# Patient Record
Sex: FEMALE | Race: WHITE | Employment: OTHER | URBAN - METROPOLITAN AREA
[De-identification: names, ages, dates, MRNs, and addresses within clinical notes are randomized per-mention and may not be internally consistent; named-entity substitution may affect disease eponyms.]

---

## 2017-02-27 DIAGNOSIS — F99 INSOMNIA DUE TO OTHER MENTAL DISORDER: ICD-10-CM

## 2017-02-27 DIAGNOSIS — F51.05 INSOMNIA DUE TO OTHER MENTAL DISORDER: ICD-10-CM

## 2017-02-28 NOTE — TELEPHONE ENCOUNTER
Trazodone 50 mg       Last Written Prescription Date: 8/9/16  Last Fill Quantity: 90; # refills: 1  Last Office Visit with [facility], [facility] or [facility] prescribing provider:  11/22/16        Last PHQ-9 score on record=   PHQ-9 SCORE 11/22/2016   Total Score -   Total Score 2       Lab Results   Component Value Date    AST 28 08/04/2015     Lab Results   Component Value Date    ALT 52 05/31/2016

## 2017-03-01 RX ORDER — TRAZODONE HYDROCHLORIDE 50 MG/1
TABLET, FILM COATED ORAL
Qty: 90 TABLET | Refills: 2 | Status: SHIPPED | OUTPATIENT
Start: 2017-03-01 | End: 2019-07-02

## 2017-03-01 NOTE — TELEPHONE ENCOUNTER
Insomnia and Depression     No labs needed  PHQ9 2  Prescription approved per [facility] Refill Protocol.  Blanka Rosales RN- Triage [employer]

## 2017-04-16 DIAGNOSIS — F32.5 MAJOR DEPRESSION IN COMPLETE REMISSION (H): ICD-10-CM

## 2017-04-17 NOTE — TELEPHONE ENCOUNTER
Venlafaxine 150 mg    Last Written Prescription Date: 11/1/16  Last Fill Quantity: 90, # refills: 1  Last Office Visit with [facility], [facility] or [facility] prescribing provider: 11/22/16        BP Readings from Last 3 Encounters:   11/22/16 124/78   06/14/16 136/70   05/31/16 120/70     Pulse: (for Fetzima)  Creatinine   Date Value Ref Range Status   08/04/2015 0.90 0.52 - 1.04 mg/dL Final   ]    Last PHQ-9 score on record=   PHQ-9 SCORE 11/22/2016   Total Score -   Total Score 2

## 2017-04-18 RX ORDER — VENLAFAXINE HYDROCHLORIDE 150 MG/1
CAPSULE, EXTENDED RELEASE ORAL
Qty: 90 CAPSULE | Refills: 1 | Status: SHIPPED | OUTPATIENT
Start: 2017-04-18 | End: 2017-10-22

## 2017-06-01 ENCOUNTER — OFFICE VISIT (OUTPATIENT)
Dept: FAMILY MEDICINE | Facility: CLINIC | Age: 67
End: 2017-06-01
Payer: COMMERCIAL

## 2017-06-01 VITALS
HEART RATE: 88 BPM | RESPIRATION RATE: 14 BRPM | OXYGEN SATURATION: 97 % | HEIGHT: 64 IN | TEMPERATURE: 98 F | DIASTOLIC BLOOD PRESSURE: 70 MMHG | WEIGHT: 147 LBS | BODY MASS INDEX: 25.1 KG/M2 | SYSTOLIC BLOOD PRESSURE: 110 MMHG

## 2017-06-01 DIAGNOSIS — Z23 NEED FOR DTAP VACCINE: ICD-10-CM

## 2017-06-01 DIAGNOSIS — Z23 NEED FOR PNEUMOCOCCAL VACCINE: ICD-10-CM

## 2017-06-01 DIAGNOSIS — R73.01 IMPAIRED FASTING GLUCOSE: ICD-10-CM

## 2017-06-01 DIAGNOSIS — F41.1 GENERALIZED ANXIETY DISORDER: ICD-10-CM

## 2017-06-01 DIAGNOSIS — E78.2 MIXED HYPERLIPIDEMIA: ICD-10-CM

## 2017-06-01 DIAGNOSIS — F32.5 MAJOR DEPRESSION IN COMPLETE REMISSION (H): ICD-10-CM

## 2017-06-01 DIAGNOSIS — K62.5 RECTAL BLEEDING: Primary | ICD-10-CM

## 2017-06-01 DIAGNOSIS — N18.2 CKD (CHRONIC KIDNEY DISEASE) STAGE 2, GFR 60-89 ML/MIN: ICD-10-CM

## 2017-06-01 DIAGNOSIS — E66.3 OVERWEIGHT (BMI 25.0-29.9): ICD-10-CM

## 2017-06-01 LAB
ALT SERPL W P-5'-P-CCNC: 27 U/L (ref 0–50)
ANION GAP SERPL CALCULATED.3IONS-SCNC: 9 MMOL/L (ref 3–14)
BUN SERPL-MCNC: 10 MG/DL (ref 7–30)
CALCIUM SERPL-MCNC: 8.7 MG/DL (ref 8.5–10.1)
CHLORIDE SERPL-SCNC: 102 MMOL/L (ref 94–109)
CHOLEST SERPL-MCNC: 187 MG/DL
CO2 SERPL-SCNC: 28 MMOL/L (ref 20–32)
CREAT SERPL-MCNC: 0.74 MG/DL (ref 0.52–1.04)
CREAT UR-MCNC: 32 MG/DL
GFR SERPL CREATININE-BSD FRML MDRD: 78 ML/MIN/1.7M2
GLUCOSE SERPL-MCNC: 93 MG/DL (ref 70–99)
HDLC SERPL-MCNC: 60 MG/DL
LDLC SERPL CALC-MCNC: 92 MG/DL
MICROALBUMIN UR-MCNC: <5 MG/L
MICROALBUMIN/CREAT UR: NORMAL MG/G CR (ref 0–25)
NONHDLC SERPL-MCNC: 127 MG/DL
POTASSIUM SERPL-SCNC: 3.6 MMOL/L (ref 3.4–5.3)
SODIUM SERPL-SCNC: 139 MMOL/L (ref 133–144)
TRIGL SERPL-MCNC: 177 MG/DL

## 2017-06-01 PROCEDURE — 82043 UR ALBUMIN QUANTITATIVE: CPT | Performed by: FAMILY MEDICINE

## 2017-06-01 PROCEDURE — 80048 BASIC METABOLIC PNL TOTAL CA: CPT | Performed by: FAMILY MEDICINE

## 2017-06-01 PROCEDURE — 84460 ALANINE AMINO (ALT) (SGPT): CPT | Performed by: FAMILY MEDICINE

## 2017-06-01 PROCEDURE — 90732 PPSV23 VACC 2 YRS+ SUBQ/IM: CPT | Performed by: FAMILY MEDICINE

## 2017-06-01 PROCEDURE — 99214 OFFICE O/P EST MOD 30 MIN: CPT | Mod: 25 | Performed by: FAMILY MEDICINE

## 2017-06-01 PROCEDURE — 90715 TDAP VACCINE 7 YRS/> IM: CPT | Mod: GY | Performed by: FAMILY MEDICINE

## 2017-06-01 PROCEDURE — 90471 IMMUNIZATION ADMIN: CPT | Mod: 59 | Performed by: FAMILY MEDICINE

## 2017-06-01 PROCEDURE — 36415 COLL VENOUS BLD VENIPUNCTURE: CPT | Performed by: FAMILY MEDICINE

## 2017-06-01 PROCEDURE — 80061 LIPID PANEL: CPT | Performed by: FAMILY MEDICINE

## 2017-06-01 PROCEDURE — G0009 ADMIN PNEUMOCOCCAL VACCINE: HCPCS | Performed by: FAMILY MEDICINE

## 2017-06-01 ASSESSMENT — ANXIETY QUESTIONNAIRES
2. NOT BEING ABLE TO STOP OR CONTROL WORRYING: NOT AT ALL
3. WORRYING TOO MUCH ABOUT DIFFERENT THINGS: NOT AT ALL
GAD7 TOTAL SCORE: 0
5. BEING SO RESTLESS THAT IT IS HARD TO SIT STILL: NOT AT ALL
6. BECOMING EASILY ANNOYED OR IRRITABLE: NOT AT ALL
1. FEELING NERVOUS, ANXIOUS, OR ON EDGE: NOT AT ALL
7. FEELING AFRAID AS IF SOMETHING AWFUL MIGHT HAPPEN: NOT AT ALL
IF YOU CHECKED OFF ANY PROBLEMS ON THIS QUESTIONNAIRE, HOW DIFFICULT HAVE THESE PROBLEMS MADE IT FOR YOU TO DO YOUR WORK, TAKE CARE OF THINGS AT HOME, OR GET ALONG WITH OTHER PEOPLE: NOT DIFFICULT AT ALL

## 2017-06-01 ASSESSMENT — PATIENT HEALTH QUESTIONNAIRE - PHQ9: 5. POOR APPETITE OR OVEREATING: NOT AT ALL

## 2017-06-01 NOTE — PATIENT INSTRUCTIONS
1.  Weight Loss Tips  1. Do not eat after 6 hrs before your expected bedtime  2. Have your heaviest meal for breakfast, a slightly lighter meal at lunch and a snack 6 hrs before bed  3. No sugar/calorie drinks except milk ie no fruit juice, pop, alcohol.  4. Drink milk 30min before meals to decrease your hunger. Also it is excellent as part of your last meal of the day snack  5. Drink lots of water  6. Increase fiber in diet: all bran cereal, salads, popcorn etc  7. Have only one small serving of fruit a day about 1/2 cup (as this is high in sugar)  8. EXERCISE is the bottom line. Without it, you will gain weight even on a low calorie diet. Best if done 2-3X a day as can    Being overweight contributes to high blood pressure and high cholesterol, both of which cause heart attacks, strokes and kidney failure, prediabetes and diabetes, arthritis, and liver disease     3. . Schedule your mammo at [facility]  At [address] at [phone]

## 2017-06-01 NOTE — MR AVS SNAPSHOT
After Visit Summary   6/1/2017    Emma Rudolph    MRN: [id number]           Patient Information     Date Of Birth          1950        Visit Information        Provider Department      6/1/2017 1:00 PM Angie Camacho MD [facility]        Today's Diagnoses     Impaired fasting glucose    -  1    Major depression in complete remission (H) on meds since 6-12-13        Overweight (BMI 25.0-29.9)        Generalized anxiety disorder        Mixed hyperlipidemia        Rectal bleeding          Care Instructions    1.  Weight Loss Tips  1. Do not eat after 6 hrs before your expected bedtime  2. Have your heaviest meal for breakfast, a slightly lighter meal at lunch and a snack 6 hrs before bed  3. No sugar/calorie drinks except milk ie no fruit juice, pop, alcohol.  4. Drink milk 30min before meals to decrease your hunger. Also it is excellent as part of your last meal of the day snack  5. Drink lots of water  6. Increase fiber in diet: all bran cereal, salads, popcorn etc  7. Have only one small serving of fruit a day about 1/2 cup (as this is high in sugar)  8. EXERCISE is the bottom line. Without it, you will gain weight even on a low calorie diet. Best if done 2-3X a day as can    Being overweight contributes to high blood pressure and high cholesterol, both of which cause heart attacks, strokes and kidney failure, prediabetes and diabetes, arthritis, and liver disease               Follow-ups after your visit        Additional Services     GASTROENTEROLOGY ADULT REF PROCEDURE ONLY       Last Lab Result: Creatinine (mg/dL)       Date                     Value                 08/04/2015               0.90             ----------  Body mass index is 25.23 kg/(m^2).     Needed:  No  Language:  English    Patient will be contacted to schedule procedure.     Please be aware that coverage of these services is subject to the terms and limitations  of your health insurance plan.  Call member services at your health plan with any benefit or coverage questions.  Any procedures must be performed at a [facility] facility OR coordinated by your clinic's referral office.    Please bring the following with you to your appointment:    (1) Any X-Rays, CTs or MRIs which have been performed.  Contact the facility where they were done to arrange for  prior to your scheduled appointment.    (2) List of current medications   (3) This referral request   (4) Any documents/labs given to you for this referral                  Who to contact     If you have questions or need follow up information about today's clinic visit or your schedule please contact [facility] directly at [phone].  Normal or non-critical lab and imaging results will be communicated to you by MyChart, letter or phone within 4 business days after the clinic has received the results. If you do not hear from us within 7 days, please contact the clinic through [employer] or phone. If you have a critical or abnormal lab result, we will notify you by phone as soon as possible.  Submit refill requests through [employer] or call your pharmacy and they will forward the refill request to us. Please allow 3 business days for your refill to be completed.          Additional Information About Your Visit        [employer] Information     [employer] gives you secure access to your electronic health record. If you see a primary care provider, you can also send messages to your care team and make appointments. If you have questions, please call your primary care clinic.  If you do not have a primary care provider, please call [phone] and they will assist you.        Care EveryWhere ID     This is your Care EveryWhere ID. This could be used by other organizations to access your [facility] medical records  [id number]        Your Vitals Were     Pulse Temperature Respirations Height Last Period  "Pulse Oximetry    88 98  F (36.7  C) (Tympanic) 14 5' 4\" (1.626 m) (LMP Unknown) 97%    Breastfeeding? BMI (Body Mass Index)                No 25.23 kg/m2           Blood Pressure from Last 3 Encounters:   06/01/17 110/70   11/22/16 124/78   06/14/16 136/70    Weight from Last 3 Encounters:   06/01/17 147 lb (66.7 kg)   11/22/16 146 lb 8 oz (66.5 kg)   06/14/16 151 lb (68.5 kg)              We Performed the Following     DEPRESSION ACTION PLAN (DAP)     GASTROENTEROLOGY ADULT REF PROCEDURE ONLY        Primary Care Provider Office Phone # Fax #    Angelina Puckett PA-C [phone]       [facility] [address]  [facility] [address]        Thank you!     Thank you for choosing [facility]  for your care. Our goal is always to provide you with excellent care. Hearing back from our patients is one way we can continue to improve our services. Please take a few minutes to complete the written survey that you may receive in the mail after your visit with us. Thank you!             Your Updated Medication List - Protect others around you: Learn how to safely use, store and throw away your medicines at www.disposemymeds.org.          This list is accurate as of: 6/1/17  1:30 PM.  Always use your most recent med list.                   Brand Name Dispense Instructions for use    ASPIRIN PO      Take 81 mg by mouth daily       MULTIVITAMIN PO      Take by mouth daily       simvastatin 20 MG tablet    ZOCOR    90 tablet    TAKE 1 TABLET AT BEDTIME       traZODone 50 MG tablet    DESYREL    90 tablet    TAKE 1 TABLET NIGHTLY AS NEEDED FOR SLEEP       venlafaxine 150 MG 24 hr capsule    EFFEXOR-XR    90 capsule    TAKE 1 CAPSULE DAILY       vitamin D 1000 UNITS capsule      Take 1 capsule by mouth daily.         "

## 2017-06-01 NOTE — NURSING NOTE
"Chief Complaint   Patient presents with     Rectal Problem     /70  Pulse 88  Temp 98  F (36.7  C) (Tympanic)  Resp 14  Ht 5' 4\" (1.626 m)  Wt 147 lb (66.7 kg)  LMP  (LMP Unknown)  SpO2 97%  Breastfeeding? No  BMI 25.23 kg/m2 Estimated body mass index is 25.23 kg/(m^2) as calculated from the following:    Height as of this encounter: 5' 4\" (1.626 m).    Weight as of this encounter: 147 lb (66.7 kg).  BP completed using cuff size: regular   Kelli Napier CMA    Health Maintenance Due   Topic Date Due     MICROALBUMIN Q1 YEAR  11/13/1951     DEXA SCAN SCREENING (SYSTEM ASSIGNED)  11/13/2015     MILENA QUESTIONNAIRE 6 MONTHS  05/22/2017     PHQ-9 Q6 MONTHS  05/22/2017     FALL RISK ASSESSMENT  06/14/2017     Health Maintenance reviewed at today's visit patient asked to schedule/complete:   Depression:  Patient agrees to schedule  Immunizations:  Patient agrees to schedule    "

## 2017-06-01 NOTE — LETTER
My Depression Action Plan  Name: Emma Rudolph   Date of Birth 1950  Date: 6/1/2017    My doctor: Angelina Puckett   My clinic: [address] [address]  [phone]          GREEN    ZONE   Good Control    What it looks like:     Things are going generally well. You have normal up s and down s. You may even feel depressed from time to time, but bad moods usually last less than a day.   What you need to do:  1. Continue to care for yourself (see self care plan)  2. Check your depression survival kit and update it as needed  3. Follow your physician s recommendations including any medication.  4. Do not stop taking medication unless you consult with your physician first.           YELLOW         ZONE Getting Worse    What it looks like:     Depression is starting to interfere with your life.     It may be hard to get out of bed; you may be starting to isolate yourself from others.    Symptoms of depression are starting to last most all day and this has happened for several days.     You may have suicidal thoughts but they are not constant.   What you need to do:     1. Call your care team, your response to treatment will improve if you keep your care team informed of your progress. Yellow periods are signs an adjustment may need to be made.     2. Continue your self-care, even if you have to fake it!    3. Talk to someone in your support network    4. Open up your depression survival kit           RED    ZONE Medical Alert - Get Help    What it looks like:     Depression is seriously interfering with your life.     You may experience these or other symptoms: You can t get out of bed most days, can t work or engage in other necessary activities, you have trouble taking care of basic hygiene, or basic responsibilities, thoughts of suicide or death that will not go away, self-injurious behavior.      What you need to do:  1. Call your care team and request a same-day appointment. If they are not available (weekends or after hours) call your local crisis line, emergency room or 911.      Electronically signed by: Angie Camacho, June 1, 2017    Depression Self Care Plan / Survival Kit    Self-Care for Depression  Here s the deal. Your body and mind are really not as separate as most people think.  What you do and think affects how you feel and how you feel influences what you do and think. This means if you do things that people who feel good do, it will help you feel better.  Sometimes this is all it takes.  There is also a place for medication and therapy depending on how severe your depression is, so be sure to consult with your medical provider and/ or Behavioral Health Consultant if your symptoms are worsening or not improving.     In order to better manage my stress, I will:    Exercise  Get some form of exercise, every day. This will help reduce pain and release endorphins, the  feel good  chemicals in your brain. This is almost as good as taking antidepressants!  This is not the same as joining a gym and then never going! (they count on that by the way ) It can be as simple as just going for a walk or doing some gardening, anything that will get you moving.      Hygiene   Maintain good hygiene (Get out of bed in the morning, Make your bed, Brush your teeth, Take a shower, and Get dressed like you were going to work, even if you are unemployed).  If your clothes don't fit try to get ones that do.    Diet  I will strive to eat foods that are good for me, drink plenty of water, and avoid excessive sugar, caffeine, alcohol, and other mood-altering substances.  Some foods that are helpful in depression are: complex carbohydrates, B vitamins, flaxseed, fish or fish oil, fresh fruits and vegetables.    Psychotherapy  I agree to participate in Individual Therapy (if recommended).    Medication  If prescribed  medications, I agree to take them.  Missing doses can result in serious side effects.  I understand that drinking alcohol, or other illicit drug use, may cause potential side effects.  I will not stop my medication abruptly without first discussing it with my provider.    Staying Connected With Others  I will stay in touch with my friends, family members, and my primary care provider/team.    Use your imagination  Be creative.  We all have a creative side; it doesn t matter if it s oil painting, sand castles, or mud pies! This will also kick up the endorphins.    Witness Beauty  (AKA stop and smell the roses) Take a look outside, even in mid-winter. Notice colors, textures. Watch the squirrels and birds.     Service to others  Be of service to others.  There is always someone else in need.  By helping others we can  get out of ourselves  and remember the really important things.  This also provides opportunities for practicing all the other parts of the program.    Humor  Laugh and be silly!  Adjust your TV habits for less news and crime-drama and more comedy.    Control your stress  Try breathing deep, massage therapy, biofeedback, and meditation. Find time to relax each day.     My support system    Clinic Contact:  Phone number:    Contact 1:  Phone number:    Contact 2:  Phone number:    [employer]/:  Phone number:    Therapist:  Phone number:    [facility] crisis center:    Phone number:    Other community support:  Phone number:

## 2017-06-01 NOTE — LETTER
[facility]  [address]  [facility] [id number] [phone]                                                                                                           Emma Rudolph  [address]  [facility] [address]    June 2, 2017      Dear Emma,    The results of your recent tests were reviewed and are enclosed.     All of your lab results are normal, except as noted below.     The following are explanations of some of our lab tests     THIS DOES NOT MEAN THAT YOU HAD ALL OF THESE DONE     Please continue on the same medications unless a change is noted above     These are some general explanations for tests:     Hgb is the blood iron level   WBC means White Blood Cells   Platelets are small blood cells that help with forming the blood clots along with other blood factors.   Electrolytes are Sodium, Potassium, Calcium, Magnesium, Phosphorus.   Liver tests are: AST, ALT, Bilirubin, Alkaline Phosphatase.###your ALT is just slightly high  And has been this high before , 3 yrs ago   Kidney tests are Creatinine, GFR.   HDL Cholesterol - is the good cholesterol and it is good to have it high.   LDL cholesterol is the bad cholesterol and it is good to have it low.   It is recommended to have LDL less than 130 for people with hypertension and to have it less than 100 for people with heart disease, diabetes and chronic kidney disease.   Triglycerides are another type of lipid and can also cause heart disease so should be kept low. ###very slightly high -not significant   Thyroid tests are TSH, T4, T3   Glucose is sugar###just a little high as it has been in the past   This is a sign of prediabetes . The only way known to prevent diabetes or keep it from getting worse is exercise, 20-40 minutes 3 times a day around the time of meals as your insulin is wearing out  You need to get rid of the sugar using your muscles     A1c is a test that gives  us an idea about how well was controlled the diabetes for the last 3 months.   PSA stands for Prostate Specific Antigen and it can be elevated with prostate cancer or prostate inflammation.              Results for orders placed or performed in visit on 06/01/17   ALT   Result Value Ref Range    ALT 27 0 - 50 U/L   Lipid panel reflex to direct LDL   Result Value Ref Range    Cholesterol 187 <200 mg/dL    Triglycerides 177 (H) <150 mg/dL    HDL Cholesterol 60 >49 mg/dL    LDL Cholesterol Calculated 92 <100 mg/dL    Non HDL Cholesterol 127 <130 mg/dL   Albumin Random Urine Quantitative   Result Value Ref Range    Creatinine Urine 32 mg/dL    Albumin Urine mg/L <5 mg/L    Albumin Urine mg/g Cr Unable to calculate due to low value 0 - 25 mg/g Cr   Basic metabolic panel   Result Value Ref Range    Sodium 139 133 - 144 mmol/L    Potassium 3.6 3.4 - 5.3 mmol/L    Chloride 102 94 - 109 mmol/L    Carbon Dioxide 28 20 - 32 mmol/L    Anion Gap 9 3 - 14 mmol/L    Glucose 93 70 - 99 mg/dL    Urea Nitrogen 10 7 - 30 mg/dL    Creatinine 0.74 0.52 - 1.04 mg/dL    GFR Estimate 78 >60 mL/min/1.7m2    GFR Estimate If Black >90   GFR Calc   >60 mL/min/1.7m2    Calcium 8.7 8.5 - 10.1 mg/dL       Thank you for choosing [facility].  We appreciate the opportunity to serve you and look forward to supporting your healthcare needs in the future.    If you have any questions or concerns, please call me or my staff at [phone].      Sincerely,    Angie Camacho MD

## 2017-06-01 NOTE — NURSING NOTE
Screening Questionnaire for Adult Immunization    Are you sick today?   No   Do you have allergies to medications, food, a vaccine component or latex?   No   Have you ever had a serious reaction after receiving a vaccination?   No   Do you have a long-term health problem with heart disease, lung disease, asthma, kidney disease, metabolic disease (e.g. diabetes), anemia, or other blood disorder?   No   Do you have cancer, leukemia, HIV/AIDS, or any other immune system problem?   No   In the past 3 months, have you taken medications that affect  your immune system, such as prednisone, other steroids, or anticancer drugs; drugs for the treatment of rheumatoid arthritis, Crohn s disease, or psoriasis; or have you had radiation treatments?   No   Have you had a seizure, or a brain or other nervous system problem?   No   During the past year, have you received a transfusion of blood or blood     products, or been given immune (gamma) globulin or antiviral drug?   No   For women: Are you pregnant or is there a chance you could become        pregnant during the next month?   No   Have you received any vaccinations in the past 4 weeks?   No     Immunization questionnaire answers were all negative.      MNVFC doesn't apply on this patient    Per orders of Dr. Camacho, injection of Tdap and Pneumococcal 23 given by Kelli Napier. Patient instructed to remain in clinic for 20 minutes afterwards, and to report any adverse reaction to me immediately.       Screening performed by Kelli Napier on 6/1/2017 at 2:01 PM.

## 2017-06-01 NOTE — PROGRESS NOTES
SUBJECTIVE:                                                    Emma Rudolph is a 66 year old female who presents to clinic today for the following health issues:    Hemorrhoids      Duration: x 1.5 day    Description:   Pain: no   Itching: no     Accompanying signs and symptoms:   Blood in stool: 4 x  Over 2 d and BRB and went to bottom of toilet    Changes in stool pattern: no wnl BM q d     History (similar episodes/previous evaluation): None    Precipitating or alleviating factors: None    Therapies tried and outcome: none    Colonoscopy in 4-15 --> rept in 10 yrs      Glucose Intolerance   Follow-up      Patient is checking blood sugars: not at all    Diabetic concerns: None     Symptoms of hypoglycemia (low blood sugar): none     Paresthesias (numbness or burning in feet) or sores: No     Date of last diabetic eye exam: 2016     MIxed Hyperlipidemia Follow-Up      Rate your low fat/cholesterol diet?: not monitoring fat    Taking statin?  Yes, no muscle aches from 20mgm simvastatin    Other lipid medications/supplements?:  None    fam hx CAD     Had a hi ALT in the past r/o from statin      OVERWEIGHT    -steady wt for yrs   -comorbid hi LDL , Glucose Intolerance    CKD Stage 3    -no use of NSAIDS  -comorbid hi LDL     Depression and Anxiety Follow-Up    Status since last visit: No change-in remission on meds     Other associated symptoms:None    Complicating factors:     Significant life event: No     Current substance abuse: None    No insomnia off trazodone for 3 mo     PHQ-9 SCORE 5/31/2016 6/14/2016 11/22/2016   Total Score - - -   Total Score 4 0 2     MILENA-7 SCORE 5/31/2016 6/14/2016 11/22/2016   Total Score 0 0 3        PHQ-9  English =0     PHQ-9   Any Language     GAD7=0       Problem list and histories reviewed & adjusted, as indicated.  Additional history: as documented    Labs reviewed in EPIC    Reviewed and updated as needed this visit by clinical staff  Tobacco  Allergies  Meds  Med  "Hx  Surg Hx  Fam Hx  Soc Hx      Reviewed and updated as needed this visit by Provider         ROS:  C: NEGATIVE for fever, chills, change in weight  I: NEGATIVE for worrisome rashes, moles or lesions  E: NEGATIVE for vision changes or irritation  E/M: NEGATIVE for ear, mouth and throat problems  R: NEGATIVE for significant cough or SOB  B: NEGATIVE for masses, tenderness or discharge  CV: NEGATIVE for chest pain, palpitations or peripheral edema  GI: POSITIVE for  and hematochezia  : NEGATIVE for frequency, dysuria, or hematuria  M: NEGATIVE for significant arthralgias or myalgia  N: NEGATIVE for weakness, dizziness or paresthesias  E: NEGATIVE for temperature intolerance, skin/hair changes  H: NEGATIVE for bleeding problems  P: NEGATIVE for changes in mood or affect    OBJECTIVE:                                                    /70  Pulse 88  Temp 98  F (36.7  C) (Tympanic)  Resp 14  Ht 5' 4\" (1.626 m)  Wt 147 lb (66.7 kg)  LMP  (LMP Unknown)  SpO2 97%  Breastfeeding? No  BMI 25.23 kg/m2  Body mass index is 25.23 kg/(m^2).  GENERAL: healthy, alert and no distress  EYES: Eyes grossly normal to inspection, PERRL and conjunctivae and sclerae normal  RESP: lungs clear to auscultation - no rales, rhonchi or wheezes  CV: regular rate and rhythm, normal S1 S2, no S3 or S4, no murmur, click or rub, no peripheral edema and peripheral pulses strong  ABDOMEN: soft, nontender, without hepatosplenomegaly or masses, soft, nontender and bowel sounds normal  MS: no gross musculoskeletal defects noted, no edema  SKIN: no suspicious lesions or rashes  NEURO: Normal strength and tone, mentation intact and speech normal  PSYCH: mentation appears normal, affect normal/bright    Diagnostic Test Results:  Results for orders placed or performed in visit on 06/01/17   ALT   Result Value Ref Range    ALT 27 0 - 50 U/L   Lipid panel reflex to direct LDL   Result Value Ref Range    Cholesterol 187 <200 mg/dL    " Triglycerides 177 (H) <150 mg/dL    HDL Cholesterol 60 >49 mg/dL    LDL Cholesterol Calculated 92 <100 mg/dL    Non HDL Cholesterol 127 <130 mg/dL   Albumin Random Urine Quantitative   Result Value Ref Range    Creatinine Urine 32 mg/dL    Albumin Urine mg/L <5 mg/L    Albumin Urine mg/g Cr Unable to calculate due to low value 0 - 25 mg/g Cr   Basic metabolic panel   Result Value Ref Range    Sodium 139 133 - 144 mmol/L    Potassium 3.6 3.4 - 5.3 mmol/L    Chloride 102 94 - 109 mmol/L    Carbon Dioxide 28 20 - 32 mmol/L    Anion Gap 9 3 - 14 mmol/L    Glucose 93 70 - 99 mg/dL    Urea Nitrogen 10 7 - 30 mg/dL    Creatinine 0.74 0.52 - 1.04 mg/dL    GFR Estimate 78 >60 mL/min/1.7m2    GFR Estimate If Black >90   GFR Calc   >60 mL/min/1.7m2    Calcium 8.7 8.5 - 10.1 mg/dL        ASSESSMENT/PLAN:                                                              ICD-10-CM    1. Rectal bleeding K62.5 GASTROENTEROLOGY ADULT REF PROCEDURE ONLY   2. Major depression in complete remission (H) on meds since 6-12-13 F32.5    3. Generalized anxiety disorder F41.1    4. Impaired fasting glucose R73.01    5. Overweight (BMI 25.0-29.9) E66.3 ALT   6. Mixed hyperlipidemia E78.2 ALT     Lipid panel reflex to direct LDL   7. Need for pneumococcal vaccine Z23 PNEUMOVAX - MEDICARE     Pneumococcal vaccine 23 valent PPSV23  (Pneumovax) [06667]   8. Need for DTaP vaccine Z23 TDAP VACCINE (ADACEL)     ADMIN PNEUMOCOCCAL VACCINE (For MEDICARE Pt. ONLY) []   9. CKD (chronic kidney disease) stage 2, GFR 60-89 ml/min N18.2 Albumin Random Urine Quantitative     Basic metabolic panel       Patient Instructions   1.  Weight Loss Tips  1. Do not eat after 6 hrs before your expected bedtime  2. Have your heaviest meal for breakfast, a slightly lighter meal at lunch and a snack 6 hrs before bed  3. No sugar/calorie drinks except milk ie no fruit juice, pop, alcohol.  4. Drink milk 30min before meals to decrease your hunger. Also it  is excellent as part of your last meal of the day snack  5. Drink lots of water  6. Increase fiber in diet: all bran cereal, salads, popcorn etc  7. Have only one small serving of fruit a day about 1/2 cup (as this is high in sugar)  8. EXERCISE is the bottom line. Without it, you will gain weight even on a low calorie diet. Best if done 2-3X a day as can    Being overweight contributes to high blood pressure and high cholesterol, both of which cause heart attacks, strokes and kidney failure, prediabetes and diabetes, arthritis, and liver disease     3. . Schedule your mammo at [facility]  At [address] at [phone]          Angie Camacho MD  [facility]    Weight management plan: Discussed healthy diet and exercise guidelines and patient will follow up in 6 months in clinic to re-evaluate.      Angie Camacho MD

## 2017-06-02 ASSESSMENT — PATIENT HEALTH QUESTIONNAIRE - PHQ9: SUM OF ALL RESPONSES TO PHQ QUESTIONS 1-9: 0

## 2017-06-02 ASSESSMENT — ANXIETY QUESTIONNAIRES: GAD7 TOTAL SCORE: 0

## 2017-06-02 NOTE — PROGRESS NOTES
Please see attached lab results  All of your lab results are normal, except as noted below.    The following are explanations of some of our lab tests    THIS DOES NOT MEAN THAT YOU HAD ALL OF THESE DONE    Please continue on the same medications unless a change is noted above    These are some general explanations for tests:    Hgb is the blood iron level  WBC means White Blood Cells  Platelets are small blood cells that help with forming the blood clots along with other blood factors.  Electrolytes are Sodium, Potassium, Calcium, Magnesium, Phosphorus.  Liver tests are: AST, ALT, Bilirubin, Alkaline Phosphatase.###your ALT is just slightly high  And has been this high before , 3 yrs ago   Kidney tests are Creatinine, GFR.  HDL Cholesterol - is the good cholesterol and it is good to have it high.  LDL cholesterol is the bad cholesterol and it is good to have it low.  It is recommended to have LDL less than 130 for people with hypertension and to have it less than 100 for people with heart disease, diabetes and chronic kidney disease.  Triglycerides are another type of lipid and can also cause heart disease so should be kept low. ###very slightly high -not significant   Thyroid tests are TSH, T4, T3  Glucose is sugar###just a little high as it has been in the past   This is a sign of prediabetes . The only way known to prevent diabetes or keep it from getting worse is exercise, 20-40 minutes 3 times a day around the time of meals as your insulin is wearing out  You need to get rid of the sugar using your muscles     A1c is a test that gives us an idea about how well was controlled the diabetes for the last 3 months.   PSA stands for Prostate Specific Antigen and it can be elevated with prostate cancer or prostate inflammation.

## 2017-08-04 ENCOUNTER — HOSPITAL ENCOUNTER (OUTPATIENT)
Dept: BONE DENSITY | Facility: CLINIC | Age: 67
End: 2017-08-04
Attending: FAMILY MEDICINE
Payer: COMMERCIAL

## 2017-08-04 ENCOUNTER — HOSPITAL ENCOUNTER (OUTPATIENT)
Dept: MAMMOGRAPHY | Facility: CLINIC | Age: 67
Discharge: HOME | End: 2017-08-04
Attending: FAMILY MEDICINE | Admitting: FAMILY MEDICINE
Payer: COMMERCIAL

## 2017-08-04 DIAGNOSIS — Z12.31 ENCOUNTER FOR SCREENING MAMMOGRAM FOR HIGH-RISK PATIENT: ICD-10-CM

## 2017-08-04 PROCEDURE — 77080 DXA BONE DENSITY AXIAL: CPT

## 2017-08-04 PROCEDURE — G0202 SCR MAMMO BI INCL CAD: HCPCS

## 2017-10-11 ENCOUNTER — OFFICE VISIT (OUTPATIENT)
Dept: FAMILY MEDICINE | Facility: CLINIC | Age: 67
End: 2017-10-11
Payer: COMMERCIAL

## 2017-10-11 VITALS
OXYGEN SATURATION: 97 % | TEMPERATURE: 97.3 F | RESPIRATION RATE: 20 BRPM | DIASTOLIC BLOOD PRESSURE: 80 MMHG | HEART RATE: 98 BPM | BODY MASS INDEX: 26.29 KG/M2 | HEIGHT: 64 IN | SYSTOLIC BLOOD PRESSURE: 130 MMHG | WEIGHT: 154 LBS

## 2017-10-11 DIAGNOSIS — M67.431 GANGLION CYST OF WRIST, RIGHT: Primary | ICD-10-CM

## 2017-10-11 PROCEDURE — 99213 OFFICE O/P EST LOW 20 MIN: CPT | Performed by: INTERNAL MEDICINE

## 2017-10-11 NOTE — PATIENT INSTRUCTIONS
Ganglion Cyst    A ganglion cyst usually is a painless bump on the wrist or finger joint. It connects to the joint capsule and grows like a balloon on a stalk. It is filled with joint fluid. The cause of a ganglion cyst is not known.   If the cyst puts pressure on a nearby nerve it may cause pain. Otherwise, cysts are painless and harmless. Most tend to disappear over time without treatment. Do not try to drain or break the cyst at home. This can cause harm and does not cure the problem.  If you are having pain from the cyst, a temporary wrist splint may be helpful to limit wrist motion. If this does not help, the fluid can be removed from the cyst. This should shrink the size of the cyst. If this doesn t give relief, the ganglion can be removed by surgery.  Home care    If you are having wrist pain, use a wrist splint for 1-2 weeks at a time. You can buy one at many drug stores without a prescription.    You may use over-the-counter pain medicine to control pain, unless another medicine was prescribed. If you have chronic liver or kidney disease or ever had a stomach ulcer or GI bleeding, talk with your healthcare provider before using these medicines.      If a needle was used to drain the cyst fluid or inject medicine into it, keep the site clean and covered with a bandage for the first 24 hours. If a pressure dressing was applied, leave it in place for the time advised.  Follow-up care  Follow up with your healthcare provider, or as advised by our staff. Make an appointment for a repeat exam if pain continues for more than 2 weeks in a wrist splint.  When to seek medical advice  Call your healthcare provider right away if any of these occur:    Increasing pain in the wrist    Redness over the cyst    Fluid draining from the cyst    Numbness or tingling in the hand or arm  Date Last Reviewed: 11/20/2015 2000-2017 [employer]. [address] [place] [address], [place] [address]. All rights reserved. This  information is not intended as a substitute for professional medical care. Always follow your healthcare professional's instructions.

## 2017-10-11 NOTE — PROGRESS NOTES
SUBJECTIVE:   Emma Rudolph is a 66 year old female who presents to clinic today for the following health issues:    Musculoskeletal problem/pain      Duration: >2 weeks    Description  Location: Right Wrist    Intensity:  moderate    Accompanying signs and symptoms: Noticed two nodules.    History  Previous similar problem: no   Previous evaluation:  none    Precipitating or alleviating factors:  Trauma or overuse: no   Aggravating factors include: none    Therapies tried and outcome: nothing        PMH:   Patient Active Problem List   Diagnosis     Generalized anxiety disorder     Mixed hyperlipidemia     CKD (chronic kidney disease) stage 2, GFR 60-89 ml/min     Overweight (BMI 25.0-29.9)     ACP (advance care planning)     Major depression in complete remission (H) on meds since 6-12-13     Insomnia due to other mental disorder since 33y/o when became depressed      Elevated ALT measurement     Osteoarthritis of multiple joints, unspecified osteoarthritis type of #2&3 fingers bilat R>L     Family history of ischemic heart disease     Memory loss     Primary insomnia     Impaired fasting glucose     Past Surgical History:   Procedure Laterality Date     COLONOSCOPY       EYE SURGERY       H ARGON LASER FOR RETINAL TEAR Left 3/2014     HEAD & NECK SURGERY      wisdom teeth     LAPAROSCOPIC SINGLE SITE SALPINGO-OOPHORECTOMY Bilateral 4/15/2015    Procedure: LAPAROSCOPIC SINGLE SITE SALPINGO-OOPHORECTOMY;  Surgeon: Leonora Zavaleta MD;  Location:  [facility]     wisdom teeth removal  age 19       Social History   Substance Use Topics     Smoking status: Never Smoker     Smokeless tobacco: Never Used     Alcohol use 0.0 oz/week     0 Standard drinks or equivalent per week      Comment: socially     Family History   Problem Relation Age of Onset     Breast Cancer Mother 80     Neurologic Disorder Father      Parkinson's     Coronary Artery Disease Father 44     MI     Lipids Brother      Lipids Brother      Family  "History Negative Brother      Lipids Sister      Family History Negative Sister      Family History Negative Sister      Family History Negative Sister      CEREBROVASCULAR DISEASE Maternal Grandmother 86     CANCER Maternal Grandfather 69     leukemia     CANCER Paternal Grandfather 69     stomach     DIABETES No family hx of      Cancer - colorectal No family hx of      Hypertension No family hx of      Hyperlipidemia No family hx of      Colon Cancer No family hx of      Prostate Cancer No family hx of      Other Cancer No family hx of      Depression No family hx of      Anxiety Disorder No family hx of      MENTAL ILLNESS No family hx of      Substance Abuse No family hx of      Anesthesia Reaction No family hx of      Asthma No family hx of      OSTEOPOROSIS No family hx of      Genetic Disorder No family hx of      Thyroid Disease No family hx of      Obesity No family hx of      Unknown/Adopted No family hx of          Current Outpatient Prescriptions   Medication Sig Dispense Refill     venlafaxine (EFFEXOR-XR) 150 MG 24 hr capsule TAKE 1 CAPSULE DAILY 90 capsule 1     traZODone (DESYREL) 50 MG tablet TAKE 1 TABLET NIGHTLY AS NEEDED FOR SLEEP 90 tablet 2     simvastatin (ZOCOR) 20 MG tablet TAKE 1 TABLET AT BEDTIME 90 tablet 2     ASPIRIN PO Take 81 mg by mouth daily       Multiple Vitamins-Minerals (MULTIVITAMIN OR) Take by mouth daily       Cholecalciferol (VITAMIN D) 1000 UNITS capsule Take 1 capsule by mouth daily.       Allergies   Allergen Reactions     Penicillins Swelling and Rash     Amoxicillin     Sulfa Drugs Hives     Trazodone Fatigue     Really tired         ROS:  C: NEGATIVE for fever, chills, change in weight    OBJECTIVE:                                                    /80  Pulse 98  Temp 97.3  F (36.3  C) (Tympanic)  Resp 20  Ht 5' 4\" (1.626 m)  Wt 154 lb (69.9 kg)  LMP  (LMP Unknown)  SpO2 97%  Breastfeeding? No  BMI 26.43 kg/m2  Body mass index is 26.43 kg/(m^2).  " Apical pulse 90 and regular  GENERAL: healthy, alert and no distress  MS: RUE exam shows two small nodules in Right wrist.  One appears to be a ganglion the other is bone hard    Diagnostic Test Results:  none      ASSESSMENT/PLAN:                                                    1. Ganglion cyst of wrist, right  And bone hard nodule Right wrist.    - ORTHO  REFERRAL        Will call or return to clinic if worsening or symptoms not improving as discussed.  See Patient Instructions      Ayo Latham MD  [facility]

## 2017-10-11 NOTE — NURSING NOTE
"Chief Complaint   Patient presents with     Musculoskeletal Problem     /80  Pulse 98  Temp 97.3  F (36.3  C) (Tympanic)  Resp 20  Ht 5' 4\" (1.626 m)  Wt 154 lb (69.9 kg)  LMP  (LMP Unknown)  SpO2 97%  Breastfeeding? No  BMI 26.43 kg/m2 Estimated body mass index is 26.43 kg/(m^2) as calculated from the following:    Height as of this encounter: 5' 4\" (1.626 m).    Weight as of this encounter: 154 lb (69.9 kg).  BP completed using cuff size: regular   Kelli Napier CMA    Health Maintenance Due   Topic Date Due     FALL RISK ASSESSMENT  06/14/2017     INFLUENZA VACCINE (SYSTEM ASSIGNED)  09/01/2017     Health Maintenance reviewed at today's visit patient asked to schedule/complete:   Immunizations:  Patient agrees to schedule    "

## 2017-10-11 NOTE — MR AVS SNAPSHOT
After Visit Summary   10/11/2017    Emma Rudolph    MRN: [id number]           Patient Information     Date Of Birth          1950        Visit Information        Provider Department      10/11/2017 11:30 AM Ayo Latham MD [facility]        Today's Diagnoses     Ganglion cyst of wrist, right    -  1      Care Instructions      Ganglion Cyst    A ganglion cyst usually is a painless bump on the wrist or finger joint. It connects to the joint capsule and grows like a balloon on a stalk. It is filled with joint fluid. The cause of a ganglion cyst is not known.   If the cyst puts pressure on a nearby nerve it may cause pain. Otherwise, cysts are painless and harmless. Most tend to disappear over time without treatment. Do not try to drain or break the cyst at home. This can cause harm and does not cure the problem.  If you are having pain from the cyst, a temporary wrist splint may be helpful to limit wrist motion. If this does not help, the fluid can be removed from the cyst. This should shrink the size of the cyst. If this doesn t give relief, the ganglion can be removed by surgery.  Home care    If you are having wrist pain, use a wrist splint for 1-2 weeks at a time. You can buy one at many drug stores without a prescription.    You may use over-the-counter pain medicine to control pain, unless another medicine was prescribed. If you have chronic liver or kidney disease or ever had a stomach ulcer or GI bleeding, talk with your healthcare provider before using these medicines.      If a needle was used to drain the cyst fluid or inject medicine into it, keep the site clean and covered with a bandage for the first 24 hours. If a pressure dressing was applied, leave it in place for the time advised.  Follow-up care  Follow up with your healthcare provider, or as advised by our staff. Make an appointment for a repeat exam if pain continues for more than 2  weeks in a wrist splint.  When to seek medical advice  Call your healthcare provider right away if any of these occur:    Increasing pain in the wrist    Redness over the cyst    Fluid draining from the cyst    Numbness or tingling in the hand or arm  Date Last Reviewed: 11/20/2015 2000-2017 [employer]. [address] [place] [address]. All rights reserved. This information is not intended as a substitute for professional medical care. Always follow your healthcare professional's instructions.                Follow-ups after your visit        Additional Services     ORTHO  REFERRAL       [facility] is referring you to the Orthopedic  Services at [facility].       The  Representative will assist you in the coordination of your Orthopedic and Musculoskeletal Care as prescribed by your physician.    The  Representative will call you within 1 business day to help schedule your appointment, or you may contact the  Representative at:    All areas ~ [phone]     Type of Referral : Ganglion       Timeframe requested: Within 2 weeks    Coverage of these services is subject to the terms and limitations of your health insurance plan.  Please call member services at your health plan with any benefit or coverage questions.      If X-rays, CT or MRI's have been performed, please contact the facility where they were done to arrange for , prior to your scheduled appointment.  Please bring this referral request to your appointment and present it to your specialist.                  Who to contact     If you have questions or need follow up information about today's clinic visit or your schedule please contact [facility] directly at [phone].  Normal or non-critical lab and imaging results will be communicated to you by MyChart, letter or phone within 4 business days after the  "clinic has received the results. If you do not hear from us within 7 days, please contact the clinic through [facility] or phone. If you have a critical or abnormal lab result, we will notify you by phone as soon as possible.  Submit refill requests through [facility] or call your pharmacy and they will forward the refill request to us. Please allow 3 business days for your refill to be completed.          Additional Information About Your Visit        [employer] Information     [facility] gives you secure access to your electronic health record. If you see a primary care provider, you can also send messages to your care team and make appointments. If you have questions, please call your primary care clinic.  If you do not have a primary care provider, please call [phone] and they will assist you.        Care EveryWhere ID     This is your Care EveryWhere ID. This could be used by other organizations to access your [place] medical records  [id number]        Your Vitals Were     Pulse Temperature Respirations Height Last Period Pulse Oximetry    98 97.3  F (36.3  C) (Tympanic) 20 5' 4\" (1.626 m) (LMP Unknown) 97%    Breastfeeding? BMI (Body Mass Index)                No 26.43 kg/m2           Blood Pressure from Last 3 Encounters:   10/11/17 130/80   06/01/17 110/70   11/22/16 124/78    Weight from Last 3 Encounters:   10/11/17 154 lb (69.9 kg)   06/01/17 147 lb (66.7 kg)   11/22/16 146 lb 8 oz (66.5 kg)              We Performed the Following     ORTHO  REFERRAL        Primary Care Provider Office Phone # Fax #    Angelina Puckett PA-C [phone]       [address]  [facility] [address]        Equal Access to Services     [facility] HUMA : Hadii zuleika hagen Soyahaira, waaxda luqadaha, qaybta kaalmada adeegyada, luciano jennings. So [facility] [phone].    ATENCIÓN: Si habla español, tiene a wiggins disposición servicios gratuitos de asistencia lingüística. " Estella rios [phone].    We comply with applicable federal civil rights laws and [place] laws. We do not discriminate on the basis of race, color, national origin, age, disability, sex, sexual orientation, or gender identity.            Thank you!     Thank you for choosing [facility]  for your care. Our goal is always to provide you with excellent care. Hearing back from our patients is one way we can continue to improve our services. Please take a few minutes to complete the written survey that you may receive in the mail after your visit with us. Thank you!             Your Updated Medication List - Protect others around you: Learn how to safely use, store and throw away your medicines at www.disposemymeds.org.          This list is accurate as of: 10/11/17 11:53 AM.  Always use your most recent med list.                   Brand Name Dispense Instructions for use Diagnosis    ASPIRIN PO      Take 81 mg by mouth daily        MULTIVITAMIN PO      Take by mouth daily        simvastatin 20 MG tablet    ZOCOR    90 tablet    TAKE 1 TABLET AT BEDTIME    Mixed hyperlipidemia       traZODone 50 MG tablet    DESYREL    90 tablet    TAKE 1 TABLET NIGHTLY AS NEEDED FOR SLEEP    Insomnia due to other mental disorder       venlafaxine 150 MG 24 hr capsule    EFFEXOR-XR    90 capsule    TAKE 1 CAPSULE DAILY    Major depression in complete remission (H)       vitamin D 1000 UNITS capsule      Take 1 capsule by mouth daily.

## 2017-10-16 ENCOUNTER — OFFICE VISIT (OUTPATIENT)
Dept: ORTHOPEDICS | Facility: CLINIC | Age: 67
End: 2017-10-16
Payer: COMMERCIAL

## 2017-10-16 VITALS
BODY MASS INDEX: 26.29 KG/M2 | WEIGHT: 154 LBS | HEIGHT: 64 IN | SYSTOLIC BLOOD PRESSURE: 138 MMHG | DIASTOLIC BLOOD PRESSURE: 84 MMHG

## 2017-10-16 DIAGNOSIS — M67.439 PALMAR WRIST GANGLION: Primary | ICD-10-CM

## 2017-10-16 DIAGNOSIS — M65.4 DE QUERVAIN'S DISEASE (RADIAL STYLOID TENOSYNOVITIS): ICD-10-CM

## 2017-10-16 PROCEDURE — 99203 OFFICE O/P NEW LOW 30 MIN: CPT | Performed by: ORTHOPAEDIC SURGERY

## 2017-10-16 NOTE — PROGRESS NOTES
HISTORY OF PRESENT ILLNESS:    Emma Rudolph is a 66 year old female who is seen in consultation at the request of Dr. Latham for right wrist, cyst    Present symptoms: Pt states cyst has been present for about 10 days,  Pt states cyst does cause some discomfort, pain will go up the arm with increased use.  Pt describes pain as dull.  Cyst is located on the volar aspect of the wrist.  Pt states she also has a bone spur Right at the radial styloid.  She denies any numbness or tingling sensation otherwise. No catching or locking of the finger movements.  She is right-hand dominant.  Treatments tried to this point: heat, wrist brace (neoprene)   Orthopedic PMH: no ortho surgeries    Past Medical History:   Diagnosis Date     Generalized anxiety disorder      Hyperlipidemia LDL goal <130      Mild major depression (H)      Vitamin D deficiency disease        Past Surgical History:   Procedure Laterality Date     COLONOSCOPY       EYE SURGERY       H ARGON LASER FOR RETINAL TEAR Left 3/2014     HEAD & NECK SURGERY      wisdom teeth     LAPAROSCOPIC SINGLE SITE SALPINGO-OOPHORECTOMY Bilateral 4/15/2015    Procedure: LAPAROSCOPIC SINGLE SITE SALPINGO-OOPHORECTOMY;  Surgeon: Leonora Zavaleta MD;  [facility]     wisdom teeth removal  age 19       Family History   Problem Relation Age of Onset     Breast Cancer Mother 80     Neurologic Disorder Father      Parkinson's     Coronary Artery Disease Father 44     MI     Lipids Brother      Lipids Brother      Family History Negative Brother      Lipids Sister      Family History Negative Sister      Family History Negative Sister      Family History Negative Sister      CEREBROVASCULAR DISEASE Maternal Grandmother 86     CANCER Maternal Grandfather 69     leukemia     CANCER Paternal Grandfather 69     stomach     DIABETES No family hx of      Cancer - colorectal No family hx of      Hypertension No family hx of      Hyperlipidemia No family hx of      Colon Cancer No  family hx of      Prostate Cancer No family hx of      Other Cancer No family hx of      Depression No family hx of      Anxiety Disorder No family hx of      MENTAL ILLNESS No family hx of      Substance Abuse No family hx of      Anesthesia Reaction No family hx of      Asthma No family hx of      OSTEOPOROSIS No family hx of      Genetic Disorder No family hx of      Thyroid Disease No family hx of      Obesity No family hx of      Unknown/Adopted No family hx of        Social History     Social History     Marital status: Single     Spouse name: N/A     Number of children: N/A     Years of education: N/A     Occupational History      Retired     [employer]     Social History Main Topics     Smoking status: Never Smoker     Smokeless tobacco: Never Used     Alcohol use 0.0 oz/week     0 Standard drinks or equivalent per week      Comment: socially     Drug use: No     Sexual activity: No     Other Topics Concern     Parent/Sibling W/ Cabg, Mi Or Angioplasty Before 65f 55m? Yes     Seat Belt Yes     Social History Narrative       Current Outpatient Prescriptions   Medication Sig Dispense Refill     venlafaxine (EFFEXOR-XR) 150 MG 24 hr capsule TAKE 1 CAPSULE DAILY 90 capsule 1     traZODone (DESYREL) 50 MG tablet TAKE 1 TABLET NIGHTLY AS NEEDED FOR SLEEP 90 tablet 2     simvastatin (ZOCOR) 20 MG tablet TAKE 1 TABLET AT BEDTIME 90 tablet 2     ASPIRIN PO Take 81 mg by mouth daily       Multiple Vitamins-Minerals (MULTIVITAMIN OR) Take by mouth daily       Cholecalciferol (VITAMIN D) 1000 UNITS capsule Take 1 capsule by mouth daily.         Allergies   Allergen Reactions     Penicillins Swelling and Rash     Amoxicillin     Sulfa Drugs Hives     Trazodone Fatigue     Really tired       REVIEW OF SYSTEMS:  CONSTITUTIONAL:  NEGATIVE for fever, chills, change in weight  INTEGUMENTARY/SKIN:  NEGATIVE for worrisome rashes, moles or lesions  EYES:  NEGATIVE for vision changes or irritation  ENT/MOUTH:  NEGATIVE  "for ear, mouth and throat problems  RESP:  NEGATIVE for significant cough or SOB  BREAST:  NEGATIVE for masses, tenderness or discharge  CV:  NEGATIVE for chest pain, palpitations or peripheral edema  GI:  NEGATIVE for nausea, abdominal pain, heartburn, or change in bowel habits  :  Negative   MUSCULOSKELETAL:  See HPI above  NEURO:  NEGATIVE for weakness, dizziness or paresthesias  ENDOCRINE:  NEGATIVE for temperature intolerance, skin/hair changes  HEME/ALLERGY/IMMUNE:  NEGATIVE for bleeding problems  PSYCHIATRIC:  NEGATIVE for changes in mood or affect      PHYSICAL EXAM:  /84 (BP Location: Right arm, Patient Position: Sitting, Cuff Size: Adult Regular)  Ht 5' 4\" (1.626 m)  Wt 154 lb (69.9 kg)  LMP  (LMP Unknown)  BMI 26.43 kg/m2  Body mass index is 26.43 kg/(m^2).   GENERAL APPEARANCE: healthy, alert and no distress   HEENT: No apparent thyroid megaly. Clear sclera with normal ocular movement  RESPIRATORY: No labored breathing  SKIN: no suspicious lesions or rashes  NEURO: Normal strength and tone, mentation intact and speech normal  VASCULAR: Good pulses, and capillary refill   LYMPH: no lymphadenopathy   PSYCH:  mentation appears normal and affect normal/bright    MUSCULOSKELETAL:  Normal gait  No deformities in the upper extremities  Presence of very firm mass of small size on the radial styloid noted  It is tender to touch  The size of the mass is probably 4 mm in diameter  Some tenderness along the abductor tendon to the thumb, right  Another softer cystic mass on the volar radial wrist  It is also tender  Range of motion is somewhat uncomfortable  Finger movement is full  No focal atrophy of the thenar eminences  Normal  strength     ASSESSMENT:  Volar wrist ganglion cyst  Cyst versus  Bone spur at the first dorsal compartment of the wrist  Minimal de Quervain's, right    PLAN:  The nature of the problems for both de Quervain's (And associated cyst) along with volar wrist ganglion cyst was " thoroughly explained.  Options of observation,aspiration versus surgical intervention were discussed.  Details about the surgery  Were explained including possible recurrent formation of the cyst  With discussion about choices for anesthesia, most likely, local MAC would be the best option for this procedure.  If the problem persists to her dissatisfaction, surgical intervention of volar wrist ganglion cyst and the first dorsal compartment can be considered Under local MAC. She'll contact our surgery scheduler if she decides on surgery.  Otherwise, follow-up as needed.    Imaging Interpretation:   None taken today    Pete Barrett MD  Department of Orthopedic Surgery        Disclaimer: This note consists of symbols derived from keyboarding, dictation and/or voice recognition software. As a result, there may be errors in the script that have gone undetected. Please consider this when interpreting information found in this chart.

## 2017-10-16 NOTE — PATIENT INSTRUCTIONS
Ganglion Cyst: Hand    A ganglion cyst is a firm, fluid-filled lump that can suddenly appear on the front or back of the wrist or at the base of a finger. These cysts grow from normal tissue in the wrist and fingers, and range in size from a pea to a peach pit. Although ganglion cysts are common, they don t spread, and they don t become cancerous. They can occur after an injury, but many times it isn t known why they grow. Ganglion cysts can change in size, and may go away on their own.  Symptoms  A ganglion cyst is sometimes painful, especially when it first occurs. Constantly using your hand or wrist can make the cyst enlarge and hurt more. Some hand and wrist movements, such as grasping things, may also be difficult.  How a ganglion cyst develops  Your wrist and hand are made up of many small bones that meet at joints. Tendons attach muscles to the bones at the joints. The tendons allow the joints to bend and straighten. Both tendons and joints are lined with tissue called synovium. This tissue makes a thick fluid that keeps the joints and tendons moving easily. Sometimes the tissue balloons out from the joint or tendons and forms a cyst. As the cyst fills with fluid and grows, it appears as a lump you can feel.  Where ganglion cysts occur  A ganglion cyst can occur anywhere on the hand near a joint. Cysts most commonly appear on the back or palm side of the wrist, or on the palm at the base of a finger. Your doctor can usually diagnose a cyst by examining the lump. He or she may draw off a little fluid or order an X-ray to rule out other problems.  Treating a ganglion cyst  Your healthcare provider may just watch your ganglion cyst. Many shrink and become painless without treatment. Some disappear altogether. If the cyst is unsightly or painful, or makes it hard for you to use your hand, your healthcare provider can treat it or, if needed, remove it surgically.  Nonsurgical treatment  To shrink the cyst, your  provider may remove (aspirate) the fluid with a needle. If the cyst hurts, your provider may also give you an injection of an anti-inflammatory, such as cortisone, to relieve the irritation. Your hand may then be wrapped to help keep the cyst from recurring.  Surgery  If the cyst reappears after treatment, your healthcare provider may remove it surgically. A section of the tissue that lines the joint or tendon is removed along with the cyst. This helps prevent another cyst from forming, although recurrence of the cyst is still possible after surgery. Usually, only your hand or arm is numbed, and you can go home a few hours after surgery. Your hand may be in a splint for several days.  Date Last Reviewed: 9/10/2015    7096-1526 [employer]. [address] [place] [address], [place] [address]. All rights reserved. This information is not intended as a substitute for professional medical care. Always follow your healthcare professional's instructions.

## 2017-10-16 NOTE — LETTER
10/16/2017         RE: Emma Rudolph  [address]  [facility] [id number]        Dear Colleague,    Thank you for referring your patient, Emma Rudolph, to the [facility]. Please see a copy of my visit note below.    HISTORY OF PRESENT ILLNESS:    Emma Rudolph is a 66 year old female who is seen in consultation at the request of Dr. Latham for right wrist, cyst    Present symptoms: Pt states cyst has been present for about 10 days,  Pt states cyst does cause some discomfort, pain will go up the arm with increased use.  Pt describes pain as dull.  Cyst is located on the volar aspect of the wrist.  Pt states she also has a bone spur Right at the radial styloid.  She denies any numbness or tingling sensation otherwise. No catching or locking of the finger movements.  She is right-hand dominant.  Treatments tried to this point: heat, wrist brace (neoprene)   Orthopedic PMH: no ortho surgeries    Past Medical History:   Diagnosis Date     Generalized anxiety disorder      Hyperlipidemia LDL goal <130      Mild major depression (H)      Vitamin D deficiency disease        Past Surgical History:   Procedure Laterality Date     COLONOSCOPY       EYE SURGERY       H ARGON LASER FOR RETINAL TEAR Left 3/2014     HEAD & NECK SURGERY      wisdom teeth     LAPAROSCOPIC SINGLE SITE SALPINGO-OOPHORECTOMY Bilateral 4/15/2015    Procedure: LAPAROSCOPIC SINGLE SITE SALPINGO-OOPHORECTOMY;  Surgeon: Leonora Zavaleta MD;  [facility]  [facility]     wisdom teeth removal  age 19       Family History   Problem Relation Age of Onset     Breast Cancer Mother 80     Neurologic Disorder Father      Parkinson's     Coronary Artery Disease Father 44     MI     Lipids Brother      Lipids Brother      Family History Negative Brother      Lipids Sister      Family History Negative Sister      Family History Negative Sister      Family History Negative Sister      CEREBROVASCULAR DISEASE Maternal Grandmother  86     CANCER Maternal Grandfather 69     leukemia     CANCER Paternal Grandfather 69     stomach     DIABETES No family hx of      Cancer - colorectal No family hx of      Hypertension No family hx of      Hyperlipidemia No family hx of      Colon Cancer No family hx of      Prostate Cancer No family hx of      Other Cancer No family hx of      Depression No family hx of      Anxiety Disorder No family hx of      MENTAL ILLNESS No family hx of      Substance Abuse No family hx of      Anesthesia Reaction No family hx of      Asthma No family hx of      OSTEOPOROSIS No family hx of      Genetic Disorder No family hx of      Thyroid Disease No family hx of      Obesity No family hx of      Unknown/Adopted No family hx of        Social History     Social History     Marital status: Single     Spouse name: N/A     Number of children: N/A     Years of education: N/A     Occupational History      Retired     [employer]     Social History Main Topics     Smoking status: Never Smoker     Smokeless tobacco: Never Used     Alcohol use 0.0 oz/week     0 Standard drinks or equivalent per week      Comment: socially     Drug use: No     Sexual activity: No     Other Topics Concern     Parent/Sibling W/ Cabg, Mi Or Angioplasty Before 65f 55m? Yes     Seat Belt Yes     Social History Narrative       Current Outpatient Prescriptions   Medication Sig Dispense Refill     venlafaxine (EFFEXOR-XR) 150 MG 24 hr capsule TAKE 1 CAPSULE DAILY 90 capsule 1     traZODone (DESYREL) 50 MG tablet TAKE 1 TABLET NIGHTLY AS NEEDED FOR SLEEP 90 tablet 2     simvastatin (ZOCOR) 20 MG tablet TAKE 1 TABLET AT BEDTIME 90 tablet 2     ASPIRIN PO Take 81 mg by mouth daily       Multiple Vitamins-Minerals (MULTIVITAMIN OR) Take by mouth daily       Cholecalciferol (VITAMIN D) 1000 UNITS capsule Take 1 capsule by mouth daily.         Allergies   Allergen Reactions     Penicillins Swelling and Rash     Amoxicillin     Sulfa Drugs Hives      "Trazodone Fatigue     Really tired       REVIEW OF SYSTEMS:  CONSTITUTIONAL:  NEGATIVE for fever, chills, change in weight  INTEGUMENTARY/SKIN:  NEGATIVE for worrisome rashes, moles or lesions  EYES:  NEGATIVE for vision changes or irritation  ENT/MOUTH:  NEGATIVE for ear, mouth and throat problems  RESP:  NEGATIVE for significant cough or SOB  BREAST:  NEGATIVE for masses, tenderness or discharge  CV:  NEGATIVE for chest pain, palpitations or peripheral edema  GI:  NEGATIVE for nausea, abdominal pain, heartburn, or change in bowel habits  :  Negative   MUSCULOSKELETAL:  See HPI above  NEURO:  NEGATIVE for weakness, dizziness or paresthesias  ENDOCRINE:  NEGATIVE for temperature intolerance, skin/hair changes  HEME/ALLERGY/IMMUNE:  NEGATIVE for bleeding problems  PSYCHIATRIC:  NEGATIVE for changes in mood or affect      PHYSICAL EXAM:  /84 (BP Location: Right arm, Patient Position: Sitting, Cuff Size: Adult Regular)  Ht 5' 4\" (1.626 m)  Wt 154 lb (69.9 kg)  LMP  (LMP Unknown)  BMI 26.43 kg/m2  Body mass index is 26.43 kg/(m^2).   GENERAL APPEARANCE: healthy, alert and no distress   HEENT: No apparent thyroid megaly. Clear sclera with normal ocular movement  RESPIRATORY: No labored breathing  SKIN: no suspicious lesions or rashes  NEURO: Normal strength and tone, mentation intact and speech normal  VASCULAR: Good pulses, and capillary refill   LYMPH: no lymphadenopathy   PSYCH:  mentation appears normal and affect normal/bright    MUSCULOSKELETAL:  Normal gait  No deformities in the upper extremities  Presence of very firm mass of small size on the radial styloid noted  It is tender to touch  The size of the mass is probably 4 mm in diameter  Some tenderness along the abductor tendon to the thumb, right  Another softer cystic mass on the volar radial wrist  It is also tender  Range of motion is somewhat uncomfortable  Finger movement is full  No focal atrophy of the thenar eminences  Normal  " strength     ASSESSMENT:  Volar wrist ganglion cyst  Cyst versus  Bone spur at the first dorsal compartment of the wrist  Minimal de Quervain's, right    PLAN:  The nature of the problems for both de Quervain's (And associated cyst) along with volar wrist ganglion cyst was thoroughly explained.  Options of observation,aspiration versus surgical intervention were discussed.  Details about the surgery  Were explained including possible recurrent formation of the cyst  With discussion about choices for anesthesia, most likely, local MAC would be the best option for this procedure.  If the problem persists to her dissatisfaction, surgical intervention of volar wrist ganglion cyst and the first dorsal compartment can be considered Under local MAC. She'll contact our surgery scheduler if she decides on surgery.  Otherwise, follow-up as needed.    Imaging Interpretation:   None taken today    Pete Barrett MD  Department of Orthopedic Surgery        Disclaimer: This note consists of symbols derived from keyboarding, dictation and/or voice recognition software. As a result, there may be errors in the script that have gone undetected. Please consider this when interpreting information found in this chart.      Again, thank you for allowing me to participate in the care of your patient.        Sincerely,        Pete Barrett MD

## 2017-10-16 NOTE — MR AVS SNAPSHOT
After Visit Summary   10/16/2017    Emma Rudolph    MRN: [id number]           Patient Information     Date Of Birth          1950        Visit Information        Provider Department      10/16/2017 1:00 PM Pete Barrett MD [facility] ORTHOPEDIC SURGERY        Care Instructions      Ganglion Cyst: Hand    A ganglion cyst is a firm, fluid-filled lump that can suddenly appear on the front or back of the wrist or at the base of a finger. These cysts grow from normal tissue in the wrist and fingers, and range in size from a pea to a peach pit. Although ganglion cysts are common, they don t spread, and they don t become cancerous. They can occur after an injury, but many times it isn t known why they grow. Ganglion cysts can change in size, and may go away on their own.  Symptoms  A ganglion cyst is sometimes painful, especially when it first occurs. Constantly using your hand or wrist can make the cyst enlarge and hurt more. Some hand and wrist movements, such as grasping things, may also be difficult.  How a ganglion cyst develops  Your wrist and hand are made up of many small bones that meet at joints. Tendons attach muscles to the bones at the joints. The tendons allow the joints to bend and straighten. Both tendons and joints are lined with tissue called synovium. This tissue makes a thick fluid that keeps the joints and tendons moving easily. Sometimes the tissue balloons out from the joint or tendons and forms a cyst. As the cyst fills with fluid and grows, it appears as a lump you can feel.  Where ganglion cysts occur  A ganglion cyst can occur anywhere on the hand near a joint. Cysts most commonly appear on the back or palm side of the wrist, or on the palm at the base of a finger. Your doctor can usually diagnose a cyst by examining the lump. He or she may draw off a little fluid or order an X-ray to rule out other problems.  Treating a ganglion cyst  Your healthcare provider  may just watch your ganglion cyst. Many shrink and become painless without treatment. Some disappear altogether. If the cyst is unsightly or painful, or makes it hard for you to use your hand, your healthcare provider can treat it or, if needed, remove it surgically.  Nonsurgical treatment  To shrink the cyst, your provider may remove (aspirate) the fluid with a needle. If the cyst hurts, your provider may also give you an injection of an anti-inflammatory, such as cortisone, to relieve the irritation. Your hand may then be wrapped to help keep the cyst from recurring.  Surgery  If the cyst reappears after treatment, your healthcare provider may remove it surgically. A section of the tissue that lines the joint or tendon is removed along with the cyst. This helps prevent another cyst from forming, although recurrence of the cyst is still possible after surgery. Usually, only your hand or arm is numbed, and you can go home a few hours after surgery. Your hand may be in a splint for several days.  Date Last Reviewed: 9/10/2015    9637-4433 [employer]. [address] [place] [address]. All rights reserved. This information is not intended as a substitute for professional medical care. Always follow your healthcare professional's instructions.                Follow-ups after your visit        Who to contact     If you have questions or need follow up information about today's clinic visit or your schedule please contact [facility] ORTHOPEDIC SURGERY directly at [phone].  Normal or non-critical lab and imaging results will be communicated to you by MyChart, letter or phone within 4 business days after the clinic has received the results. If you do not hear from us within 7 days, please contact the clinic through [employer] or phone. If you have a critical or abnormal lab result, we will notify you by phone as soon as possible.  Submit refill requests through [employer] or call your pharmacy  "and they will forward the refill request to us. Please allow 3 business days for your refill to be completed.          Additional Information About Your Visit        [employer] Information     [employer] gives you secure access to your electronic health record. If you see a primary care provider, you can also send messages to your care team and make appointments. If you have questions, please call your primary care clinic.  If you do not have a primary care provider, please call [phone] and they will assist you.        Care EveryWhere ID     This is your Care EveryWhere ID. This could be used by other organizations to access your [place] medical records  [id number]        Your Vitals Were     Height Last Period BMI (Body Mass Index)             5' 4\" (1.626 m) (LMP Unknown) 26.43 kg/m2          Blood Pressure from Last 3 Encounters:   10/16/17 138/84   10/11/17 130/80   06/01/17 110/70    Weight from Last 3 Encounters:   10/16/17 154 lb (69.9 kg)   10/11/17 154 lb (69.9 kg)   06/01/17 147 lb (66.7 kg)              Today, you had the following     No orders found for display       Primary Care Provider Office Phone # Fax #    Angelina Puckett PA-C [phone] [address] [facility] [address]  [facility] [address]        Equal Access to Services     VALDEMAR FINN : Hadii aad ku hadasho Soomaali, waaxda luqadaha, qaybta kaalmada adeegyada, luciano celis . So [facility] [phone].    ATENCIÓN: Si habla español, tiene a wiggins disposición servicios gratuitos de asistencia lingüística. Estella al [phone].    We comply with applicable federal civil rights laws and [place] laws. We do not discriminate on the basis of race, color, national origin, age, disability, sex, sexual orientation, or gender identity.            Thank you!     Thank you for choosing [facility] ORTHOPEDIC SURGERY  for your care. Our goal is always to provide you with excellent care. Hearing back " from our patients is one way we can continue to improve our services. Please take a few minutes to complete the written survey that you may receive in the mail after your visit with us. Thank you!             Your Updated Medication List - Protect others around you: Learn how to safely use, store and throw away your medicines at www.disposemymeds.org.          This list is accurate as of: 10/16/17  1:13 PM.  Always use your most recent med list.                   Brand Name Dispense Instructions for use Diagnosis    ASPIRIN PO      Take 81 mg by mouth daily        MULTIVITAMIN PO      Take by mouth daily        simvastatin 20 MG tablet    ZOCOR    90 tablet    TAKE 1 TABLET AT BEDTIME    Mixed hyperlipidemia       traZODone 50 MG tablet    DESYREL    90 tablet    TAKE 1 TABLET NIGHTLY AS NEEDED FOR SLEEP    Insomnia due to other mental disorder       venlafaxine 150 MG 24 hr capsule    EFFEXOR-XR    90 capsule    TAKE 1 CAPSULE DAILY    Major depression in complete remission (H)       vitamin D 1000 UNITS capsule      Take 1 capsule by mouth daily.

## 2017-10-16 NOTE — NURSING NOTE
"Chief Complaint   Patient presents with     Cyst     Right wrist, cyst       Initial /84 (BP Location: Right arm, Patient Position: Sitting, Cuff Size: Adult Regular)  Ht 5' 4\" (1.626 m)  Wt 154 lb (69.9 kg)  LMP  (LMP Unknown)  BMI 26.43 kg/m2 Estimated body mass index is 26.43 kg/(m^2) as calculated from the following:    Height as of this encounter: 5' 4\" (1.626 m).    Weight as of this encounter: 154 lb (69.9 kg).  Medication Reconciliation: complete    "

## 2017-10-22 DIAGNOSIS — F32.5 MAJOR DEPRESSION IN COMPLETE REMISSION (H): ICD-10-CM

## 2017-10-22 DIAGNOSIS — E78.2 MIXED HYPERLIPIDEMIA: ICD-10-CM

## 2017-10-24 RX ORDER — SIMVASTATIN 20 MG
TABLET ORAL
Qty: 90 TABLET | Refills: 2 | Status: SHIPPED | OUTPATIENT
Start: 2017-10-24 | End: 2018-06-26

## 2017-10-24 RX ORDER — VENLAFAXINE HYDROCHLORIDE 150 MG/1
CAPSULE, EXTENDED RELEASE ORAL
Qty: 90 CAPSULE | Refills: 0 | Status: SHIPPED | OUTPATIENT
Start: 2017-10-24 | End: 2018-01-07

## 2018-01-07 DIAGNOSIS — F32.5 MAJOR DEPRESSION IN COMPLETE REMISSION (H): ICD-10-CM

## 2018-01-09 RX ORDER — VENLAFAXINE HYDROCHLORIDE 150 MG/1
CAPSULE, EXTENDED RELEASE ORAL
Qty: 90 CAPSULE | Refills: 0 | Status: SHIPPED | OUTPATIENT
Start: 2018-01-09 | End: 2018-04-15

## 2018-04-15 DIAGNOSIS — F32.5 MAJOR DEPRESSION IN COMPLETE REMISSION (H): ICD-10-CM

## 2018-04-17 RX ORDER — VENLAFAXINE HYDROCHLORIDE 150 MG/1
CAPSULE, EXTENDED RELEASE ORAL
Qty: 30 CAPSULE | Refills: 0 | Status: SHIPPED | OUTPATIENT
Start: 2018-04-17 | End: 2018-06-03

## 2018-06-03 ENCOUNTER — TELEPHONE (OUTPATIENT)
Dept: FAMILY MEDICINE | Facility: CLINIC | Age: 68
End: 2018-06-03

## 2018-06-03 DIAGNOSIS — F32.5 MAJOR DEPRESSION IN COMPLETE REMISSION (H): ICD-10-CM

## 2018-06-04 NOTE — TELEPHONE ENCOUNTER
"Requested Prescriptions   Pending Prescriptions Disp Refills     venlafaxine (EFFEXOR-XR) 150 MG 24 hr capsule [Pharmacy Med Name: VENLAFAXINE HCL ER CAPS 150MG]  Last Written Prescription Date:  4/17/18  Last Fill Quantity: 30,  # refills: 0   Last office visit: 10/11/2017 with prescribing provider:  Noa   Future Office Visit:     30 capsule 0     Sig: TAKE 1 CAPSULE DAILY (ONE TIME REFILL ONLY, NEED TO BE SEEN BECAUSE DUE 6 MONTH OFFICE VISIT AND PHQ-9 ASSESSMENT)    Serotonin-Norepinephrine Reuptake Inhibitors  Failed    6/3/2018  5:24 PM       Failed - PHQ-9 score of less than 5 in past 6 months    Please review last PHQ-9 score.          Failed - Normal serum creatinine on file in past 12 months    Recent Labs   Lab Test  06/01/17   1344   CR  0.74            Failed - Recent (6 mo) or future (30 days) visit within the authorizing provider's specialty    Patient had office visit in the last 6 months or has a visit in the next 30 days with authorizing provider or within the authorizing provider's specialty.  See \"Patient Info\" tab in inbasket, or \"Choose Columns\" in Meds & Orders section of the refill encounter.           Passed - Blood pressure under 140/90 in past 12 months    BP Readings from Last 3 Encounters:   10/16/17 138/84   10/11/17 130/80   06/01/17 110/70                Passed - Patient is age 18 or older       Passed - No active pregnancy on record       Passed - No positive pregnancy test in past 12 months          "

## 2018-06-05 RX ORDER — VENLAFAXINE HYDROCHLORIDE 150 MG/1
CAPSULE, EXTENDED RELEASE ORAL
Qty: 30 CAPSULE | Refills: 3 | Status: SHIPPED | OUTPATIENT
Start: 2018-06-05 | End: 2018-06-06

## 2018-06-05 NOTE — TELEPHONE ENCOUNTER
Patient has to reschedule her appointment as provider out of office.PHQ9 completed.  Prescription approved per [facility] Refill Protocol.

## 2018-06-06 RX ORDER — VENLAFAXINE HYDROCHLORIDE 150 MG/1
CAPSULE, EXTENDED RELEASE ORAL
Qty: 90 CAPSULE | Refills: 0 | Status: SHIPPED | OUTPATIENT
Start: 2018-06-06 | End: 2018-06-07

## 2018-06-06 ASSESSMENT — PATIENT HEALTH QUESTIONNAIRE - PHQ9: SUM OF ALL RESPONSES TO PHQ QUESTIONS 1-9: 3

## 2018-06-06 NOTE — TELEPHONE ENCOUNTER
Pt reports she shows Rx for effexor was not approved. Informed pt rx was approved yesterday. This was resent today for 90 day supply since she uses mail order.

## 2018-06-07 RX ORDER — VENLAFAXINE HYDROCHLORIDE 150 MG/1
CAPSULE, EXTENDED RELEASE ORAL
Qty: 7 CAPSULE | Refills: 0 | Status: SHIPPED | OUTPATIENT
Start: 2018-06-07 | End: 2018-06-26

## 2018-06-07 NOTE — TELEPHONE ENCOUNTER
venlafaxine (EFFEXOR-XR) 150 MG 24 hr capsule 90 capsule 0 6/6/2018        Sig: Take 1 capsule daily      Class: E-Prescribe      Order: [id number]      E-Prescribing Status: Receipt confirmed by pharmacy (6/6/2018 11:48 AM CDT)      Patient is calling back and requesting a short term supply to [facility] on [address].

## 2018-06-26 ENCOUNTER — OFFICE VISIT (OUTPATIENT)
Dept: FAMILY MEDICINE | Facility: CLINIC | Age: 68
End: 2018-06-26
Payer: COMMERCIAL

## 2018-06-26 VITALS
SYSTOLIC BLOOD PRESSURE: 130 MMHG | HEIGHT: 64 IN | TEMPERATURE: 98.2 F | HEART RATE: 101 BPM | WEIGHT: 157 LBS | OXYGEN SATURATION: 98 % | DIASTOLIC BLOOD PRESSURE: 82 MMHG | BODY MASS INDEX: 26.8 KG/M2 | RESPIRATION RATE: 14 BRPM

## 2018-06-26 DIAGNOSIS — R74.01 ELEVATED ALT MEASUREMENT: ICD-10-CM

## 2018-06-26 DIAGNOSIS — R41.3 MEMORY LOSS: Primary | ICD-10-CM

## 2018-06-26 DIAGNOSIS — R73.01 IMPAIRED FASTING GLUCOSE: ICD-10-CM

## 2018-06-26 DIAGNOSIS — Z82.49 FAMILY HISTORY OF ISCHEMIC HEART DISEASE: ICD-10-CM

## 2018-06-26 DIAGNOSIS — N18.2 CKD (CHRONIC KIDNEY DISEASE) STAGE 2, GFR 60-89 ML/MIN: ICD-10-CM

## 2018-06-26 DIAGNOSIS — E66.3 OVERWEIGHT (BMI 25.0-29.9): ICD-10-CM

## 2018-06-26 DIAGNOSIS — E78.2 MIXED HYPERLIPIDEMIA: ICD-10-CM

## 2018-06-26 DIAGNOSIS — Z12.11 SPECIAL SCREENING FOR MALIGNANT NEOPLASMS, COLON: ICD-10-CM

## 2018-06-26 DIAGNOSIS — F51.01 PRIMARY INSOMNIA: ICD-10-CM

## 2018-06-26 DIAGNOSIS — F32.5 MAJOR DEPRESSION IN COMPLETE REMISSION (H): ICD-10-CM

## 2018-06-26 DIAGNOSIS — F41.1 GENERALIZED ANXIETY DISORDER: ICD-10-CM

## 2018-06-26 PROCEDURE — 99214 OFFICE O/P EST MOD 30 MIN: CPT | Performed by: FAMILY MEDICINE

## 2018-06-26 PROCEDURE — 82043 UR ALBUMIN QUANTITATIVE: CPT | Performed by: FAMILY MEDICINE

## 2018-06-26 PROCEDURE — 80048 BASIC METABOLIC PNL TOTAL CA: CPT | Performed by: FAMILY MEDICINE

## 2018-06-26 PROCEDURE — 36415 COLL VENOUS BLD VENIPUNCTURE: CPT | Performed by: FAMILY MEDICINE

## 2018-06-26 PROCEDURE — 80061 LIPID PANEL: CPT | Performed by: FAMILY MEDICINE

## 2018-06-26 PROCEDURE — 84460 ALANINE AMINO (ALT) (SGPT): CPT | Performed by: FAMILY MEDICINE

## 2018-06-26 RX ORDER — SIMVASTATIN 20 MG
20 TABLET ORAL AT BEDTIME
Qty: 90 TABLET | Refills: 1 | Status: SHIPPED | OUTPATIENT
Start: 2018-06-26 | End: 2018-10-30

## 2018-06-26 RX ORDER — VENLAFAXINE HYDROCHLORIDE 150 MG/1
CAPSULE, EXTENDED RELEASE ORAL
Qty: 90 CAPSULE | Refills: 1 | Status: SHIPPED | OUTPATIENT
Start: 2018-06-26 | End: 2018-08-03

## 2018-06-26 ASSESSMENT — PATIENT HEALTH QUESTIONNAIRE - PHQ9: 5. POOR APPETITE OR OVEREATING: NOT AT ALL

## 2018-06-26 ASSESSMENT — ANXIETY QUESTIONNAIRES
7. FEELING AFRAID AS IF SOMETHING AWFUL MIGHT HAPPEN: NOT AT ALL
5. BEING SO RESTLESS THAT IT IS HARD TO SIT STILL: NOT AT ALL
2. NOT BEING ABLE TO STOP OR CONTROL WORRYING: NOT AT ALL
IF YOU CHECKED OFF ANY PROBLEMS ON THIS QUESTIONNAIRE, HOW DIFFICULT HAVE THESE PROBLEMS MADE IT FOR YOU TO DO YOUR WORK, TAKE CARE OF THINGS AT HOME, OR GET ALONG WITH OTHER PEOPLE: NOT DIFFICULT AT ALL
GAD7 TOTAL SCORE: 1
6. BECOMING EASILY ANNOYED OR IRRITABLE: SEVERAL DAYS
1. FEELING NERVOUS, ANXIOUS, OR ON EDGE: NOT AT ALL
3. WORRYING TOO MUCH ABOUT DIFFERENT THINGS: NOT AT ALL

## 2018-06-26 NOTE — LETTER
My Depression Action Plan  Name: Emma Rudolph   Date of Birth 1950  Date: 6/26/2018    My doctor: Angelina Puckett   My clinic: [address] [address]  [phone]          GREEN    ZONE   Good Control    What it looks like:     Things are going generally well. You have normal up s and down s. You may even feel depressed from time to time, but bad moods usually last less than a day.   What you need to do:  1. Continue to care for yourself (see self care plan)  2. Check your depression survival kit and update it as needed  3. Follow your physician s recommendations including any medication.  4. Do not stop taking medication unless you consult with your physician first.           YELLOW         ZONE Getting Worse    What it looks like:     Depression is starting to interfere with your life.     It may be hard to get out of bed; you may be starting to isolate yourself from others.    Symptoms of depression are starting to last most all day and this has happened for several days.     You may have suicidal thoughts but they are not constant.   What you need to do:     1. Call your care team, your response to treatment will improve if you keep your care team informed of your progress. Yellow periods are signs an adjustment may need to be made.     2. Continue your self-care, even if you have to fake it!    3. Talk to someone in your support network    4. Open up your depression survival kit           RED    ZONE Medical Alert - Get Help    What it looks like:     Depression is seriously interfering with your life.     You may experience these or other symptoms: You can t get out of bed most days, can t work or engage in other necessary activities, you have trouble taking care of basic hygiene, or basic responsibilities, thoughts of suicide or death that will not go away, self-injurious behavior.      What you need to do:  1. Call your care team and request a same-day appointment. If they are not available (weekends or after hours) call your local crisis line, emergency room or 911.            Depression Self Care Plan / Survival Kit    Self-Care for Depression  Here s the deal. Your body and mind are really not as separate as most people think.  What you do and think affects how you feel and how you feel influences what you do and think. This means if you do things that people who feel good do, it will help you feel better.  Sometimes this is all it takes.  There is also a place for medication and therapy depending on how severe your depression is, so be sure to consult with your medical provider and/ or Behavioral Health Consultant if your symptoms are worsening or not improving.     In order to better manage my stress, I will:    Exercise  Get some form of exercise, every day. This will help reduce pain and release endorphins, the  feel good  chemicals in your brain. This is almost as good as taking antidepressants!  This is not the same as joining a gym and then never going! (they count on that by the way ) It can be as simple as just going for a walk or doing some gardening, anything that will get you moving.      Hygiene   Maintain good hygiene (Get out of bed in the morning, Make your bed, Brush your teeth, Take a shower, and Get dressed like you were going to work, even if you are unemployed).  If your clothes don't fit try to get ones that do.    Diet  I will strive to eat foods that are good for me, drink plenty of water, and avoid excessive sugar, caffeine, alcohol, and other mood-altering substances.  Some foods that are helpful in depression are: complex carbohydrates, B vitamins, flaxseed, fish or fish oil, fresh fruits and vegetables.    Psychotherapy  I agree to participate in Individual Therapy (if recommended).    Medication  If prescribed medications, I agree to take them.  Missing doses can  result in serious side effects.  I understand that drinking alcohol, or other illicit drug use, may cause potential side effects.  I will not stop my medication abruptly without first discussing it with my provider.    Staying Connected With Others  I will stay in touch with my friends, family members, and my primary care provider/team.    Use your imagination  Be creative.  We all have a creative side; it doesn t matter if it s oil painting, sand castles, or mud pies! This will also kick up the endorphins.    Witness Beauty  (AKA stop and smell the roses) Take a look outside, even in mid-winter. Notice colors, textures. Watch the squirrels and birds.     Service to others  Be of service to others.  There is always someone else in need.  By helping others we can  get out of ourselves  and remember the really important things.  This also provides opportunities for practicing all the other parts of the program.    Humor  Laugh and be silly!  Adjust your TV habits for less news and crime-drama and more comedy.    Control your stress  Try breathing deep, massage therapy, biofeedback, and meditation. Find time to relax each day.     My support system    Clinic Contact:  Phone number:    Contact 1:  Phone number:    Contact 2:  Phone number:    Nonden[employer]ational/:  Phone number:    Therapist:  Phone number:    Local crisis center:    Phone number:    Other community support:  Phone number:

## 2018-06-26 NOTE — NURSING NOTE
"Chief Complaint   Patient presents with     Recheck Medication     /82  Pulse 101  Temp 98.2  F (36.8  C) (Tympanic)  Resp 14  Ht 5' 4\" (1.626 m)  Wt 157 lb (71.2 kg)  LMP  (LMP Unknown)  SpO2 98%  Breastfeeding? No  BMI 26.95 kg/m2 Estimated body mass index is 26.95 kg/(m^2) as calculated from the following:    Height as of this encounter: 5' 4\" (1.626 m).    Weight as of this encounter: 157 lb (71.2 kg).  BP completed using cuff size: regular   Kelli Napier CMA    Health Maintenance Due   Topic Date Due     FALL RISK ASSESSMENT  06/14/2017     BMP Q1 YR  06/01/2018     LIPID MONITORING Q1 YEAR  06/01/2018     MICROALBUMIN Q1 YEAR  06/01/2018     DEPRESSION ACTION PLAN Q1 YR  06/01/2018     PAP Q3 YR  08/04/2018     Health Maintenance reviewed at today's visit patient asked to schedule/complete:   Cervical Cancer:  Patient agrees to schedule  Depression:  Patient agrees to schedule    "

## 2018-06-26 NOTE — PROGRESS NOTES
SUBJECTIVE:   Emma Rudolph is a 67 year old female who presents to clinic today for the following health issues:      MEMORY LOSS    - NOTED ONLY  By pt   -cannot remember even names of friends --they have not said anything   -lives with and cares for 91y/o mom   -worsening over 2 yrs   -neg fam hx deementia     Glucose Intolerance Follow-up      Patient is checking blood sugars: not at all    Diabetic concerns: None     Symptoms of hypoglycemia (low blood sugar): none     Paresthesias (numbness or burning in feet) or sores: No     Date of last diabetic eye exam: 2016     MIxed Hyperlipidemia Follow-Up      Rate your low fat/cholesterol diet?: not monitoring fat    Taking statin?  Yes, no muscle aches from 20mgm simvastatin    Other lipid medications/supplements?:  None    fam hx CAD     Had a hi ALT in the past r/o from statin      OVERWEIGHT      -steady wt for yrs     -comorbid hi LDL , Glucose Intolerance    CKD Stage 3      -no use of NSAIDS    -comorbid hi LDL     Depression and Anxiety Follow-Up      Status since last visit: No change-in remission on meds     Other associated symptoms:None    Complicating factors: still some insomnia     Significant life event: No     Current substance abuse: None    No insomnia off trazodone for 3 mo     PHQ-9 SCORE 5/31/2016 6/14/2016 11/22/2016   Total Score - - -   Total Score 4 0 2     MILENA-7 SCORE 5/31/2016 6/14/2016 11/22/2016   Total Score 0 0 3        PHQ-9  English =0     PHQ-9   Any Language     GAD7=0         Problem list and histories reviewed & adjusted, as indicated.  Additional history: as documented    Labs reviewed in EPIC    Reviewed and updated as needed this visit by clinical staff  Tobacco  Allergies  Meds  Problems  Med Hx  Surg Hx  Fam Hx  Soc Hx        Reviewed and updated as needed this visit by Provider  Allergies  Meds  Problems         ROS:  CONSTITUTIONAL: NEGATIVE for fever, chills, change in weight  INTEGUMENTARY/SKIN:  "NEGATIVE for worrisome rashes, moles or lesions  EYES: NEGATIVE for vision changes or irritation  ENT/MOUTH: NEGATIVE for ear, mouth and throat problems  RESP: NEGATIVE for significant cough or SOB  BREAST: NEGATIVE for masses, tenderness or discharge  CV: NEGATIVE for chest pain, palpitations or peripheral edema  GI: NEGATIVE for nausea, abdominal pain, heartburn, or change in bowel habits  : NEGATIVE for frequency, dysuria, or hematuria  MUSCULOSKELETAL: NEGATIVE for significant arthralgias or myalgia  NEURO: NEGATIVE for weakness, dizziness or paresthesias  ENDOCRINE: NEGATIVE for temperature intolerance, skin/hair changes  HEME: NEGATIVE for bleeding problems  PSYCHIATRIC: NEGATIVE for changes in mood or affect POS  Memory loss     OBJECTIVE:     /82  Pulse 101  Temp 98.2  F (36.8  C) (Tympanic)  Resp 14  Ht 5' 4\" (1.626 m)  Wt 157 lb (71.2 kg)  LMP  (LMP Unknown)  SpO2 98%  Breastfeeding? No  BMI 26.95 kg/m2  Body mass index is 26.95 kg/(m^2).  GENERAL: healthy, alert, no distress, over weight and elderly  EYES: Eyes grossly normal to inspection, PERRL and conjunctivae and sclerae normal  RESP: lungs clear to auscultation - no rales, rhonchi or wheezes  CV: regular rate and rhythm, normal S1 S2, no S3 or S4, no murmur, click or rub, no peripheral edema and peripheral pulses strong  MS: no gross musculoskeletal defects noted, no edema  SKIN: no suspicious lesions or rashes  NEURO: Normal strength and tone, mentation intact and speech normal  PSYCH: mentation appears normal, affect normal/bright and appearance well groomed    Diagnostic Test Results:  none     ASSESSMENT/PLAN:               ICD-10-CM    1. Memory loss R41.3 NEUROLOGY ADULT REFERRAL   2. Impaired fasting glucose R73.01 Basic metabolic panel     Albumin Random Urine Quantitative with Creat Ratio   3. Elevated ALT measurement R74.0    4. 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 Generalized anxiety disorder F41.1    6. Mixed hyperlipidemia E78.2 Lipid panel reflex to direct LDL Fasting     ALT     simvastatin (ZOCOR) 20 MG tablet   7. Family history of ischemic heart disease Z82.49 Lipid panel reflex to direct LDL Fasting   8. CKD (chronic kidney disease) stage 2, GFR 60-89 ml/min N18.2    9. Primary insomnia F51.01    10. Overweight (BMI 25.0-29.9) E66.3 ALT   11. Special screening for malignant neoplasms, colon Z12.11 Fecal colorectal cancer screen FIT       Patient Instructions   1. Shingrex is a 2 shot series that prevents shingles 97% of the time, as opposed to the old shingles shot that only prevented it at 40-50%  It costs less for medicare at a pharmacy  You should get it starting at 50 yrs old     2.  Weight Loss Tips  1. Do not eat after 6 hrs before your expected bedtime  2. Have your heaviest meal for breakfast, a slightly lighter meal at lunch and a snack 6 hrs before bed  3. No sugar/calorie drinks except milk ie no fruit juice, pop, alcohol.  4. Drink milk 30min before meals to decrease your hunger. Also it is excellent as part of your last meal of the day snack  5. Drink lots of water  6. Increase fiber in diet: all bran cereal, salads, popcorn etc  7. Have only one small serving of fruit a day about 1/2 cup (as this is high in sugar)  8. EXERCISE is the bottom line. Without it, you will gain weight even on a low calorie diet. Best if done 2-3X a day as can    Being overweight contributes to high blood pressure and high cholesterol, both of which cause heart attacks, strokes and kidney failure, prediabetes and diabetes, arthritis, and liver disease               Angie Camacho MD  [facility]      Weight management plan: Discussed healthy diet and exercise guidelines and patient will follow up in 6 months in clinic to re-evaluate.    Left\\\Angie Camacho MD

## 2018-06-26 NOTE — MR AVS SNAPSHOT
After Visit Summary   6/26/2018    Emma Rudolph    MRN: [id number]           Patient Information     Date Of Birth          1950        Visit Information        Provider Department      6/26/2018 11:00 AM Angie Camacho MD [facility]        Today's Diagnoses     Memory loss    -  1    Impaired fasting glucose        Elevated ALT measurement        Mixed hyperlipidemia        Family history of ischemic heart disease        Major depression in complete remission (H) on meds since 6-12-13        Generalized anxiety disorder        Primary insomnia        Overweight (BMI 25.0-29.9)          Care Instructions    1. Shingrex is a 2 shot series that prevents shingles 97% of the time, as opposed to the old shingles shot that only prevented it at 40-50%  It costs less for medicare at a pharmacy  You should get it starting at 50 yrs old     2.  Weight Loss Tips  1. Do not eat after 6 hrs before your expected bedtime  2. Have your heaviest meal for breakfast, a slightly lighter meal at lunch and a snack 6 hrs before bed  3. No sugar/calorie drinks except milk ie no fruit juice, pop, alcohol.  4. Drink milk 30min before meals to decrease your hunger. Also it is excellent as part of your last meal of the day snack  5. Drink lots of water  6. Increase fiber in diet: all bran cereal, salads, popcorn etc  7. Have only one small serving of fruit a day about 1/2 cup (as this is high in sugar)  8. EXERCISE is the bottom line. Without it, you will gain weight even on a low calorie diet. Best if done 2-3X a day as can    Being overweight contributes to high blood pressure and high cholesterol, both of which cause heart attacks, strokes and kidney failure, prediabetes and diabetes, arthritis, and liver disease                   Follow-ups after your visit        Additional Services     NEUROLOGY ADULT REFERRAL       Your provider has referred you for the following:    Consult at [facility]: [facility] Liana Chacon [phone]   [email or web address]    Please be aware that coverage of these services is subject to the terms and limitations of your health insurance plan.  Call member services at your health plan with any benefit or coverage questions.      Please bring the following with you to your appointment:    (1) Any X-Rays, CTs or MRIs which have been performed.  Contact the facility where they were done to arrange for  prior to your scheduled appointment.    (2) List of current medications  (3) This referral request   (4) Any documents/labs given to you for this referral                  Follow-up notes from your care team     Return in about 6 months (around 12/26/2018) for Physical Exam.      Who to contact     If you have questions or need follow up information about today's clinic visit or your schedule please contact [facility] directly at [phone].  Normal or non-critical lab and imaging results will be communicated to you by Let's Talkhart, letter or phone within 4 business days after the clinic has received the results. If you do not hear from us within 7 days, please contact the clinic through Let's Talkhart or phone. If you have a critical or abnormal lab result, we will notify you by phone as soon as possible.  Submit refill requests through [employer] or call your pharmacy and they will forward the refill request to us. Please allow 3 business days for your refill to be completed.          Additional Information About Your Visit        Let's TalkharTitansan Information     [employer] gives you secure access to your electronic health record. If you see a primary care provider, you can also send messages to your care team and make appointments. If you have questions, please call your primary care clinic.  If you do not have a primary care provider, please call [phone] and they will assist you.        Care  "EveryWhere ID     This is your Care EveryWhere ID. This could be used by other organizations to access your [facility] medical records  [id number]        Your Vitals Were     Pulse Temperature Respirations Height Last Period Pulse Oximetry    101 98.2  F (36.8  C) (Tympanic) 14 5' 4\" (1.626 m) (LMP Unknown) 98%    Breastfeeding? BMI (Body Mass Index)                No 26.95 kg/m2           Blood Pressure from Last 3 Encounters:   06/26/18 130/82   10/16/17 138/84   10/11/17 130/80    Weight from Last 3 Encounters:   06/26/18 157 lb (71.2 kg)   10/16/17 154 lb (69.9 kg)   10/11/17 154 lb (69.9 kg)              We Performed the Following     Albumin Random Urine Quantitative with Creat Ratio     ALT     Basic metabolic panel     DEPRESSION ACTION PLAN (DAP)     Lipid panel reflex to direct LDL Fasting     NEUROLOGY ADULT REFERRAL     PAF COMPLETED          Today's Medication Changes          These changes are accurate as of 6/26/18 12:11 PM.  If you have any questions, ask your nurse or doctor.               These medicines have changed or have updated prescriptions.        Dose/Directions    simvastatin 20 MG tablet   Commonly known as:  ZOCOR   This may have changed:  See the new instructions.   Used for:  Mixed hyperlipidemia   Changed by:  Angie Camacho MD        Dose:  20 mg   Take 1 tablet (20 mg) by mouth At Bedtime   Quantity:  90 tablet   Refills:  1            Where to get your medicines      These medications were sent to [facility] - [address] [address]     Phone:  [phone]     simvastatin 20 MG tablet    venlafaxine 150 MG 24 hr capsule                Primary Care Provider Office Phone # Fax #    Angelina Puckett PA-C [phone]       [address] [address]        Equal Access to Services     VALDEMAR FINN AH: vira Lopez, " jalil jonwallace janettein hayaan adeeg kharash la'aan ah. So [facility] [place] [facility] [phone].    ATENCIÓN: Si ajit anna, tiene a wiggins disposición servicios gratuitos de asistencia lingüística. Estella al [phone].    We comply with applicable federal civil rights laws and [place] laws. We do not discriminate on the basis of race, color, national origin, age, disability, sex, sexual orientation, or gender identity.            Thank you!     Thank you for choosing [facility]  for your care. Our goal is always to provide you with excellent care. Hearing back from our patients is one way we can continue to improve our services. Please take a few minutes to complete the written survey that you may receive in the mail after your visit with us. Thank you!             Your Updated Medication List - Protect others around you: Learn how to safely use, store and throw away your medicines at www.disposemymeds.org.          This list is accurate as of 6/26/18 12:11 PM.  Always use your most recent med list.                   Brand Name Dispense Instructions for use Diagnosis    ASPIRIN PO      Take 81 mg by mouth daily        MULTIVITAMIN PO      Take by mouth daily        simvastatin 20 MG tablet    ZOCOR    90 tablet    Take 1 tablet (20 mg) by mouth At Bedtime    Mixed hyperlipidemia       traZODone 50 MG tablet    DESYREL    90 tablet    TAKE 1 TABLET NIGHTLY AS NEEDED FOR SLEEP    Insomnia due to other mental disorder       venlafaxine 150 MG 24 hr capsule    EFFEXOR-XR    90 capsule    Take 1 capsule daily    Major depression in complete remission (H)       vitamin D 1000 units capsule      Take 1 capsule by mouth daily.

## 2018-06-26 NOTE — PATIENT INSTRUCTIONS
1. Shingrex is a 2 shot series that prevents shingles 97% of the time, as opposed to the old shingles shot that only prevented it at 40-50%  It costs less for medicare at a pharmacy  You should get it starting at 50 yrs old     2.  Weight Loss Tips  1. Do not eat after 6 hrs before your expected bedtime  2. Have your heaviest meal for breakfast, a slightly lighter meal at lunch and a snack 6 hrs before bed  3. No sugar/calorie drinks except milk ie no fruit juice, pop, alcohol.  4. Drink milk 30min before meals to decrease your hunger. Also it is excellent as part of your last meal of the day snack  5. Drink lots of water  6. Increase fiber in diet: all bran cereal, salads, popcorn etc  7. Have only one small serving of fruit a day about 1/2 cup (as this is high in sugar)  8. EXERCISE is the bottom line. Without it, you will gain weight even on a low calorie diet. Best if done 2-3X a day as can    Being overweight contributes to high blood pressure and high cholesterol, both of which cause heart attacks, strokes and kidney failure, prediabetes and diabetes, arthritis, and liver disease

## 2018-06-26 NOTE — LETTER
June 29, 2018      Emma Rudolph  [address] [place] [id number]        Dear ,    We are writing to inform you of your test results.    All of your lab results are normal, except as noted below.     The following are explanations of some of our lab tests     THIS DOES NOT MEAN THAT YOU HAD ALL OF THESE DONE     Please continue on the same medications unless a change is noted above     These are some general explanations for tests:     Hgb is the blood iron level   WBC means White Blood Cells   Platelets are small blood cells that help with forming the blood clots along with other blood factors.   Electrolytes are Sodium, Potassium, Calcium, Magnesium, Phosphorus.   Liver tests are: AST, ALT, Bilirubin, Alkaline Phosphatase.   Kidney tests are Creatinine, GFR.   HDL Cholesterol - is the good cholesterol and it is good to have it high.   LDL cholesterol is the bad cholesterol and it is good to have it low.###essentially normal at 104  ##You should be on atorvastatin?   It is recommended to have LDL less than 130 for people with hypertension and to have it less than 100 for people with heart disease, diabetes and chronic kidney disease.   Triglycerides are another type of lipid and can also cause heart disease so should be kept low. ###much higher than in the past when they were only alittle higher than normal. I am sure it is recent diet, so will await the next labs in 6 mo ####   Thyroid tests are TSH, T4, T3   Glucose is sugar   A1c is a test that gives us an idea about how well was controlled the diabetes for the last 3 months.   PSA stands for Prostate Specific Antigen and it can be elevated with prostate cancer or prostate inflammation.     Resulted Orders   Basic metabolic panel   Result Value Ref Range    Sodium 140 133 - 144 mmol/L    Potassium 3.9 3.4 - 5.3 mmol/L    Chloride 105 94 - 109 mmol/L    Carbon Dioxide 27 20 - 32 mmol/L    Anion Gap 8 3 - 14 mmol/L    Glucose 95 70 - 99  mg/dL      Comment:      Fasting specimen    Urea Nitrogen 12 7 - 30 mg/dL    Creatinine 0.71 0.52 - 1.04 mg/dL    GFR Estimate 82 >60 mL/min/1.7m2      Comment:      Non  GFR Calc    GFR Estimate If Black >90 >60 mL/min/1.7m2      Comment:       GFR Calc    Calcium 9.7 8.5 - 10.1 mg/dL   Lipid panel reflex to direct LDL Fasting   Result Value Ref Range    Cholesterol 207 (H) <200 mg/dL      Comment:      Desirable:       <200 mg/dl    Triglycerides 236 (H) <150 mg/dL      Comment:      Borderline high:  150-199 mg/dl  High:             200-499 mg/dl  Very high:       >499 mg/dl  Fasting specimen      HDL Cholesterol 56 >49 mg/dL    LDL Cholesterol Calculated 104 (H) <100 mg/dL      Comment:      Above desirable:  100-129 mg/dl  Borderline High:  130-159 mg/dL  High:             160-189 mg/dL  Very high:       >189 mg/dl      Non HDL Cholesterol 151 (H) <130 mg/dL      Comment:      Above Desirable:  130-159 mg/dl  Borderline high:  160-189 mg/dl  High:             190-219 mg/dl  Very high:       >219 mg/dl     ALT   Result Value Ref Range    ALT 35 0 - 50 U/L   Albumin Random Urine Quantitative with Creat Ratio   Result Value Ref Range    Creatinine Urine 4 mg/dL    Albumin Urine mg/L 6 mg/L    Albumin Urine mg/g Cr 148.96 (H) 0 - 25 mg/g Cr       If you have any questions or concerns, please call the clinic at the number listed above.       Sincerely,        Angie Camacho MD

## 2018-06-27 DIAGNOSIS — Z12.11 SPECIAL SCREENING FOR MALIGNANT NEOPLASMS, COLON: ICD-10-CM

## 2018-06-27 LAB
ALT SERPL W P-5'-P-CCNC: 35 U/L (ref 0–50)
ANION GAP SERPL CALCULATED.3IONS-SCNC: 8 MMOL/L (ref 3–14)
BUN SERPL-MCNC: 12 MG/DL (ref 7–30)
CALCIUM SERPL-MCNC: 9.7 MG/DL (ref 8.5–10.1)
CHLORIDE SERPL-SCNC: 105 MMOL/L (ref 94–109)
CHOLEST SERPL-MCNC: 207 MG/DL
CO2 SERPL-SCNC: 27 MMOL/L (ref 20–32)
CREAT SERPL-MCNC: 0.71 MG/DL (ref 0.52–1.04)
CREAT UR-MCNC: 4 MG/DL
GFR SERPL CREATININE-BSD FRML MDRD: 82 ML/MIN/1.7M2
GLUCOSE SERPL-MCNC: 95 MG/DL (ref 70–99)
HDLC SERPL-MCNC: 56 MG/DL
LDLC SERPL CALC-MCNC: 104 MG/DL
MICROALBUMIN UR-MCNC: 6 MG/L
MICROALBUMIN/CREAT UR: 148.96 MG/G CR (ref 0–25)
NONHDLC SERPL-MCNC: 151 MG/DL
POTASSIUM SERPL-SCNC: 3.9 MMOL/L (ref 3.4–5.3)
SODIUM SERPL-SCNC: 140 MMOL/L (ref 133–144)
TRIGL SERPL-MCNC: 236 MG/DL

## 2018-06-27 PROCEDURE — 82274 ASSAY TEST FOR BLOOD FECAL: CPT | Performed by: FAMILY MEDICINE

## 2018-06-27 ASSESSMENT — PATIENT HEALTH QUESTIONNAIRE - PHQ9: SUM OF ALL RESPONSES TO PHQ QUESTIONS 1-9: 1

## 2018-06-27 ASSESSMENT — ANXIETY QUESTIONNAIRES: GAD7 TOTAL SCORE: 1

## 2018-06-28 ENCOUNTER — TELEPHONE (OUTPATIENT)
Dept: FAMILY MEDICINE | Facility: CLINIC | Age: 68
End: 2018-06-28

## 2018-06-28 DIAGNOSIS — E78.2 MIXED HYPERLIPIDEMIA: Primary | ICD-10-CM

## 2018-06-28 RX ORDER — ATORVASTATIN CALCIUM 20 MG/1
20 TABLET, FILM COATED ORAL DAILY
Qty: 30 TABLET | Refills: 1 | Status: SHIPPED | OUTPATIENT
Start: 2018-06-28 | End: 2018-08-03

## 2018-06-28 NOTE — TELEPHONE ENCOUNTER
Reason for Call:  Other call back    Detailed comments: Patient would like to change her cholesterol medication, states Simvastatin causes leg cramping at night. She would like a call back to discuss.     Phone Number Patient can be reached at: Home number on file [phone] (home)    Best Time: any    Can we leave a detailed message on this number? YES    Call taken on 6/28/2018 at 2:11 PM by Jany Lovtet

## 2018-06-29 NOTE — PROGRESS NOTES
Please see attached lab results  All of your lab results are normal, except as noted below.    The following are explanations of some of our lab tests    THIS DOES NOT MEAN THAT YOU HAD ALL OF THESE DONE    Please continue on the same medications unless a change is noted above    These are some general explanations for tests:    Hgb is the blood iron level  WBC means White Blood Cells  Platelets are small blood cells that help with forming the blood clots along with other blood factors.  Electrolytes are Sodium, Potassium, Calcium, Magnesium, Phosphorus.  Liver tests are: AST, ALT, Bilirubin, Alkaline Phosphatase.  Kidney tests are Creatinine, GFR.  HDL Cholesterol - is the good cholesterol and it is good to have it high.  LDL cholesterol is the bad cholesterol and it is good to have it low.###essentially normal at 104  ##You should be on atorvastatin?  It is recommended to have LDL less than 130 for people with hypertension and to have it less than 100 for people with heart disease, diabetes and chronic kidney disease.  Triglycerides are another type of lipid and can also cause heart disease so should be kept low. ###much higher than in the past when they were only alittle higher than normal. I am sure it is recent diet, so will await the next labs in 6 mo ####  Thyroid tests are TSH, T4, T3  Glucose is sugar  A1c is a test that gives us an idea about how well was controlled the diabetes for the last 3 months.   PSA stands for Prostate Specific Antigen and it can be elevated with prostate cancer or prostate inflammation.

## 2018-07-01 LAB — HEMOCCULT STL QL IA: NEGATIVE

## 2018-08-03 ENCOUNTER — MYC REFILL (OUTPATIENT)
Dept: FAMILY MEDICINE | Facility: CLINIC | Age: 68
End: 2018-08-03

## 2018-08-03 DIAGNOSIS — E78.2 MIXED HYPERLIPIDEMIA: ICD-10-CM

## 2018-08-03 RX ORDER — ATORVASTATIN CALCIUM 20 MG/1
20 TABLET, FILM COATED ORAL DAILY
Qty: 30 TABLET | Refills: 5 | Status: SHIPPED | OUTPATIENT
Start: 2018-08-03 | End: 2018-10-08

## 2018-08-03 RX ORDER — ATORVASTATIN CALCIUM 20 MG/1
20 TABLET, FILM COATED ORAL DAILY
Qty: 30 TABLET | Refills: 1 | OUTPATIENT
Start: 2018-08-03

## 2018-08-03 NOTE — TELEPHONE ENCOUNTER
Message from Tdt:  Original authorizing provider: TAI Barker would like a refill of the following medications:  atorvastatin (LIPITOR) 20 MG tablet [Angelina Puckett PA-C]    Preferred pharmacy: [facility] - [address]    Comment:

## 2018-08-03 NOTE — TELEPHONE ENCOUNTER
Message from Tdt:  Original authorizing provider: TAI Barker would like a refill of the following medications:  atorvastatin (LIPITOR) 20 MG tablet [Angelina Puckett PA-C]    Preferred pharmacy: [facility] - [address]    Comment:

## 2018-08-03 NOTE — TELEPHONE ENCOUNTER
After Visit Summary   8/31/2017    Gadiel James    MRN: [id number]           Patient Information     Date Of Birth          2003        Visit Information        Provider Department      8/31/2017 7:30 PM Nick Rolon MD [facility]        Today's Diagnoses     Dysuria    -  1       Follow-ups after your visit        Your next 10 appointments already scheduled     Feb 19, 2018  1:00 PM CST   Return Visit with La Batista   [facility] ([facility])    [address] [place] [address] [address] [place] [address]   [phone]            Feb 27, 2018  9:30 AM CST   Return Visit with Edmundo Woods,     [facility] ([facility])    [address] [place] [address]   [phone]              Who to contact     If you have questions or need follow up information about today's clinic visit or your schedule please contact [facility] directly at [phone].  Normal or non-critical lab and imaging results will be communicated to you by V[employer] Viohart, letter or phone within 4 business days after the clinic has received the results. If you do not hear from us within 7 days, please contact the clinic through [employer] or phone. If you have a critical or abnormal lab result, we will notify you by phone as soon as possible.  Submit refill requests through [employer] or call your pharmacy and they will forward the refill request to us. Please allow 3 business days for your refill to be completed.          Additional Information About Your Visit        Vital Viohart Information     [employer] lets you send messages to your doctor, view your test results, renew your prescriptions, schedule appointments and more. To sign up, go to [email or web address], contact your [facility] clinic or call [phone] during business hours.            Care EveryWhere ID     This is your Care EveryWhere ID. This  Duplicate request.    Leah Esposito RN  Flex Workforce Triage     "could be used by other organizations to access your [facility] medical records  Opted out of Care Everywhere exchange        Your Vitals Were     Pulse Temperature Height Pulse Oximetry BMI (Body Mass Index)       98 98.5  F (36.9  C) (Oral) 5' 8\" (1.727 m) 98% 34.55 kg/m2        Blood Pressure from Last 3 Encounters:   08/31/17 125/70   07/24/17 113/67   03/10/17 126/81    Weight from Last 3 Encounters:   08/31/17 227 lb 3.2 oz (103.1 kg) (>99 %)*   07/24/17 218 lb (98.9 kg) (>99 %)*   03/10/17 183 lb 3.2 oz (83.1 kg) (99 %)*     * Growth percentiles are based on [facility] 2-20 Years data.              We Performed the Following     UA reflex to Microscopic and Culture        Primary Care Provider Office Phone # Fax #    Rajesh Brower [phone]       [address] [facility] [address]  [facility] [address]        Equal Access to Services     [facility]: Hadii tish salazar hadasho Soomaali, waaxda luqadaha, qaybta kaalmada adeegyajermaine, toro rice . So [facility] [phone].    ATENCIÓN: Si habla español, tiene a fong disposición servicios gratuitos de asistencia lingüística. Altoname al [phone].    We comply with applicable federal civil rights laws and [place] laws. We do not discriminate on the basis of race, color, national origin, age, disability sex, sexual orientation or gender identity.            Thank you!     Thank you for choosing [facility]  for your care. Our goal is always to provide you with excellent care. Hearing back from our patients is one way we can continue to improve our services. Please take a few minutes to complete the written survey that you may receive in the mail after your visit with us. Thank you!             Your Updated Medication List - Protect others around you: Learn how to safely use, store and throw away your medicines at www.disposemymeds.org.          This list is accurate as of: 8/31/17 11:59 PM.  Always use your most recent " med list.                   Brand Name Dispense Instructions for use Diagnosis    ABILIFY 15 MG tablet   Generic drug:  ARIPiprazole      Take 7.5 mg by mouth daily        CHILDRENS MULTI VITAMINS/IRON PO           FLUoxetine 20 MG capsule    PROzac    30 capsule    Take 1 capsule (20 mg) by mouth daily    Mood disorder (H)       guanFACINE 2 MG Tb24 24 hr tablet    INTUNIV    30 tablet    Take 1 tablet (2 mg) by mouth At Bedtime    Attention deficit hyperactivity disorder (ADHD), combined type       lisdexamfetamine 40 MG capsule    VYVANSE    30 capsule    Take 1 capsule (40 mg) by mouth every morning    Attention deficit hyperactivity disorder (ADHD), combined type       order for DME     1 Device    Equipment being ordered: short CAM boot    Rowe's disease of left foot       study - metFORMIN  MG 24 hr tablet    ids 4862     Take 500 mg by mouth 2 times daily (with meals) Prescribed by Dr. Miller

## 2018-08-03 NOTE — TELEPHONE ENCOUNTER
"atorvastatin (LIPITOR) 20 MG tablet  Last Written Prescription Date:  06/28/18  Last Fill Quantity: 30,  # refills: 1   Last office visit: 6/26/2018 with prescribing provider:  06/26/18   Future Office Visit:    Requested Prescriptions   Pending Prescriptions Disp Refills     atorvastatin (LIPITOR) 20 MG tablet 30 tablet 1     Sig: Take 1 tablet (20 mg) by mouth daily    Statins Protocol Passed    8/3/2018  2:25 PM       Passed - LDL on file in past 12 months    Recent Labs   Lab Test  06/26/18   1219   LDL  104*            Passed - No abnormal creatine kinase in past 12 months    No lab results found.            Passed - Recent (12 mo) or future (30 days) visit within the authorizing provider's specialty    Patient had office visit in the last 12 months or has a visit in the next 30 days with authorizing provider or within the authorizing provider's specialty.  See \"Patient Info\" tab in inbasket, or \"Choose Columns\" in Meds & Orders section of the refill encounter.           Passed - Patient is age 18 or older       Passed - No active pregnancy on record       Passed - No positive pregnancy test in past 12 months          "

## 2018-08-03 NOTE — TELEPHONE ENCOUNTER
"Requested Prescriptions   Pending Prescriptions Disp Refills     atorvastatin (LIPITOR) 20 MG tablet 30 tablet 1    Last Written Prescription Date:  6/28/2018  Last Fill Quantity: 30,  # refills: 1   Last office visit: 6/26/2018 with prescribing provider:  Italo   Future Office Visit:     Sig: Take 1 tablet (20 mg) by mouth daily    Statins Protocol Passed    8/3/2018  2:25 PM       Passed - LDL on file in past 12 months    Recent Labs   Lab Test  06/26/18   1219   LDL  104*            Passed - No abnormal creatine kinase in past 12 months    No lab results found.            Passed - Recent (12 mo) or future (30 days) visit within the authorizing provider's specialty    Patient had office visit in the last 12 months or has a visit in the next 30 days with authorizing provider or within the authorizing provider's specialty.  See \"Patient Info\" tab in inbasket, or \"Choose Columns\" in Meds & Orders section of the refill encounter.           Passed - Patient is age 18 or older       Passed - No active pregnancy on record       Passed - No positive pregnancy test in past 12 months        Filled per [facility] protocol.     Leah Esposito RN  Flex Workforce Triage    "

## 2018-09-11 ENCOUNTER — HOSPITAL ENCOUNTER (OUTPATIENT)
Dept: MAMMOGRAPHY | Facility: CLINIC | Age: 68
Discharge: HOME | End: 2018-09-11
Attending: FAMILY MEDICINE | Admitting: FAMILY MEDICINE
Payer: COMMERCIAL

## 2018-09-11 DIAGNOSIS — Z12.31 VISIT FOR SCREENING MAMMOGRAM: ICD-10-CM

## 2018-09-11 PROCEDURE — 77067 SCR MAMMO BI INCL CAD: CPT

## 2018-10-07 ENCOUNTER — HEALTH MAINTENANCE LETTER (OUTPATIENT)
Age: 68
End: 2018-10-07

## 2018-10-08 DIAGNOSIS — E78.2 MIXED HYPERLIPIDEMIA: ICD-10-CM

## 2018-10-08 NOTE — TELEPHONE ENCOUNTER
Reason for Call:  Medication or medication refill:    Do you use a [facility]?  Name of the pharmacy and phone number for the current request:     [facility] - [place], [place] - [address] [place] [address]      Name of the medication requested: Lipitor    Other request: Patient would like a 90 day supply instead of 30 day    Can we leave a detailed message on this number? YES    Phone number patient can be reached at: Home number on file [phone] (home)    Best Time: anytime    Call taken on 10/8/2018 at 4:40 PM by Shelby Driver

## 2018-10-10 RX ORDER — ATORVASTATIN CALCIUM 20 MG/1
20 TABLET, FILM COATED ORAL DAILY
Qty: 90 TABLET | Refills: 2 | Status: SHIPPED | OUTPATIENT
Start: 2018-10-10 | End: 2019-07-23

## 2018-10-10 NOTE — TELEPHONE ENCOUNTER
"Requested Prescriptions   Pending Prescriptions Disp Refills     atorvastatin (LIPITOR) 20 MG tablet 30 tablet 5     Sig: Take 1 tablet (20 mg) by mouth daily    Statins Protocol Passed    10/8/2018  5:07 PM       Passed - LDL on file in past 12 months    Recent Labs   Lab Test  06/26/18   1219   LDL  104*            Passed - No abnormal creatine kinase in past 12 months    No lab results found.            Passed - Recent (12 mo) or future (30 days) visit within the authorizing provider's specialty    Patient had office visit in the last 12 months or has a visit in the next 30 days with authorizing provider or within the authorizing provider's specialty.  See \"Patient Info\" tab in inbasket, or \"Choose Columns\" in Meds & Orders section of the refill encounter.           Passed - Patient is age 18 or older       Passed - No active pregnancy on record       Passed - No positive pregnancy test in past 12 months        Prescription approved per [facility] Refill Protocol.    "

## 2018-10-30 ENCOUNTER — OFFICE VISIT (OUTPATIENT)
Dept: FAMILY MEDICINE | Facility: CLINIC | Age: 68
End: 2018-10-30
Payer: COMMERCIAL

## 2018-10-30 VITALS
WEIGHT: 160 LBS | TEMPERATURE: 98.9 F | DIASTOLIC BLOOD PRESSURE: 78 MMHG | SYSTOLIC BLOOD PRESSURE: 120 MMHG | RESPIRATION RATE: 14 BRPM | BODY MASS INDEX: 27.46 KG/M2 | HEART RATE: 86 BPM

## 2018-10-30 DIAGNOSIS — M65.4 DE QUERVAIN'S DISEASE (TENOSYNOVITIS): ICD-10-CM

## 2018-10-30 DIAGNOSIS — R07.0 THROAT PAIN: Primary | ICD-10-CM

## 2018-10-30 DIAGNOSIS — R09.82 PND (POST-NASAL DRIP): ICD-10-CM

## 2018-10-30 PROCEDURE — 99214 OFFICE O/P EST MOD 30 MIN: CPT | Performed by: PHYSICIAN ASSISTANT

## 2018-10-30 RX ORDER — FLUTICASONE PROPIONATE 50 MCG
1-2 SPRAY, SUSPENSION (ML) NASAL DAILY
Qty: 1 BOTTLE | Refills: 3 | Status: SHIPPED | OUTPATIENT
Start: 2018-10-30 | End: 2020-03-19

## 2018-10-30 NOTE — MR AVS SNAPSHOT
After Visit Summary   10/30/2018    Emma Rudolph    MRN: [id number]           Patient Information     Date Of Birth          1950        Visit Information        Provider Department      10/30/2018 1:30 PM Angelina Puckett PA-C [facility]        Today's Diagnoses     Throat pain    -  1    PND (post-nasal drip)        De Quervain's disease (tenosynovitis)           Follow-ups after your visit        Additional Services     [facility], AND CHIR[facility]RACTIC REFERRAL       Physical Therapy, Hand Therapy and Chiropractic Care are available through:  *[facility]  *Hand Therapy (Occupational Therapy or Physical Therapy)  *[facility]    Call one number to schedule at any of the above locations: [phone].    Physical therapy, Hand therapy and/or Chiropractic care has been recommended by your physician as an excellent treatment option to reduce pain and help people return to normal activities, including sports.  Therapy and/or chiropractic care services are a great complement or alternative to expensive and invasive surgery, injections, or long-term use of prescription medications. The primary goal is to identify the underlying problem and provide you the tools to manage your condition on your own.     Please be aware that coverage of these services is subject to the terms and limitations of your health insurance plan.  Call member services at your health plan with any benefit or coverage questions.      Please bring the following to your appointment:  *Your personal calendar for scheduling future appointments  *Comfortable clothing                  Follow-up notes from your care team     Return in about 2 weeks (around 11/13/2018) for phone call to clinic.      Future tests that were ordered for you today     Open Future Orders        Priority Expected Expires Ordered    [facility], AND CH[facility]RACT[facility]  REFERRAL Routine  10/30/2019 10/30/2018            Who to contact     If you have questions or need follow up information about today's clinic visit or your schedule please contact [facility] directly at [phone].  Normal or non-critical lab and imaging results will be communicated to you by [employer], letter or phone within 4 business days after the clinic has received the results. If you do not hear from us within 7 days, please contact the clinic through [employer] or phone. If you have a critical or abnormal lab result, we will notify you by phone as soon as possible.  Submit refill requests through [employer] or call your pharmacy and they will forward the refill request to us. Please allow 3 business days for your refill to be completed.          Additional Information About Your Visit        [employer] Information     [employer] gives you secure access to your electronic health record. If you see a primary care provider, you can also send messages to your care team and make appointments. If you have questions, please call your primary care clinic.  If you do not have a primary care provider, please call [phone] and they will assist you.        Care EveryWhere ID     This is your Care EveryWhere ID. This could be used by other organizations to access your [facility] medical records  [id number]        Your Vitals Were     Pulse Temperature Respirations Last Period BMI (Body Mass Index)       86 98.9  F (37.2  C) (Tympanic) 14 (LMP Unknown) 27.46 kg/m2        Blood Pressure from Last 3 Encounters:   10/30/18 120/78   06/26/18 130/82   10/16/17 138/84    Weight from Last 3 Encounters:   10/30/18 160 lb (72.6 kg)   06/26/18 157 lb (71.2 kg)   10/16/17 154 lb (69.9 kg)                 Today's Medication Changes          These changes are accurate as of 10/30/18  1:59 PM.  If you have any questions, ask your nurse or doctor.               Start taking these medicines.        Dose/Directions     fluticasone 50 MCG/ACT spray   Commonly known as:  FLONASE   Used for:  Throat pain, PND (post-nasal drip)   Started by:  Angelina Puckett PA-C        Dose:  1-2 spray   Spray 1-2 sprays into both nostrils daily   Quantity:  1 Bottle   Refills:  3         Stop taking these medicines if you haven't already. Please contact your care team if you have questions.     simvastatin 20 MG tablet   Commonly known as:  ZOCOR   Stopped by:  Angelina Puckett PA-C                Where to get your medicines      These medications were sent to [facility] [address] [facility] [address] AT [address]  [address], [facility] [address]     Phone:  [phone]     fluticasone 50 MCG/ACT spray                Primary Care Provider Office Phone # Fax #    Angelina Puckett PA-C [phone] [address]  [facility] [address]        Equal Access to Services     [facility] AH: Hadii aad ku hadasho Soomaali, waaxda luqadaha, qaybta kaalmada adeegyada, waxay janettein hayaan adeseth jennings. So [facility] [phone].    ATENCIÓN: Si habla español, tiene a wiggins disposición servicios gratuitos de asistencia lingüística. [facility] [phone].    We comply with applicable federal civil rights laws and [place] laws. We do not discriminate on the basis of race, color, national origin, age, disability, sex, sexual orientation, or gender identity.            Thank you!     Thank you for choosing [facility]  for your care. Our goal is always to provide you with excellent care. Hearing back from our patients is one way we can continue to improve our services. Please take a few minutes to complete the written survey that you may receive in the mail after your visit with us. Thank you!             Your Updated Medication List - Protect others around you: Learn how to safely use, store and throw away your medicines  at www.disposemymeds.org.          This list is accurate as of 10/30/18  1:59 PM.  Always use your most recent med list.                   Brand Name Dispense Instructions for use Diagnosis    ASPIRIN PO      Take 81 mg by mouth daily        atorvastatin 20 MG tablet    LIPITOR    90 tablet    Take 1 tablet (20 mg) by mouth daily    Mixed hyperlipidemia       fluticasone 50 MCG/ACT spray    FLONASE    1 Bottle    Spray 1-2 sprays into both nostrils daily    Throat pain, PND (post-nasal drip)       MULTIVITAMIN PO      Take by mouth daily        traZODone 50 MG tablet    DESYREL    90 tablet    TAKE 1 TABLET NIGHTLY AS NEEDED FOR SLEEP    Insomnia due to other mental disorder       venlafaxine 150 MG 24 hr capsule    EFFEXOR-XR    90 capsule    Take 1 capsule daily    Major depression in complete remission (H)       vitamin D 1000 units capsule      Take 1 capsule by mouth daily.

## 2018-10-30 NOTE — NURSING NOTE
Health Maintenance Due   Topic Date Due     FALL RISK ASSESSMENT  06/14/2017     PAP Q3 YR  08/04/2018     INFLUENZA VACCINE (1) 09/01/2018     Health Maintenance reviewed at today's visit patient asked to schedule/complete:   Cervical Cancer:  Patient agrees to schedule

## 2018-10-30 NOTE — PROGRESS NOTES
SUBJECTIVE:   Emma Rudolph is a 67 year old female who presents to clinic today for the following health issues:    Throat Problem      Duration: since May     Description (location/character/radiation): scratchy throat, deeper voice occationally    Intensity:  mild    Accompanying signs and symptoms: throat always feels irritated     History (similar episodes/previous evaluation): None    Precipitating or alleviating factors: None    Therapies tried and outcome: throat lozenges, lots of water     Reviewed and updated as needed this visit by clinical staff  Tobacco  Allergies  Meds  Problems  Med Hx  Surg Hx  Fam Hx  Soc Hx        Reviewed and updated as needed this visit by Provider  Tobacco  Allergies  Meds  Problems  Med Hx  Surg Hx  Fam Hx  Soc Hx        Additional complaints: Right hand pain    HPI additional notes: Pat presents today with   Chief Complaint   Patient presents with     Throat Problem   Has scratchy sensation in throat all the time.  Fluids help.  No fevers, No pain when swallowing but has irritation sensation.  Thought it was maybe spring allergies but then never went away.  No history of heartburn.  Nothing seems to make it worse.  No runny nose or other URI symptoms.  Voice feels more scratchy and sounds different, no trouble projecting her voice.         ROS:  C: Negative for fever, chills, recent change in weight  Skin: Negative for worrisome rashes or lesions  ENT/MOUTH:POSITIVE for sore throat and hoarseness.  Negative for congestion, runny nose, pain with swallowing and swollen glands.  Resp: Negative for significant cough or SOB  CV: Negative for chest pain or peripheral edema  GI: Negative for nausea, abdominal pain, heartburn, or change in bowel habits  MS: Positive for right hand pain  P: Negative for changes in mood or affect  ROS all other systems negative.    Chart Review:  History   Smoking Status     Never Smoker   Smokeless Tobacco     Never Used        Recent Labs   Lab Test  06/26/18   1219  06/01/17   1344  05/31/16   0838  08/04/15   0934   06/12/13   0933   LDL  104*  92  95  87   < >  84   HDL  56  60  65  61   < >  55   TRIG  236*  177*  149  151*   < >  173*   ALT  35  27  52*  47   < >  58*   CR  0.71  0.74   --   0.90   < >   --    GFRESTIMATED  82  78   --   63   < >   --    GFRESTBLACK  >90  >90  African American GFR Calc     --   76   < >   --    POTASSIUM  3.9  3.6   --    --    --    --    TSH   --    --    --   1.07   --   1.16    < > = values in this interval not displayed.      BP Readings from Last 3 Encounters:   10/30/18 120/78   06/26/18 130/82   10/16/17 138/84    Wt Readings from Last 3 Encounters:   10/30/18 160 lb (72.6 kg)   06/26/18 157 lb (71.2 kg)   10/16/17 154 lb (69.9 kg)          Patient Active Problem List   Diagnosis     Generalized anxiety disorder     Mixed hyperlipidemia     CKD (chronic kidney disease) stage 2, GFR 60-89 ml/min     ACP (advance care planning)     Major depression in complete remission (H) on meds since 6-12-13     Osteoarthritis of multiple joints, unspecified osteoarthritis type of #2&3 fingers bilat R>L     Family history of ischemic heart disease     Memory loss     Primary insomnia     Throat pain     Past Surgical History:   Procedure Laterality Date     COLONOSCOPY       EYE SURGERY       H ARGON LASER FOR RETINAL TEAR Left 3/2014     HEAD & NECK SURGERY      wisdom teeth     LAPAROSCOPIC SINGLE SITE SALPINGO-OOPHORECTOMY Bilateral 4/15/2015    Procedure: LAPAROSCOPIC SINGLE SITE SALPINGO-OOPHORECTOMY;  Surgeon: Leonora Zavaleta MD;  Location: [facility]     wisdom teeth removal  age 19     Problem list, Medication list, Allergies, Medical/Social/Surg hx reviewed in [facility], updated as appropriate.   OBJECTIVE:                                                    /78 (BP Location: Right arm, Cuff Size: Adult Regular)  Pulse 86  Temp 98.9  F (37.2  C) (Tympanic)  Resp 14  Wt 160 lb (72.6 kg)  LMP   (LMP Unknown)  BMI 27.46 kg/m2  Body mass index is 27.46 kg/(m^2).  GENERAL: healthy, alert, in no acute distress  EYES: Grossly normal to inspection, EOMI, PERRL  HENT: Ear canals normal; TMs pearly gray without effusion. Nasal mucosa moist without edema or discharge. Oral mucous membranes moist, no lesions or ulcerations. Pharynx mildly erythematous.  Uvula midline.  Scant clear postnasal drainage. Sinuses non-tender to palpation.  NECK: Non-tender, no adenopathy.  RESP: lungs clear to auscultation - no rales, no rhonchi, no wheezes  CV: regular rate and rhythm, normal S1 S2. No peripheral edema.  ABDOMEN: soft, nontender, no hepatosplenomegaly or masses. Normal bowel sounds in all four quadrants.  SKIN: no suspicious lesions, no rashes  PSYCH: Alert and oriented times 3;  Able to articulate logical thoughts. Affect is normal.    Diagnostic test results: none      ASSESSMENT/PLAN:                                                          ICD-10-CM    1. Throat pain R07.0 fluticasone (FLONASE) 50 MCG/ACT spray   2. PND (post-nasal drip) R09.82 fluticasone (FLONASE) 50 MCG/ACT spray   3. De Quervain's disease (tenosynovitis) M65.4 [facility], HAND, AND CHIROPRACTIC REFERRAL     Discussed with chronicity of throat pain, cause is likely due to post-nasal drainage or reflux.  Pt does have post-nasal drainage on exam and her symptoms did start in the spring so I think this is the more likely cause.  Will trial flonase for two weeks.  If not improving, pt will send MyC message and will trial course of omeprazole.  Discussed further step may then be visit to ENT or GI for laryngoscopy or endoscopy to check for inflammation.    Will refer pt to [facility] for PT on her right hand.  She has already seen orthopedics last year who were not very helpful and she does not want a steroid injection at this time.      Please see patient instructions for treatment details.    Return in about 2 weeks (around 11/13/2018) for phone call to  clinic.    Angelina Puckett PA-C  [facility]

## 2018-11-12 ENCOUNTER — THERAPY VISIT (OUTPATIENT)
Dept: OCCUPATIONAL THERAPY | Facility: CLINIC | Age: 68
End: 2018-11-12
Attending: PHYSICIAN ASSISTANT
Payer: COMMERCIAL

## 2018-11-12 DIAGNOSIS — M65.4 DE QUERVAIN'S DISEASE (TENOSYNOVITIS): ICD-10-CM

## 2018-11-12 PROCEDURE — G8984 CARRY CURRENT STATUS: HCPCS | Mod: GO | Performed by: OCCUPATIONAL THERAPIST

## 2018-11-12 PROCEDURE — 97110 THERAPEUTIC EXERCISES: CPT | Mod: GO | Performed by: OCCUPATIONAL THERAPIST

## 2018-11-12 PROCEDURE — 97760 ORTHOTIC MGMT&TRAING 1ST ENC: CPT | Mod: GO | Performed by: OCCUPATIONAL THERAPIST

## 2018-11-12 PROCEDURE — 97165 OT EVAL LOW COMPLEX 30 MIN: CPT | Mod: GO | Performed by: OCCUPATIONAL THERAPIST

## 2018-11-12 PROCEDURE — G8985 CARRY GOAL STATUS: HCPCS | Mod: GO | Performed by: OCCUPATIONAL THERAPIST

## 2018-11-12 PROCEDURE — 97140 MANUAL THERAPY 1/> REGIONS: CPT | Mod: GO | Performed by: OCCUPATIONAL THERAPIST

## 2018-11-12 NOTE — PROGRESS NOTES
Hand Therapy Initial Evaluation    Current Date:  11/12/2018    Diagnosis:  Right Dequervains  DOI:  October 2017     Subjective:  Emma Rudolph is a 67 year old right hand dominant female.    Patient reports symptoms of pain of the right wrist which occurred due to overuse/computer. Since onset symptoms are Unchanged  Special tests:  none.  Previous treatment: thumb spica from MD.    General health as reported by patient is excellent.  Pertinent medical history includes:Menopausal  Medical allergies:sulfer.  Surgical history: none.  Medication history: Anti-depressants, cholesteral.    Occupational Profile Information:  Current occupation is retired  Job Tasks: Computer Work  Prior functional level:  no limitations  Barriers include:none  Mobility: No difficulty  Transportation: drives  Leisure activities/hobbies: computer  Other: computer    Upper Extremity Functional Index Score:  SCORE:   Column Totals: /80: 78   (A lower score indicates greater disability.)    Objective:  Pain Level Report  VAS(0-10) 11/12/18   At Rest: 0   With Use: 1     Report of Pain:  Location:  wrist  Pain Quality:  Sharp and twinge and ache  Frequency: intermittent    Pain is worst:  daytime  Exacerbated by:  computer  Relieved by:  cold and orthosis  Progression:  unchanged  ROM:  Pain Report:  - none    + mild    ++ moderate    +++ severe    11/12/2018    Thumb AROM  PROM Left Right    MP 0/65 0+/65    IP /75 0/75    Rabd 65 55++    Pabd 60 50+++   Wrist Ext 75 75    Flex 75 75    RD 15 15    UD 40 35+    Sup 85 85    Pro 85 85     Pain level report on scale of 0-10/10  Resisted Testing 11/12/2018   APL +   EPB +   RD min   UD -   Wrist Ext -   Wrist Flex +     Strength:   (Measured in pounds)  Pain Report:  - none    + mild    ++ moderate    +++ severe     11/12/18   Trials Left Right   1  2  3     Average: 40# 45#-     Lat Pinch  11/12/18   Trials Left Right   1  2  3     Average: 16# 14#-     3 Pt Pinch  11/12/18   Trials  Left Right   1  2  3     Average: 14# 14#+     Pain Report:  - none    + mild    ++ moderate    +++ severe   Tenderness 11/12/2018   Radial Styloid +   SBRN -     Special Tests 11/12/2018   Finkelsteins R:++  L:   Radial Nerve Tinel's -     Assessment:  Patient presents with symptoms consistent with diagnosis of DeQuervains tenosynovitis, with conservative intervention.   Patient's limitations or Problem List includes:  Pain, Decreased ROM/motion, Weakness, Decreased  and pinch strength and tightness in musculature of the right wrist which interferes with the patient's ability to perform Work Tasks and Household Chores as compared to previous level of function.    Rehab Potential:  Excellent - Return to full activity, no limitations    Patient will benefit from skilled Occupational Therapy to increase wrist and thumb ROM, flexibility,  strength and pinch strength and decrease pain to return to previous activity level and resume normal daily tasks and to reach their rehab potential.    Barriers to Learning:  No barrier    Communication Issues:  Patient appears to be able to clearly communicate and understand verbal and written communication and follow directions correctly.    Chart Review: Detailed history review with patient    Identified Performance Deficits: home establishment and management and work    Assessment of Occupational Performance:  1-3 Performance Deficits    Clinical Decision Making (Complexity): Low complexity    Treatment Explanation:  The following has been discussed with the patient:    RX ordered/plan of care  Anticipated outcomes  Possible risks and side effects    Treatment Plan:    Frequency:  1 X week, once daily  Duration:  for 4 weeks tapering to 2 X a month over 1 month    Modalities:    US   Therapeutic Exercise:   AROM of wrist and thumb  Manual Techniques:   Joint mobs  Friction massage over 1st dorsal compartment  MFR  Neuromuscular Reeducation   Nerve Gliding and  Kinesiotaping  Orthotic Fabrication:  Forearm based thumb spica orthosis  Activity:   Avoid activities that exacerbate pain in the wrist or thumb.    Avoid  with twist, wide grasp.    Discharge Plan:    Achieve all LTG.  Independent in home treatment program.  Reach maximal therapeutic benefit.    Home Exercise Program:  FABTH HS and day during repetitive tasks and heavy activities  FM to 1st dorsal compartment and radial styloid  Massage extensors    Next Visit:  US  MFR  FM  Nerve gliding  ROM thumb

## 2018-11-12 NOTE — LETTER
[facility]  [address] [address]  [phone]    2018    Re: Emma Rudolph   :   1950  MRN:  [id number]   REFERRING PHYSICIAN:   Angelina Wall    [facility]    Date of Initial Evaluation:  2018  Visits:  Rxs Used: 1  Reason for Referral:  De Quervain's disease (tenosynovitis)    EVALUATION SUMMARY    Hand Therapy Initial Evaluation    Current Date:  2018  Diagnosis:  Right Dequervains  DOI:  2017     Subjective:  Emma Rudolph is a 67 year old right hand dominant female.  Patient reports symptoms of pain of the right wrist which occurred due to overuse/computer. Since onset symptoms are Unchanged  Special tests:  none.  Previous treatment: thumb spica from MD.    General health as reported by patient is excellent.  Pertinent medical history includes:Menopausal  Medical allergies:sulfer.  Surgical history: none.  Medication history: Anti-depressants, cholesteral.  Occupational Profile Information:  Current occupation is retired  Job Tasks: Computer Work  Prior functional level:  no limitations  Barriers include:none  Mobility: No difficulty  Transportation: drives  Leisure activities/hobbies: computer  Other: computer    Upper Extremity Functional Index Score:  SCORE:   Column Totals: /80: 78   (A lower score indicates greater disability.)    Objective:  Pain Level Report  VAS(0-10) 18   At Rest: 0   With Use: 1   Re: Emma Rudolph   :   1950    Report of Pain:  Location:  wrist  Pain Quality:  Sharp and twinge and ache  Frequency: intermittent    Pain is worst:  daytime  Exacerbated by:  computer  Relieved by:  cold and orthosis  Progression:  unchanged  ROM:  Pain Report:  - none    + mild    ++ moderate    +++ severe    2018    Thumb AROM  PROM Left Right    MP 0/65 0+/65    IP /75 0/75    Rabd 65 55++    Pabd 60 50+++   Wrist Ext 75 75    Flex 75 75    RD 15 15    UD 40  35+    Sup 85 85    Pro 85 85     Pain level report on scale of 0-10/10  Resisted Testing 11/12/2018   APL +   EPB +   RD min   UD -   Wrist Ext -   Wrist Flex +     Strength:   (Measured in pounds)  Pain Report:  - none    + mild    ++ moderate    +++ severe     11/12/18   Trials Left Right   1  2  3     Average: 40# 45#-     Lat Pinch  11/12/18   Trials Left Right   1  2  3     Average: 16# 14#-     3 Pt Pinch  11/12/18   Trials Left Right   1  2  3     Average: 14# 14#+     Pain Report:  - none    + mild    ++ moderate    +++ severe   Tenderness 11/12/2018   Radial Styloid +   SBRN -     Special Tests 11/12/2018   Finkelsteins R:++  L:   Radial Nerve Tinel's -     Assessment:  Patient presents with symptoms consistent with diagnosis of DeQuervains tenosynovitis, with conservative intervention.   Patient's limitations or Problem List includes:  Pain, Decreased ROM/motion, Weakness, Decreased  and pinch strength and tightness in musculature of the right wrist which interferes with the patient's ability to perform Work Tasks and Household Chores as compared to previous level of function.    Rehab Potential:  Excellent - Return to full activity, no limitations    Patient will benefit from skilled Occupational Therapy to increase wrist and thumb ROM, flexibility,  strength and pinch strength and decrease pain to return to previous activity level and resume normal daily tasks and to reach their rehab potential.    Barriers to Learning:  No barrier  Communication Issues:  Patient appears to be able to clearly communicate and understand verbal and written communication and follow directions correctly.    Chart Review: Detailed history review with patient    Identified Performance Deficits: home establishment and management and work    Assessment of Occupational Performance:  1-3 Performance Deficits    Clinical Decision Making (Complexity): Low complexity    Treatment Explanation:  The following has been  discussed with the patient:    RX ordered/plan of care  Anticipated outcomes  Possible risks and side effects    Treatment Plan:    Frequency:  1 X week, once daily  Duration:  for 4 weeks tapering to 2 X a month over 1 month    Modalities:    US   Therapeutic Exercise:   AROM of wrist and thumb  Manual Techniques:   Joint mobs  Friction massage over 1st dorsal compartment  MFR  Neuromuscular Reeducation   Nerve Gliding and Kinesiotaping  Orthotic Fabrication:  Forearm based thumb spica orthosis  Activity:   Avoid activities that exacerbate pain in the wrist or thumb.    Avoid  with twist, wide grasp.    Discharge Plan:    Achieve all LTG.  Independent in home treatment program.  Reach maximal therapeutic benefit.    Home Exercise Program:  FABTH HS and day during repetitive tasks and heavy activities  FM to 1st dorsal compartment and radial styloid  Massage extensors    Next Visit:  US  MFR  FM  Nerve gliding  ROM thumb      Thank you for your referral.    INQUIRIES  Therapist: LILIANA Iraheta/WILLIAMS, ZULEIMAT  [facility]  [address] [place] [address]  Phone: [phone]  Fax: [phone]

## 2018-11-23 ENCOUNTER — THERAPY VISIT (OUTPATIENT)
Dept: OCCUPATIONAL THERAPY | Facility: CLINIC | Age: 68
End: 2018-11-23
Payer: COMMERCIAL

## 2018-11-23 DIAGNOSIS — M65.4 RADIAL STYLOID TENOSYNOVITIS: Primary | ICD-10-CM

## 2018-11-23 PROCEDURE — 97035 APP MDLTY 1+ULTRASOUND EA 15: CPT | Mod: GO | Performed by: OCCUPATIONAL THERAPIST

## 2018-11-23 PROCEDURE — 97110 THERAPEUTIC EXERCISES: CPT | Mod: GO | Performed by: OCCUPATIONAL THERAPIST

## 2018-11-23 PROCEDURE — 97140 MANUAL THERAPY 1/> REGIONS: CPT | Mod: GO | Performed by: OCCUPATIONAL THERAPIST

## 2018-11-23 NOTE — PROGRESS NOTES
SOAP Note Objective Information for 11/23/2018:    Pain Level Report  VAS(0-10) 11/12/18 11/23/18   At Rest: 0 0   With Use: 1 0-1     Report of Pain:  Location:  wrist  Pain Quality:  tight  Frequency: intermittent    Pain is worst:  daytime  Exacerbated by:  computer  Relieved by:  cold and orthosis  Progression:  improved  Home Exercise Program:  FABTH HS and day during repetitive tasks and heavy activities  FM to 1st dorsal compartment and radial styloid  Massage extensors  Gentle AROM of wrist - ext/flex and UD/RD  Gentle AROM/tendon gliding of thumb - flex/ext and PABD    Next Visit:  US  ROM thumb  MFR  FM  Nerve gliding - passive and active RN glides    Please refer to the daily flowsheet for treatment today, total treatment time and time spent performing 1:1 timed codes.

## 2018-11-30 ENCOUNTER — THERAPY VISIT (OUTPATIENT)
Dept: OCCUPATIONAL THERAPY | Facility: CLINIC | Age: 68
End: 2018-11-30
Payer: COMMERCIAL

## 2018-11-30 DIAGNOSIS — M65.4 RADIAL STYLOID TENOSYNOVITIS: ICD-10-CM

## 2018-11-30 PROCEDURE — 97112 NEUROMUSCULAR REEDUCATION: CPT | Mod: GO | Performed by: OCCUPATIONAL THERAPIST

## 2018-11-30 PROCEDURE — 97140 MANUAL THERAPY 1/> REGIONS: CPT | Mod: GO | Performed by: OCCUPATIONAL THERAPIST

## 2018-11-30 PROCEDURE — 97110 THERAPEUTIC EXERCISES: CPT | Mod: GO | Performed by: OCCUPATIONAL THERAPIST

## 2018-11-30 NOTE — PROGRESS NOTES
SOAP Note Objective Information for 11/30/2018:    Pain Level Report  VAS(0-10) 11/12/18 11/23/18 11/30/18   At Rest: 0 0 0   With Use: 1 0-1 0-1     Report of Pain:  Location:  wrist  Pain Quality:  Tight, twinge  Frequency: intermittent    Pain is worst:  daytime  Exacerbated by:  computer  Relieved by:  cold and orthosis  Progression:  Improved   Home Exercise Program:  FABTH HS and day during repetitive tasks and heavy activities  FM to 1st dorsal compartment and radial styloid  Massage extensors  Gentle AROM of wrist - ext/flex and UD/RD  Gentle AROM/tendon gliding of thumb - flex/ext and PABD  Proximal RN glides     Next Visit:  US  ROM thumb  MFR  FM  Nerve gliding - passive and active RN glides  Add DeQuervain's stretch    Please refer to the daily flowsheet for treatment today, total treatment time and time spent performing 1:1 timed codes.

## 2018-12-05 ENCOUNTER — THERAPY VISIT (OUTPATIENT)
Dept: OCCUPATIONAL THERAPY | Facility: CLINIC | Age: 68
End: 2018-12-05
Payer: COMMERCIAL

## 2018-12-05 DIAGNOSIS — M65.4 RADIAL STYLOID TENOSYNOVITIS: ICD-10-CM

## 2018-12-05 PROCEDURE — 97140 MANUAL THERAPY 1/> REGIONS: CPT | Mod: GO | Performed by: OCCUPATIONAL THERAPIST

## 2018-12-05 PROCEDURE — 97110 THERAPEUTIC EXERCISES: CPT | Mod: GO | Performed by: OCCUPATIONAL THERAPIST

## 2018-12-05 NOTE — PROGRESS NOTES
SOAP Note Objective Information for 12/5/2018    Pain Level Report  VAS(0-10) 11/12/18 11/23/18 11/30/18 12/5/2018   At Rest: 0 0 0 0   With Use: 1 0-1 0-1 1     Report of Pain:  Location:  wrist  Pain Quality:  Tight, twinge  Frequency: intermittent    Pain is worst:  daytime  Exacerbated by:  computer  Relieved by:  cold and orthosis  Progression:  Improved   ROM:  Pain Report:  - none    + mild    ++ moderate    +++ severe    11/12/2018    Thumb AROM  PROM Left Right    MP 0/65 0+/65    IP /75 0/75    Rabd 65 55++    Pabd 60 50+++   Wrist Ext 75 75    Flex 75 75    RD 15 15    UD 40 35+    Sup 85 85    Pro 85 85     Pain level report on scale of 0-10/10  Resisted Testing 11/12/2018   APL +   EPB +   RD min   UD -   Wrist Ext -   Wrist Flex +     Strength:   (Measured in pounds)  Pain Report:  - none    + mild    ++ moderate    +++ severe     11/12/18   Trials Left Right   1  2  3     Average: 40# 45#-     Lat Pinch  11/12/18   Trials Left Right   1  2  3     Average: 16# 14#-     3 Pt Pinch  11/12/18   Trials Left Right   1  2  3     Average: 14# 14#+     Pain Report:  - none    + mild    ++ moderate    +++ severe   Tenderness 11/12/2018 12/5/2018   Radial Styloid + min   SBRN -      Special Tests 11/12/2018 12/5/18   Finkelsteins R:++  L: R: min  L:   Radial Nerve Tinel's -      Home Exercise Program:  FAB HS and day during repetitive tasks and heavy activities  FM to 1st dorsal compartment and radial styloid  Massage extensors  Gentle AROM of wrist - ext/flex and UD/RD  Gentle AROM/tendon gliding of thumb - flex/ext and PABD  Proximal RN glides     Next Visit:  US  ROM thumb  MFR  FM  Nerve gliding - passive and active RN glides  Add DeQuervain's stretch    Please refer to the daily flowsheet for treatment today, total treatment time and time spent performing 1:1 timed codes.

## 2018-12-05 NOTE — MR AVS SNAPSHOT
After Visit Summary   12/5/2018    Emma Rudolph    MRN: [id number]           Patient Information     Date Of Birth          1950        Visit Information        Provider Department      12/5/2018 11:00 AM Leah Schroeder [facility]        Today's Diagnoses     Radial styloid tenosynovitis           Follow-ups after your visit        Your next 10 appointments already scheduled     Dec 14, 2018 10:30 AM CST   [facility] with Taylor A Carolina, OT   [facility] ([facility])    [address] [address]   [phone]            Dec 21, 2018 10:30 AM CST   [facility] Hand with Taylor A Orlando, OT   [facility] ([facility])    [address] [address]   [phone]            Dec 28, 2018 10:30 AM CST   [facility] Hand with Taylor A Orlando, OT   [facility] ([facility])    [address] [address]   [phone]            Jan 04, 2019 10:30 AM CST   [facility] Hand with Taylor A Orlando, OT   [facility] ([facility])    [address] [address]   [phone]              Who to contact     If you have questions or need follow up information about today's clinic visit or your schedule please contact [facility] directly at [phone].  Normal or non-critical lab and imaging results will be communicated to you by [employer], letter or phone within 4 business days after the clinic has received the results. If you do not hear from us within 7 days, please contact the clinic through [employer] or phone. If you have a critical or abnormal lab result, we will notify you by phone as soon as possible.  Submit refill requests through [employer] or call your pharmacy and they will forward the refill request to us. Please allow 3 business days for your refill to be completed.          Additional Information About Your Visit        [facility]  Information     [employer] gives you secure access to your electronic health record. If you see a primary care provider, you can also send messages to your care team and make appointments. If you have questions, please call your primary care clinic.  If you do not have a primary care provider, please call [phone] and they will assist you.        Care EveryWhere ID     This is your Care EveryWhere ID. This could be used by other organizations to access your [facility] medical records  [id number]        Your Vitals Were     Last Period                   (LMP Unknown)            Blood Pressure from Last 3 Encounters:   10/30/18 120/78   06/26/18 130/82   10/16/17 138/84    Weight from Last 3 Encounters:   10/30/18 72.6 kg (160 lb)   06/26/18 71.2 kg (157 lb)   10/16/17 69.9 kg (154 lb)              We Performed the Following     MANUAL THER TECH,1+REGIONS,EA 15 MIN     THERAPEUTIC EXERCISES        Primary Care Provider Office Phone # Fax #    Angelina Puckett PA-C [phone]       [address] [facility] [address]  [facility] [address]        Equal Access to Services     [facility] : Hadii aad ku hadasho Soomaali, waaxda luqadaha, qaybta kaalmada adeegyada, waxay janettein hayetiennen tiki celis . So [facility] [phone].    ATENCIÓN: Si habla español, tiene a wiggins disposición servicios gratuitos de asistencia lingüística. [facility] [phone].    We comply with applicable federal civil rights laws and [place] laws. We do not discriminate on the basis of race, color, national origin, age, disability, sex, sexual orientation, or gender identity.            Thank you!     Thank you for choosing [facility]  for your care. Our goal is always to provide you with excellent care. Hearing back from our patients is one way we can continue to improve our services. Please take a few minutes to complete the written survey that you may receive in the mail after your visit with us. Thank you!              Your Updated Medication List - Protect others around you: Learn how to safely use, store and throw away your medicines at www.disposemymeds.org.          This list is accurate as of 12/5/18 11:52 AM.  Always use your most recent med list.                   Brand Name Dispense Instructions for use Diagnosis    ASPIRIN PO      Take 81 mg by mouth daily        atorvastatin 20 MG tablet    LIPITOR    90 tablet    Take 1 tablet (20 mg) by mouth daily    Mixed hyperlipidemia       fluticasone 50 MCG/ACT nasal spray    FLONASE    1 Bottle    Spray 1-2 sprays into both nostrils daily    Throat pain, PND (post-nasal drip)       MULTIVITAMIN PO      Take by mouth daily        traZODone 50 MG tablet    DESYREL    90 tablet    TAKE 1 TABLET NIGHTLY AS NEEDED FOR SLEEP    Insomnia due to other mental disorder       venlafaxine 150 MG 24 hr capsule    EFFEXOR-XR    90 capsule    Take 1 capsule daily    Major depression in complete remission (H)       vitamin D 1000 units capsule      Take 1 capsule by mouth daily.

## 2018-12-14 ENCOUNTER — THERAPY VISIT (OUTPATIENT)
Dept: OCCUPATIONAL THERAPY | Facility: CLINIC | Age: 68
End: 2018-12-14
Payer: COMMERCIAL

## 2018-12-14 DIAGNOSIS — M65.4 RADIAL STYLOID TENOSYNOVITIS: ICD-10-CM

## 2018-12-14 PROCEDURE — 97018 PARAFFIN BATH THERAPY: CPT | Mod: GO | Performed by: OCCUPATIONAL THERAPIST

## 2018-12-14 PROCEDURE — G8984 CARRY CURRENT STATUS: HCPCS | Mod: GO | Performed by: OCCUPATIONAL THERAPIST

## 2018-12-14 PROCEDURE — 97110 THERAPEUTIC EXERCISES: CPT | Mod: GO | Performed by: OCCUPATIONAL THERAPIST

## 2018-12-14 PROCEDURE — 97140 MANUAL THERAPY 1/> REGIONS: CPT | Mod: GO | Performed by: OCCUPATIONAL THERAPIST

## 2018-12-14 PROCEDURE — G8985 CARRY GOAL STATUS: HCPCS | Mod: GO | Performed by: OCCUPATIONAL THERAPIST

## 2018-12-14 NOTE — PROGRESS NOTES
Hand Therapy Progress Note  Reporting Period:  11/12/2018 through 12/14/2018    Diagnosis:  Right Dequervains  DOI:  October 2017     Initial History:  Patient reports symptoms of pain of the right wrist which occurred due to overuse/computer. Since onset symptoms are Unchanged  Special tests:  none.  Previous treatment: thumb spica from MD.    General health as reported by patient is excellent.  Pertinent medical history includes:Menopausal  Medical allergies:sulfer.  Surgical history: none.  Medication history: Anti-depressants, cholesteral.    Occupational Profile Information:  Current occupation is retired  Job Tasks: Computer Work  Prior functional level:  no limitations  Barriers include:none  Mobility: No difficulty  Transportation: drives  Leisure activities/hobbies: computer  Other: computer    S:  Subjective changes as noted by patient:  Things are feeling really good! Its localized here (1st dorsal compartment)  Functional changes noted by patient: Improvement in Self Care Tasks (dressing, eating, bathing, hygiene/toileting)  Response to previous treatment: good  Patient has noted adverse reaction to: None      Pain Level Report  VAS(0-10) 11/12/18 11/23/18 11/30/18 12/5/18 12/14/18   At Rest: 0 0 0 0 0   With Use: 1 0-1 0-1 1 0-1     Report of Pain:  Location:  Wrist - radial  Pain Quality:  Tight, twinge   Frequency: intermittent    Pain is worst:  daytime  Exacerbated by:  Computer, thumb extension  Relieved by:  cold and orthosis  Progression:  Resolving    ROM:  Pain Report:  - none    + mild    ++ moderate    +++ severe    11/12/2018 12/14/18   Thumb AROM  PROM Left Right Right    MP 0/65 0+/65 0/55    IP /75 0/75 0/80    Rabd 65 55++ 60+    Pabd 60 50+++ 60+   Wrist Ext 75 75 75    Flex 75 75 70    RD 15 15 20+    UD 40 35+ 35    Sup 85 85 wnl    Pro 85 85 wnl     Pain level report on scale of 0-10/10  Resisted Testing 11/12/2018 12/14   APL + +   EPB + +   RD min NT   UD - NT   Wrist Ext - NT    Wrist Flex + NT     Strength:   (Measured in pounds)  Pain Report:  - none    + mild    ++ moderate    +++ severe     11/12/18 12/14/18   Trials Left Right Right   1  2  3      Average: 40# 45#- 40-     Lat Pinch  11/12/18 12/14/18   Trials Left Right Right   1  2  3      Average: 16# 14#- 16-     3 Pt Pinch  11/12/18 12/14/18   Trials Left Right Right   1  2  3      Average: 14# 14#+ 14-     Pain Report:  - none    + mild    ++ moderate    +++ severe   Tenderness 11/12/18 12/14/18   Radial Styloid + +   SBRN - -     Special Tests 11/12/18 12/14/18   Finkelsteins R:++  L: R: stretch   Radial Nerve Tinel's - -     Assessment:  Response to therapy has been improvement to:  ROM of thumb:  Radial Abduction and Palmar Abduction  Strength:  Pinch strength  Pain:  frequency is less, intensity of pain is decreased, duration of pain is decreased and less tender over affected area  Overall Assessment:  Patient would benefit from continued therapy to achieve rehab potential  STG/LTG:  See goal sheet for details and updates of remaining functional limitations.     Treatment Plan:    I have re-evaluated this patient and find that the nature, scope, duration and intensity of the therapy is appropriate for the medical condition of the patient.  Frequency:  1 X week, once daily  Duration:  for 4 additional weeks     Home Exercise Program:  FABTH HS and day during repetitive tasks and heavy activities  FM to 1st dorsal compartment and radial styloid  Massage extensors  Gentle AROM of wrist - ext/flex and UD/RD  Gentle AROM/tendon gliding of thumb - flex/ext and PABD  Proximal RN glides - discontinue due to pain in upper arm  DeQuervain's Stretch   Heat (epsom salt soaks vs paraffin) to fingers  Tendon gliding/AROM of fingers    Next Visit:  US  ROM thumb  MFR  FM  Nerve gliding - passive and active RN glides  See if patient purchased paraffin/tried heat to fingers    Please refer to the daily flowsheet for treatment today,  total treatment time and time spent performing 1:1 timed codes.

## 2018-12-21 ENCOUNTER — THERAPY VISIT (OUTPATIENT)
Dept: OCCUPATIONAL THERAPY | Facility: CLINIC | Age: 68
End: 2018-12-21
Payer: COMMERCIAL

## 2018-12-21 DIAGNOSIS — M65.4 RADIAL STYLOID TENOSYNOVITIS: ICD-10-CM

## 2018-12-21 PROCEDURE — 97110 THERAPEUTIC EXERCISES: CPT | Mod: GO | Performed by: OCCUPATIONAL THERAPIST

## 2018-12-21 PROCEDURE — 97140 MANUAL THERAPY 1/> REGIONS: CPT | Mod: GO | Performed by: OCCUPATIONAL THERAPIST

## 2018-12-21 PROCEDURE — 97035 APP MDLTY 1+ULTRASOUND EA 15: CPT | Mod: GO | Performed by: OCCUPATIONAL THERAPIST

## 2018-12-28 ENCOUNTER — THERAPY VISIT (OUTPATIENT)
Dept: OCCUPATIONAL THERAPY | Facility: CLINIC | Age: 68
End: 2018-12-28
Payer: COMMERCIAL

## 2018-12-28 DIAGNOSIS — M65.4 RADIAL STYLOID TENOSYNOVITIS: ICD-10-CM

## 2018-12-28 PROCEDURE — 97140 MANUAL THERAPY 1/> REGIONS: CPT | Mod: GO | Performed by: OCCUPATIONAL THERAPIST

## 2018-12-28 PROCEDURE — 97110 THERAPEUTIC EXERCISES: CPT | Mod: GO | Performed by: OCCUPATIONAL THERAPIST

## 2019-01-11 ENCOUNTER — THERAPY VISIT (OUTPATIENT)
Dept: OCCUPATIONAL THERAPY | Facility: CLINIC | Age: 69
End: 2019-01-11
Payer: COMMERCIAL

## 2019-01-11 DIAGNOSIS — M65.4 RADIAL STYLOID TENOSYNOVITIS: ICD-10-CM

## 2019-01-11 PROCEDURE — 97140 MANUAL THERAPY 1/> REGIONS: CPT | Mod: GO | Performed by: OCCUPATIONAL THERAPIST

## 2019-01-11 PROCEDURE — G8984 CARRY CURRENT STATUS: HCPCS | Mod: GO | Performed by: OCCUPATIONAL THERAPIST

## 2019-01-11 PROCEDURE — 97110 THERAPEUTIC EXERCISES: CPT | Mod: GO | Performed by: OCCUPATIONAL THERAPIST

## 2019-01-11 PROCEDURE — G8985 CARRY GOAL STATUS: HCPCS | Mod: GO | Performed by: OCCUPATIONAL THERAPIST

## 2019-01-11 NOTE — PROGRESS NOTES
Hand Therapy Progress Note  Reporting Period:  12/14/2018 through 1/11/2019    Diagnosis:  Right Dequervains  DOI:  October 2017     Initial History:  Patient reports symptoms of pain of the right wrist which occurred due to overuse/computer. Since onset symptoms are Unchanged  Special tests:  none.  Previous treatment: thumb spica from MD.    General health as reported by patient is excellent.  Pertinent medical history includes:Menopausal  Medical allergies:sulfer.  Surgical history: none.  Medication history: Anti-depressants, cholesteral.    Occupational Profile Information:  Current occupation is retired  Job Tasks: Computer Work  Prior functional level:  no limitations  Barriers include:none  Mobility: No difficulty  Transportation: drives  Leisure activities/hobbies: computer  Other: computer    S:  Subjective changes as noted by patient:  It had been feeling good but I drove over without my splint on and its a little sore.  Functional changes noted by patient: Improvement in Self Care Tasks (dressing, eating, bathing, hygiene/toileting), Recreational Activities and Household Chores  Response to previous treatment: good  Patient has noted adverse reaction to: None    Pain Level Report  VAS(0-10) 11/12/18 11/23/18 11/30/18 12/5/18 12/14/18 1/11/19   At Rest: 0 0 0 0 0 0   With Use: 1 0-1 0-1 1 0-1 0-3     Report of Pain:  Location:  Wrist - radial  Pain Quality:  Tight, twinge   Frequency: intermittent    Pain is worst:  daytime  Exacerbated by:  Computer, thumb extension  Relieved by:  cold and orthosis  Progression:  Resolving - more localized    ROM:  Pain Report:  - none    + mild    ++ moderate    +++ severe    11/12/2018 12/14/18 1/11/19   Thumb AROM  PROM Left Right Right Right    MP 0/65 0+/65 0/55 0/55    IP /75 0/75 0/80 0/70    Rabd 65 55++ 60+ 65+    Pabd 60 50+++ 60+ 60   Wrist Ext 75 75 75 75+    Flex 75 75 70 70+    RD 15 15 20+ 15    UD 40 35+ 35 35+    Sup 85 85 wnl wnl    Pro 85 85 wnl wnl      Pain level report on scale of 0-10/10  Resisted Testing 11/12/2018 12/14 1/11   APL + + +   EPB + + +   RD min NT NT   UD - NT NT   Wrist Ext - NT NT   Wrist Flex + NT NT     Strength:   (Measured in pounds)  Pain Report:  - none    + mild    ++ moderate    +++ severe     11/12/18 12/14/18 1/11/19   Trials Left Right Right Right   1  2  3       Average: 40# 45#- 40- 40-     Lat Pinch  11/12/18 12/14/18 1/11/19   Trials Left Right Right Right   1  2  3       Average: 16# 14#- 16- 15-     3 Pt Pinch  11/12/18 12/14/18 1/11/19   Trials Left Right Right Right   1  2  3       Average: 14# 14#+ 14- 14-     Pain Report:  - none    + mild    ++ moderate    +++ severe   Tenderness 11/12/18 12/14/18 1/11/19   Radial Styloid + + +   SBRN - - -     Special Tests 11/12/18 12/14/18 1/11/19   Finkelsteins R:++  L: R: stretch R: +   Radial Nerve Tinel's - - -     Assessment:  Response to therapy has been improvement to:  Pain:  frequency is less and less tender over affected area  Overall Assessment:  Patient is progressing well and is ready to decrease frequency of treatment in the clinic.  STG/LTG:  See goal sheet for details and updates of remaining functional limitations.     Treatment Plan:    I have re-evaluated this patient and find that the nature, scope, duration and intensity of the therapy is appropriate for the medical condition of the patient.  Frequency:  1 X every other week, once daily  Duration:  for 6 additional weeks     Home Exercise Program:  FABTH HS and day during repetitive tasks and heavy activities  FM to 1st dorsal compartment and radial styloid  Massage extensors  Gentle AROM of wrist - ext/flex and UD/RD  Gentle AROM/tendon gliding of thumb - flex/ext and PABD  Proximal RN glides - discontinue due to pain in upper arm  DeQuervain's Stretch   Heat (epsom salt soaks vs paraffin) to fingers  Tendon gliding/AROM of fingers    Next Visit:  US  ROM thumb  MFR  FM  Nerve gliding - passive and active RN  glides    Please refer to the daily flowsheet for treatment today, total treatment time and time spent performing 1:1 timed codes.

## 2019-01-25 ENCOUNTER — THERAPY VISIT (OUTPATIENT)
Dept: OCCUPATIONAL THERAPY | Facility: CLINIC | Age: 69
End: 2019-01-25
Payer: COMMERCIAL

## 2019-01-25 DIAGNOSIS — M65.4 RADIAL STYLOID TENOSYNOVITIS: ICD-10-CM

## 2019-01-25 PROCEDURE — 97140 MANUAL THERAPY 1/> REGIONS: CPT | Mod: GO | Performed by: OCCUPATIONAL THERAPIST

## 2019-01-25 PROCEDURE — 97110 THERAPEUTIC EXERCISES: CPT | Mod: GO | Performed by: OCCUPATIONAL THERAPIST

## 2019-01-25 PROCEDURE — 97035 APP MDLTY 1+ULTRASOUND EA 15: CPT | Mod: GO | Performed by: OCCUPATIONAL THERAPIST

## 2019-01-30 ENCOUNTER — TRANSFERRED RECORDS (OUTPATIENT)
Dept: HEALTH INFORMATION MANAGEMENT | Facility: CLINIC | Age: 69
End: 2019-01-30

## 2019-02-04 ENCOUNTER — TRANSFERRED RECORDS (OUTPATIENT)
Dept: HEALTH INFORMATION MANAGEMENT | Facility: CLINIC | Age: 69
End: 2019-02-04

## 2019-02-06 NOTE — PROGRESS NOTES
Hand Therapy Progress Note  Reporting Period: 1/11/2019 through 2/8/2019    Diagnosis:  Right Dequervains  DOI:  October 2017     Initial History:  Patient reports symptoms of pain of the right wrist which occurred due to overuse/computer. Since onset symptoms are Unchanged  Special tests:  none.  Previous treatment: thumb spica from MD.    General health as reported by patient is excellent.  Pertinent medical history includes:Menopausal  Medical allergies:sulfer.  Surgical history: none.  Medication history: Anti-depressants, cholesteral.    Occupational Profile Information:  Current occupation is retired  Job Tasks: Computer Work  Prior functional level:  no limitations  Barriers include:none  Mobility: No difficulty  Transportation: drives  Leisure activities/hobbies: computer  Other: computer    Upper Extremity Functional Index Score:  SCORE:   Column Totals: /80: 72   (A lower score indicates greater disability.)    Subjective:  Subjective changes as noted by patient: Overall its feeling good. Pain is really localized here (radial styloid)   Functional changes noted by patient: Improvement in Self Care Tasks (dressing, eating, bathing, hygiene/toileting) and Household Chores  Response to previous treatment: good  Patient has noted adverse reaction to: None    Pain Level Report  VAS(0-10) 11/12/18 11/23/18 11/30/18 12/5/18 12/14/18 1/11/19 2/8/19   At Rest: 0 0 0 0 0 0 0-1   With Use: 1 0-1 0-1 1 0-1 0-3 0-2     Report of Pain:  Location:  Wrist - radial  Pain Quality:  sharp   Frequency: intermittent    Pain is worst:  daytime  Exacerbated by:  Computer, thumb extension  Relieved by:  cold and orthosis  Progression:  Resolving - more localized    ROM:  Pain Report:  - none    + mild    ++ moderate    +++ severe    11/12/2018 12/14/18 1/11/19 2/8/19   Thumb AROM  PROM Left Right Right Right Right    MP 0/65 0+/65 0/55 0/55 0/60    IP /75 0/75 0/80 0/70 0/70    Rabd 65 55++ 60+ 65+ 65    Pabd 60 50+++ 60+ 60 60    Wrist Ext 75 75 75 75+ 75    Flex 75 75 70 70+ 70    RD 15 15 20+ 15 20-    UD 40 35+ 35 35+ 35+    Sup 85 85 wnl wnl wnl    Pro 85 85 wnl wnl wnl     Pain level report on scale of 0-10/10  Resisted Testing 11/12/2018 12/14 1/11 2/8   APL + + + -   EPB + + + +   RD min NT NT NT   UD - NT NT NT   Wrist Ext - NT NT NT   Wrist Flex + NT NT NT     Strength:   (Measured in pounds)  Pain Report:  - none    + mild    ++ moderate    +++ severe     11/12/18 12/14/18 1/11/19 2/8/19   Trials Left Right Right Right Right   1  2  3        Average: 40# 45#- 40- 40- 48-     Lat Pinch  11/12/18 12/14/18 1/11/19 2/8/19   Trials Left Right Right Right Right   1  2  3        Average: 16# 14#- 16- 15- 15-     3 Pt Pinch  11/12/18 12/14/18 1/11/19 2/8/19   Trials Left Right Right Right Right   1  2  3        Average: 14# 14#+ 14- 14- 13-     Pain Report:  - none    + mild    ++ moderate    +++ severe   Tenderness 11/12/18 12/14/18 1/11/19 2/8/19   Radial Styloid + + + +   SBRN - - - -     Special Tests 11/12/18 12/14/18 1/11/19 2/8/19   Finkelsteins R:++  L: R: stretch R: + R: +   Radial Nerve Tinel's - - - -     Assessment:  Response to therapy has been improvement to:  ROM of Wrist:  Ulnar Deviation  Thumb:  Flex  Strength:    Pain:  frequency is less, intensity of pain is decreased, duration of pain is decreased and less tender over affected area  Overall Assessment:  Patient is progressing well and is ready to decrease frequency of treatment in the clinic.  STG/LTG:  See goal sheet for details and updates of remaining functional limitations.     Treatment Plan:    I have re-evaluated this patient and find that the nature, scope, duration and intensity of the therapy is appropriate for the medical condition of the patient.   Patient will continue on independent home exercise program, if symptoms are worsening patient is encouraged to schedule further therapy visits. Will keep chart open 1 month. If patient does not return  to therapy, this will serve as discharge note.  Frequency:  1 X every other week, once daily  Duration:  for 4 additional weeks     Home Exercise Program:  FABTH HS and day during repetitive tasks and heavy activities  FM to 1st dorsal compartment and radial styloid  Massage extensors  Gentle AROM of wrist - ext/flex and UD/RD  Gentle AROM/tendon gliding of thumb - flex/ext and PABD  Proximal RN glides - discontinue due to pain in upper arm  DeQuervain's Stretch   Heat (epsom salt soaks vs paraffin) to fingers  Tendon gliding/AROM of fingers    Next Visit:  US  ROM thumb  MFR  FM  Nerve gliding - passive and active RN glides    Please refer to the daily flowsheet for treatment today, total treatment time and time spent performing 1:1 timed codes.

## 2019-02-08 ENCOUNTER — THERAPY VISIT (OUTPATIENT)
Dept: OCCUPATIONAL THERAPY | Facility: CLINIC | Age: 69
End: 2019-02-08
Payer: COMMERCIAL

## 2019-02-08 DIAGNOSIS — M65.4 RADIAL STYLOID TENOSYNOVITIS: ICD-10-CM

## 2019-02-08 PROCEDURE — 97110 THERAPEUTIC EXERCISES: CPT | Mod: GO | Performed by: OCCUPATIONAL THERAPIST

## 2019-02-08 PROCEDURE — G8985 CARRY GOAL STATUS: HCPCS | Mod: GO | Performed by: OCCUPATIONAL THERAPIST

## 2019-02-08 PROCEDURE — G8984 CARRY CURRENT STATUS: HCPCS | Mod: GO | Performed by: OCCUPATIONAL THERAPIST

## 2019-02-08 PROCEDURE — 97140 MANUAL THERAPY 1/> REGIONS: CPT | Mod: GO | Performed by: OCCUPATIONAL THERAPIST

## 2019-03-22 DIAGNOSIS — F32.5 MAJOR DEPRESSION IN COMPLETE REMISSION (H): ICD-10-CM

## 2019-03-22 RX ORDER — VENLAFAXINE HYDROCHLORIDE 150 MG/1
CAPSULE, EXTENDED RELEASE ORAL
Qty: 90 CAPSULE | Refills: 0 | Status: SHIPPED | OUTPATIENT
Start: 2019-03-22 | End: 2019-07-02

## 2019-03-22 ASSESSMENT — PATIENT HEALTH QUESTIONNAIRE - PHQ9: SUM OF ALL RESPONSES TO PHQ QUESTIONS 1-9: 2

## 2019-03-22 NOTE — TELEPHONE ENCOUNTER
PHQ-9 SCORE 6/1/2017 6/5/2018 6/26/2018   PHQ-9 Total Score - - -   PHQ-9 Total Score 0 3 1     Last OV: 10/30/2018.     Prescription approved per [facility] Protocol for 6 months of refills since last appointment, which was 10/2018. Patient is aware she is due to meet with provider within the next month.      Called patient and updated her PHQ-9.  PHQ-9 score:    PHQ-9 SCORE 3/22/2019   PHQ-9 Total Score -   PHQ-9 Total Score 2

## 2019-03-22 NOTE — TELEPHONE ENCOUNTER
"Requested Prescriptions   Pending Prescriptions Disp Refills     venlafaxine (EFFEXOR-XR) 150 MG 24 hr capsule [Pharmacy Med Name: VENLAFAXINE HCL ER CAPS 150MG]  Last Written Prescription Date:  8/6/2018  Last Fill Quantity: 90 capsule,  # refills: 1   Last office visit: 10/30/2018 with prescribing provider:  Italo Becker Office Visit:     90 capsule 1     Sig: TAKE 1 CAPSULE DAILY    Serotonin-Norepinephrine Reuptake Inhibitors  Failed - 3/22/2019  9:31 AM       Failed - PHQ-9 score of less than 5 in past 6 months    Please review last PHQ-9 score.   PHQ-9 SCORE 6/1/2017 6/5/2018 6/26/2018   PHQ-9 Total Score - - -   PHQ-9 Total Score 0 3 1     MILENA-7 SCORE 11/22/2016 6/1/2017 6/26/2018   Total Score 3 0 1            Passed - Blood pressure under 140/90 in past 12 months    BP Readings from Last 3 Encounters:   10/30/18 120/78   06/26/18 130/82   10/16/17 138/84                Passed - Medication is active on med list       Passed - Patient is age 18 or older       Passed - No active pregnancy on record       Passed - Normal serum creatinine on file in past 12 months    Recent Labs   Lab Test 06/26/18  1219   CR 0.71            Passed - No positive pregnancy test in past 12 months       Passed - Recent (6 mo) or future (30 days) visit within the authorizing provider's specialty    Patient had office visit in the last 6 months or has a visit in the next 30 days with authorizing provider or within the authorizing provider's specialty.  See \"Patient Info\" tab in inbasket, or \"Choose Columns\" in Meds & Orders section of the refill encounter.               "

## 2019-05-03 NOTE — TELEPHONE ENCOUNTER
"Requested Prescriptions   Pending Prescriptions Disp Refills     venlafaxine (EFFEXOR-XR) 150 MG 24 hr capsule [Pharmacy Med Name: VENLAFAXINE HCL ER CAPS 150MG]  Last Written Prescription Date:  1/9/18  Last Fill Quantity: 90,  # refills: 0   Last office visit: 10/11/2017 with prescribing provider:  Noa   Future Office Visit:     90 capsule 0     Sig: TAKE 1 CAPSULE DAILY    Serotonin-Norepinephrine Reuptake Inhibitors  Failed    4/15/2018  1:13 PM       Failed - PHQ-9 score of less than 5 in past 6 months    Please review last PHQ-9 score.          Failed - Recent (6 mo) or future (30 days) visit within the authorizing provider's specialty    Patient had office visit in the last 6 months or has a visit in the next 30 days with authorizing provider or within the authorizing provider's specialty.  See \"Patient Info\" tab in inbasket, or \"Choose Columns\" in Meds & Orders section of the refill encounter.           Passed - Blood pressure under 140/90 in past 12 months    BP Readings from Last 3 Encounters:   10/16/17 138/84   10/11/17 130/80   06/01/17 110/70                Passed - Patient is age 18 or older       Passed - No active pregnancy on record       Passed - Normal serum creatinine on file in past 12 months    Recent Labs   Lab Test  06/01/17   1344   CR  0.74            Passed - No positive pregnancy test in past 12 months          "
Medication is being filled for 1 time refill only due to:  Patient needs to be seen because due 6 month office visit and PHQ-9 assessment.      PHQ-9 SCORE 6/14/2016 11/22/2016 6/1/2017   Total Score - - -   Total Score 0 2 0     
additional location...

## 2019-06-12 PROBLEM — M65.4 RADIAL STYLOID TENOSYNOVITIS: Status: RESOLVED | Noted: 2018-11-23 | Resolved: 2019-06-12

## 2019-06-20 ENCOUNTER — TRANSFERRED RECORDS (OUTPATIENT)
Dept: HEALTH INFORMATION MANAGEMENT | Facility: CLINIC | Age: 69
End: 2019-06-20

## 2019-06-28 ENCOUNTER — TRANSFERRED RECORDS (OUTPATIENT)
Dept: HEALTH INFORMATION MANAGEMENT | Facility: CLINIC | Age: 69
End: 2019-06-28

## 2019-07-02 ENCOUNTER — OFFICE VISIT (OUTPATIENT)
Dept: FAMILY MEDICINE | Facility: CLINIC | Age: 69
End: 2019-07-02
Payer: COMMERCIAL

## 2019-07-02 VITALS
HEART RATE: 90 BPM | RESPIRATION RATE: 14 BRPM | HEIGHT: 64 IN | SYSTOLIC BLOOD PRESSURE: 124 MMHG | DIASTOLIC BLOOD PRESSURE: 76 MMHG | BODY MASS INDEX: 27.14 KG/M2 | WEIGHT: 159 LBS | TEMPERATURE: 97.7 F

## 2019-07-02 DIAGNOSIS — F32.5 MAJOR DEPRESSION IN COMPLETE REMISSION (H): ICD-10-CM

## 2019-07-02 DIAGNOSIS — R41.3 MEMORY LOSS: ICD-10-CM

## 2019-07-02 DIAGNOSIS — Z00.00 ENCOUNTER FOR MEDICARE ANNUAL WELLNESS EXAM: Primary | ICD-10-CM

## 2019-07-02 DIAGNOSIS — Z12.31 ENCOUNTER FOR SCREENING MAMMOGRAM FOR BREAST CANCER: ICD-10-CM

## 2019-07-02 DIAGNOSIS — R73.01 ELEVATED FASTING GLUCOSE: ICD-10-CM

## 2019-07-02 LAB
ALBUMIN SERPL-MCNC: 4 G/DL (ref 3.4–5)
ALP SERPL-CCNC: 117 U/L (ref 40–150)
ALT SERPL W P-5'-P-CCNC: 59 U/L (ref 0–50)
ANION GAP SERPL CALCULATED.3IONS-SCNC: 8 MMOL/L (ref 3–14)
AST SERPL W P-5'-P-CCNC: 37 U/L (ref 0–45)
BILIRUB SERPL-MCNC: 1.4 MG/DL (ref 0.2–1.3)
BUN SERPL-MCNC: 11 MG/DL (ref 7–30)
CALCIUM SERPL-MCNC: 9.4 MG/DL (ref 8.5–10.1)
CHLORIDE SERPL-SCNC: 109 MMOL/L (ref 94–109)
CHOLEST SERPL-MCNC: 170 MG/DL
CO2 SERPL-SCNC: 24 MMOL/L (ref 20–32)
CREAT SERPL-MCNC: 0.77 MG/DL (ref 0.52–1.04)
CREAT UR-MCNC: 219 MG/DL
GFR SERPL CREATININE-BSD FRML MDRD: 78 ML/MIN/{1.73_M2}
GLUCOSE SERPL-MCNC: 145 MG/DL (ref 70–99)
HDLC SERPL-MCNC: 58 MG/DL
LDLC SERPL CALC-MCNC: 84 MG/DL
MICROALBUMIN UR-MCNC: 19 MG/L
MICROALBUMIN/CREAT UR: 8.77 MG/G CR (ref 0–25)
NONHDLC SERPL-MCNC: 112 MG/DL
POTASSIUM SERPL-SCNC: 4 MMOL/L (ref 3.4–5.3)
PROT SERPL-MCNC: 8.2 G/DL (ref 6.8–8.8)
SODIUM SERPL-SCNC: 141 MMOL/L (ref 133–144)
TRIGL SERPL-MCNC: 142 MG/DL

## 2019-07-02 PROCEDURE — 80053 COMPREHEN METABOLIC PANEL: CPT | Performed by: PHYSICIAN ASSISTANT

## 2019-07-02 PROCEDURE — 36415 COLL VENOUS BLD VENIPUNCTURE: CPT | Performed by: PHYSICIAN ASSISTANT

## 2019-07-02 PROCEDURE — 99214 OFFICE O/P EST MOD 30 MIN: CPT | Mod: 25 | Performed by: PHYSICIAN ASSISTANT

## 2019-07-02 PROCEDURE — 80061 LIPID PANEL: CPT | Performed by: PHYSICIAN ASSISTANT

## 2019-07-02 PROCEDURE — G0439 PPPS, SUBSEQ VISIT: HCPCS | Performed by: PHYSICIAN ASSISTANT

## 2019-07-02 PROCEDURE — 82043 UR ALBUMIN QUANTITATIVE: CPT | Performed by: PHYSICIAN ASSISTANT

## 2019-07-02 RX ORDER — VENLAFAXINE HYDROCHLORIDE 75 MG/1
225 CAPSULE, EXTENDED RELEASE ORAL DAILY
Qty: 180 CAPSULE | Refills: 0 | Status: SHIPPED | OUTPATIENT
Start: 2019-07-02 | End: 2019-08-02

## 2019-07-02 ASSESSMENT — ANXIETY QUESTIONNAIRES
GAD7 TOTAL SCORE: 8
GAD7 TOTAL SCORE: 5
1. FEELING NERVOUS, ANXIOUS, OR ON EDGE: NEARLY EVERY DAY
2. NOT BEING ABLE TO STOP OR CONTROL WORRYING: NOT AT ALL
7. FEELING AFRAID AS IF SOMETHING AWFUL MIGHT HAPPEN: NOT AT ALL
6. BECOMING EASILY ANNOYED OR IRRITABLE: NOT AT ALL
5. BEING SO RESTLESS THAT IT IS HARD TO SIT STILL: MORE THAN HALF THE DAYS
4. TROUBLE RELAXING: MORE THAN HALF THE DAYS
6. BECOMING EASILY ANNOYED OR IRRITABLE: NOT AT ALL
GAD7 TOTAL SCORE: 5
7. FEELING AFRAID AS IF SOMETHING AWFUL MIGHT HAPPEN: NOT AT ALL
7. FEELING AFRAID AS IF SOMETHING AWFUL MIGHT HAPPEN: NOT AT ALL
1. FEELING NERVOUS, ANXIOUS, OR ON EDGE: NEARLY EVERY DAY
5. BEING SO RESTLESS THAT IT IS HARD TO SIT STILL: NOT AT ALL
3. WORRYING TOO MUCH ABOUT DIFFERENT THINGS: NOT AT ALL
3. WORRYING TOO MUCH ABOUT DIFFERENT THINGS: NOT AT ALL
2. NOT BEING ABLE TO STOP OR CONTROL WORRYING: NOT AT ALL
GAD7 TOTAL SCORE: 5

## 2019-07-02 ASSESSMENT — ENCOUNTER SYMPTOMS
CARDIOVASCULAR NEGATIVE: 1
RESPIRATORY NEGATIVE: 1
GASTROINTESTINAL NEGATIVE: 1
EYES NEGATIVE: 1
CONSTITUTIONAL NEGATIVE: 1
MUSCULOSKELETAL NEGATIVE: 1
NEUROLOGICAL NEGATIVE: 1

## 2019-07-02 ASSESSMENT — PATIENT HEALTH QUESTIONNAIRE - PHQ9
10. IF YOU CHECKED OFF ANY PROBLEMS, HOW DIFFICULT HAVE THESE PROBLEMS MADE IT FOR YOU TO DO YOUR WORK, TAKE CARE OF THINGS AT HOME, OR GET ALONG WITH OTHER PEOPLE: NOT DIFFICULT AT ALL
5. POOR APPETITE OR OVEREATING: NEARLY EVERY DAY
SUM OF ALL RESPONSES TO PHQ QUESTIONS 1-9: 4
SUM OF ALL RESPONSES TO PHQ QUESTIONS 1-9: 4

## 2019-07-02 ASSESSMENT — ACTIVITIES OF DAILY LIVING (ADL): CURRENT_FUNCTION: NO ASSISTANCE NEEDED

## 2019-07-02 ASSESSMENT — MIFFLIN-ST. JEOR: SCORE: 1236.22

## 2019-07-02 NOTE — PATIENT INSTRUCTIONS
1. We are changing the dose of your antidepressant.    Get rid of the old tablets.    Start with the new tablets - you will take 3 at a time, once a day.   TAKE the venlafaxine at night - lets see if this helps with sleep.     2. SCHEDULE THE MAMMO FOR AFTER September 12 2019  You are due for a mammogram, which is a study to screen for breast cancer.  [facility]  [address]  [address]  [place] [address]  Appointments: [phone] or [phone]  Hours: M-F 7:00AM-5:00PM

## 2019-07-02 NOTE — PROGRESS NOTES
"SUBJECTIVE:   Emma Rudolph is a 68 year old female who presents for Preventive Visit.    Are you in the first 12 months of your Medicare coverage?  No    Healthy Habits:     In general, how would you rate your overall health?  Good    Frequency of exercise:  4-5 days/week    Duration of exercise:  15-30 minutes    Do you usually eat at least 4 servings of fruit and vegetables a day, include whole grains    & fiber and avoid regularly eating high fat or \"junk\" foods?  No    Taking medications regularly:  Yes    Medication side effects:  None    Ability to successfully perform activities of daily living:  No assistance needed    Home Safety:  No safety concerns identified    Hearing Impairment:  No hearing concerns    In the past 6 months, have you been bothered by leaking of urine?  No    In general, how would you rate your overall mental or emotional health?  Good      PHQ-2 Total Score: 0    Additional concerns today:  Yes  Pt brought in a list yesterday - concerns from Dr. Nancy Chery (memory care) to be addressed today.    Do you feel safe in your environment? Yes    Do you have a Health Care Directive? Yes: Patient states has Advance Directive and will bring in a copy to clinic.      Fall risk  Fallen 2 or more times in the past year?: No  Any fall with injury in the past year?: No  click delete button to remove this line now  Cognitive Screening   1) Repeat 3 items (Leader, Season, Table)    2) Clock draw: NORMAL  3) 3 item recall: Recalls 3 objects  Results: 3 items recalled: COGNITIVE IMPAIRMENT LESS LIKELY    Mini-CogTM Copyright S Owen. Licensed by the author for use in [facility]; reprinted with permission (geoff@.[facility]). All rights reserved.      Do you have sleep apnea, excessive snoring or daytime drowsiness?: yes daytime drowsiness    Sleep Apnea Screening Questions:    S - Do you snore? Yes  T - Daytime sleepiness? Yes  O - Has you partner observed you stop breathing during " sleep? No  P - High blood pressure? No  B - BMI >35? No  A - Age >50? Yes  N - Neck size >17 in men? No  G - Male gender? No    Reviewed and updated as needed this visit by clinical staff  Tobacco  Allergies  Meds         Reviewed and updated as needed this visit by Provider        Social History     Tobacco Use     Smoking status: Never Smoker     Smokeless tobacco: Never Used   Substance Use Topics     Alcohol use: Yes     Alcohol/week: 0.0 oz     Comment: socially     If you drink alcohol do you typically have >3 drinks per day or >7 drinks per week? No    Alcohol Use 7/2/2019   Prescreen: >3 drinks/day or >7 drinks/week? No   Prescreen: >3 drinks/day or >7 drinks/week? -   No flowsheet data found.      Current providers sharing in care for this patient include:   Patient Care Team:  Angelina Puckett PA-C as PCP - General (Physician Assistant)  Angelina Puckett PA-C as Assigned PCP    The following health maintenance items are reviewed in Epic and correct as of today:  Health Maintenance   Topic Date Due     ZOSTER IMMUNIZATION (1 of 2) 11/13/2000     MEDICARE ANNUAL WELLNESS VISIT  05/31/2017     BMP  06/26/2019     LIPID  06/26/2019     MICROALBUMIN  06/26/2019     INFLUENZA VACCINE (1) 09/01/2019     FALL RISK ASSESSMENT  10/30/2019     MAMMO SCREENING  09/11/2020     ADVANCE CARE PLANNING  05/31/2021     COLONOSCOPY  04/08/2025     DTAP/TDAP/TD IMMUNIZATION (3 - Td) 06/01/2027     DEXA  Completed     HEPATITIS C SCREENING  Completed     PNEUMOCOCCAL IMMUNIZATION 65+ LOW/MEDIUM RISK  Completed     IPV IMMUNIZATION  Aged Out     MENINGITIS IMMUNIZATION  Aged Out     BP Readings from Last 3 Encounters:   07/02/19 124/76   10/30/18 120/78   06/26/18 130/82    Wt Readings from Last 3 Encounters:   07/02/19 72.1 kg (159 lb)   10/30/18 72.6 kg (160 lb)   06/26/18 71.2 kg (157 lb)                  Patient Active Problem List   Diagnosis     Generalized anxiety disorder     Mixed  hyperlipidemia     CKD (chronic kidney disease) stage 2, GFR 60-89 ml/min     ACP (advance care planning)     Major depression in complete remission (H) on meds since 6-12-13     Osteoarthritis of multiple joints, unspecified osteoarthritis type of #2&3 fingers bilat R>L     Family history of ischemic heart disease     Memory loss     Primary insomnia     Throat pain     Past Surgical History:   Procedure Laterality Date     COLONOSCOPY       EYE SURGERY       H ARGON LASER FOR RETINAL TEAR Left 3/2014     HEAD & NECK SURGERY      wisdom teeth     LAPAROSCOPIC SINGLE SITE SALPINGO-OOPHORECTOMY Bilateral 4/15/2015    Procedure: LAPAROSCOPIC SINGLE SITE SALPINGO-OOPHORECTOMY;  Surgeon: Leonora Zavaleta MD;  Location: [facility]     wisdom teeth removal  age 19       Social History     Tobacco Use     Smoking status: Never Smoker     Smokeless tobacco: Never Used   Substance Use Topics     Alcohol use: Yes     Alcohol/week: 0.0 oz     Comment: socially     Family History   Problem Relation Age of Onset     Breast Cancer Mother 80     Neurologic Disorder Father         Parkinson's     Coronary Artery Disease Father 44        MI     Lipids Brother      Lipids Brother      Family History Negative Brother      Lipids Sister      Family History Negative Sister      Family History Negative Sister      Family History Negative Sister      Cerebrovascular Disease Maternal Grandmother 86     Cancer Maternal Grandfather 69        leukemia     Cancer Paternal Grandfather 69        stomach     Diabetes No family hx of      Cancer - colorectal No family hx of      Hypertension No family hx of      Hyperlipidemia No family hx of      Colon Cancer No family hx of      Prostate Cancer No family hx of      Other Cancer No family hx of      Depression No family hx of      Anxiety Disorder No family hx of      Mental Illness No family hx of      Substance Abuse No family hx of      Anesthesia Reaction No family hx of      Asthma No family hx of   "    Osteoporosis No family hx of      Genetic Disorder No family hx of      Thyroid Disease No family hx of      Obesity No family hx of      Unknown/Adopted No family hx of          Current Outpatient Medications   Medication Sig Dispense Refill     ASPIRIN PO Take 81 mg by mouth daily       atorvastatin (LIPITOR) 20 MG tablet Take 1 tablet (20 mg) by mouth daily 90 tablet 2     Cholecalciferol (VITAMIN D) 1000 UNITS capsule Take 1 capsule by mouth daily.       fluticasone (FLONASE) 50 MCG/ACT spray Spray 1-2 sprays into both nostrils daily 1 Bottle 3     Multiple Vitamins-Minerals (MULTIVITAMIN OR) Take by mouth daily       venlafaxine (EFFEXOR-XR) 75 MG 24 hr capsule Take 3 capsules (225 mg) by mouth daily 180 capsule 0     Allergies   Allergen Reactions     Penicillins Swelling, Rash and Hives     Amoxicillin     Sulfa Drugs Hives     Trazodone Fatigue     Really tired     Mammogram Screening: Mammogram Screening: Patient over age 50, mutual decision to screen reflected in health maintenance.    Review of Systems   Constitutional: Negative.    HENT: Negative.    Eyes: Negative.    Respiratory: Negative.    Cardiovascular: Negative.    Gastrointestinal: Negative.    Genitourinary: Negative.    Musculoskeletal: Negative.    Skin: Negative.    Neurological: Negative.    Psychiatric/Behavioral:        As in HPI         OBJECTIVE:   /76 (Cuff Size: Adult Regular)   Pulse 90   Temp 97.7  F (36.5  C) (Tympanic)   Resp 14   Ht 1.626 m (5' 4\")   Wt 72.1 kg (159 lb)   LMP  (LMP Unknown)   BMI 27.29 kg/m   Estimated body mass index is 27.29 kg/m  as calculated from the following:    Height as of this encounter: 1.626 m (5' 4\").    Weight as of this encounter: 72.1 kg (159 lb).  Physical Exam   Constitutional: She is oriented to person, place, and time. She appears well-developed and well-nourished. No distress.   HENT:   Right Ear: Tympanic membrane and external ear normal.   Left Ear: Tympanic membrane and " external ear normal.   Nose: Nose normal.   Mouth/Throat: Oropharynx is clear and moist. No oropharyngeal exudate.   Eyes: Pupils are equal, round, and reactive to light. Conjunctivae are normal. Right eye exhibits no discharge. Left eye exhibits no discharge.   Neck: Neck supple. No tracheal deviation present. No thyromegaly present.   Cardiovascular: Normal rate, regular rhythm, S1 normal, S2 normal, normal heart sounds and normal pulses. Exam reveals no S3, no S4 and no friction rub.   No murmur heard.  Pulmonary/Chest: Effort normal and breath sounds normal. No respiratory distress. She has no wheezes. She has no rales. Right breast exhibits no mass, no nipple discharge and no tenderness. Left breast exhibits no mass, no nipple discharge and no tenderness.   Abdominal: Soft. She exhibits no mass. There is no hepatosplenomegaly. There is no tenderness.   Musculoskeletal: Normal range of motion. She exhibits no edema.   Lymphadenopathy:     She has no cervical adenopathy.   Neurological: She is alert and oriented to person, place, and time. She has normal strength and normal reflexes. She exhibits normal muscle tone.   Skin: Skin is warm and dry. No rash noted.   Psychiatric: She has a normal mood and affect. Judgment and thought content normal. Cognition and memory are normal.         Diagnostic Test Results:  No results found for this or any previous visit (from the past 24 hour(s)).    ASSESSMENT / PLAN:       ICD-10-CM    1. Encounter for Medicare annual wellness exam Z00.00 Lipid panel reflex to direct LDL Fasting     Comprehensive metabolic panel (BMP + Alb, Alk Phos, ALT, AST, Total. Bili, TP)     Albumin Random Urine Quantitative with Creat Ratio   2. Encounter for screening mammogram for breast cancer Z12.31 *MA Screening Digital Bilateral   3. Memory loss R41.3    4. Major depression in complete remission (H) on meds since 6-12-13 F32.5 venlafaxine (EFFEXOR-XR) 75 MG 24 hr capsule      For memory loss -  "we reviewed the recommendations from Dr. Chery.   Celi feels this has improved some, and today she does not have any apparent memory concerns.   She has GPS on her phone, and we discussed sharing \"find my iphone\" with her sister  She also has increased exercise and has the MIND diet information    She asked to change the antidepressant due to recently increased anxiety.   We will instead increase the dose of venlafaxine and have her take it at night (hopeful this will help with her insomnia as well).    If this is not helping, we will consider a switch to a new antidepressant.   If the insomnia persists - consider a sleep study    End of Life Planning:  Patient currently has an advanced directive: Yes.  Practitioner is supportive of decision.    COUNSELING:  Reviewed preventive health counseling, as reflected in patient instructions  Special attention given to:       Regular exercise       Healthy diet/nutrition       Fall risk prevention       Immunizations - discussed Shingrix, which she should get from her pharmacy (Medicare)       Estimated body mass index is 27.29 kg/m  as calculated from the following:    Height as of this encounter: 1.626 m (5' 4\").    Weight as of this encounter: 72.1 kg (159 lb).         reports that she has never smoked. She has never used smokeless tobacco.      Appropriate preventive services were discussed with this patient, including applicable screening as appropriate for cardiovascular disease, diabetes, osteopenia/osteoporosis, and glaucoma.  As appropriate for age/gender, discussed screening for colorectal cancer, prostate cancer, breast cancer, and cervical cancer. Checklist reviewing preventive services available has been given to the patient.    Reviewed patients plan of care and provided an AVS. The Intermediate Care Plan ( asthma action plan, low back pain action plan, and migraine action plan) for Emma meets the Care Plan requirement. This Care Plan has been " established and reviewed with the Patient.    Counseling Resources:  ATP IV Guidelines  Pooled Cohorts Equation Calculator  Breast Cancer Risk Calculator  FRAX Risk Assessment  ICSI Preventive Guidelines  Dietary Guidelines for Americans, 2010  USDA's MyPlate  ASA Prophylaxis  Lung CA Screening    Pati Sky PA-C  [facility]        Identified Health Risks:  Answers for HPI/ROS submitted by the patient on 7/2/2019   Annual Exam:  If you checked off any problems, how difficult have these problems made it for you to do your work, take care of things at home, or get along with other people?: Not difficult at all  PHQ9 TOTAL SCORE: 4  MILENA 7 TOTAL SCORE: 5

## 2019-07-05 NOTE — RESULT ENCOUNTER NOTE
"Pat    Your lab tests are complete and I have reviewed the results.     - your glucose (blood sugar) was elevated.  We should check another fasting glucose (do not eat for 8 hours prior to the test) and a hemoglobin A1C (test for diabetes).  Please call [phone] to schedule a \"Lab Only\" visit next week and come in fasting.     - Your other lab results look great; everything else is normal.    If you have any questions or concerns, please feel free to call or send a [employer] message.    Sincerely,  Chalino Sky PA-C  "

## 2019-07-23 DIAGNOSIS — E78.2 MIXED HYPERLIPIDEMIA: ICD-10-CM

## 2019-07-23 RX ORDER — ATORVASTATIN CALCIUM 20 MG/1
TABLET, FILM COATED ORAL
Qty: 90 TABLET | Refills: 2 | Status: SHIPPED | OUTPATIENT
Start: 2019-07-23 | End: 2020-05-19

## 2019-07-23 NOTE — TELEPHONE ENCOUNTER
"Requested Prescriptions   Pending Prescriptions Disp Refills     atorvastatin (LIPITOR) 20 MG tablet [Pharmacy Med Name: ATORVASTATIN TABS 20MG]  Last Written Prescription Date:  10/10/2018  Last Fill Quantity: 90 tablet,  # refills: 2   Last office visit: 7/2/2019 with prescribing provider:  Asya   Future Office Visit:   Next 5 appointments (look out 90 days)    Aug 02, 2019  9:50 AM CDT  SHORT with Pati Sky PA-C  [facility] [address] [place] [id number]  [phone]          90 tablet 2     Sig: TAKE 1 TABLET DAILY       Statins Protocol Passed - 7/23/2019 11:40 AM        Passed - LDL on file in past 12 months     Recent Labs   Lab Test 07/02/19  0824   LDL 84             Passed - No abnormal creatine kinase in past 12 months     No lab results found.             Passed - Recent (12 mo) or future (30 days) visit within the authorizing provider's specialty     Patient had office visit in the last 12 months or has a visit in the next 30 days with authorizing provider or within the authorizing provider's specialty.  See \"Patient Info\" tab in inbasket, or \"Choose Columns\" in Meds & Orders section of the refill encounter.              Passed - Medication is active on med list        Passed - Patient is age 18 or older        Passed - No active pregnancy on record        Passed - No positive pregnancy test in past 12 months           "

## 2019-08-02 ENCOUNTER — OFFICE VISIT (OUTPATIENT)
Dept: FAMILY MEDICINE | Facility: CLINIC | Age: 69
End: 2019-08-02
Payer: COMMERCIAL

## 2019-08-02 VITALS
HEART RATE: 88 BPM | TEMPERATURE: 98 F | WEIGHT: 160 LBS | BODY MASS INDEX: 27.46 KG/M2 | SYSTOLIC BLOOD PRESSURE: 126 MMHG | DIASTOLIC BLOOD PRESSURE: 84 MMHG | RESPIRATION RATE: 14 BRPM

## 2019-08-02 DIAGNOSIS — F32.5 MAJOR DEPRESSION IN COMPLETE REMISSION (H): Primary | ICD-10-CM

## 2019-08-02 DIAGNOSIS — R73.01 ELEVATED FASTING GLUCOSE: ICD-10-CM

## 2019-08-02 DIAGNOSIS — E66.3 OVERWEIGHT (BMI 25.0-29.9): ICD-10-CM

## 2019-08-02 LAB
GLUCOSE SERPL-MCNC: 98 MG/DL (ref 70–99)
HBA1C MFR BLD: 5.6 % (ref 0–5.6)

## 2019-08-02 PROCEDURE — 83036 HEMOGLOBIN GLYCOSYLATED A1C: CPT | Performed by: PHYSICIAN ASSISTANT

## 2019-08-02 PROCEDURE — 99214 OFFICE O/P EST MOD 30 MIN: CPT | Performed by: PHYSICIAN ASSISTANT

## 2019-08-02 PROCEDURE — 36415 COLL VENOUS BLD VENIPUNCTURE: CPT | Performed by: PHYSICIAN ASSISTANT

## 2019-08-02 PROCEDURE — 82947 ASSAY GLUCOSE BLOOD QUANT: CPT | Performed by: PHYSICIAN ASSISTANT

## 2019-08-02 RX ORDER — VENLAFAXINE HYDROCHLORIDE 75 MG/1
150 CAPSULE, EXTENDED RELEASE ORAL DAILY
Qty: 180 CAPSULE | Refills: 0 | Status: SHIPPED | OUTPATIENT
Start: 2019-08-02 | End: 2019-12-14

## 2019-08-02 ASSESSMENT — ENCOUNTER SYMPTOMS
CONSTITUTIONAL NEGATIVE: 1
RESPIRATORY NEGATIVE: 1
EYES NEGATIVE: 1
MUSCULOSKELETAL NEGATIVE: 1
CARDIOVASCULAR NEGATIVE: 1
GASTROINTESTINAL NEGATIVE: 1
NEUROLOGICAL NEGATIVE: 1

## 2019-08-02 ASSESSMENT — ANXIETY QUESTIONNAIRES
IF YOU CHECKED OFF ANY PROBLEMS ON THIS QUESTIONNAIRE, HOW DIFFICULT HAVE THESE PROBLEMS MADE IT FOR YOU TO DO YOUR WORK, TAKE CARE OF THINGS AT HOME, OR GET ALONG WITH OTHER PEOPLE: SOMEWHAT DIFFICULT
6. BECOMING EASILY ANNOYED OR IRRITABLE: NOT AT ALL
2. NOT BEING ABLE TO STOP OR CONTROL WORRYING: NOT AT ALL
1. FEELING NERVOUS, ANXIOUS, OR ON EDGE: SEVERAL DAYS
GAD7 TOTAL SCORE: 1
5. BEING SO RESTLESS THAT IT IS HARD TO SIT STILL: NOT AT ALL
3. WORRYING TOO MUCH ABOUT DIFFERENT THINGS: NOT AT ALL
7. FEELING AFRAID AS IF SOMETHING AWFUL MIGHT HAPPEN: NOT AT ALL

## 2019-08-02 ASSESSMENT — PATIENT HEALTH QUESTIONNAIRE - PHQ9: 5. POOR APPETITE OR OVEREATING: NOT AT ALL

## 2019-08-02 NOTE — PROGRESS NOTES
Subjective     Emma Rudolph is a 68 year old female who presents to clinic today for the following health issues:    HPI   Anxiety Follow-Up    How are you doing with your anxiety since your last visit? Improved     Are you having other symptoms that might be associated with anxiety? No    Have you had a significant life event? No     Are you feeling depressed? No    Do you have any concerns with your use of alcohol or other drugs? No    Social History     Tobacco Use     Smoking status: Never Smoker     Smokeless tobacco: Never Used   Substance Use Topics     Alcohol use: Yes     Alcohol/week: 0.0 oz     Comment: socially     Drug use: No     MILENA-7 SCORE 6/26/2018 7/2/2019 7/2/2019   Total Score - - 5 (mild anxiety)   Total Score 1 5 8     PHQ 6/26/2018 3/22/2019 7/2/2019   PHQ-9 Total Score 1 2 4   Q9: Thoughts of better off dead/self-harm past 2 weeks Not at all Not at all Not at all     No flowsheet data found.  No flowsheet data found.      Amount of exercise or physical activity: 5 times a week- 30-60 minutes    Problems taking medications regularly: taking 2 capsules instead of 3    Medication side effects: fatigue and tiredness    Diet: regular (no restrictions)    Insomnia is MUCH better when taking the medication at night  She is now taking 150 mg venlafaxine at night  Her anxiety is well controlled        Patient Active Problem List   Diagnosis     Generalized anxiety disorder     Mixed hyperlipidemia     CKD (chronic kidney disease) stage 2, GFR 60-89 ml/min     ACP (advance care planning)     Major depression in complete remission (H) on meds since 6-12-13     Osteoarthritis of multiple joints, unspecified osteoarthritis type of #2&3 fingers bilat R>L     Family history of ischemic heart disease     Memory loss     Primary insomnia     Throat pain     Past Surgical History:   Procedure Laterality Date     COLONOSCOPY       EYE SURGERY       H ARGON LASER FOR RETINAL TEAR Left 3/2014     HEAD &  NECK SURGERY      wisdom teeth     LAPAROSCOPIC SINGLE SITE SALPINGO-OOPHORECTOMY Bilateral 4/15/2015    Procedure: LAPAROSCOPIC SINGLE SITE SALPINGO-OOPHORECTOMY;  Surgeon: Leonora Zavaleta MD;  [facility]     wisdom teeth removal  age 19       Social History     Tobacco Use     Smoking status: Never Smoker     Smokeless tobacco: Never Used   Substance Use Topics     Alcohol use: Yes     Alcohol/week: 0.0 oz     Comment: socially     Family History   Problem Relation Age of Onset     Breast Cancer Mother 80     Neurologic Disorder Father         Parkinson's     Coronary Artery Disease Father 44        MI     Lipids Brother      Lipids Brother      Family History Negative Brother      Lipids Sister      Family History Negative Sister      Family History Negative Sister      Family History Negative Sister      Cerebrovascular Disease Maternal Grandmother 86     Cancer Maternal Grandfather 69        leukemia     Cancer Paternal Grandfather 69        stomach     Diabetes No family hx of      Cancer - colorectal No family hx of      Hypertension No family hx of      Hyperlipidemia No family hx of      Colon Cancer No family hx of      Prostate Cancer No family hx of      Other Cancer No family hx of      Depression No family hx of      Anxiety Disorder No family hx of      Mental Illness No family hx of      Substance Abuse No family hx of      Anesthesia Reaction No family hx of      Asthma No family hx of      Osteoporosis No family hx of      Genetic Disorder No family hx of      Thyroid Disease No family hx of      Obesity No family hx of      Unknown/Adopted No family hx of          Current Outpatient Medications   Medication Sig Dispense Refill     ASPIRIN PO Take 81 mg by mouth daily       atorvastatin (LIPITOR) 20 MG tablet TAKE 1 TABLET DAILY 90 tablet 2     Cholecalciferol (VITAMIN D) 1000 UNITS capsule Take 1 capsule by mouth daily.       fluticasone (FLONASE) 50 MCG/ACT spray Spray 1-2 sprays into both  nostrils daily 1 Bottle 3     Multiple Vitamins-Minerals (MULTIVITAMIN OR) Take by mouth daily       venlafaxine (EFFEXOR-XR) 75 MG 24 hr capsule Take 2 capsules (150 mg) by mouth daily At night 180 capsule 0     Allergies   Allergen Reactions     Penicillins Swelling, Rash and Hives     Amoxicillin     Sulfa Drugs Hives     Trazodone Fatigue     Really tired       Reviewed and updated as needed this visit by Provider       Review of Systems   Constitutional: Negative.    HENT: Negative.    Eyes: Negative.    Respiratory: Negative.    Cardiovascular: Negative.    Gastrointestinal: Negative.    Genitourinary: Negative.    Musculoskeletal: Negative.    Skin: Negative.    Neurological: Negative.    Psychiatric/Behavioral:        As in HPI         Objective    /84 (Cuff Size: Adult Regular)   Pulse 88   Temp 98  F (36.7  C) (Tympanic)   Resp 14   Wt 72.6 kg (160 lb)   LMP  (LMP Unknown)   BMI 27.46 kg/m    Physical Exam   Constitutional: She is oriented to person, place, and time. She appears well-developed and well-nourished. No distress.   HENT:   Head: Normocephalic.   Right Ear: External ear normal.   Left Ear: External ear normal.   Nose: Nose normal.   Eyes: Conjunctivae and EOM are normal.   Neck: Normal range of motion.   Pulmonary/Chest: Effort normal.   Neurological: She is alert and oriented to person, place, and time.   Skin: She is not diaphoretic.   Psychiatric: She has a normal mood and affect. Judgment normal.       Diagnostic Test Results:  No results found for this or any previous visit (from the past 24 hour(s)).        Assessment & Plan   Problem List Items Addressed This Visit        Behavioral    Major depression in complete remission (H) on meds since 6-12-13 - Primary    Relevant Medications    venlafaxine (EFFEXOR-XR) 75 MG 24 hr capsule      Other Visit Diagnoses     Elevated fasting glucose        Overweight (BMI 25.0-29.9)             - Will also check A1C and fasting glucose  "today   - Ok to take  mg venlafaxine at night as needed.  If 75 mg is enough for her anxiety, then only take 75 daily.  Updated Rx sent to the pharmacy.     BMI:   Estimated body mass index is 27.46 kg/m  as calculated from the following:    Height as of 7/2/19: 1.626 m (5' 4\").    Weight as of this encounter: 72.6 kg (160 lb).   Weight management plan: Discussed healthy diet and exercise guidelines        There are no Patient Instructions on file for this visit.    Return in about 1 year (around 8/2/2020) for Preventive Care Visit.    Pati Sky PA-C  [facility]    "

## 2019-08-03 ASSESSMENT — ANXIETY QUESTIONNAIRES: GAD7 TOTAL SCORE: 1

## 2019-08-06 NOTE — RESULT ENCOUNTER NOTE
Pat    Your lab tests are complete and I have reviewed the results.     - Your lab results look great; everything is normal.  - No evidence of diabetes    If you have any questions or concerns, please feel free to call or send a [employer] message.    Sincerely,  Chalino Sky PA-C

## 2019-09-18 ENCOUNTER — HOSPITAL ENCOUNTER (OUTPATIENT)
Dept: MAMMOGRAPHY | Facility: CLINIC | Age: 69
Discharge: HOME | End: 2019-09-18
Attending: PHYSICIAN ASSISTANT | Admitting: PHYSICIAN ASSISTANT
Payer: COMMERCIAL

## 2019-09-18 DIAGNOSIS — Z12.31 ENCOUNTER FOR SCREENING MAMMOGRAM FOR BREAST CANCER: ICD-10-CM

## 2019-09-18 PROCEDURE — 77067 SCR MAMMO BI INCL CAD: CPT

## 2019-09-20 ENCOUNTER — OFFICE VISIT (OUTPATIENT)
Dept: FAMILY MEDICINE | Facility: CLINIC | Age: 69
End: 2019-09-20
Payer: COMMERCIAL

## 2019-09-20 VITALS
OXYGEN SATURATION: 97 % | RESPIRATION RATE: 14 BRPM | SYSTOLIC BLOOD PRESSURE: 132 MMHG | WEIGHT: 160 LBS | DIASTOLIC BLOOD PRESSURE: 74 MMHG | TEMPERATURE: 97.5 F | BODY MASS INDEX: 27.31 KG/M2 | HEART RATE: 90 BPM | HEIGHT: 64 IN

## 2019-09-20 DIAGNOSIS — Z01.818 PREOP GENERAL PHYSICAL EXAM: Primary | ICD-10-CM

## 2019-09-20 DIAGNOSIS — H25.9 SENILE CATARACT, UNSPECIFIED AGE-RELATED CATARACT TYPE, UNSPECIFIED LATERALITY: ICD-10-CM

## 2019-09-20 PROCEDURE — 99214 OFFICE O/P EST MOD 30 MIN: CPT | Performed by: PHYSICIAN ASSISTANT

## 2019-09-20 ASSESSMENT — MIFFLIN-ST. JEOR: SCORE: 1240.76

## 2019-09-20 NOTE — PROGRESS NOTES
[facility]  [address]  [facility] [address]  [phone]  Dept: [phone]    PRE-OP EVALUATION:  Today's date: 2019    Emma Rudolph (: 1950) presents for pre-operative evaluation assessment as requested by Dr. Parrish lujan.  She requires evaluation and anesthesia risk assessment prior to undergoing surgery/procedure for treatment of cataract .    Fax number for surgical facility: [phone]  Primary Physician: Angelina Puckett  Type of Anesthesia Anticipated: Local with MAC    Patient has a Health Care Directive or Living Will:  NO    Preop Questions 2019   Who is doing your surgery? [facility]   What are you having done? cataract surgery   Date of Surgery/Procedure: 2019   Facility or Hospital where procedure/surgery will be performed: [facility]   1.  Do you have a history of Heart attack, stroke, stent, coronary bypass surgery, or other heart surgery? No   2.  Do you ever have any pain or discomfort in your chest? No   3.  Do you have a history of  Heart Failure? No   4.   Are you troubled by shortness of breath when:  walking on a level surface, or up a slight hill, or at night? No   5.  Do you currently have a cold, bronchitis or other respiratory infection? No   6.  Do you have a cough, shortness of breath, or wheezing? No   7.  Do you sometimes get pains in the calves of your legs when you walk? No   8. Do you or anyone in your family have previous history of blood clots? No   9.  Do you or does anyone in your family have a serious bleeding problem such as prolonged bleeding following surgeries or cuts? No   10. Have you ever had problems with anemia or been told to take iron pills? No   11. Have you had any abnormal blood loss such as black, tarry or bloody stools, or abnormal vaginal bleeding? No   12. Have you ever had a blood transfusion? No   13. Have you  or any of your relatives ever had problems with anesthesia? No   14. Do you have sleep apnea, excessive snoring or daytime drowsiness? No   15. Do you have any prosthetic heart valves? No   16. Do you have prosthetic joints? No   17. Is there any chance that you may be pregnant? No       HPI:     HPI related to upcoming procedure: Progressively worsening vision in both eyes.        See problem list for active medical problems.  Problems all longstanding and stable, except as noted/documented.  See ROS for pertinent symptoms related to these conditions.    MEDICAL HISTORY:     Patient Active Problem List   Diagnosis     Generalized anxiety disorder     Mixed hyperlipidemia     CKD (chronic kidney disease) stage 2, GFR 60-89 ml/min     ACP (advance care planning)     Major depression in complete remission (H) on meds since 6-12-13     Osteoarthritis of multiple joints, unspecified osteoarthritis type of #2&3 fingers bilat R>L     Family history of ischemic heart disease     Memory loss     Primary insomnia       Past Medical History:   Diagnosis Date     Generalized anxiety disorder      Hyperlipidemia LDL goal <130      Mild major depression (H)      Vitamin D deficiency disease      Past Surgical History:   Procedure Laterality Date     COLONOSCOPY       EYE SURGERY       H ARGON LASER FOR RETINAL TEAR Left 3/2014     HEAD & NECK SURGERY      wisdom teeth     LAPAROSCOPIC SINGLE SITE SALPINGO-OOPHORECTOMY Bilateral 4/15/2015    Procedure: LAPAROSCOPIC SINGLE SITE SALPINGO-OOPHORECTOMY;  Surgeon: Leonora Zavaleta MD;  Location: [facility]     wisdom teeth removal  age 19     Current Outpatient Medications   Medication Sig Dispense Refill     ASPIRIN PO Take 81 mg by mouth daily       atorvastatin (LIPITOR) 20 MG tablet TAKE 1 TABLET DAILY 90 tablet 2     Cholecalciferol (VITAMIN D) 1000 UNITS capsule Take 1 capsule by mouth daily.       fluticasone (FLONASE) 50 MCG/ACT spray Spray 1-2 sprays into both nostrils daily 1  "Bottle 3     Multiple Vitamins-Minerals (MULTIVITAMIN OR) Take by mouth daily       venlafaxine (EFFEXOR-XR) 75 MG 24 hr capsule Take 2 capsules (150 mg) by mouth daily At night 180 capsule 0     OTC products: None, except as noted above    Allergies   Allergen Reactions     Penicillins Swelling, Rash and Hives     Amoxicillin     Sulfa Drugs Hives     Trazodone Fatigue     Really tired      Latex Allergy: NO    Social History     Tobacco Use     Smoking status: Never Smoker     Smokeless tobacco: Never Used   Substance Use Topics     Alcohol use: Yes     Alcohol/week: 0.0 oz     Comment: socially     History   Drug Use No       REVIEW OF SYSTEMS:   CONSTITUTIONAL: NEGATIVE for fever, chills, change in weight  INTEGUMENTARY/SKIN: NEGATIVE for worrisome rashes, moles or lesions  EYES: NEGATIVE for vision changes or irritation  ENT/MOUTH: NEGATIVE for ear, mouth and throat problems  RESP: NEGATIVE for significant cough or SOB  BREAST: NEGATIVE for masses, tenderness or discharge  CV: NEGATIVE for chest pain, palpitations or peripheral edema  GI: NEGATIVE for nausea, abdominal pain, heartburn, or change in bowel habits  : NEGATIVE for frequency, dysuria, or hematuria  MUSCULOSKELETAL: NEGATIVE for significant arthralgias or myalgia  NEURO: NEGATIVE for weakness, dizziness or paresthesias  ENDOCRINE: NEGATIVE for temperature intolerance, skin/hair changes  HEME: NEGATIVE for bleeding problems  PSYCHIATRIC: NEGATIVE for changes in mood or affect    EXAM:   /74   Pulse 90   Temp 97.5  F (36.4  C) (Tympanic)   Resp 14   Ht 1.626 m (5' 4\")   Wt 72.6 kg (160 lb)   LMP  (LMP Unknown)   SpO2 97%   BMI 27.46 kg/m      GENERAL APPEARANCE: healthy, alert and no distress     EYES: EOMI, PERRL     HENT: ear canals and TM's normal and nose and mouth without ulcers or lesions     NECK: no adenopathy, no asymmetry, masses, or scars and thyroid normal to palpation     RESP: lungs clear to auscultation - no rales, " rhonchi or wheezes     CV: regular rates and rhythm, normal S1 S2, no S3 or S4 and no murmur, click or rub     ABDOMEN:  soft, nontender, no HSM or masses and bowel sounds normal     MS: extremities normal- no gross deformities noted, no evidence of inflammation in joints, FROM in all extremities.     SKIN: no suspicious lesions or rashes     NEURO: Normal strength and tone, sensory exam grossly normal, mentation intact and speech normal     PSYCH: mentation appears normal. and affect normal/bright     LYMPHATICS: No cervical adenopathy    DIAGNOSTICS:   No labs or EKG required for low risk surgery (cataract, skin procedure, breast biopsy, etc)    Recent Labs   Lab Test 08/02/19  1027 07/02/19  0824 06/26/18  1219  04/15/15  0814 06/11/12  1502   HGB  --   --   --   --  14.0 14.6   NA  --  141 140   < >  --   --    POTASSIUM  --  4.0 3.9   < >  --   --    CR  --  0.77 0.71   < >  --   --    A1C 5.6  --   --   --   --   --     < > = values in this interval not displayed.        IMPRESSION:   Reason for surgery/procedure: Bilateral cataracts  Diagnosis/reason for consult: Evaluation and anesthesia risk assessment prior to      The proposed surgical procedure is considered LOW risk.    REVISED CARDIAC RISK INDEX  The patient has the following serious cardiovascular risks for perioperative complications such as (MI, PE, VFib and 3  AV Block):  No serious cardiac risks  INTERPRETATION: 0 risks: Class I (very low risk - 0.4% complication rate)    The patient has the following additional risks for perioperative complications:  No identified additional risks      ICD-10-CM    1. Preop general physical exam Z01.818    2. Senile cataract, unspecified age-related cataract type, unspecified laterality H25.9        RECOMMENDATIONS:         --Patient is to take all scheduled medications on the day of surgery EXCEPT.    APPROVAL GIVEN to proceed with proposed procedure, without further diagnostic evaluation       Signed  Electronically by: Angelina Puckett PA-C    Copy of this evaluation report is provided to requesting physician.    [facility] Preop Guidelines    Revised Cardiac Risk Index

## 2019-12-14 DIAGNOSIS — F32.5 MAJOR DEPRESSION IN COMPLETE REMISSION (H): ICD-10-CM

## 2019-12-14 NOTE — TELEPHONE ENCOUNTER
"Requested Prescriptions   Pending Prescriptions Disp Refills     venlafaxine (EFFEXOR-XR) 75 MG 24 hr capsule    Last Written Prescription Date:  08/02/2019  Last Fill Quantity: 180 capsule,  # refills: 0   Last office visit: 9/20/2019 with prescribing provider:  Italo   Future Office Visit:     180 capsule 0     Sig: Take 2 capsules (150 mg) by mouth daily At night       Serotonin-Norepinephrine Reuptake Inhibitors  Passed - 12/14/2019  9:29 AM        Passed - Blood pressure under 140/90 in past 12 months     BP Readings from Last 3 Encounters:   09/20/19 132/74   08/02/19 126/84   07/02/19 124/76                 Passed - PHQ-9 score of less than 5 in past 6 months     Please review last PHQ-9 score.    PHQ-9 SCORE 6/26/2018 3/22/2019 7/2/2019   PHQ-9 Total Score - - -   PHQ-9 Total Score MyChart - - 4 (Minimal depression)   PHQ-9 Total Score 1 2 4               Passed - Medication is active on med list        Passed - Patient is age 18 or older        Passed - No active pregnancy on record        Passed - Normal serum creatinine on file in past 12 months     Recent Labs   Lab Test 07/02/19  0824   CR 0.77             Passed - No positive pregnancy test in past 12 months        Passed - Recent (6 mo) or future (30 days) visit within the authorizing provider's specialty     Patient had office visit in the last 6 months or has a visit in the next 30 days with authorizing provider or within the authorizing provider's specialty.  See \"Patient Info\" tab in inbasket, or \"Choose Columns\" in Meds & Orders section of the refill encounter.               "

## 2019-12-16 ENCOUNTER — TRANSFERRED RECORDS (OUTPATIENT)
Dept: HEALTH INFORMATION MANAGEMENT | Facility: CLINIC | Age: 69
End: 2019-12-16

## 2019-12-16 RX ORDER — VENLAFAXINE HYDROCHLORIDE 75 MG/1
75 CAPSULE, EXTENDED RELEASE ORAL DAILY
Qty: 180 CAPSULE | Refills: 1 | Status: SHIPPED | OUTPATIENT
Start: 2019-12-16 | End: 2020-04-06

## 2020-03-19 ENCOUNTER — TELEPHONE (OUTPATIENT)
Dept: FAMILY MEDICINE | Facility: CLINIC | Age: 70
End: 2020-03-19

## 2020-03-19 DIAGNOSIS — F41.1 GENERALIZED ANXIETY DISORDER: Primary | ICD-10-CM

## 2020-03-19 RX ORDER — DESVENLAFAXINE 50 MG/1
50 TABLET, FILM COATED, EXTENDED RELEASE ORAL DAILY
Qty: 30 TABLET | Refills: 1 | Status: SHIPPED | OUTPATIENT
Start: 2020-03-19 | End: 2020-04-06

## 2020-03-19 ASSESSMENT — ANXIETY QUESTIONNAIRES
1. FEELING NERVOUS, ANXIOUS, OR ON EDGE: NEARLY EVERY DAY
IF YOU CHECKED OFF ANY PROBLEMS ON THIS QUESTIONNAIRE, HOW DIFFICULT HAVE THESE PROBLEMS MADE IT FOR YOU TO DO YOUR WORK, TAKE CARE OF THINGS AT HOME, OR GET ALONG WITH OTHER PEOPLE: SOMEWHAT DIFFICULT
6. BECOMING EASILY ANNOYED OR IRRITABLE: SEVERAL DAYS
2. NOT BEING ABLE TO STOP OR CONTROL WORRYING: NOT AT ALL
GAD7 TOTAL SCORE: 9
7. FEELING AFRAID AS IF SOMETHING AWFUL MIGHT HAPPEN: NOT AT ALL
5. BEING SO RESTLESS THAT IT IS HARD TO SIT STILL: MORE THAN HALF THE DAYS
3. WORRYING TOO MUCH ABOUT DIFFERENT THINGS: NOT AT ALL

## 2020-03-19 ASSESSMENT — PATIENT HEALTH QUESTIONNAIRE - PHQ9
SUM OF ALL RESPONSES TO PHQ QUESTIONS 1-9: 5
5. POOR APPETITE OR OVEREATING: NEARLY EVERY DAY

## 2020-03-19 NOTE — TELEPHONE ENCOUNTER
Pt was called with providers message. States she tried Zoloft years ago and that didn't work for her at that time. Reports that anxiety has worsen in the last 6 months. Pt also had increased family stressors caring for her mother and bother. She has been getting help from family now and mother is at nursing home.     Pt would like to try a different medications because Effexor is not helping her anxiety. She tried 150 mg dose and this was affecting her thinking. Effexor 75 mg tablet is not working for anxiety now. PHQ9 and MILENA were reviewed.  Please see below and advice.     PHQ 3/22/2019 7/2/2019 3/19/2020   PHQ-9 Total Score 2 4 5   Q9: Thoughts of better off dead/self-harm past 2 weeks Not at all Not at all Not at all     MILENA-7 SCORE 7/2/2019 8/2/2019 3/19/2020   Total Score 5 (mild anxiety) - -   Total Score 8 1 9

## 2020-03-19 NOTE — TELEPHONE ENCOUNTER
Let's try switching her to pristiq that is a newer medication but works well with patients that have previously had success with effexor and then fails to help.  Should have an evisit or phone visit in 1 month.    PHQ-9 SCORE 3/22/2019 7/2/2019 3/19/2020   PHQ-9 Total Score - - -   PHQ-9 Total Score MyChart - 4 (Minimal depression) -   PHQ-9 Total Score 2 4 5     MILENA-7 SCORE 7/2/2019 8/2/2019 3/19/2020   Total Score 5 (mild anxiety) - -   Total Score 8 1 9

## 2020-03-19 NOTE — TELEPHONE ENCOUNTER
Can switch to zoloft instead, doesn't look like she's been on this.  I can send it in, I just need to know what pharmacy she is using.

## 2020-03-19 NOTE — TELEPHONE ENCOUNTER
Reason for call:  Patient reporting a symptom    Symptom or request: anxiety    Duration (how long have symptoms been present): ongoing    Have you been treated for this before? Yes    Additional comments: PT WANTS TO DISCUSS CHANGING ANXIETY MEDICATION.  CAN THIS BE A PHONE VISIT OR IN OFFICE?  PLEASE LET PATIENT KNOW.    Phone Number patient can be reached at:  Home number on file [phone] (home)    Best Time:  ASAP    Can we leave a detailed message on this number:  YES    Call taken on 3/19/2020 at 12:57 PM by ELBERT ZARATE

## 2020-03-20 ASSESSMENT — ANXIETY QUESTIONNAIRES: GAD7 TOTAL SCORE: 9

## 2020-04-06 ENCOUNTER — E-VISIT (OUTPATIENT)
Dept: FAMILY MEDICINE | Facility: CLINIC | Age: 70
End: 2020-04-06

## 2020-04-06 DIAGNOSIS — F41.1 GENERALIZED ANXIETY DISORDER: ICD-10-CM

## 2020-04-06 PROCEDURE — 99207 ZZC NO CHARGE LOS: CPT | Performed by: PHYSICIAN ASSISTANT

## 2020-04-06 RX ORDER — DESVENLAFAXINE 50 MG/1
50 TABLET, FILM COATED, EXTENDED RELEASE ORAL DAILY
Qty: 90 TABLET | Refills: 1 | Status: SHIPPED | OUTPATIENT
Start: 2020-04-06 | End: 2020-06-11

## 2020-04-06 RX ORDER — DESVENLAFAXINE 50 MG/1
50 TABLET, FILM COATED, EXTENDED RELEASE ORAL DAILY
Qty: 30 TABLET | Refills: 1 | Status: CANCELLED | OUTPATIENT
Start: 2020-04-06

## 2020-04-06 NOTE — TELEPHONE ENCOUNTER
Reason for Call:  Medication or medication refill:    Do you use a [facility]?  Name of the pharmacy and phone number for the [facility]    Name of the medication requested: desvenlafaxine (PRISTIQ) 50 MG 24 hr tablet 90 day supply    Other request: only has 7 days let and mail order    Can we leave a detailed message on this number? YES    Phone number patient can be reached at: Home number on file [phone] (home)    Best Time: asap    Call taken on 4/6/2020 at 9:37 AM by MARTHA HALL

## 2020-04-06 NOTE — TELEPHONE ENCOUNTER
"Pt was to do an e-visit this month. My chart message was sent by JERALD Hawley with e-visit instructions. Pt verbalized agreement with plan. Will wait for Rx approval.     Last office visit 09/20/2019.    desvenlafaxine (PRISTIQ) 50 MG 24 hr tablet  30 tablet  1  3/19/2020   --    Sig - Route: Take 1 tablet (50 mg) by mouth daily - Oral          Requested Prescriptions   Pending Prescriptions Disp Refills     desvenlafaxine (PRISTIQ) 50 MG 24 hr tablet 30 tablet 1     Sig: Take 1 tablet (50 mg) by mouth daily       Serotonin-Norepinephrine Reuptake Inhibitors  Passed - 4/6/2020  9:38 AM        Passed - Blood pressure under 140/90 in past 12 months     BP Readings from Last 3 Encounters:   09/20/19 132/74   08/02/19 126/84   07/02/19 124/76                 Passed - Recent (12 mo) or future (30 days) visit within the authorizing provider's specialty     Patient has had an office visit with the authorizing provider or a provider within the authorizing providers department within the previous 12 mos or has a future within next 30 days. See \"Patient Info\" tab in inbasket, or \"Choose Columns\" in Meds & Orders section of the refill encounter.              Passed - Medication is active on med list        Passed - Patient is age 18 or older        Passed - No active pregnancy on record        Passed - Normal serum creatinine on file in past 12 months     Recent Labs   Lab Test 07/02/19  0824   CR 0.77       Ok to refill medication if creatinine is low          Passed - No positive pregnancy test in past 12 months             "

## 2020-05-19 DIAGNOSIS — E78.2 MIXED HYPERLIPIDEMIA: ICD-10-CM

## 2020-05-19 RX ORDER — ATORVASTATIN CALCIUM 20 MG/1
TABLET, FILM COATED ORAL
Qty: 90 TABLET | Refills: 2 | Status: SHIPPED | OUTPATIENT
Start: 2020-05-19 | End: 2020-08-25

## 2020-06-11 ENCOUNTER — VIRTUAL VISIT (OUTPATIENT)
Dept: FAMILY MEDICINE | Facility: CLINIC | Age: 70
End: 2020-06-11
Payer: COMMERCIAL

## 2020-06-11 DIAGNOSIS — F41.1 GENERALIZED ANXIETY DISORDER: ICD-10-CM

## 2020-06-11 PROCEDURE — 99213 OFFICE O/P EST LOW 20 MIN: CPT | Mod: 95 | Performed by: PHYSICIAN ASSISTANT

## 2020-06-11 RX ORDER — DESVENLAFAXINE 100 MG/1
100 TABLET, EXTENDED RELEASE ORAL DAILY
Qty: 90 TABLET | Refills: 1 | Status: SHIPPED | OUTPATIENT
Start: 2020-06-11 | End: 2020-08-27

## 2020-06-11 ASSESSMENT — ANXIETY QUESTIONNAIRES
2. NOT BEING ABLE TO STOP OR CONTROL WORRYING: NOT AT ALL
5. BEING SO RESTLESS THAT IT IS HARD TO SIT STILL: NOT AT ALL
6. BECOMING EASILY ANNOYED OR IRRITABLE: NOT AT ALL
7. FEELING AFRAID AS IF SOMETHING AWFUL MIGHT HAPPEN: NOT AT ALL
IF YOU CHECKED OFF ANY PROBLEMS ON THIS QUESTIONNAIRE, HOW DIFFICULT HAVE THESE PROBLEMS MADE IT FOR YOU TO DO YOUR WORK, TAKE CARE OF THINGS AT HOME, OR GET ALONG WITH OTHER PEOPLE: SOMEWHAT DIFFICULT
3. WORRYING TOO MUCH ABOUT DIFFERENT THINGS: NOT AT ALL
1. FEELING NERVOUS, ANXIOUS, OR ON EDGE: NEARLY EVERY DAY
GAD7 TOTAL SCORE: 4

## 2020-06-11 ASSESSMENT — PATIENT HEALTH QUESTIONNAIRE - PHQ9: 5. POOR APPETITE OR OVEREATING: SEVERAL DAYS

## 2020-06-11 NOTE — PROGRESS NOTES
"Emma Rudolph is a 69 year old female who is being evaluated via a billable telephone visit.      The patient has been notified of following:     \"This telephone visit will be conducted via a call between you and your physician/provider. We have found that certain health care needs can be provided without the need for a physical exam.  This service lets us provide the care you need with a short phone conversation.  If a prescription is necessary we can send it directly to your pharmacy.  If lab work is needed we can place an order for that and you can then stop by our lab to have the test done at a later time.    Telephone visits are billed at different rates depending on your insurance coverage. During this emergency period, for some insurers they may be billed the same as an in-person visit.  Please reach out to your insurance provider with any questions.    If during the course of the call the physician/provider feels a telephone visit is not appropriate, you will not be charged for this service.\"    Patient has given verbal consent for Telephone visit?  Yes    What phone number would you like to be contacted at?     How would you like to obtain your AVS? Alexandrahart    Ilia     Emma Rudolph is a 69 year old female who presents via phone visit today for the following health issues:    HPI  Anxiety Follow-Up    How are you doing with your anxiety since your last visit? No change    Are you having other symptoms that might be associated with anxiety? Yes:  social situations    Have you had a significant life event? No     Are you feeling depressed? No    Do you have any concerns with your use of alcohol or other drugs? No    Social History     Tobacco Use     Smoking status: Never Smoker     Smokeless tobacco: Never Used   Substance Use Topics     Alcohol use: Yes     Alcohol/week: 0.0 standard drinks     Comment: socially     Drug use: No     MILENA-7 SCORE 8/2/2019 3/19/2020 6/11/2020   Total Score - " - -   Total Score 1 9 4     PHQ 3/22/2019 7/2/2019 3/19/2020   PHQ-9 Total Score 2 4 5   Q9: Thoughts of better off dead/self-harm past 2 weeks Not at all Not at all Not at all     Last PHQ-9 3/19/2020   1.  Little interest or pleasure in doing things 0   2.  Feeling down, depressed, or hopeless 0   3.  Trouble falling or staying asleep, or sleeping too much 2   4.  Feeling tired or having little energy 0   5.  Poor appetite or overeating 0   6.  Feeling bad about yourself 0   7.  Trouble concentrating 3   8.  Moving slowly or restless 0   Q9: Thoughts of better off dead/self-harm past 2 weeks 0   PHQ-9 Total Score 5   Difficulty at work, home, or with people -     MILENA-7  6/11/2020   1. Feeling nervous, anxious, or on edge 3   2. Not being able to stop or control worrying 0   3. Worrying too much about different things 0   4. Trouble relaxing 1   5. Being so restless that it is hard to sit still 0   6. Becoming easily annoyed or irritable 0   7. Feeling afraid, as if something awful might happen 0   MILENA-7 Total Score 4   If you checked any problems, how difficult have they made it for you to do your work, take care of things at home, or get along with other people? Somewhat difficult         How many servings of fruits and vegetables do you eat daily?  2-3    On average, how many sweetened beverages do you drink each day (Examples: soda, juice, sweet tea, etc.  Do NOT count diet or artificially sweetened beverages)?   0    How many days per week do you exercise enough to make your heart beat faster? 3 or less    How many minutes a day do you exercise enough to make your heart beat faster? 9 or less    How many days per week do you miss taking your medication? 0  Additional provider notes:                    Recent Labs   Lab Test 08/02/19  1027 07/02/19  0824 06/26/18  1219 06/01/17  1344  08/04/15  0934  06/12/13  0933   A1C 5.6  --   --   --   --   --   --   --    LDL  --  84 104* 92   < > 87   < > 84   HDL  --   58 56 60   < > 61   < > 55   TRIG  --  142 236* 177*   < > 151*   < > 173*   ALT  --  59* 35 27   < > 47   < > 58*   CR  --  0.77 0.71 0.74  --  0.90   < >  --    GFRESTIMATED  --  78 82 78  --  63   < >  --    GFRESTBLACK  --  >90 >90 >90  African American GFR Calc    --  76   < >  --    POTASSIUM  --  4.0 3.9 3.6   < >  --   --   --    TSH  --   --   --   --   --  1.07  --  1.16    < > = values in this interval not displayed.      BP Readings from Last 3 Encounters:   09/20/19 132/74   08/02/19 126/84   07/02/19 124/76    Wt Readings from Last 3 Encounters:   09/20/19 72.6 kg (160 lb)   08/02/19 72.6 kg (160 lb)   07/02/19 72.1 kg (159 lb)                    Reviewed and updated as needed this visit by Provider  Tobacco  Allergies  Meds  Problems  Med Hx  Surg Hx  Fam Hx         Review of Systems   Constitutional, HEENT, cardiovascular, pulmonary, GI, , musculoskeletal, neuro, skin, endocrine and psych systems are negative, except as otherwise noted.       Objective   Reported vitals:  LMP  (LMP Unknown)    healthy, alert and no distress  Psych: Alert and oriented times 3; coherent speech, normal   rate and volume, able to articulate logical thoughts, able   to abstract reason, no tangential thoughts, no hallucinations   or delusions  Her affect is normal     Diagnostic Test Results:  Labs reviewed in Epic        Assessment/Plan:  1. Generalized anxiety disorder  Increased dose due to increasing daily anxiety.  IF not improving 1 month, may consider adding atarax for prn use during social situations.  - desvenlafaxine (PRISTIQ) 100 MG 24 hr tablet; Take 1 tablet (100 mg) by mouth daily  Dispense: 90 tablet; Refill: 1      A physical exam was not possible today due to the COVID19 pandemic crisis for which we are trying to keep all patients safe and at home.  Clinical decision making was based on history as presented by the patient and answers to follow up questions as noted above.  Any lab orders that are  needed will be put in as future orders so that the patient can come in for a lab only visit to minimize the amount of time spent in the clinic.    Return in about 1 month (around 7/11/2020) for Med Check, Evisit, phone or video visit.      Call Start Time: 3:24 PM   Call End Time:  3:30 PM     Phone call duration:  6 minutes      Angelina Puckett PA-C    Family Medicine  [facility]

## 2020-06-12 ASSESSMENT — ANXIETY QUESTIONNAIRES: GAD7 TOTAL SCORE: 4

## 2020-08-19 ENCOUNTER — TELEPHONE (OUTPATIENT)
Dept: FAMILY MEDICINE | Facility: CLINIC | Age: 70
End: 2020-08-19

## 2020-08-19 NOTE — TELEPHONE ENCOUNTER
Patient Quality Outreach      Summary:    Patient is due/failing the following:   PHQ-9 Needed and Annual wellness, date due: 7/2/2020    Type of outreach:    Sent letter.    Questions for provider review:    None                                                                                   **Start Working phrase here:**       Patient has the following on her problem list/HM:     Depression / Dysthymia review    6 Month Remission: 4-8 month window range:   12 Month Remission: 10-14 month window range:        PHQ-9 SCORE 3/22/2019 7/2/2019 3/19/2020   PHQ-9 Total Score - - -   PHQ-9 Total Score MyChart - 4 (Minimal depression) -   PHQ-9 Total Score 2 4 5       If PHQ-9 recheck is 5 or more, route to provider for next steps.

## 2020-08-19 NOTE — LETTER
August 19, 2020    Emma MASSIEL Rudolph  [address] [place] [id number]    Dear Mindy Alatorre cares about your health and your health plan.  I have reviewed your medical conditions, medication list and lab results, and am making recommendations based on this review to better manage your health.    You are in particular need of attention regarding:  -Depression/Anxiety  -Wellness (Physical) Visit     I am recommending that you:     -schedule a WELLNESS (Physical) APPOINTMENT with me.   I will check fasting labs the same day - nothing to eat except water and meds for 8-10 hours prior. (If you go elsewhere for Wellness visits then please disregard this reminder.)    Please complete the enclosed PHQ9 and mail back to clinic in the envelope provided.         Please call us at the [place] location:  [phone] or use [facility] to address the above recommendations.     Thank you for trusting [facility].  We appreciate the opportunity to serve you and look forward to supporting your healthcare in the future.    If you have (or plan to have) any of these tests done at a facility other than a [facility] or a [facility], please have the results sent to the [facility] location noted above.      Best Regards,    MICHELLE Choudhary

## 2020-08-27 ENCOUNTER — OFFICE VISIT (OUTPATIENT)
Dept: FAMILY MEDICINE | Facility: CLINIC | Age: 70
End: 2020-08-27
Payer: COMMERCIAL

## 2020-08-27 VITALS
DIASTOLIC BLOOD PRESSURE: 82 MMHG | RESPIRATION RATE: 16 BRPM | SYSTOLIC BLOOD PRESSURE: 136 MMHG | BODY MASS INDEX: 27.49 KG/M2 | WEIGHT: 161 LBS | TEMPERATURE: 97.8 F | HEART RATE: 94 BPM | OXYGEN SATURATION: 99 % | HEIGHT: 64 IN

## 2020-08-27 DIAGNOSIS — F41.1 GENERALIZED ANXIETY DISORDER: ICD-10-CM

## 2020-08-27 DIAGNOSIS — Z00.00 MEDICARE ANNUAL WELLNESS VISIT, SUBSEQUENT: Primary | ICD-10-CM

## 2020-08-27 DIAGNOSIS — I10 HYPERTENSION, UNSPECIFIED TYPE: ICD-10-CM

## 2020-08-27 DIAGNOSIS — E78.2 MIXED HYPERLIPIDEMIA: ICD-10-CM

## 2020-08-27 DIAGNOSIS — F32.5 MAJOR DEPRESSION IN COMPLETE REMISSION (H): ICD-10-CM

## 2020-08-27 LAB
ALBUMIN SERPL-MCNC: 4 G/DL (ref 3.4–5)
ALP SERPL-CCNC: 122 U/L (ref 40–150)
ALT SERPL W P-5'-P-CCNC: 65 U/L (ref 0–50)
ANION GAP SERPL CALCULATED.3IONS-SCNC: 6 MMOL/L (ref 3–14)
AST SERPL W P-5'-P-CCNC: 35 U/L (ref 0–45)
BILIRUB SERPL-MCNC: 1.3 MG/DL (ref 0.2–1.3)
BUN SERPL-MCNC: 15 MG/DL (ref 7–30)
CALCIUM SERPL-MCNC: 9.4 MG/DL (ref 8.5–10.1)
CHLORIDE SERPL-SCNC: 106 MMOL/L (ref 94–109)
CHOLEST SERPL-MCNC: 189 MG/DL
CO2 SERPL-SCNC: 26 MMOL/L (ref 20–32)
CREAT SERPL-MCNC: 0.81 MG/DL (ref 0.52–1.04)
CREAT UR-MCNC: 269 MG/DL
ERYTHROCYTE [DISTWIDTH] IN BLOOD BY AUTOMATED COUNT: 12.4 % (ref 10–15)
GFR SERPL CREATININE-BSD FRML MDRD: 74 ML/MIN/{1.73_M2}
GLUCOSE SERPL-MCNC: 110 MG/DL (ref 70–99)
HCT VFR BLD AUTO: 45.4 % (ref 35–47)
HDLC SERPL-MCNC: 62 MG/DL
HGB BLD-MCNC: 15.5 G/DL (ref 11.7–15.7)
LDLC SERPL CALC-MCNC: 93 MG/DL
MCH RBC QN AUTO: 31.3 PG (ref 26.5–33)
MCHC RBC AUTO-ENTMCNC: 34.1 G/DL (ref 31.5–36.5)
MCV RBC AUTO: 92 FL (ref 78–100)
MICROALBUMIN UR-MCNC: 25 MG/L
MICROALBUMIN/CREAT UR: 9.37 MG/G CR (ref 0–25)
NONHDLC SERPL-MCNC: 127 MG/DL
PLATELET # BLD AUTO: 291 10E9/L (ref 150–450)
POTASSIUM SERPL-SCNC: 3.5 MMOL/L (ref 3.4–5.3)
PROT SERPL-MCNC: 8.5 G/DL (ref 6.8–8.8)
RBC # BLD AUTO: 4.96 10E12/L (ref 3.8–5.2)
SODIUM SERPL-SCNC: 138 MMOL/L (ref 133–144)
TRIGL SERPL-MCNC: 169 MG/DL
WBC # BLD AUTO: 5 10E9/L (ref 4–11)

## 2020-08-27 PROCEDURE — 80053 COMPREHEN METABOLIC PANEL: CPT | Performed by: INTERNAL MEDICINE

## 2020-08-27 PROCEDURE — 85027 COMPLETE CBC AUTOMATED: CPT | Performed by: INTERNAL MEDICINE

## 2020-08-27 PROCEDURE — 36415 COLL VENOUS BLD VENIPUNCTURE: CPT | Performed by: INTERNAL MEDICINE

## 2020-08-27 PROCEDURE — 82043 UR ALBUMIN QUANTITATIVE: CPT | Performed by: INTERNAL MEDICINE

## 2020-08-27 PROCEDURE — 80061 LIPID PANEL: CPT | Performed by: INTERNAL MEDICINE

## 2020-08-27 PROCEDURE — 99397 PER PM REEVAL EST PAT 65+ YR: CPT | Performed by: INTERNAL MEDICINE

## 2020-08-27 RX ORDER — BLOOD PRESSURE TEST KIT
KIT MISCELLANEOUS
Qty: 1 KIT | Refills: 0 | Status: SHIPPED | OUTPATIENT
Start: 2020-08-27 | End: 2023-03-01

## 2020-08-27 RX ORDER — ATORVASTATIN CALCIUM 20 MG/1
20 TABLET, FILM COATED ORAL DAILY
Qty: 90 TABLET | Refills: 3 | Status: SHIPPED | OUTPATIENT
Start: 2020-08-27 | End: 2021-12-08

## 2020-08-27 RX ORDER — ATORVASTATIN CALCIUM 20 MG/1
20 TABLET, FILM COATED ORAL DAILY
Qty: 90 TABLET | Refills: 3 | Status: SHIPPED | OUTPATIENT
Start: 2020-08-27 | End: 2020-08-27

## 2020-08-27 RX ORDER — HYDROXYZINE HYDROCHLORIDE 25 MG/1
25 TABLET, FILM COATED ORAL 3 TIMES DAILY PRN
Qty: 30 TABLET | Refills: 1 | Status: SHIPPED | OUTPATIENT
Start: 2020-08-27 | End: 2020-10-02

## 2020-08-27 RX ORDER — INFLUENZA A VIRUS A/VICTORIA/4897/2022 IVR-238 (H1N1) ANTIGEN (FORMALDEHYDE INACTIVATED), INFLUENZA A VIRUS A/CALIFORNIA/122/2022 SAN-022 (H3N2) ANTIGEN (FORMALDEHYDE INACTIVATED), AND INFLUENZA B VIRUS B/MICHIGAN/01/2021 ANTIGEN (FORMALDEHYDE INACTIVATED) 60; 60; 60 UG/.5ML; UG/.5ML; UG/.5ML
INJECTION, SUSPENSION INTRAMUSCULAR
Refills: 0 | COMMUNITY
Start: 2019-09-13 | End: 2020-11-03

## 2020-08-27 RX ORDER — ZOSTER VACCINE RECOMBINANT, ADJUVANTED 50 MCG/0.5
KIT INTRAMUSCULAR
Refills: 0 | COMMUNITY
Start: 2019-09-13 | End: 2020-11-03

## 2020-08-27 RX ORDER — DESVENLAFAXINE 100 MG/1
100 TABLET, EXTENDED RELEASE ORAL DAILY
Qty: 90 TABLET | Refills: 1 | Status: SHIPPED | OUTPATIENT
Start: 2020-08-27 | End: 2021-05-12

## 2020-08-27 RX ORDER — DESVENLAFAXINE 100 MG/1
100 TABLET, EXTENDED RELEASE ORAL DAILY
Qty: 90 TABLET | Refills: 1 | Status: SHIPPED | OUTPATIENT
Start: 2020-08-27 | End: 2020-08-27

## 2020-08-27 RX ORDER — BLOOD PRESSURE TEST KIT
KIT MISCELLANEOUS
Qty: 1 KIT | Refills: 0 | Status: SHIPPED | OUTPATIENT
Start: 2020-08-27 | End: 2020-08-27

## 2020-08-27 ASSESSMENT — MIFFLIN-ST. JEOR: SCORE: 1240.29

## 2020-08-27 ASSESSMENT — PATIENT HEALTH QUESTIONNAIRE - PHQ9: SUM OF ALL RESPONSES TO PHQ QUESTIONS 1-9: 0

## 2020-08-27 ASSESSMENT — ACTIVITIES OF DAILY LIVING (ADL): CURRENT_FUNCTION: NO ASSISTANCE NEEDED

## 2020-08-27 NOTE — PROGRESS NOTES
"SUBJECTIVE:   Emma Rudolph is a 69 year old female who presents for Preventive Visit.    Are you in the first 12 months of your Medicare coverage?  No    Healthy Habits:     In general, how would you rate your overall health?  Good    Frequency of exercise:  2-3 days/week    Duration of exercise:  Less than 15 minutes    Do you usually eat at least 4 servings of fruit and vegetables a day, include whole grains    & fiber and avoid regularly eating high fat or \"junk\" foods?  No    Taking medications regularly:  Yes    Medication side effects:  None    Ability to successfully perform activities of daily living:  No assistance needed    Home Safety:  No safety concerns identified    Hearing Impairment:  No hearing concerns    In the past 6 months, have you been bothered by leaking of urine?  No    In general, how would you rate your overall mental or emotional health?  Good      PHQ-2 Total Score: 0    Additional concerns today:  Yes    Do you feel safe in your environment? Yes    Have you ever done Advance Care Planning? (For example, a Health Directive, POLST, or a discussion with a medical provider or your loved ones about your wishes): Yes, patient states has an Advance Care Planning document and will bring a copy to the clinic.    Fall risk  Fallen 2 or more times in the past year?: No  Any fall with injury in the past year?: No    Cognitive Screening     Do you have sleep apnea, excessive snoring or daytime drowsiness?: no    Reviewed and updated as needed this visit by clinical staff         Reviewed and updated as needed this visit by Provider        Social History     Tobacco Use     Smoking status: Never Smoker     Smokeless tobacco: Never Used   Substance Use Topics     Alcohol use: Yes     Alcohol/week: 0.0 standard drinks     Comment: socially         Alcohol Use 8/24/2020   Prescreen: >3 drinks/day or >7 drinks/week? No   Prescreen: >3 drinks/day or >7 drinks/week? -       Current providers sharing " "in care for this patient include:   Patient Care Team:  Angelina Puckett PA-C as PCP - General (Physician Assistant)  Angelina Puckett PA-C as Assigned PCP    The following health maintenance items are reviewed in Epic and correct as of today:  Health Maintenance   Topic Date Due     MEDICARE ANNUAL WELLNESS VISIT  07/02/2020     BMP  07/02/2020     LIPID  07/02/2020     MICROALBUMIN  07/02/2020     FALL RISK ASSESSMENT  07/02/2020     INFLUENZA VACCINE (1) 09/01/2020     PHQ-9  09/19/2020     ADVANCE CARE PLANNING  05/31/2021     MAMMO SCREENING  09/18/2021     COLORECTAL CANCER SCREENING  04/08/2025     DTAP/TDAP/TD IMMUNIZATION (3 - Td) 06/01/2027     DEXA  Completed     HEPATITIS C SCREENING  Completed     DEPRESSION ACTION PLAN  Completed     PNEUMOCOCCAL IMMUNIZATION 65+ LOW/MEDIUM RISK  Completed     ZOSTER IMMUNIZATION  Completed     IPV IMMUNIZATION  Aged Out     MENINGITIS IMMUNIZATION  Aged Out     HEPATITIS B IMMUNIZATION  Aged Out     Lab work is in process    Review of Systems  Constitutional, HEENT, cardiovascular, pulmonary, GI, , musculoskeletal, neuro, skin, endocrine and psych systems are negative, except as otherwise noted.    OBJECTIVE:   LMP  (LMP Unknown)  Estimated body mass index is 27.46 kg/m  as calculated from the following:    Height as of 9/20/19: 1.626 m (5' 4\").    Weight as of 9/20/19: 72.6 kg (160 lb).  Physical Exam  GENERAL: healthy, alert and no distress  EYES: Eyes grossly normal to inspection, PERRL and conjunctivae and sclerae normal  HENT: ear canals and TM's normal, nose and mouth without ulcers or lesions  NECK: no adenopathy, no asymmetry, masses, or scars and thyroid normal to palpation  RESP: lungs clear to auscultation - no rales, rhonchi or wheezes  BREAST: Deferred as she does her mammograms regularly.   CV: regular rate and rhythm, normal S1 S2, no S3 or S4, no murmur, click or rub, no peripheral edema and peripheral pulses " strong  ABDOMEN: soft, nontender, no hepatosplenomegaly, no masses and bowel sounds normal  MS: no gross musculoskeletal defects noted, no edema  SKIN: no suspicious lesions or rashes  NEURO: Normal strength and tone, mentation intact and speech normal  PSYCH: mentation appears normal, affect normal/bright    Diagnostic Test Results:  Labs reviewed in Epic    ASSESSMENT / PLAN:     Assessment and Plan  1. Medicare annual wellness visit, subsequent  - Lipid panel reflex to direct LDL Fasting  - CBC with platelets  - Comprehensive metabolic panel  - Albumin Random Urine Quantitative with Creat Ratio    2. Mixed hyperlipidemia  - atorvastatin (LIPITOR) 20 MG tablet; Take 1 tablet (20 mg) by mouth daily  Dispense: 90 tablet; Refill: 3    3. Generalized anxiety disorder  - hydrOXYzine (ATARAX) 25 MG tablet; Take 1 tablet (25 mg) by mouth 3 times daily as needed for anxiety  Dispense: 30 tablet; Refill: 1  - desvenlafaxine (PRISTIQ) 100 MG 24 hr tablet; Take 1 tablet (100 mg) by mouth daily  Dispense: 90 tablet; Refill: 1    4. 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 Hypertension, unspecified type  Borderline elevated, emphasized to send me the BP log and explained BP goals. Need of medication if uncontrolled. DASH diet given.   - Blood Pressure KIT; Please check your BP daily.  Dispense: 1 kit; Refill: 0     Patient Instructions   Started on new medication as needed for anxiety spells.     Please do the lab work ordered.    Elevated BP - sent in BP monitor to your pharamcy and please follow the diet below.     [employer] videos for WALK AT HOME - [employer] walking videos      ==============================================    Dietary Approaches to Stop Hypertension trial -- A different approach was evaluated in the Dietary Approaches to Stop Hypertension (DASH) trial [6].  "Rather than evaluating sodium intake or weight loss, DASH randomly assigned 459 patients with BPs of less than 160/80 to 95 mmHg to one of three diets:  ?A control diet low in fruits, vegetables, and legumes and high in snacks, sweets, meats, and saturated fat.  ?A diet rich in fruits, vegetables, legumes and low in snacks and sweets.  ?A combination diet rich in fruits, vegetables, legumes, and low-fat dairy products and low in snacks, sweets, meats, and saturated and total fat (this combination diet is called the \"DASH diet\"). The DASH diet is comprised of four to five servings of fruit, four to five servings of vegetables, two to three servings of low-fat dairy per day, and <25 percent fat.  The following observations were noted in which the BP reductions were expressed in relation to the fall in BP seen with the control diet:  ?The fruits and vegetables diet reduced the BP by 2.8/1.1 mmHg, and the combination diet reduced the BP by 5.5/3.0.  ?These effects were more pronounced in patients with hypertension. With the combination diet, for example, the BP fell 11.4/5.5 mmHg in hypertensives versus 3.5/2.1 mmHg in the normotensives.  ?The antihypertensive effects were maximal by the end of week 2 with any of the diets and were then maintained for eight weeks.      Return in about 6 months (around 2/27/2021), or if symptoms worsen or fail to improve.    Leticia Howadr MD  [facility]    COUNSELING:  Reviewed preventive health counseling, as reflected in patient instructions       Regular exercise       Healthy diet/nutrition       Vision screening       Hearing screening       Dental care       Bladder control       Fall risk prevention       Osteoporosis Prevention/Bone Health       Colon cancer screening    Estimated body mass index is 27.46 kg/m  as calculated from the following:    Height as of 9/20/19: 1.626 m (5' 4\").    Weight as of 9/20/19: 72.6 kg (160 lb).    Weight " management plan: Discussed healthy diet and exercise guidelines    She reports that she has never smoked. She has never used smokeless tobacco.      Appropriate preventive services were discussed with this patient, including applicable screening as appropriate for cardiovascular disease, diabetes, osteopenia/osteoporosis, and glaucoma.  As appropriate for age/gender, discussed screening for colorectal cancer, prostate cancer, breast cancer, and cervical cancer. Checklist reviewing preventive services available has been given to the patient.    Reviewed patients plan of care and provided an AVS. The Basic Care Plan (routine screening as documented in Health Maintenance) for Emma meets the Care Plan requirement. This Care Plan has been established and reviewed with the Patient.    Counseling Resources:  ATP IV Guidelines  Pooled Cohorts Equation Calculator  Breast Cancer Risk Calculator  Breast Cancer: Medication to Reduce Risk  FRAX Risk Assessment  ICSI Preventive Guidelines  Dietary Guidelines for Americans, 2010  USDA's MyPlate  ASA Prophylaxis  Lung CA Screening    Leticia Howard MD  [facility]    Identified Health Risks:

## 2020-08-27 NOTE — PATIENT INSTRUCTIONS
"Started on new medication as needed for anxiety spells.     Please do the lab work ordered.    Elevated BP - sent in BP monitor to your pharamcy and please follow the diet below.     [employer] videos for WALK AT HOME - Loulou Haro walking videos      ==============================================    Dietary Approaches to Stop Hypertension trial -- A different approach was evaluated in the Dietary Approaches to Stop Hypertension (DASH) trial [6]. Rather than evaluating sodium intake or weight loss, DASH randomly assigned 459 patients with BPs of less than 160/80 to 95 mmHg to one of three diets:  ?A control diet low in fruits, vegetables, and legumes and high in snacks, sweets, meats, and saturated fat.  ?A diet rich in fruits, vegetables, legumes and low in snacks and sweets.  ?A combination diet rich in fruits, vegetables, legumes, and low-fat dairy products and low in snacks, sweets, meats, and saturated and total fat (this combination diet is called the \"DASH diet\"). The DASH diet is comprised of four to five servings of fruit, four to five servings of vegetables, two to three servings of low-fat dairy per day, and <25 percent fat.  The following observations were noted in which the BP reductions were expressed in relation to the fall in BP seen with the control diet:  ?The fruits and vegetables diet reduced the BP by 2.8/1.1 mmHg, and the combination diet reduced the BP by 5.5/3.0.  ?These effects were more pronounced in patients with hypertension. With the combination diet, for example, the BP fell 11.4/5.5 mmHg in hypertensives versus 3.5/2.1 mmHg in the normotensives.  ?The antihypertensive effects were maximal by the end of week 2 with any of the diets and were then maintained for eight weeks.    "

## 2020-08-27 NOTE — LETTER
August 28, 2020      Emma Rudolph  [address] [place] [id number]        Dear ,    We are writing to inform you of your test results.    One of your liver enzyme is mildly abnormal, will need follow up on it. Avoid alcohol , over the counter tylenol or Ibuprofen.     All your other labs are normal, there might be some highlighted which doesn't have any clinical significance.     Resulted Orders   Lipid panel reflex to direct LDL Fasting   Result Value Ref Range    Cholesterol 189 <200 mg/dL    Triglycerides 169 (H) <150 mg/dL      Comment:      Borderline high:  150-199 mg/dl  High:             200-499 mg/dl  Very high:       >499 mg/dl  Fasting specimen      HDL Cholesterol 62 >49 mg/dL    LDL Cholesterol Calculated 93 <100 mg/dL      Comment:      Desirable:       <100 mg/dl    Non HDL Cholesterol 127 <130 mg/dL   CBC with platelets   Result Value Ref Range    WBC 5.0 4.0 - 11.0 10e9/L    RBC Count 4.96 3.8 - 5.2 10e12/L    Hemoglobin 15.5 11.7 - 15.7 g/dL    Hematocrit 45.4 35.0 - 47.0 %    MCV 92 78 - 100 fl    MCH 31.3 26.5 - 33.0 pg    MCHC 34.1 31.5 - 36.5 g/dL    RDW 12.4 10.0 - 15.0 %    Platelet Count 291 150 - 450 10e9/L   Comprehensive metabolic panel   Result Value Ref Range    Sodium 138 133 - 144 mmol/L    Potassium 3.5 3.4 - 5.3 mmol/L    Chloride 106 94 - 109 mmol/L    Carbon Dioxide 26 20 - 32 mmol/L    Anion Gap 6 3 - 14 mmol/L    Glucose 110 (H) 70 - 99 mg/dL      Comment:      Fasting specimen    Urea Nitrogen 15 7 - 30 mg/dL    Creatinine 0.81 0.52 - 1.04 mg/dL    GFR Estimate 74 >60 mL/min/[1.73_m2]      Comment:      Non  GFR Calc  Starting 12/18/2018, serum creatinine based estimated GFR (eGFR) will be   calculated using the Chronic Kidney Disease Epidemiology Collaboration   (CKD-EPI) equation.      GFR Estimate If Black 85 >60 mL/min/[1.73_m2]      Comment:       GFR Calc  Starting 12/18/2018, serum creatinine based estimated GFR  (eGFR) will be   calculated using the Chronic Kidney Disease Epidemiology Collaboration   (CKD-EPI) equation.      Calcium 9.4 8.5 - 10.1 mg/dL    Bilirubin Total 1.3 0.2 - 1.3 mg/dL    Albumin 4.0 3.4 - 5.0 g/dL    Protein Total 8.5 6.8 - 8.8 g/dL    Alkaline Phosphatase 122 40 - 150 U/L    ALT 65 (H) 0 - 50 U/L    AST 35 0 - 45 U/L   Albumin Random Urine Quantitative with Creat Ratio   Result Value Ref Range    Creatinine Urine 269 mg/dL    Albumin Urine mg/L 25 mg/L    Albumin Urine mg/g Cr 9.37 0 - 25 mg/g Cr       If you have any questions or concerns, please call the clinic at the number listed above.     Sincerely,    Leticia Howard MD   Internal medicine physician   [facility]

## 2020-08-28 ENCOUNTER — TELEPHONE (OUTPATIENT)
Dept: FAMILY MEDICINE | Facility: CLINIC | Age: 70
End: 2020-08-28

## 2020-08-28 NOTE — TELEPHONE ENCOUNTER
Pt was called. Informed pt Rx for hydrOXYzine (ATARAX) 25 MG tablet is for anxiety spells. She asked how she should take- pt was given directions - take 1 tablet (25 mg) by mouth 3 times daily as needed. Ok for her to take PRN only?

## 2020-08-28 NOTE — TELEPHONE ENCOUNTER
Reason for Call:  Other call back    Detailed comments: The patient called and stated that she saw Dr. Howard on 8/27 and was prescribed hydrOXYzine (ATARAX) 25 MG tablet.  She doesn't remember discussing this with Dr. Howard so she would like a call back to discuss what this medication is for.     Phone Number Patient can be reached at: Home number on file [phone] (home)    Best Time: Any    Can we leave a detailed message on this number? YES    Call taken on 8/28/2020 at 10:19 AM by Shahana Esteban

## 2020-08-29 NOTE — TELEPHONE ENCOUNTER
Yes, that's correct.     Please remind the patient that she was asking for increase in dose of Pristiq her current antidepressant though having normal PHQ-9 scores. She was stating that she does have intermittent spells of anxiety for which this as needed medication started to take along with current Pristiq.    Thank you,  Leticia Howard MD on 8/29/2020 at 12:54 AM

## 2020-09-18 ENCOUNTER — HOSPITAL ENCOUNTER (OUTPATIENT)
Dept: MAMMOGRAPHY | Facility: CLINIC | Age: 70
Discharge: HOME | End: 2020-09-18
Attending: PHYSICIAN ASSISTANT | Admitting: PHYSICIAN ASSISTANT
Payer: COMMERCIAL

## 2020-09-18 DIAGNOSIS — Z12.31 VISIT FOR SCREENING MAMMOGRAM: ICD-10-CM

## 2020-09-18 PROCEDURE — 77067 SCR MAMMO BI INCL CAD: CPT

## 2020-09-24 ENCOUNTER — HOSPITAL ENCOUNTER (OUTPATIENT)
Dept: MAMMOGRAPHY | Facility: CLINIC | Age: 70
End: 2020-09-24
Attending: PHYSICIAN ASSISTANT
Payer: COMMERCIAL

## 2020-09-24 DIAGNOSIS — Z11.59 ENCOUNTER FOR SCREENING FOR OTHER VIRAL DISEASES: Primary | ICD-10-CM

## 2020-09-24 DIAGNOSIS — R92.8 ABNORMAL MAMMOGRAM: ICD-10-CM

## 2020-09-24 PROCEDURE — 76642 ULTRASOUND BREAST LIMITED: CPT | Mod: LT

## 2020-09-29 DIAGNOSIS — Z11.59 ENCOUNTER FOR SCREENING FOR OTHER VIRAL DISEASES: ICD-10-CM

## 2020-09-29 PROCEDURE — U0003 INFECTIOUS AGENT DETECTION BY NUCLEIC ACID (DNA OR RNA); SEVERE ACUTE RESPIRATORY SYNDROME CORONAVIRUS 2 (SARS-COV-2) (CORONAVIRUS DISEASE [COVID-19]), AMPLIFIED PROBE TECHNIQUE, MAKING USE OF HIGH THROUGHPUT TECHNOLOGIES AS DESCRIBED BY CMS-2020-01-R: HCPCS | Performed by: PHYSICIAN ASSISTANT

## 2020-09-30 ENCOUNTER — TRANSFERRED RECORDS (OUTPATIENT)
Dept: HEALTH INFORMATION MANAGEMENT | Facility: CLINIC | Age: 70
End: 2020-09-30

## 2020-09-30 LAB
SARS-COV-2 RNA SPEC QL NAA+PROBE: NOT DETECTED
SPECIMEN SOURCE: NORMAL

## 2020-10-02 ENCOUNTER — HOSPITAL ENCOUNTER (OUTPATIENT)
Dept: MAMMOGRAPHY | Facility: CLINIC | Age: 70
End: 2020-10-02
Attending: PHYSICIAN ASSISTANT
Payer: COMMERCIAL

## 2020-10-02 DIAGNOSIS — R92.8 ABNORMAL MAMMOGRAM: ICD-10-CM

## 2020-10-02 PROCEDURE — 999N000065 MA POST PROCEDURE LEFT

## 2020-10-02 PROCEDURE — 88305 TISSUE EXAM BY PATHOLOGIST: CPT | Mod: TC | Performed by: RADIOLOGY

## 2020-10-02 PROCEDURE — 250N000009 HC RX 250: Performed by: PHYSICIAN ASSISTANT

## 2020-10-02 PROCEDURE — 272N000031 US BREAST BIOPSY CORE NEEDLE LEFT

## 2020-10-02 PROCEDURE — 88305 TISSUE EXAM BY PATHOLOGIST: CPT | Mod: 26 | Performed by: PATHOLOGY

## 2020-10-02 PROCEDURE — 19084 BX BREAST ADD LESION US IMAG: CPT | Mod: LT

## 2020-10-02 RX ADMIN — LIDOCAINE HYDROCHLORIDE 10 ML: 10 INJECTION, SOLUTION INFILTRATION; PERINEURAL at 09:27

## 2020-10-02 NOTE — DISCHARGE INSTRUCTIONS

## 2020-10-05 ENCOUNTER — TELEPHONE (OUTPATIENT)
Dept: MAMMOGRAPHY | Facility: CLINIC | Age: 70
End: 2020-10-05

## 2020-10-05 LAB — COPATH REPORT: NORMAL

## 2020-10-05 NOTE — TELEPHONE ENCOUNTER
Pathology report reviewed with our breast radiologist Dr. Thomas Kirkpatrick, who confirmed the recent breast imaging is concordant with the final surgical pathology results below.    Ultrasound Guided Left Breast Biopsy @ 12:00 shows Complex Sclerosing Lesion, negative for atypia and malignancy.    Ultrasound Guided Left Breast Biopsy @ 11:00 shows Benign Breast tissue,  Negative for atypia or malignancy.    Recommended follow up is Surgical Consultation for the 12:00 biopsy site showing Complex Sclerosing Lesion.    I called patient this afternoon and left her a voicemail message asking if she could return my call to inform of biopsy results, recommendations for follow up and to assist her in getting surgical consultation arranged.  Char Larkin, RN, BSN    Char Larkin RN, BSN  Breast Care Nurse Coordinator  [facility] [place]  [phone]          Patient Name: CAMRON HUGHES   MR#: [id number]   Specimen #: [id number]   Collected: 10/2/2020   Received: 10/2/2020   Reported: 10/5/2020 14:47   Ordering Phy(s): THOMAS KIRKPATRICK   Additional Phy(s): ANU EDWARDS     SPECIMEN(S):   A: Left ultrasound guided breast needle biopsy, 12:00, 3.0 cm from nipple,    1.0 c size   B: Left ultrasound guided breast needle biopsy, 11:00, 3.0 cm from nipple,    1.1 cm size     FINAL DIAGNOSIS:   A: Left breast, 12:00, 3 cm FN, ultrasound-guided core biopsy   - Complex sclerosing lesion   - Negative for atypia and malignancy     B: Left breast, 11:00, 3 cm FN, ultrasound-guided core biopsy   - Benign breast tissue with adenosis   - Negative for atypia and malignancy     COMMENT:   Sections from the biopsy at 12:00 (part A) demonstrates a complex   sclerosing lesion.     Sections from the biopsy at 11:00 (part B) demonstrate benign breast   tissue with focal adenosis. The findings   may not explain the mass forming lesion seen on imaging. Correlation  with   clinical and radiographic findings   is suggested.     Judy Alonso, Lala, Arthur and Robert have reviewed this case and   concur.     Electronically signed out by:     Stacey Wells M.D.

## 2020-10-06 NOTE — PROGRESS NOTES
Pathology report reviewed with our breast radiologist Dr. Thomas Kirkpatrick, who confirmed the recent breast imaging is concordant with the final surgical pathology results below.     Ultrasound Guided Left Breast Biopsy @ 12:00 shows Complex Sclerosing Lesion, negative for atypia and malignancy.    Ultrasound Guided Left Breast Biopsy @ 11:00 shows Benign Breast tissue,  Negative for atypia or malignancy.     Recommended follow up is Surgical Consultation for the 12:00 biopsy site showing Complex Sclerosing Lesion.  Surgical Consult has been arranged with Dr Eileen Montes on 10/9/20 at 12:45p.m., with a check in time of 12:30p.m. at the [facility].   Patient has directions and phone numbers.    Questions were answered and he has my phone number if she has further questions.  Patient verbalized understanding and agrees with the plan of care.  Ordering provider- Dr. Angelina Puckett has been notified of the results, recommendations for follow up, and scheduled surgical consultation.  I will forward this note, along with the pathology results.   Char Larkin RN, BSN    Char Larkin RN, BSN  Breast Care Nurse Coordinator  [facility] [place]  [phone]        Patient Name: CAMRON HUGHES   MR#: [id number]   Specimen #: [id number]   Collected: 10/2/2020   Received: 10/2/2020   Reported: 10/5/2020 14:47   Ordering Phy(s): THOMAS KIRKPATRICK   Additional Phy(s): ANGELINA PUCKETT     SPECIMEN(S):   A: Left ultrasound guided breast needle biopsy, 12:00, 3.0 cm from nipple,    1.0 c size   B: Left ultrasound guided breast needle biopsy, 11:00, 3.0 cm from nipple,    1.1 cm size     FINAL DIAGNOSIS:   A: Left breast, 12:00, 3 cm FN, ultrasound-guided core biopsy   - Complex sclerosing lesion   - Negative for atypia and malignancy     B: Left breast, 11:00, 3 cm FN, ultrasound-guided core biopsy   - Benign  breast tissue with adenosis   - Negative for atypia and malignancy     COMMENT:   Sections from the biopsy at 12:00 (part A) demonstrates a complex   sclerosing lesion.     Sections from the biopsy at 11:00 (part B) demonstrate benign breast   tissue with focal adenosis. The findings   may not explain the mass forming lesion seen on imaging. Correlation with   clinical and radiographic findings   is suggested.     Judy Alonso, Lala, Arthur and Robetr have reviewed this case and   concur.     Electronically signed out by:     Stacey Wells M.D.

## 2020-10-06 NOTE — TELEPHONE ENCOUNTER
I spoke with patient this morning and informed her of biopsy results, recommendations for follow up and assisted her in getting surgical consultation arranged.     See Progress Notes 10/2/20.  Char Larkin, RN, BSN

## 2020-10-09 ENCOUNTER — OFFICE VISIT (OUTPATIENT)
Dept: SURGERY | Facility: CLINIC | Age: 70
End: 2020-10-09
Payer: COMMERCIAL

## 2020-10-09 ENCOUNTER — TELEPHONE (OUTPATIENT)
Dept: SURGERY | Facility: CLINIC | Age: 70
End: 2020-10-09

## 2020-10-09 VITALS
BODY MASS INDEX: 27.31 KG/M2 | RESPIRATION RATE: 16 BRPM | DIASTOLIC BLOOD PRESSURE: 72 MMHG | WEIGHT: 160 LBS | HEIGHT: 64 IN | OXYGEN SATURATION: 98 % | SYSTOLIC BLOOD PRESSURE: 134 MMHG | HEART RATE: 96 BPM

## 2020-10-09 DIAGNOSIS — N63.20 BREAST MASS, LEFT: Primary | ICD-10-CM

## 2020-10-09 DIAGNOSIS — Z11.59 ENCOUNTER FOR SCREENING FOR OTHER VIRAL DISEASES: Primary | ICD-10-CM

## 2020-10-09 PROCEDURE — 99204 OFFICE O/P NEW MOD 45 MIN: CPT | Performed by: SURGERY

## 2020-10-09 ASSESSMENT — MIFFLIN-ST. JEOR: SCORE: 1235.76

## 2020-10-09 NOTE — LETTER
[facility]    [address], [address]  [place], [place] [address]  Phone [phone]  Fax [phone] [address], [address]  [place] [address]  Phone [phone]  Fax [phone]    [email or web address]     2020    Re: Emma Rudolph  : 1950      [facility] Consultation   CHIEF COMPLAINT:        Chief Complaint   Patient presents with     Consult     Left breast complex sclerosing lesion   HISTORY OF PRESENT ILLNESS: Emma Rudolph is a 69 year old female who is seen in consultation at the request of Dr. Puckett for evaluation of left breast complex sclerosing lesion. The patient was undergoing her annual screening mammogram in 2020 when she was noted to have possible left breast retroareolar asymmetry. She then underwent left breast ultrasound, which demonstrated a 1.1 cm lesion at the 11 o'clock position of the retroareolar left breast and a 1 cm lesion at the 12 o'clock position of the retroareolar left breast. Biopsies of both lesions were performed. The 11:00 lesion demonstrated adenosis. The 12:00 lesion demonstrated complex sclerosing lesion. The patient reports that she has not noticed any breast masses, breast pain, or nipple discharge bilaterally. This was her first breast biopsy. Patient's family history is significant for breast cancer in the patient's mother, diagnosed when she was in her 80s. The patient began menarche around the age of 13-14. She has never been pregnant. She has not taken any hormone replacement therapy or oral contraceptives.   REVIEW OF SYSTEMS:   Constitutional: No fevers or chills   Eyes: No blurred or double vision   HENT: Denies headaches, No rhinorrhea, No sore throat   Respiratory: No cough or shortness of breath   Cardiovascular: Denies chest pain or palpitations   Gastrointestinal: No abdominal pain or  nausea/vomiting   Genitourinary: No hematuria or dysuria   Musculoskeletal: Denies arthralgias or myalgias   Neurologic: No numbness or tingling   Integumentary: No skin rashes   Past Medical History        Past Medical History:   Diagnosis Date     Generalized anxiety disorder      Hyperlipidemia LDL goal <130      Mild major depression (H)      Vitamin D deficiency disease      Past Surgical History         Past Surgical History:   Procedure Laterality Date     COLONOSCOPY       EYE SURGERY       H ARGON LASER FOR RETINAL TEAR Left 3/2014     HEAD & NECK SURGERY      wisdom teeth     LAPAROSCOPIC SINGLE SITE SALPINGO-OOPHORECTOMY Bilateral 4/15/2015    Procedure: LAPAROSCOPIC SINGLE SITE SALPINGO-OOPHORECTOMY; Surgeon: Leonora Zavaleta MD; Location: [facility]     wisdom teeth removal  age 19     Family History         Family History   Problem Relation Age of Onset     Breast Cancer Mother 80     Neurologic Disorder Father     Parkinson's     Coronary Artery Disease Father 44    MI     Lipids Brother      Lipids Brother      Family History Negative Brother      Lipids Sister      Family History Negative Sister      Family History Negative Sister      Family History Negative Sister      Cerebrovascular Disease Maternal Grandmother 86     Cancer Maternal Grandfather 69    leukemia     Cancer Paternal Grandfather 69    stomach     Diabetes No family hx of      Cancer - colorectal No family hx of      Hypertension No family hx of      Hyperlipidemia No family hx of      Colon Cancer No family hx of      Prostate Cancer No family hx of      Other Cancer No family hx of      Depression No family hx of      Anxiety Disorder No family hx of      Mental Illness No family hx of      Substance Abuse No family hx of      Anesthesia Reaction No family hx of      Asthma No family hx of      Osteoporosis No family hx of      Genetic Disorder No family hx of      Thyroid Disease No family hx of      Obesity No family hx of       "Unknown/Adopted No family hx of      Social History           Tobacco Use     Smoking status: Never Smoker     Smokeless tobacco: Never Used   Substance Use Topics     Alcohol use: Yes     Alcohol/week: 0.0 standard drinks     Comment: socially         Patient Active Problem List   Diagnosis     Generalized anxiety disorder     Mixed hyperlipidemia     CKD (chronic kidney disease) stage 2, GFR 60-89 ml/min     ACP (advance care planning)     Major depression in complete remission (H) on meds since 6-12-13     Osteoarthritis of multiple joints, unspecified osteoarthritis type of #2&3 fingers bilat R>L     Family history of ischemic heart disease     Memory loss     Primary insomnia     Hypertension, unspecified type           Allergies   Allergen Reactions     Penicillins Swelling, Rash and Hives     Amoxicillin     Sulfa Drugs Hives     Trazodone Fatigue     Really tired     Current Outpatient Prescriptions          Current Outpatient Medications   Medication Sig Dispense Refill     ASPIRIN PO Take 81 mg by mouth daily       atorvastatin (LIPITOR) 20 MG tablet Take 1 tablet (20 mg) by mouth daily 90 tablet 3     Blood Pressure KIT Please check your BP daily. 1 kit 0     Cholecalciferol (VITAMIN D) 1000 UNITS capsule Take 1 capsule by mouth daily.       desvenlafaxine (PRISTIQ) 100 MG 24 hr tablet Take 1 tablet (100 mg) by mouth daily 90 tablet 1     FLUZONE HIGH-DOSE 0.5 ML injection ADM 0.5ML IM UTD  0     Multiple Vitamins-Minerals (MULTIVITAMIN OR) Take by mouth daily       SHINGRIX injection ADM 0.5ML IM UTD  0   Vitals: /72 (BP Location: Left arm, Patient Position: Sitting, Cuff Size: Adult Regular)  Pulse 96  Resp 16  Ht 1.626 m (5' 4\")  Wt 72.6 kg (160 lb)  LMP (LMP Unknown)  SpO2 98%  BMI 27.46 kg/m    BMI= Body mass index is 27.46 kg/m .   EXAM:   General: Vital signs reviewed, in no apparent distress   Eyes: Anicteric   HENT: Normocephalic, atraumatic, trachea midline   Respiratory: Breathing " nonlabored   Cardiovascular: Regular rate and rhythm   Breast: No palpable breast masses bilaterally   Lymph: No palpable axillary lymphadenopathy bilaterally   Musculoskeletal: No gross deformities   Neurologic: Grossly nonfocal exam   Psychiatric: Normal mood, affect and insight   Integumentary: Warm and dry   All labs and imaging personally reviewed and significant for:   Bilateral digital screening mammography with computer   aided detection including digital breast tomosynthesis, 9/18/2020   11:50 AM.   BREAST DENSITY: Scattered fibroglandular densities.   COMMENTS: Possible developing architectural distortion in the LEFT   breast at 11:00-1:00 retroareolar mid depth. No concerning findings   in the right breast.   ULTRASOUND BREAST LEFT LIMITED 1-3 QUADRANTS September 24, 2020 1:25   PM   HISTORY: Recalled from screening mammography for evaluation of   possible architectural distortion in the left breast.   COMPARISON: 9/18/2020.   FINDINGS: Ultrasound of the upper left breast was performed by the   sonographer and radiologist. At the 11 o'clock position 3 cm from the   nipple there is an oval hypoechoic mass measuring 1.1 cm. At the 12   o'clock position 3 cm from the nipple there is an oval hypoechoic mass   measuring 1 cm. There appears to be normal-appearing parenchyma in   between the two lesions but if they are measured together the total   extent is 3.4 cm. Ultrasound-guided core needle biopsy is recommended.   Survey of the left axilla was performed. No enlarged lymph nodes are   seen.   IMPRESSION: BI-RADS CATEGORY: 4 - Suspicious Abnormality-Biopsy Should   Be Considered.   RECOMMENDED FOLLOW-UP: Biopsy.   Recommend ultrasound-guided core biopsy of the abnormalities at the 11   o'clock and 12 o'clock positions.   FINAL DIAGNOSIS:   A: Left breast, 12:00, 3 cm FN, ultrasound-guided core biopsy   - Complex sclerosing lesion   - Negative for atypia and malignancy     B: Left breast, 11:00, 3 cm FN,  ultrasound-guided core biopsy   - Benign breast tissue with adenosis   - Negative for atypia and malignancy   ASSESSMENT:   Emma Rudolph is a 69 year old who presents with left breast complex sclerosing lesion. Significant pertinent co-morbidities include: None.   PLAN:   Following a thorough discussion with the patient, the decision was made to proceed to the operating room for left breast excisional biopsy. The risks and benefits of the procedure were discussed with the patient. We will plan to radioactive seed localized both the 11:00 and 12:00 previously biopsied left breast retroareolar lesions. Surgery will be scheduled at the patient's earliest convenience.   It was my pleasure to participate in the care of Emma Rudolph in clinic today. Thank you for this consultation.   Eileen Montes MD

## 2020-10-09 NOTE — PROGRESS NOTES
[facility] General Surgery Clinic Consultation    CHIEF COMPLAINT:  Chief Complaint   Patient presents with     Consult     Left breast complex sclerosing lesion       HISTORY OF PRESENT ILLNESS:  Emma Rudolph is a 69 year old female who is seen in consultation at the request of Dr. Puckett for evaluation of left breast complex sclerosing lesion.  The patient was undergoing her annual screening mammogram in September 2020 when she was noted to have possible left breast retroareolar asymmetry.  She then underwent left breast ultrasound, which demonstrated a 1.1 cm lesion at the 11 o'clock position of the retroareolar left breast and a 1 cm lesion at the 12 o'clock position of the retroareolar left breast.  Biopsies of both lesions were performed.  The 11:00 lesion demonstrated adenosis.  The 12:00 lesion demonstrated complex sclerosing lesion.  The patient reports that she has not noticed any breast masses, breast pain, or nipple discharge bilaterally.  This was her first breast biopsy.  Patient's family history is significant for breast cancer in the patient's mother, diagnosed when she was in her 80s.  The patient began menarche around the age of 13-14.  She has never been pregnant.  She has not taken any hormone replacement therapy or oral contraceptives.    REVIEW OF SYSTEMS:  Constitutional: No fevers or chills  Eyes: No blurred or double vision  HENT: Denies headaches, No rhinorrhea, No sore throat  Respiratory: No cough or shortness of breath  Cardiovascular: Denies chest pain or palpitations  Gastrointestinal: No abdominal pain or nausea/vomiting  Genitourinary: No hematuria or dysuria  Musculoskeletal: Denies arthralgias or myalgias  Neurologic: No numbness or tingling  Integumentary: No skin rashes    Past Medical History:   Diagnosis Date     Generalized anxiety disorder      Hyperlipidemia LDL goal <130      Mild major depression (H)      Vitamin D deficiency disease        Past Surgical History:    Procedure Laterality Date     COLONOSCOPY       EYE SURGERY       H ARGON LASER FOR RETINAL TEAR Left 3/2014     HEAD & NECK SURGERY      wisdom teeth     LAPAROSCOPIC SINGLE SITE SALPINGO-OOPHORECTOMY Bilateral 4/15/2015    Procedure: LAPAROSCOPIC SINGLE SITE SALPINGO-OOPHORECTOMY;  Surgeon: Leonora Zavaleta MD;  Location:  [facility]     wisdom teeth removal  age 19       Family History   Problem Relation Age of Onset     Breast Cancer Mother 80     Neurologic Disorder Father         Parkinson's     Coronary Artery Disease Father 44        MI     Lipids Brother      Lipids Brother      Family History Negative Brother      Lipids Sister      Family History Negative Sister      Family History Negative Sister      Family History Negative Sister      Cerebrovascular Disease Maternal Grandmother 86     Cancer Maternal Grandfather 69        leukemia     Cancer Paternal Grandfather 69        stomach     Diabetes No family hx of      Cancer - colorectal No family hx of      Hypertension No family hx of      Hyperlipidemia No family hx of      Colon Cancer No family hx of      Prostate Cancer No family hx of      Other Cancer No family hx of      Depression No family hx of      Anxiety Disorder No family hx of      Mental Illness No family hx of      Substance Abuse No family hx of      Anesthesia Reaction No family hx of      Asthma No family hx of      Osteoporosis No family hx of      Genetic Disorder No family hx of      Thyroid Disease No family hx of      Obesity No family hx of      Unknown/Adopted No family hx of        Social History     Tobacco Use     Smoking status: Never Smoker     Smokeless tobacco: Never Used   Substance Use Topics     Alcohol use: Yes     Alcohol/week: 0.0 standard drinks     Comment: socially       Patient Active Problem List   Diagnosis     Generalized anxiety disorder     Mixed hyperlipidemia     CKD (chronic kidney disease) stage 2, GFR 60-89 ml/min     ACP (advance care planning)     Major  "depression in complete remission (H) on meds since 6-12-13     Osteoarthritis of multiple joints, unspecified osteoarthritis type of #2&3 fingers bilat R>L     Family history of ischemic heart disease     Memory loss     Primary insomnia     Hypertension, unspecified type       Allergies   Allergen Reactions     Penicillins Swelling, Rash and Hives     Amoxicillin     Sulfa Drugs Hives     Trazodone Fatigue     Really tired       Current Outpatient Medications   Medication Sig Dispense Refill     ASPIRIN PO Take 81 mg by mouth daily       atorvastatin (LIPITOR) 20 MG tablet Take 1 tablet (20 mg) by mouth daily 90 tablet 3     Blood Pressure KIT Please check your BP daily. 1 kit 0     Cholecalciferol (VITAMIN D) 1000 UNITS capsule Take 1 capsule by mouth daily.       desvenlafaxine (PRISTIQ) 100 MG 24 hr tablet Take 1 tablet (100 mg) by mouth daily 90 tablet 1     FLUZONE HIGH-DOSE 0.5 ML injection ADM 0.5ML IM UTD  0     Multiple Vitamins-Minerals (MULTIVITAMIN OR) Take by mouth daily       SHINGRIX injection ADM 0.5ML IM UTD  0       Vitals: /72 (BP Location: Left arm, Patient Position: Sitting, Cuff Size: Adult Regular)   Pulse 96   Resp 16   Ht 1.626 m (5' 4\")   Wt 72.6 kg (160 lb)   LMP  (LMP Unknown)   SpO2 98%   BMI 27.46 kg/m    BMI= Body mass index is 27.46 kg/m .    EXAM:  General: Vital signs reviewed, in no apparent distress  Eyes: Anicteric  HENT: Normocephalic, atraumatic, trachea midline   Respiratory: Breathing nonlabored  Cardiovascular: Regular rate and rhythm  Breast: No palpable breast masses bilaterally  Lymph: No palpable axillary lymphadenopathy bilaterally  Musculoskeletal: No gross deformities  Neurologic: Grossly nonfocal exam  Psychiatric: Normal mood, affect and insight  Integumentary: Warm and dry    All labs and imaging personally reviewed and significant for:   Bilateral digital screening mammography with computer  aided detection including digital breast tomosynthesis, " 9/18/2020  11:50 AM.     BREAST DENSITY: Scattered fibroglandular densities.     COMMENTS: Possible developing architectural distortion in the LEFT  breast at 11:00-1:00 retroareolar mid depth.  No concerning findings  in the right breast.      ULTRASOUND BREAST LEFT LIMITED 1-3 QUADRANTS September 24, 2020 1:25  PM      HISTORY: Recalled from screening mammography for evaluation of  possible architectural distortion in the left breast.      COMPARISON: 9/18/2020.      FINDINGS: Ultrasound of the upper left breast was performed by the  sonographer and radiologist. At the 11 o'clock position 3 cm from the  nipple there is an oval hypoechoic mass measuring 1.1 cm. At the 12  o'clock position 3 cm from the nipple there is an oval hypoechoic mass  measuring 1 cm. There appears to be normal-appearing parenchyma in  between the two lesions but if they are measured together the total  extent is 3.4 cm. Ultrasound-guided core needle biopsy is recommended.     Survey of the left axilla was performed. No enlarged lymph nodes are  seen.                                                                       IMPRESSION: BI-RADS CATEGORY: 4 - Suspicious Abnormality-Biopsy Should  Be Considered.     RECOMMENDED FOLLOW-UP: Biopsy.     Recommend ultrasound-guided core biopsy of the abnormalities at the 11  o'clock and 12 o'clock positions.     FINAL DIAGNOSIS:   A: Left breast, 12:00, 3 cm FN, ultrasound-guided core biopsy   - Complex sclerosing lesion   - Negative for atypia and malignancy     B: Left breast, 11:00, 3 cm FN, ultrasound-guided core biopsy   - Benign breast tissue with adenosis   - Negative for atypia and malignancy     ASSESSMENT:  Emma Rudolph is a 69 year old who presents with left breast complex sclerosing lesion.  Significant pertinent co-morbidities include: None.       PLAN:  Following a thorough discussion with the patient, the decision was made to proceed to the operating room for left breast  excisional biopsy.  The risks and benefits of the procedure were discussed with the patient.  We will plan to radioactive seed localized both the 11:00 and 12:00 previously biopsied left breast retroareolar lesions.  Surgery will be scheduled at the patient's earliest convenience.    It was my pleasure to participate in the care of Emma Rudolph in clinic today. Thank you for this consultation.         Eileen Montes MD    Please route or send letter to:  Primary Care Provider (PCP) and Referring Provider

## 2020-10-09 NOTE — TELEPHONE ENCOUNTER
Type of surgery: 2 seed loc left breast biopsy  Location of surgery: [facility]  Date and time of surgery: 10/28/20 12:50pm  Surgeon: Dr Pandey  Pre-Op Appt Date: pt to schedule  Post-Op Appt Date: pt to schedule   Packet sent out: Yes  Pre-cert/Authorization completed:  Not Applicable  Date: 10/9/20    General anesthesia

## 2020-10-26 DIAGNOSIS — Z11.59 ENCOUNTER FOR SCREENING FOR OTHER VIRAL DISEASES: ICD-10-CM

## 2020-10-26 PROCEDURE — U0003 INFECTIOUS AGENT DETECTION BY NUCLEIC ACID (DNA OR RNA); SEVERE ACUTE RESPIRATORY SYNDROME CORONAVIRUS 2 (SARS-COV-2) (CORONAVIRUS DISEASE [COVID-19]), AMPLIFIED PROBE TECHNIQUE, MAKING USE OF HIGH THROUGHPUT TECHNOLOGIES AS DESCRIBED BY CMS-2020-01-R: HCPCS | Performed by: SURGERY

## 2020-10-27 ENCOUNTER — ANESTHESIA EVENT (OUTPATIENT)
Dept: SURGERY | Facility: CLINIC | Age: 70
End: 2020-10-27
Payer: COMMERCIAL

## 2020-10-27 ENCOUNTER — OFFICE VISIT (OUTPATIENT)
Dept: FAMILY MEDICINE | Facility: CLINIC | Age: 70
End: 2020-10-27
Payer: COMMERCIAL

## 2020-10-27 VITALS
DIASTOLIC BLOOD PRESSURE: 64 MMHG | HEIGHT: 64 IN | OXYGEN SATURATION: 97 % | SYSTOLIC BLOOD PRESSURE: 136 MMHG | RESPIRATION RATE: 14 BRPM | WEIGHT: 163 LBS | BODY MASS INDEX: 27.83 KG/M2 | TEMPERATURE: 98.7 F | HEART RATE: 89 BPM

## 2020-10-27 DIAGNOSIS — Z01.818 PREOP GENERAL PHYSICAL EXAM: Primary | ICD-10-CM

## 2020-10-27 DIAGNOSIS — N63.20 BREAST MASS, LEFT: ICD-10-CM

## 2020-10-27 LAB
SARS-COV-2 RNA SPEC QL NAA+PROBE: NOT DETECTED
SPECIMEN SOURCE: NORMAL

## 2020-10-27 PROCEDURE — 99214 OFFICE O/P EST MOD 30 MIN: CPT | Performed by: INTERNAL MEDICINE

## 2020-10-27 RX ORDER — HYDROXYZINE HYDROCHLORIDE 25 MG/1
TABLET, FILM COATED ORAL
COMMUNITY
Start: 2020-08-27 | End: 2021-09-17

## 2020-10-27 ASSESSMENT — MIFFLIN-ST. JEOR: SCORE: 1249.36

## 2020-10-27 NOTE — ASSESSMENT & PLAN NOTE
Last seen pt for AWV in 8/2020 and pt had screening mammogram in 9/2020 with localized breast abnormality and Pathology of the last Biopsy done is inconclusive. As per Surgery note reviewed , plans of left breast excisional biopsy and 2 seed localization on the left breast under general anaesthesia.

## 2020-10-27 NOTE — PROGRESS NOTES
[facility]  [address]  [facility] [address]  Phone: [phone]  Fax: [phone]  Primary Provider: Angelina Puckett  Pre-op Performing Provider: TANYA NOGUEIRA    PREOPERATIVE EVALUATION:  Today's date: 10/27/2020    Emma Rudolph is a 69 year old female who presents for a preoperative evaluation.    Surgical Information:  Surgery/Procedure: 2 SEED LOCALIZED LEFT BREAST BIOPSY  [facility]  Surgeon: Dr. Eileen Montes  Surgery Date: 10/28/2020  Time of Surgery: 12:50  Where patient plans to recover: At home with family  Fax number for surgical facility: Note does not need to be faxed, will be available electronically in Epic.    Type of Anesthesia Anticipated  Monitor Anesthesia Care    Subjective     HPI related to upcoming procedure:     Preop general physical exam  Last seen pt for AWV in 8/2020 and pt had screening mammogram in 9/2020 with localized breast abnormality and Pathology of the last Biopsy done is inconclusive. As per Surgery note reviewed , plans of left breast excisional biopsy and 2 seed localization on the left breast under general anaesthesia.      Preop Questions 10/27/2020   1. Have you ever had a heart attack or stroke? No   2. Have you ever had surgery on your heart or blood vessels, such as a stent placement, a coronary artery bypass, or surgery on an artery in your head, neck, heart, or legs? No   3. Do you have chest pain with activity? No   4. Do you have a history of  heart failure? No   5. Do you currently have a cold, bronchitis or symptoms of other infection? No   6. Do you have a cough, shortness of breath, or wheezing? No   7. Do you or anyone in your family have previous history of blood clots? No   8. Do you or does anyone in your family have a serious bleeding problem such as prolonged bleeding following surgeries or cuts? No   9. Have you ever had problems with anemia or been  told to take iron pills? No   10. Have you had any abnormal blood loss such as black, tarry or bloody stools, or abnormal vaginal bleeding? No   11. Have you ever had a blood transfusion? No   12. Are you willing to have a blood transfusion if it is medically needed before, during, or after your surgery? Yes   13. Have you or any of your relatives ever had problems with anesthesia? No   14. Do you have sleep apnea, excessive snoring or daytime drowsiness? No   15. Do you have any artifical heart valves or other implanted medical devices like a pacemaker, defibrillator, or continuous glucose monitor? No   16. Do you have artificial joints? No   17. Are you allergic to latex? No   18. Is there any chance that you may be pregnant? -       Health Care Directive:  Patient does not have a Health Care Directive or Living Will:N/A    Preoperative Review of :   reviewed - No concerns    Status of Chronic Conditions:  See problem list for active medical problems.  Problems all longstanding and stable, except as noted/documented.  See ROS for pertinent symptoms related to these conditions.      Review of Systems  CONSTITUTIONAL: NEGATIVE for fever, chills, change in weight  INTEGUMENTARY/SKIN: NEGATIVE for worrisome rashes, moles or lesions  EYES: NEGATIVE for vision changes or irritation  ENT/MOUTH: NEGATIVE for ear, mouth and throat problems  RESP: NEGATIVE for significant cough or SOB  BREAST: NEGATIVE for masses, tenderness or discharge  CV: NEGATIVE for chest pain, palpitations or peripheral edema  GI: NEGATIVE for nausea, abdominal pain, heartburn, or change in bowel habits  : NEGATIVE for frequency, dysuria, or hematuria  MUSCULOSKELETAL: NEGATIVE for significant arthralgias or myalgia  NEURO: NEGATIVE for weakness, dizziness or paresthesias  ENDOCRINE: NEGATIVE for temperature intolerance, skin/hair changes  HEME: NEGATIVE for bleeding problems  PSYCHIATRIC: NEGATIVE for changes in mood or affect    Patient  Active Problem List    Diagnosis Date Noted     Preop general physical exam 10/27/2020     Priority: Medium     Breast mass, left 10/09/2020     Priority: Medium     Added automatically from request for surgery [id number]       Hypertension, unspecified type 06/28/2019     Priority: Medium     Primary insomnia 11/28/2016     Priority: Medium     Family history of ischemic heart disease 11/22/2016     Priority: Medium     Memory loss 11/22/2016     Priority: Medium     Major depression in complete remission (H) on meds since 6-12-13 06/14/2016     Priority: Medium     Osteoarthritis of multiple joints, unspecified osteoarthritis type of #2&3 fingers bilat R>L 06/14/2016     Priority: Medium     Mixed hyperlipidemia 05/31/2016     Priority: Medium     CKD (chronic kidney disease) stage 2, GFR 60-89 ml/min 05/31/2016     Priority: Medium     ACP (advance care planning) 05/31/2016     Priority: Medium     Advance Care Planning 5/31/2016: ACP Review of Chart / Resources Provided:  Reviewed chart for advance care plan.  Emma RANKIN Ronni has no plan or code status on file however states presence of ACP document. Copy requested.   Added by Esperanza Blank             Generalized anxiety disorder 05/13/2013     Priority: Medium      Past Medical History:   Diagnosis Date     Breast lesion     LEFT     Generalized anxiety disorder      Hyperlipidemia LDL goal <130      Mild major depression (H)      Vitamin D deficiency disease      Past Surgical History:   Procedure Laterality Date     COLONOSCOPY       EYE SURGERY       H ARGON LASER FOR RETINAL TEAR Left 3/2014     HEAD & NECK SURGERY      wisdom teeth     LAPAROSCOPIC SINGLE SITE SALPINGO-OOPHORECTOMY Bilateral 4/15/2015    Procedure: LAPAROSCOPIC SINGLE SITE SALPINGO-OOPHORECTOMY;  Surgeon: Leonora Zavaleta MD;  Location: [facility]     wisdom teeth removal  age 19     Current Outpatient Medications   Medication Sig Dispense Refill     ASPIRIN PO Take 81 mg by mouth daily        atorvastatin (LIPITOR) 20 MG tablet Take 1 tablet (20 mg) by mouth daily 90 tablet 3     Blood Pressure KIT Please check your BP daily. 1 kit 0     Cholecalciferol (VITAMIN D) 1000 UNITS capsule Take 1 capsule by mouth daily.       desvenlafaxine (PRISTIQ) 100 MG 24 hr tablet Take 1 tablet (100 mg) by mouth daily 90 tablet 1     FLUZONE HIGH-DOSE 0.5 ML injection ADM 0.5ML IM UTD  0     Multiple Vitamins-Minerals (MULTIVITAMIN OR) Take by mouth daily       SHINGRIX injection ADM 0.5ML IM UTD  0     hydrOXYzine (ATARAX) 25 MG tablet          Allergies   Allergen Reactions     Penicillins Swelling, Rash and Hives     Amoxicillin     Sulfa Drugs Hives     Trazodone Fatigue     Really tired        Social History     Tobacco Use     Smoking status: Never Smoker     Smokeless tobacco: Never Used   Substance Use Topics     Alcohol use: Yes     Alcohol/week: 0.0 standard drinks     Comment: socially     Family History   Problem Relation Age of Onset     Breast Cancer Mother 80     Neurologic Disorder Father         Parkinson's     Coronary Artery Disease Father 44        MI     Lipids Brother      Lipids Brother      Family History Negative Brother      Lipids Sister      Family History Negative Sister      Family History Negative Sister      Family History Negative Sister      Cerebrovascular Disease Maternal Grandmother 86     Cancer Maternal Grandfather 69        leukemia     Cancer Paternal Grandfather 69        stomach     Diabetes No family hx of      Cancer - colorectal No family hx of      Hypertension No family hx of      Hyperlipidemia No family hx of      Colon Cancer No family hx of      Prostate Cancer No family hx of      Other Cancer No family hx of      Depression No family hx of      Anxiety Disorder No family hx of      Mental Illness No family hx of      Substance Abuse No family hx of      Anesthesia Reaction No family hx of      Asthma No family hx of      Osteoporosis No family hx of      Genetic  "Disorder No family hx of      Thyroid Disease No family hx of      Obesity No family hx of      Unknown/Adopted No family hx of      History   Drug Use No         Objective     /64 (BP Location: Left arm, Patient Position: Sitting, Cuff Size: Adult Regular)   Pulse 89   Temp 98.7  F (37.1  C) (Tympanic)   Resp 14   Ht 1.626 m (5' 4\")   Wt 73.9 kg (163 lb)   LMP  (LMP Unknown)   SpO2 97%   BMI 27.98 kg/m      Physical Exam    GENERAL APPEARANCE: healthy, alert and no distress     EYES: EOMI, PERRL     HENT: ear canals and TM's normal and nose and mouth without ulcers or lesions     NECK: no adenopathy, no asymmetry, masses, or scars and thyroid normal to palpation     RESP: lungs clear to auscultation - no rales, rhonchi or wheezes     CV: regular rates and rhythm, normal S1 S2, no S3 or S4 and no murmur, click or rub     ABDOMEN:  soft, nontender, no HSM or masses and bowel sounds normal     MS: extremities normal- no gross deformities noted, no evidence of inflammation in joints, FROM in all extremities.     SKIN: no suspicious lesions or rashes     NEURO: Normal strength and tone, sensory exam grossly normal, mentation intact and speech normal     PSYCH: mentation appears normal. and affect normal/bright     LYMPHATICS: No cervical adenopathy    Recent Labs   Lab Test 08/27/20  0821 08/02/19  1027 07/02/19  0824   HGB 15.5  --   --      --   --      --  141   POTASSIUM 3.5  --  4.0   CR 0.81  --  0.77   A1C  --  5.6  --         Diagnostics:  No labs were ordered during this visit.   No EKG required for low risk surgery (cataract, skin procedure, breast biopsy, etc).    Revised Cardiac Risk Index (RCRI):  The patient has the following serious cardiovascular risks for perioperative complications:   - No serious cardiac risks = 0 points     RCRI Interpretation: 0 points: Class I (very low risk - 0.4% complication rate)       Assessment & Plan   The proposed surgical procedure is considered " "LOW risk.    Risks and Recommendations:  The patient has the following additional risks and recommendations for perioperative complications:   - Consult Hospitalist / IM to assist with post-op medical management    Medication Instructions:  Patient is to take all scheduled medications on the day of surgery EXCEPT for modifications listed below:   - aspirin: Bleeding risk is low for this procedure and daily aspirin may be continued without modification.     RECOMMENDATION:  APPROVAL GIVEN to proceed with proposed procedure, without further diagnostic evaluation.    Assessment and Plan  1. Preop general physical exam  2. Breast mass, left  Discussed with the patient regarding her upcoming procedure, reassured and answered all the questions. Last EKG in 2015 which is baseline seen, patient has no cardiac history and the upcoming procedure is low risk.      Patient Instructions     All the labs done in recent physical are good to go. As its low risk breast biopsy procedure, and negative cardiac history, EKG not needed.    ==============================================  Preparing for Your Surgery  Getting started  A surgery nurse will call you to review your health history and instructions. They will give you an arrival time based on your scheduled surgery time.  Please be ready to share the following:    Your doctor's clinic name and phone number    Your medical, surgical and anesthesia history    A list of allergies and sensitivities    A list of medicines, including herbal treatments and over-the-counter drugs    Whether the patient has a legal guardian (ask how to send us the papers in advance)  If your child is having surgery, please ask for a copy of Preparing for Your Child's Surgery.    Preparing for surgery    Within 30 days of surgery: Have an exam at your family clinic (primary care clinic), or go to a pre-operative clinic. This exam is called a \"History and Physical,\" or H&P.    At your H&P exam, talk to your " care team about all medicines you take. If you need to stop any medicines before surgery, ask when to start taking them again.  ? We do this for your safety. Many medicines can make you bleed too much during surgery. Some change how well surgery (anesthesia) drugs work.    Call your insurance company to see what it will and won't pay for. Ask if they need to pre-approve the surgery. (If no insurance, call [phone].)    Call your surgeon's clinic if there's any change in your health. This includes signs of a cold or flu (sore throat, runny nose, cough, rash, fever). It also includes a scrape or scratch near the surgery site.    If you have questions on the day of surgery, call your surgery center.  Eating and drinking guidelines  For your safety: Unless your surgeon tells you otherwise, follow the guidelines below.    Eat and drink as usual until 8 hours before surgery. After that, no food or milk.    Drink clear liquids until 2 hours before surgery. These are liquids you can see through, like water, Gatorade and Propel Water. You may also have black coffee and tea (no cream or milk).    Nothing by mouth within 2 hours of surgery. This includes gum, candy and breath mints.    Stop alcohol the midnight before surgery.    If your family clinic tells you to take medicine on the morning of surgery, it's okay to take it with a sip of water.  Preventing infection    Shower or bathe the night before and morning of your surgery. Follow the instructions your clinic gave you. (If no instructions, use regular soap.)    Don't shave or clip hair near your surgery site. This can lead to skin infection.    Don't smoke the morning of surgery. Smoking increases the risk of infection. You may chew nicotine gum up to 2 hours before surgery. A nicotine patch is okay.  ? Note: Some surgeries require you to completely quit smoking and nicotine. Check with your surgeon.    Your care team will make every effort to keep you safe from  infection. We will:  ? Clean our hands often with soap and water (or an alcohol-based hand rub).  ? Clean the skin at your surgery site with a special soap that kills germs. We'll also remove hair from the site as needed.  ? Wear special hair covers, masks, gowns and gloves during surgery.  ? Give antibiotic medicine, if prescribed. Not all surgeries need antibiotics.  What to bring on the day of surgery    Photo ID and insurance card    Copy of your health care directive, if you have one    Glasses and hearing aides (bring cases)  ? You can't wear contacts during surgery    Inhaler and eye drops, if you use them (tell us about these when you arrive)    CPAP machine or breathing device, if you use them    A few personal items, if spending the night    If you have . . .  ? A pacemaker or ICD (cardiac defibrillator): Bring the ID card.  ? An implanted stimulator: Bring the remote control.  ? A legal guardian: Bring a copy of the certified (court-stamped) guardianship papers.  Please remove any jewelry, including body piercings. Leave jewelry and other valuables at home.  If you're going home the day of surgery  Important: If you don't follow the rules below, we must cancel your surgery.     Arrange for someone to drive you home after surgery. You may not drive, take a taxi or take public transportation by yourself (unless you'll have local anesthesia only).    Arrange for a responsible adult to stay with you overnight. If you don't, we may keep you in the hospital overnight, and you may need to pay the costs yourself.  Questions?   If you have any questions for your care team, list them here: _________________________________________________________________________________________________________________________________________________________________________________________________________________________________________________________________________________________________________________________  For  informational purposes only. Not to replace the advice of your health care provider. Copyright   0362-6823 [facility] [facility]. All rights reserved. Clinically reviewed by Daisha Mejias MD. [employer] [id number] - REV 07/19.      No follow-ups on file.    Leticia Howard MD  [facility]    Signed Electronically by: Leticia Howard MD    Copy of this evaluation report is provided to requesting physician.    Preop [employer]    [facility] Preop Guidelines    Revised Cardiac Risk Index

## 2020-10-28 ENCOUNTER — APPOINTMENT (OUTPATIENT)
Dept: SURGERY | Facility: PHYSICIAN GROUP | Age: 70
End: 2020-10-28
Payer: COMMERCIAL

## 2020-10-28 ENCOUNTER — HOSPITAL ENCOUNTER (OUTPATIENT)
Dept: MAMMOGRAPHY | Facility: CLINIC | Age: 70
End: 2020-10-28
Attending: SURGERY
Payer: COMMERCIAL

## 2020-10-28 ENCOUNTER — ANESTHESIA (OUTPATIENT)
Dept: SURGERY | Facility: CLINIC | Age: 70
End: 2020-10-28
Payer: COMMERCIAL

## 2020-10-28 ENCOUNTER — HOSPITAL ENCOUNTER (OUTPATIENT)
Facility: CLINIC | Age: 70
Discharge: HOME | End: 2020-10-28
Attending: SURGERY | Admitting: SURGERY
Payer: COMMERCIAL

## 2020-10-28 VITALS
OXYGEN SATURATION: 97 % | SYSTOLIC BLOOD PRESSURE: 153 MMHG | TEMPERATURE: 97.6 F | RESPIRATION RATE: 16 BRPM | HEART RATE: 85 BPM | DIASTOLIC BLOOD PRESSURE: 76 MMHG

## 2020-10-28 DIAGNOSIS — N63.20 BREAST MASS, LEFT: ICD-10-CM

## 2020-10-28 DIAGNOSIS — G89.18 POSTOPERATIVE PAIN: Primary | ICD-10-CM

## 2020-10-28 PROCEDURE — 250N000009 HC RX 250: Performed by: SURGERY

## 2020-10-28 PROCEDURE — 999N000138 HC STATISTIC PRE-PROCEDURE ASSESSMENT I: Performed by: SURGERY

## 2020-10-28 PROCEDURE — 370N000002 HC ANESTHESIA TECHNICAL FEE, EACH ADDTL 15 MIN: Performed by: SURGERY

## 2020-10-28 PROCEDURE — 258N000003 HC RX IP 258 OP 636: Performed by: NURSE ANESTHETIST, CERTIFIED REGISTERED

## 2020-10-28 PROCEDURE — 761N000007 HC RECOVERY PHASE 2 EACH 15 MINS: Performed by: SURGERY

## 2020-10-28 PROCEDURE — 999N000065 MA POST PROCEDURE LEFT

## 2020-10-28 PROCEDURE — 88307 TISSUE EXAM BY PATHOLOGIST: CPT | Mod: TC | Performed by: SURGERY

## 2020-10-28 PROCEDURE — 250N000011 HC RX IP 250 OP 636: Performed by: NURSE ANESTHETIST, CERTIFIED REGISTERED

## 2020-10-28 PROCEDURE — 88342 IMHCHEM/IMCYTCHM 1ST ANTB: CPT | Mod: TC | Performed by: SURGERY

## 2020-10-28 PROCEDURE — A4641 RADIOPHARM DX AGENT NOC: HCPCS

## 2020-10-28 PROCEDURE — 88342 IMHCHEM/IMCYTCHM 1ST ANTB: CPT | Mod: 26 | Performed by: PATHOLOGY

## 2020-10-28 PROCEDURE — 360N000018 HC SURGERY LEVEL 2 W FLUORO 1ST 30 MIN: Performed by: SURGERY

## 2020-10-28 PROCEDURE — 88341 IMHCHEM/IMCYTCHM EA ADD ANTB: CPT | Mod: 26 | Performed by: PATHOLOGY

## 2020-10-28 PROCEDURE — 250N000009 HC RX 250: Performed by: NURSE ANESTHETIST, CERTIFIED REGISTERED

## 2020-10-28 PROCEDURE — 88341 IMHCHEM/IMCYTCHM EA ADD ANTB: CPT | Mod: TC | Performed by: SURGERY

## 2020-10-28 PROCEDURE — 761N000002 HC RECOVERY PHASE 1 LEVEL 1 EA ADDTL HR: Performed by: SURGERY

## 2020-10-28 PROCEDURE — 360N000015 HC SURGERY LEVEL 2 EA 15 ADDTL MIN: Performed by: SURGERY

## 2020-10-28 PROCEDURE — 999N000104 MA BREAST SPECIMEN LEFT OR

## 2020-10-28 PROCEDURE — 761N000001 HC RECOVERY PHASE 1 LEVEL 1 FIRST HR: Performed by: SURGERY

## 2020-10-28 PROCEDURE — 250N000011 HC RX IP 250 OP 636: Performed by: SURGERY

## 2020-10-28 PROCEDURE — 250N000013 HC RX MED GY IP 250 OP 250 PS 637: Performed by: PHYSICIAN ASSISTANT

## 2020-10-28 PROCEDURE — 88307 TISSUE EXAM BY PATHOLOGIST: CPT | Mod: 26 | Performed by: PATHOLOGY

## 2020-10-28 PROCEDURE — 370N000001 HC ANESTHESIA TECHNICAL FEE, 1ST 30 MIN: Performed by: SURGERY

## 2020-10-28 PROCEDURE — 250N000011 HC RX IP 250 OP 636: Performed by: ANESTHESIOLOGY

## 2020-10-28 PROCEDURE — 272N000001 HC OR GENERAL SUPPLY STERILE: Performed by: SURGERY

## 2020-10-28 PROCEDURE — 88360 TUMOR IMMUNOHISTOCHEM/MANUAL: CPT | Mod: TC | Performed by: SURGERY

## 2020-10-28 RX ORDER — HYDROCODONE BITARTRATE AND ACETAMINOPHEN 5; 325 MG/1; MG/1
1-2 TABLET ORAL
Status: COMPLETED | OUTPATIENT
Start: 2020-10-28 | End: 2020-10-28

## 2020-10-28 RX ORDER — ONDANSETRON 2 MG/ML
INJECTION INTRAMUSCULAR; INTRAVENOUS PRN
Status: DISCONTINUED | OUTPATIENT
Start: 2020-10-28 | End: 2020-10-28

## 2020-10-28 RX ORDER — SODIUM CHLORIDE, SODIUM LACTATE, POTASSIUM CHLORIDE, CALCIUM CHLORIDE 600; 310; 30; 20 MG/100ML; MG/100ML; MG/100ML; MG/100ML
INJECTION, SOLUTION INTRAVENOUS CONTINUOUS
Status: DISCONTINUED | OUTPATIENT
Start: 2020-10-28 | End: 2020-10-28 | Stop reason: HOSPADM

## 2020-10-28 RX ORDER — LIDOCAINE HYDROCHLORIDE 20 MG/ML
INJECTION, SOLUTION INFILTRATION; PERINEURAL PRN
Status: DISCONTINUED | OUTPATIENT
Start: 2020-10-28 | End: 2020-10-28

## 2020-10-28 RX ORDER — SODIUM CHLORIDE, SODIUM LACTATE, POTASSIUM CHLORIDE, CALCIUM CHLORIDE 600; 310; 30; 20 MG/100ML; MG/100ML; MG/100ML; MG/100ML
INJECTION, SOLUTION INTRAVENOUS CONTINUOUS PRN
Status: DISCONTINUED | OUTPATIENT
Start: 2020-10-28 | End: 2020-10-28

## 2020-10-28 RX ORDER — PROPOFOL 10 MG/ML
INJECTION, EMULSION INTRAVENOUS CONTINUOUS PRN
Status: DISCONTINUED | OUTPATIENT
Start: 2020-10-28 | End: 2020-10-28

## 2020-10-28 RX ORDER — AMOXICILLIN 250 MG
1-2 CAPSULE ORAL 2 TIMES DAILY PRN
Qty: 15 TABLET | Refills: 0 | Status: SHIPPED | OUTPATIENT
Start: 2020-10-28 | End: 2020-11-03

## 2020-10-28 RX ORDER — MAGNESIUM HYDROXIDE 1200 MG/15ML
LIQUID ORAL PRN
Status: DISCONTINUED | OUTPATIENT
Start: 2020-10-28 | End: 2020-10-28 | Stop reason: HOSPADM

## 2020-10-28 RX ORDER — ONDANSETRON 4 MG/1
4 TABLET, ORALLY DISINTEGRATING ORAL EVERY 30 MIN PRN
Status: DISCONTINUED | OUTPATIENT
Start: 2020-10-28 | End: 2020-10-28 | Stop reason: HOSPADM

## 2020-10-28 RX ORDER — FENTANYL CITRATE 0.05 MG/ML
25-50 INJECTION, SOLUTION INTRAMUSCULAR; INTRAVENOUS
Status: DISCONTINUED | OUTPATIENT
Start: 2020-10-28 | End: 2020-10-28 | Stop reason: HOSPADM

## 2020-10-28 RX ORDER — HYDROMORPHONE HYDROCHLORIDE 1 MG/ML
.3-.5 INJECTION, SOLUTION INTRAMUSCULAR; INTRAVENOUS; SUBCUTANEOUS EVERY 10 MIN PRN
Status: DISCONTINUED | OUTPATIENT
Start: 2020-10-28 | End: 2020-10-28 | Stop reason: HOSPADM

## 2020-10-28 RX ORDER — MEPERIDINE HYDROCHLORIDE 25 MG/ML
12.5 INJECTION INTRAMUSCULAR; INTRAVENOUS; SUBCUTANEOUS
Status: DISCONTINUED | OUTPATIENT
Start: 2020-10-28 | End: 2020-10-28 | Stop reason: HOSPADM

## 2020-10-28 RX ORDER — NALOXONE HYDROCHLORIDE 0.4 MG/ML
.1-.4 INJECTION, SOLUTION INTRAMUSCULAR; INTRAVENOUS; SUBCUTANEOUS
Status: DISCONTINUED | OUTPATIENT
Start: 2020-10-28 | End: 2020-10-28 | Stop reason: HOSPADM

## 2020-10-28 RX ORDER — CLINDAMYCIN PHOSPHATE 900 MG/50ML
900 INJECTION, SOLUTION INTRAVENOUS SEE ADMIN INSTRUCTIONS
Status: DISCONTINUED | OUTPATIENT
Start: 2020-10-28 | End: 2020-10-28 | Stop reason: HOSPADM

## 2020-10-28 RX ORDER — FENTANYL CITRATE 50 UG/ML
INJECTION, SOLUTION INTRAMUSCULAR; INTRAVENOUS PRN
Status: DISCONTINUED | OUTPATIENT
Start: 2020-10-28 | End: 2020-10-28

## 2020-10-28 RX ORDER — CLINDAMYCIN PHOSPHATE 900 MG/50ML
900 INJECTION, SOLUTION INTRAVENOUS
Status: DISCONTINUED | OUTPATIENT
Start: 2020-10-28 | End: 2020-10-28 | Stop reason: HOSPADM

## 2020-10-28 RX ORDER — ONDANSETRON 2 MG/ML
4 INJECTION INTRAMUSCULAR; INTRAVENOUS EVERY 30 MIN PRN
Status: DISCONTINUED | OUTPATIENT
Start: 2020-10-28 | End: 2020-10-28 | Stop reason: HOSPADM

## 2020-10-28 RX ORDER — HYDROCODONE BITARTRATE AND ACETAMINOPHEN 5; 325 MG/1; MG/1
1-2 TABLET ORAL EVERY 4 HOURS PRN
Qty: 15 TABLET | Refills: 0 | Status: SHIPPED | OUTPATIENT
Start: 2020-10-28 | End: 2020-11-03

## 2020-10-28 RX ADMIN — PHENYLEPHRINE HYDROCHLORIDE 100 MCG: 10 INJECTION INTRAVENOUS at 13:34

## 2020-10-28 RX ADMIN — FENTANYL CITRATE 50 MCG: 0.05 INJECTION, SOLUTION INTRAMUSCULAR; INTRAVENOUS at 14:45

## 2020-10-28 RX ADMIN — LIDOCAINE HYDROCHLORIDE 10 ML: 10 INJECTION, SOLUTION INFILTRATION; PERINEURAL at 10:44

## 2020-10-28 RX ADMIN — SODIUM CHLORIDE, POTASSIUM CHLORIDE, SODIUM LACTATE AND CALCIUM CHLORIDE: 600; 310; 30; 20 INJECTION, SOLUTION INTRAVENOUS at 13:12

## 2020-10-28 RX ADMIN — CLINDAMYCIN PHOSPHATE 900 MG: 900 INJECTION, SOLUTION INTRAVENOUS at 13:18

## 2020-10-28 RX ADMIN — FENTANYL CITRATE 50 MCG: 0.05 INJECTION, SOLUTION INTRAMUSCULAR; INTRAVENOUS at 15:15

## 2020-10-28 RX ADMIN — MIDAZOLAM 2 MG: 1 INJECTION INTRAMUSCULAR; INTRAVENOUS at 13:12

## 2020-10-28 RX ADMIN — PHENYLEPHRINE HYDROCHLORIDE 100 MCG: 10 INJECTION INTRAVENOUS at 13:52

## 2020-10-28 RX ADMIN — ONDANSETRON 4 MG: 2 INJECTION INTRAMUSCULAR; INTRAVENOUS at 13:40

## 2020-10-28 RX ADMIN — PROPOFOL 125 MCG/KG/MIN: 10 INJECTION, EMULSION INTRAVENOUS at 13:14

## 2020-10-28 RX ADMIN — HYDROCODONE BITARTRATE AND ACETAMINOPHEN 1 TABLET: 5; 325 TABLET ORAL at 15:13

## 2020-10-28 RX ADMIN — LIDOCAINE HYDROCHLORIDE 20 MG: 20 INJECTION, SOLUTION INFILTRATION; PERINEURAL at 13:14

## 2020-10-28 RX ADMIN — FENTANYL CITRATE 25 MCG: 50 INJECTION, SOLUTION INTRAMUSCULAR; INTRAVENOUS at 13:19

## 2020-10-28 RX ADMIN — FENTANYL CITRATE 25 MCG: 50 INJECTION, SOLUTION INTRAMUSCULAR; INTRAVENOUS at 13:22

## 2020-10-28 RX ADMIN — PHENYLEPHRINE HYDROCHLORIDE 100 MCG: 10 INJECTION INTRAVENOUS at 13:43

## 2020-10-28 ASSESSMENT — LIFESTYLE VARIABLES: TOBACCO_USE: 0

## 2020-10-28 ASSESSMENT — ENCOUNTER SYMPTOMS: SEIZURES: 0

## 2020-10-28 NOTE — BRIEF OP NOTE
[facility]    Brief Operative Note    Pre-operative diagnosis: Breast mass, left [N63.20]  Post-operative diagnosis Same as pre-operative diagnosis    Procedure: Procedure(s):  2 SEED LOCALIZED LEFT BREAST BIOPSY  Surgeon: Surgeon(s) and Role:     * Eileen Montes MD - Primary     * Stacey Mcdonough PA-C - Assisting  Anesthesia: Monitor Anesthesia Care   Estimated blood loss: 15 ml   Drains: None  Specimens:   ID Type Source Tests Collected by Time Destination   A : left breast tissue Tissue Breast, Left SURGICAL PATHOLOGY EXAM Eileen Montes MD 10/28/2020  1:31 PM      Findings:   Left breast excisional biopsy with removal of seed localized lesions x 2.  Complications: None.  Implants: None    Eileen Montes MD

## 2020-10-28 NOTE — DISCHARGE INSTRUCTIONS
[facility] - [facility]  Discharge Instructions: Post-Operative Breast Surgery    ACTIVITY    Take frequent short walks and increase your activity gradually.      Avoid strenuous physical activity or heavy lifting greater than 15-20 lbs. for 1-2 weeks with arm on the surgery side.  You may climb stairs.    Gentle rotation and stretching of your arms and shoulders will prevent joint stiffness.    You may drive without restrictions when you are not using any prescription pain medication and feel comfortable in a car.    You may return to work/school when you are comfortable without any prescription pain medication.    WOUND CARE    You may shower in 48 hours. Pat your incision dry after showering.     You have skin glue on your incision. Do not pick at the glue; it will slowly peel up and fall off on its own.    Do not soak your incisions in a tub or pool for 2 weeks.     Do not apply any lotions, creams, or ointments to your incisions.    A ridge under your incisions is normal and will gradually resolve.    Wear a supportive bra for 1-2 weeks, day and night.    DIET    Start with liquids, then gradually resume your regular diet as tolerated.     Drink plenty of liquids to stay hydrated.    PAIN    Expect some tenderness and discomfort at the incision site(s).  Use the prescribed pain medication at your discretion.  Expect gradual resolution of your pain over several days.    You may take ibuprofen with food (unless you have been told not to) instead of or in addition to your prescribed pain medication.  If you are taking Norco, do not take any additional acetaminophen/APAP/Tylenol.    Do not drink alcohol or drive while you are taking pain medications.    You may apply ice to your incisions in 20 minute intervals as needed for the next 48 hours.      EXPECTATIONS    Pain medications can cause constipation.  Limit use when possible.  Take over the counter stool softener/stimulant, such as  Colace or Senna, 1-2 times a day with plenty of water.  You may take a mild over the counter laxative, such as Miralax or a suppository, as needed.      You may discontinue these medications once you are having regular bowel movements and/or are no longer taking your narcotic pain medication.    RETURN APPOINTMENT    Follow up with your surgeon (Dr. Eileen Montes) in 2 weeks.  Please call the office at [phone] to schedule your appointment.      CALL OUR OFFICE -[phone] IF YOU HAVE:     Chills or fever above 101 F.    Increased redness, warmth, or drainage at your incisions.    Significant bleeding.    Pain not relieved by your pain medication or rest.    Increasing pain after the first 48 hours.    Any other concerns or questions.    Revised October 2018

## 2020-10-28 NOTE — PROGRESS NOTES
SBAR Seed Localization    SITUATION:  Patient to breast imaging center for imaging guided seed localizations before breast lumpectomy or excision biopsy without sentinel node injection.    BACKGROUND:  Breast imaging cancer, breast abnormality  Ordered procedure completed: Yes  Special needs identified: No     ASSESSMENT:  SBAR report called to patient care unit because of unexpected event in radiology: No  Allergies and medication list reviewed prior to procedure. Yes  Skin cleansed with ChloraPrep One-Step.  Anesthesia: approximately 10ml of 1% Lidocaine injection subcutaneous before seed insertion administered by the radiologist.   Gauze dressing over insertion site(s).  Post procedure mammogram completed: Yes    Patient tolerance:well    RECOMMENDATIONS:  Patient transferred to Same Day Surgery in stable condition via wheelchair with Breast Imaging Staff.    Please call [facility] [phone] if there are any questions.

## 2020-10-28 NOTE — ANESTHESIA PREPROCEDURE EVALUATION
Anesthesia Pre-Procedure Evaluation    Patient: Emma Rudolph   MRN: [id number] : 1950          Preoperative Diagnosis: Breast mass, left [N63.20]    Procedure(s):  2 SEED LOCALIZED LEFT BREAST BIOPSY    Past Medical History:   Diagnosis Date     Breast lesion     LEFT     Generalized anxiety disorder      Hyperlipidemia LDL goal <130      Hypertension      Mild major depression (H)      Vitamin D deficiency disease      Past Surgical History:   Procedure Laterality Date     COLONOSCOPY       EYE SURGERY       GYN SURGERY       H ARGON LASER FOR RETINAL TEAR Left 3/2014     HEAD & NECK SURGERY      wisdom teeth     LAPAROSCOPIC SINGLE SITE SALPINGO-OOPHORECTOMY Bilateral 4/15/2015    Procedure: LAPAROSCOPIC SINGLE SITE SALPINGO-OOPHORECTOMY;  Surgeon: Leonora Zavaleta MD;  [facility]  [facility]     wisdom teeth removal  age 19       Anesthesia Evaluation     . Pt has had prior anesthetic.     No history of anesthetic complications          ROS/MED HX    ENT/Pulmonary:      (-) tobacco use and sleep apnea   Neurologic:      (-) seizures and CVA   Cardiovascular:     (+) Dyslipidemia, hypertension----. : . . . :. .      (-) syncope   METS/Exercise Tolerance:  >4 METS   Hematologic:         Musculoskeletal:   (+) arthritis,  -       GI/Hepatic:        (-) GERD and liver disease   Renal/Genitourinary:     (+) chronic renal disease, type: CRI,       Endo:     (+) Obesity, .   (-) Type II DM and thyroid disease   Psychiatric:     (+) psychiatric history anxiety and depression      Infectious Disease:         Malignancy:         Other: Comment: Breast mass                         Physical Exam  Normal systems: cardiovascular, pulmonary and dental    Airway   Mallampati: II  TM distance: >3 FB  Neck ROM: full    Dental     Cardiovascular       Pulmonary             Lab Results   Component Value Date    WBC 5.0 2020    HGB 15.5 2020    HCT 45.4 2020     2020    SED 10 2016    NA  "138 08/27/2020    POTASSIUM 3.5 08/27/2020    CHLORIDE 106 08/27/2020    CO2 26 08/27/2020    BUN 15 08/27/2020    CR 0.81 08/27/2020     (H) 08/27/2020    JAYDEN 9.4 08/27/2020    ALBUMIN 4.0 08/27/2020    PROTTOTAL 8.5 08/27/2020    ALT 65 (H) 08/27/2020    AST 35 08/27/2020    ALKPHOS 122 08/27/2020    BILITOTAL 1.3 08/27/2020    TSH 1.07 08/04/2015       Preop Vitals  BP Readings from Last 3 Encounters:   10/27/20 136/64   10/09/20 134/72   08/27/20 136/82    Pulse Readings from Last 3 Encounters:   10/27/20 89   10/09/20 96   08/27/20 94      Resp Readings from Last 3 Encounters:   10/27/20 14   10/09/20 16   08/27/20 16    SpO2 Readings from Last 3 Encounters:   10/27/20 97%   10/09/20 98%   08/27/20 99%      Temp Readings from Last 1 Encounters:   10/27/20 37.1  C (98.7  F) (Tympanic)    Ht Readings from Last 1 Encounters:   10/27/20 1.626 m (5' 4\")      Wt Readings from Last 1 Encounters:   10/27/20 73.9 kg (163 lb)    Estimated body mass index is 27.98 kg/m  as calculated from the following:    Height as of 10/27/20: 1.626 m (5' 4\").    Weight as of 10/27/20: 73.9 kg (163 lb).       Anesthesia Plan      History & Physical Review  History and physical reviewed and following examination; no interval change.    ASA Status:  2 .    NPO Status:  > 8 hours    Plan for MAC Reason for MAC:  Deep or markedly invasive procedure (G8)  PONV prophylaxis:  Ondansetron (or other 5HT-3)         Postoperative Care  Postoperative pain management:  Multi-modal analgesia.      Consents  Anesthetic plan, risks, benefits and alternatives discussed with:  Patient..                 Jose L Saul MD  "

## 2020-10-28 NOTE — ANESTHESIA POSTPROCEDURE EVALUATION
Patient: Emma Snowy    Procedure(s):  2 SEED LOCALIZED LEFT BREAST BIOPSY    Diagnosis:Breast mass, left [N63.20]  Diagnosis Additional Information: No value filed.    Anesthesia Type:  MAC    Note:  Anesthesia Post Evaluation    Patient location during evaluation: PACU  Patient participation: Able to fully participate in evaluation  Level of consciousness: awake and alert  Pain management: satisfactory to patient  Airway patency: patent  Cardiovascular status: hemodynamically stable  Respiratory status: acceptable and unassisted  Hydration status: balanced  PONV: none     Anesthetic complications: None          Last vitals:  Vitals:    10/28/20 1515 10/28/20 1530 10/28/20 1558   BP: 136/81 137/67 (!) 153/76   Pulse: 82 83 85   Resp: 12 19 16   Temp:      SpO2: 97% 98% 97%         Electronically Signed By: Jose L Saul MD  October 28, 2020  4:53 PM

## 2020-10-28 NOTE — BRIEF OP NOTE
[facility]    Brief Operative Note    Pre-operative diagnosis: Breast mass, left [N63.20]  Post-operative diagnosis Same    Procedure:  2 SEED LOCALIZED LEFT BREAST BIOPSY    Surgeon:     * Eileen Montes MD - Primary     * Stacey Mcdonough PA-C - Assisting    Anesthesia: Monitor Anesthesia Care     Estimated blood loss: 15 mL    Specimens:   ID Type Source Tests Collected by Time Destination   A : left breast tissue Tissue Breast, Left SURGICAL PATHOLOGY EXAM Eileen Montes MD 10/28/2020  1:31 PM      Findings:    As above. 2 seeds in specimen. No immediate complications. See operative report for full details.      Stacey Mcdonough PA-C  [facility]  [phone]

## 2020-10-28 NOTE — ANESTHESIA CARE TRANSFER NOTE
Patient: Emma Rudolph    Procedure(s):  2 SEED LOCALIZED LEFT BREAST BIOPSY    Diagnosis: Breast mass, left [N63.20]  Diagnosis Additional Information: No value filed.    Anesthesia Type:   MAC     Note:  Airway :Room Air  Patient transferred to:PACU  Comments: At end of procedure, spontaneous respirations, patient alert to voice, able to follow commands. Patient breathing room air to PACU. SpO2, NiBP, and EKG monitors and alarms on and functioning, Sihv Hugger warmer connected to patient gown, report on patient's clinical status given to PACU RN, RN questions answered.Handoff Report: Identifed the Patient, Identified the Reponsible Provider, Reviewed the pertinent medical history, Discussed the surgical course, Reviewed Intra-OP anesthesia mangement and issues during anesthesia, Set expectations for post-procedure period and Allowed opportunity for questions and acknowledgement of understanding      Vitals: (Last set prior to Anesthesia Care Transfer)    CRNA VITALS  10/28/2020 1400 - 10/28/2020 1435      10/28/2020             Pulse:  86    SpO2:  97 %    Resp Rate (set):  10                Electronically Signed By: LIDYA Purdy CRNA  October 28, 2020  2:35 PM

## 2020-10-29 DIAGNOSIS — Z01.818 PREOP GENERAL PHYSICAL EXAM: Primary | ICD-10-CM

## 2020-10-29 NOTE — OP NOTE
Procedure Date: 10/28/2020      STAFF SURGEON:  Eileen Montes MD      ASSISTANT:  MICHELLE Holt.      PREOPERATIVE DIAGNOSIS:  Left breast mass x 2.      POSTOPERATIVE DIAGNOSIS:  Left breast mass x 2.      PROCEDURE PERFORMED:  Radioactive seed localized left breast excisional biopsy x 2.      INDICATION FOR OPERATION:  Celi is a 69-year-old female who was undergoing her annual screening mammogram when she was noted to have asymmetry in the left retroareolar area.  She then underwent left breast ultrasound which confirmed presence of lesions at the 11 o'clock and 12 o'clock positions of the retroareolar aspect of the left breast.  Biopsies of both lesions were performed.  The 11 o'clock lesion demonstrated adenosis.  The 12 o'clock lesion demonstrated a complex sclerosing lesion.  The decision was made for the patient to proceed to the operating room for left breast excisional biopsy.  She did desire excision of both of her left breast previously biopsied masses.  The risks and benefits of the procedure were discussed with the patient and consent for the procedure was obtained.      ANESTHESIA:  Monitored anesthesia care with local anesthetic.      DESCRIPTION OF PROCEDURE:  The patient was brought to the preoperative area and preoperative antibiotics were administered.  The patient's surgical site was marked with a permanent marking pen.  The patient was taken to Radiology and underwent radioactive seed localization of her 2 previously biopsied left breast masses.  The patient was then brought to the operating room, placed in the supine position on the operating table.  A timeout was completed.  Anesthesia was induced.  The patient was secured to the operating table and her pressure points were padded.  The patient's left breast was prepped and draped in a sterile fashion.  Following infiltration with local anesthetic, the #15 blade scalpel was used to make a left-sided periareolar incision.  Skin flaps were  then raised superiorly and inferiorly.  The Milton probe was then used to direct our dissection.  We carefully dissected free the tissue that contained the two radioactive localizing seeds.  When the specimen had been completely dissected free, it was marked with paint for proper orientation and placed into the [employer] machine.  Specimen radiograph confirmed presence of both biopsy clips and both radioactive localizing seeds within the specimen.  The specimen was then submitted for pathologic evaluation.  The patient's surgical site was inspected and hemostasis was obtained utilizing the electrocautery.  The wound was irrigated with normal saline solution.  Hemostasis was again ensured.  The patient's incision was then reapproximated in 2 layers utilizing interrupted 3-0 Vicryl to reapproximate the deep dermal tissues and a running 4-0 Monocryl subcuticular stitch to reapproximate the skin.  The incision was washed and dried and skin glue was applied.  The lap, needle and instrument counts were correct at the end of the procedure.  The patient tolerated the procedure well and was taken to the recovery area in stable condition.      Stacey Mcdonough PA-C assisted in the above procedure. They provided assistance with pre-operative positioning, prepping, and draping of the patient. The assistant provided vital operative assistance with retraction using instruments thus providing the necessary exposure and visualization for the case, manipulation of tissues to achieve hemostasis, suction for visualization and assisted in closure of the wound. Post-operatively they assisted in transfer of the patient off the operative table and transition to the PACU physicians.    ESTIMATED BLOOD LOSS:  15 mL      FINDINGS:  Left breast excisional biopsy performed with removal of radioactive seed localized lesions x 2.      COMPLICATIONS:  None.      SPECIMENS:  Left breast mass.      DRAINS:  None.      CONDITION:  The patient will be  discharged to home directly from the postanesthesia care unit with instructions for postoperative cares and medications for pain management.         ROYA WALTON MD             D: 10/28/2020   T: 10/28/2020   MT: RIGO      Name:     CAMRON HUGHES   MRN:      [id number]        Account:        [id number]   :      1950           Procedure Date: 10/28/2020      Document: [id number]

## 2020-10-30 ENCOUNTER — TELEPHONE (OUTPATIENT)
Dept: SURGERY | Facility: CLINIC | Age: 70
End: 2020-10-30

## 2020-10-30 ENCOUNTER — TELEPHONE (OUTPATIENT)
Dept: FAMILY MEDICINE | Facility: CLINIC | Age: 70
End: 2020-10-30

## 2020-10-30 NOTE — TELEPHONE ENCOUNTER
Name of caller: Patient    Reason for Call:  Call back  Post surgery question-wondering when she can take the band off that was placed during surgery?    Surgeon:  Simon    Recent Surgery:  YES    If yes, when & what type:  10/28/2020    2 SEED LOCALIZED LEFT BREAST BIOPSY        Best phone number to reach pt at is: [phone]    Ok to leave a message with medical info? Yes.

## 2020-10-30 NOTE — TELEPHONE ENCOUNTER
She should be fine for Eye surgery clearance from my end but I am not sure if her insurance needs a preoperative clearance visit to cover the Eye procedure. Please check with her insurance and only then I can agree with it.     Yes, she can can get the COVID test scheduled and recommend to check with the surgeon on the date and time of the procedure as they may need the test only before surgery and not too early.    Leticia Howard MD on 10/30/2020 at 10:44 AM

## 2020-10-30 NOTE — TELEPHONE ENCOUNTER
Patient calling. Recently had preop with Dr. Howard for breast surgery. She is having eye surgery next week 11/5 and is wondering if she needs another preop? Also thinks would need another COVID test. This should go thru surgeon but there is an order placed by [facility] yesterday? Can she use that one or should she contact surgeon? Patient denies changes with health since last preop.

## 2020-10-30 NOTE — TELEPHONE ENCOUNTER
Pat notified, ok to remove ace wrap 48 hours after the surgery. Encouraged Pat to wear supportive bra for 1-2 weeks continuously after removal of ace wrap. Pat verbalized understanding.    Kathy London RN, BSN, OCN  Oncology Care Coordinator  [facility]  [facility]  Phone: [phone]

## 2020-10-30 NOTE — TELEPHONE ENCOUNTER
The order is for the eye surgery preop, we got faxed it from her surgeon.  Depending of the date of her next surgery, that surgeon will need to place another order.

## 2020-10-30 NOTE — TELEPHONE ENCOUNTER
RN called back to pt.    1) Advised to call eye surgeon and ask:  -What is the time frame for COVID testing  -Is it OK to use the pre-op from 10/27  -Do they need a copy/addendum of the preop for eye surgery    2) Advised to contact COVID scheduling line to get COVID scheduled in the provided time frame.     3) Advised to call insurance to see if they are OK with the 10/27 preop for eye exam as well.    Pt states understanding and teaches back.   Will call back if any further questions.

## 2020-10-30 NOTE — TELEPHONE ENCOUNTER
Sorry please clarify-    Pt has eye surgery scheduled 11/05, as per original triage.    The COVID test that is placed in the chart is for that surgery, from her eye surgeon, so she can schedule that COVID test, yes?    Does pt need to do anything else besides this?

## 2020-11-02 LAB — COPATH REPORT: NORMAL

## 2020-11-03 ENCOUNTER — OFFICE VISIT (OUTPATIENT)
Dept: FAMILY MEDICINE | Facility: CLINIC | Age: 70
End: 2020-11-03
Payer: COMMERCIAL

## 2020-11-03 ENCOUNTER — TELEPHONE (OUTPATIENT)
Dept: SURGERY | Facility: CLINIC | Age: 70
End: 2020-11-03

## 2020-11-03 VITALS
OXYGEN SATURATION: 97 % | DIASTOLIC BLOOD PRESSURE: 88 MMHG | BODY MASS INDEX: 27.98 KG/M2 | WEIGHT: 163 LBS | TEMPERATURE: 97.5 F | HEART RATE: 97 BPM | SYSTOLIC BLOOD PRESSURE: 142 MMHG

## 2020-11-03 DIAGNOSIS — H33.312: ICD-10-CM

## 2020-11-03 DIAGNOSIS — Z01.818 PREOP GENERAL PHYSICAL EXAM: Primary | ICD-10-CM

## 2020-11-03 LAB
LABORATORY COMMENT REPORT: NORMAL
SARS-COV-2 RNA SPEC QL NAA+PROBE: NEGATIVE
SARS-COV-2 RNA SPEC QL NAA+PROBE: NORMAL
SPECIMEN SOURCE: NORMAL
SPECIMEN SOURCE: NORMAL

## 2020-11-03 PROCEDURE — 99214 OFFICE O/P EST MOD 30 MIN: CPT | Performed by: PHYSICIAN ASSISTANT

## 2020-11-03 PROCEDURE — U0003 INFECTIOUS AGENT DETECTION BY NUCLEIC ACID (DNA OR RNA); SEVERE ACUTE RESPIRATORY SYNDROME CORONAVIRUS 2 (SARS-COV-2) (CORONAVIRUS DISEASE [COVID-19]), AMPLIFIED PROBE TECHNIQUE, MAKING USE OF HIGH THROUGHPUT TECHNOLOGIES AS DESCRIBED BY CMS-2020-01-R: HCPCS | Performed by: PHYSICIAN ASSISTANT

## 2020-11-03 NOTE — PROGRESS NOTES
[facility]  [address]  [place] [address]  Phone: [phone]  Fax: [phone]  Primary Provider: Angelina Edwards  Pre-op Performing Provider: ANGELINA EDWARDS    PREOPERATIVE EVALUATION:  Today's date: 11/3/2020    Emma Rudolph is a 69 year old female who presents for a preoperative evaluation.    Surgical Information:  Surgery/Procedure: left eye torn retina   Surgery Location: VitreoRetinal Surgery, [facility]  Surgeon: Dr. Mckeon  Surgery Date: 11/5/20  Time of Surgery:   Where patient plans to recover: At home with family  Fax number for surgical facility: [phone]    Type of Anesthesia Anticipated: to be determined    Subjective     HPI related to upcoming procedure: Torn retina in the left eye      Preop Questions 10/27/2020   1. Have you ever had a heart attack or stroke? No   2. Have you ever had surgery on your heart or blood vessels, such as a stent placement, a coronary artery bypass, or surgery on an artery in your head, neck, heart, or legs? No   3. Do you have chest pain with activity? No   4. Do you have a history of  heart failure? No   5. Do you currently have a cold, bronchitis or symptoms of other infection? No   6. Do you have a cough, shortness of breath, or wheezing? No   7. Do you or anyone in your family have previous history of blood clots? No   8. Do you or does anyone in your family have a serious bleeding problem such as prolonged bleeding following surgeries or cuts? No   9. Have you ever had problems with anemia or been told to take iron pills? No   10. Have you had any abnormal blood loss such as black, tarry or bloody stools, or abnormal vaginal bleeding? No   11. Have you ever had a blood transfusion? No   12. Are you willing to have a blood transfusion if it is medically needed before, during, or after your surgery? Yes   13. Have you or any of your relatives  ever had problems with anesthesia? No   14. Do you have sleep apnea, excessive snoring or daytime drowsiness? No   15. Do you have any artifical heart valves or other implanted medical devices like a pacemaker, defibrillator, or continuous glucose monitor? No   16. Do you have artificial joints? No   17. Are you allergic to latex? No   18. Is there any chance that you may be pregnant? -     Health Care Directive:  Patient does not have a Health Care Directive or Living Will: Discussed advance care planning with patient; however, patient declined at this time.    Preoperative Review of :   reviewed - no record of controlled substances prescribed.      Status of Chronic Conditions:  See problem list for active medical problems.  Problems all longstanding and stable, except as noted/documented.  See ROS for pertinent symptoms related to these conditions.      Review of Systems  CONSTITUTIONAL: NEGATIVE for fever, chills, change in weight  ENT/MOUTH: NEGATIVE for ear, mouth and throat problems  RESP: NEGATIVE for significant cough or SOB  CV: NEGATIVE for chest pain, palpitations or peripheral edema    Patient Active Problem List    Diagnosis Date Noted     Preop general physical exam 10/27/2020     Priority: Medium     Breast mass, left 10/09/2020     Priority: Medium     Added automatically from request for surgery [id number]       Hypertension, unspecified type 06/28/2019     Priority: Medium     Primary insomnia 11/28/2016     Priority: Medium     Family history of ischemic heart disease 11/22/2016     Priority: Medium     Memory loss 11/22/2016     Priority: Medium     Major depression in complete remission (H) on meds since 6-12-13 06/14/2016     Priority: Medium     Osteoarthritis of multiple joints, unspecified osteoarthritis type of #2&3 fingers bilat R>L 06/14/2016     Priority: Medium     Mixed hyperlipidemia 05/31/2016     Priority: Medium     CKD (chronic kidney disease) stage 2, GFR 60-89 ml/min  05/31/2016     Priority: Medium     ACP (advance care planning) 05/31/2016     Priority: Medium     Advance Care Planning 5/31/2016: ACP Review of Chart / Resources Provided:  Reviewed chart for advance care plan.  Emma Rudolph has no plan or code status on file however states presence of ACP document. Copy requested.   Added by Esperanza Blank             Generalized anxiety disorder 05/13/2013     Priority: Medium      Past Medical History:   Diagnosis Date     Breast lesion     LEFT     Generalized anxiety disorder      Hyperlipidemia LDL goal <130      Hypertension      Mild major depression (H)      Vitamin D deficiency disease      Past Surgical History:   Procedure Laterality Date     BIOPSY BREAST SEED LOCALIZATION Left 10/28/2020    Procedure: 2 SEED LOCALIZED LEFT BREAST BIOPSY;  Surgeon: iEleen Montes MD;  Location:  OR     COLONOSCOPY       EYE SURGERY       GYN SURGERY       H ARGON LASER FOR RETINAL TEAR Left 3/2014     HEAD & NECK SURGERY      wisdom teeth     LAPAROSCOPIC SINGLE SITE SALPINGO-OOPHORECTOMY Bilateral 4/15/2015    Procedure: LAPAROSCOPIC SINGLE SITE SALPINGO-OOPHORECTOMY;  Surgeon: Leonora Zavaleta MD;  Location:  [facility]     wisdom teeth removal  age 19     Current Outpatient Medications   Medication Sig Dispense Refill     ASPIRIN PO Take 81 mg by mouth daily       atorvastatin (LIPITOR) 20 MG tablet Take 1 tablet (20 mg) by mouth daily 90 tablet 3     Blood Pressure KIT Please check your BP daily. 1 kit 0     Cholecalciferol (VITAMIN D) 1000 UNITS capsule Take 1 capsule by mouth daily.       desvenlafaxine (PRISTIQ) 100 MG 24 hr tablet Take 1 tablet (100 mg) by mouth daily 90 tablet 1     hydrOXYzine (ATARAX) 25 MG tablet        Multiple Vitamins-Minerals (MULTIVITAMIN OR) Take by mouth daily         Allergies   Allergen Reactions     Penicillins Swelling, Rash and Hives     Amoxicillin     Sulfa Drugs Hives     Trazodone Fatigue     Really tired        Social History      Tobacco Use     Smoking status: Never Smoker     Smokeless tobacco: Never Used   Substance Use Topics     Alcohol use: Yes     Alcohol/week: 0.0 standard drinks     Comment: socially       History   Drug Use No         Objective     BP (!) 142/88 (Cuff Size: Adult Large)   Pulse 97   Temp 97.5  F (36.4  C) (Tympanic)   Wt 73.9 kg (163 lb)   LMP  (LMP Unknown)   SpO2 97%   BMI 27.98 kg/m      Physical Exam  GENERAL APPEARANCE: healthy, alert and no distress  HENT: ear canals and TM's normal and nose and mouth without ulcers or lesions  RESP: lungs clear to auscultation - no rales, rhonchi or wheezes  CV: regular rate and rhythm, normal S1 S2, no S3 or S4 and no murmur, click or rub   ABDOMEN: soft, nontender, no HSM or masses and bowel sounds normal  NEURO: Normal strength and tone, sensory exam grossly normal, mentation intact and speech normal    Recent Labs   Lab Test 08/27/20  0821 08/02/19  1027 07/02/19  0824   HGB 15.5  --   --      --   --      --  141   POTASSIUM 3.5  --  4.0   CR 0.81  --  0.77   A1C  --  5.6  --         Diagnostics:       COVID-19 PCR Testing Order Questions 9/24/20 9/29/20 10/9/20 10/26/20 10/29/20   Asymptomatic/Symptomatic: Asymptomatic Asymptomatic Asymptomatic Asymptomatic Asymptomatic   Reason for Testing: Other Procedure Other Procedure Surgery Surgery Surgery   Date of Procedure: 10/2/2020 10/2/2020 10/28/2020 10/28/2020 11/5/2020   Close contact of [employer] Employee or Provider/Resident/Faculty?             No     COVID-19 PCR Results 9/29/20 10/26/20   COVID-19 Virus PCR to [facility] - Result Not Detected Not Detected   COVID-19 Virus PCR to [facility] - Source Nasopharyngeal Nasopharyngeal         No EKG required for low risk surgery (cataract, skin procedure, breast biopsy, etc).    Revised Cardiac Risk Index (RCRI):  The patient has the following serious cardiovascular risks for perioperative complications:   - No serious cardiac risks = 0 points     RCRI  Interpretation: 0 points: Class I (very low risk - 0.4% complication rate)         Assessment & Plan   The proposed surgical procedure is considered LOW risk.    Preop general physical exam    - Asymptomatic COVID-19 Virus (Coronavirus) by PCR    Tear of retina, left           Risks and Recommendations:  The patient has the following additional risks and recommendations for perioperative complications:    Medication Instructions:  Patient is to take all scheduled medications on the day of surgery    RECOMMENDATION:  APPROVAL GIVEN to proceed with proposed procedure, without further diagnostic evaluation.    Signed Electronically by: Angelina Puckett PA-C    Copy of this evaluation report is provided to requesting physician.    Preop [facility] hospital Preop Guidelines    Revised Cardiac Risk Index

## 2020-11-03 NOTE — TELEPHONE ENCOUNTER
[facility] General Surgery:    Patient contacted and pathology discussed.  Doing well post-op.  No significant complaints.    FINAL DIAGNOSIS:   Left breast tissue:   - Negative for malignancy.   - Florid complex proliferative breast changes with:       - Atypical ductal hyperplasia.       - Usual type ductal hyperplasia       - Sclerosing adenosis.       - Apocrine metaplasia.       - Papillomatosis.       - Microcalcifications.     Eileen Montes MD

## 2020-11-03 NOTE — PATIENT INSTRUCTIONS

## 2020-11-04 NOTE — RESULT ENCOUNTER NOTE
Isaías Alatorre,    I just wanted to let you know that your lab results have been reviewed and are attached.    Your results came back quick!  No covid.  I faxed them to the surgery center.  You might want to print this out and bring it with you just in case.  Also faxed the preop yesterday.  Good luck with surgery.    Please let me know if you have any questions and have a great week!    Sincerely,    Coty Puckett PA-C    [facility]  [address]  [place] [address]  [phone] (p)

## 2020-11-06 ENCOUNTER — OFFICE VISIT (OUTPATIENT)
Dept: SURGERY | Facility: CLINIC | Age: 70
End: 2020-11-06
Payer: COMMERCIAL

## 2020-11-06 DIAGNOSIS — N60.92 ATYPICAL DUCTAL HYPERPLASIA OF LEFT BREAST: ICD-10-CM

## 2020-11-06 DIAGNOSIS — Z09 SURGERY FOLLOW-UP EXAMINATION: Primary | ICD-10-CM

## 2020-11-06 PROCEDURE — 99024 POSTOP FOLLOW-UP VISIT: CPT | Performed by: SURGERY

## 2020-11-06 NOTE — LETTER
November 6, 2020          Angelina Puckett PA-C  [address]  [place] [address]      RE:   Emma Rudolph 1950      Dear Colleague,    Thank you for referring your patient, Emma Rudolph, to [facility] at [facility]. Please see a copy of my visit note below.    Surgery Postoperative Note     S: Celi is a 69-year-old female who recently underwent left breast excisional biopsy.  She has done very well postoperatively.  No significant pain at her surgical site.  No wound concerns.  Pathology was again discussed today in clinic.     Breast: Left breast incision healing well without evidence of infection or significant underlying fluid collection     Pathology:   FINAL DIAGNOSIS:   Left breast tissue:   - Negative for malignancy.   - Florid complex proliferative breast changes with:       - Atypical ductal hyperplasia.       - Usual type ductal hyperplasia       - Sclerosing adenosis.       - Apocrine metaplasia.       - Papillomatosis.       - Microcalcifications.      A/P  Emma Rudolph is recovering from a left breast excisional biopsy.  The patient is doing very well postoperatively.  She may follow-up in the general surgery clinic on an as-needed basis.  Due to the findings of atypical ductal hyperplasia, I have recommended consultation with medical oncology to discuss benefits of chemoprevention.    Again, thank you for allowing me to participate in the care of your patient.      Sincerely,      Eileen Montes MD

## 2020-11-06 NOTE — PROGRESS NOTES
Surgery Postoperative Note    S: Celi is a 69-year-old female who recently underwent left breast excisional biopsy.  She has done very well postoperatively.  No significant pain at her surgical site.  No wound concerns.  Pathology was again discussed today in clinic.    Breast: Left breast incision healing well without evidence of infection or significant underlying fluid collection    Pathology:   FINAL DIAGNOSIS:   Left breast tissue:   - Negative for malignancy.   - Florid complex proliferative breast changes with:       - Atypical ductal hyperplasia.       - Usual type ductal hyperplasia       - Sclerosing adenosis.       - Apocrine metaplasia.       - Papillomatosis.       - Microcalcifications.     A/P  Emma Rudolph is recovering from a left breast excisional biopsy.  The patient is doing very well postoperatively.  She may follow-up in the general surgery clinic on an as-needed basis.  Due to the findings of atypical ductal hyperplasia, I have recommended consultation with medical oncology to discuss benefits of chemoprevention.    Thank you for the opportunity to help in her care.    Eileen Montes MD   [facility]  [phone]    Please route or send letter to:  Primary Care Provider (PCP) and Referring Provider

## 2020-11-09 NOTE — TELEPHONE ENCOUNTER
Oncology/Surgical Oncology Referral Request:     Specialty Requested: Medical Oncology     Referring Provider: Eileen Oliveira MD    Referring Clinic/Organization: [facility]    Records location: [facility]     Requested Provider (if specified): Not Specified

## 2020-11-10 NOTE — TELEPHONE ENCOUNTER
RECORDS STATUS - ALL OTHER DIAGNOSIS      RECORDS RECEIVED FROM: [facility]    DATE RECEIVED: 11/18/2020    NOTES STATUS DETAILS   OFFICE NOTE from referring provider Complete Eileen Montes MD   OFFICE NOTE from medical oncologist N/A    DISCHARGE SUMMARY from hospital Complete 10/28/2020 Postoperative Pain (Primary Dx) Breast Mass, left    DISCHARGE REPORT from the ER     OPERATIVE REPORT Complete Epic - See Breast Biopsies Below and in EPIC   MEDICATION LIST Complete [facility]   CLINICAL TRIAL TREATMENTS TO DATE     LABS     PATHOLOGY REPORTS Complete- Internal Biopsy  [facility] 10/28/2020 Surgical Breast Pathology   Left breast tissue:   - Negative for malignancy.   - Florid complex proliferative breast changes with:       - Atypical ductal hyperplasia.       - Usual type ductal hyperplasia       - Sclerosing adenosis.       - Apocrine metaplasia.       - Papillomatosis.       - Microcalcifications.    10/2/2020   Surgical Breast Pathology   A: Left breast, 12:00, 3 cm FN, ultrasound-guided core biopsy   - Complex sclerosing lesion   - Negative for atypia and malignancy     B: Left breast, 11:00, 3 cm FN, ultrasound-guided core biopsy   - Benign breast tissue with adenosis   - Negative for atypia and malignancy    ANYTHING RELATED TO DIAGNOSIS     GENONOMIC TESTING     TYPE:     IMAGING (NEED IMAGES & REPORT)     CT SCANS     MRI Complete MRI Brain -[facility] 2/4/2019    MAMMO Complete MA Breast 10/28/2020     MA Post Procedure left 10/2/2020   ULTRASOUND Complete US Breast Radioactive Seed 10/28/2020     US Breast Biopsy Core Needle 10/2/2020     US Breast Left Limited 9/24/2020    PET       Action    Action Taken 11/10/2020 10:45am  I called the [facility] ph: [phone] and they are going to push the MRI of the brain to  PACS.     I called pt Celi - all her records are internal     11/11/2020 1:15pm -   I called the [facility] - they are going to try  pushing the IMG over again

## 2020-11-14 NOTE — PROGRESS NOTES
"[facility]    Hematology/Oncology New Patient Note      Today's Date: 11/18/20    Reason for Consult: Atypical ductal hyperplasia.    Emma Rudolph is a 70 year old female who is being evaluated via a billable video visit.      The patient has been notified of following:     \"This video visit will be conducted via a call between you and your physician/provider. We have found that certain health care needs can be provided without the need for an in-person physical exam.  This service lets us provide the care you need with a video conversation during the COVID-19 pandemic.  If a prescription is necessary we can send it directly to your pharmacy.  If lab work is needed we can place an order for that and you can then stop by our lab to have the test done at a later time.    Video visits are billed at different rates depending on your insurance coverage.  Please reach out to your insurance provider with any questions.    If during the course of the call the physician/provider feels a video visit is not appropriate, you will not be charged for this service.\"    Patient has given verbal consent for Video visit? Yes    How would you like to obtain your AVS? Alexandrahart    Patient would like the video invitation sent by: Text to cell phone: [phone]         Video-Visit Details    Type of service:  Video Visit      Originating Location (pt. Location): Home    Distant Location (provider location):  [facility]     Mode of Communication:  Video Conference via Doximity    Video visit duration: 20 minutes      HISTORY OF PRESENT ILLNESS: Emma Rudolph is a 70 year old female who presents with the following issues:  1. 9/18/2020: Mammogram showed architectural distortion in left breast at 11:00-1:00 retroareolar mid-depth. No concerning findings in right breast.  2. 9/24/2020: Left breast U/S showed oval hypoechoic mass measuring 1.1 cm at 11:00, 3 cm from nipple; oval " hypoechoic mass measuring 1 cm at 12:00, 3 cm from nipple; normal-appearing parenchyma between 2 lesions. No enlarged lymph nodes in left axilla.  3. 10/2/2020: U/S-guided left breast needles biopsy at 12:00 showed a complex sclerosing lesion, negative for atypica and malignancy. Biopsy at 11:00 showed benign breast tissue with adenosis, negative for atypica and malignancy.  4. 10/28/2020: Underwent left breast excision under care of Dr. Eileen Montes. Pathology was negative for malignancy but showed atypical ductal hyperplasia, apocrine metaplasia, papillomatosis, microcalcifications.    Celi reports feeling overall well with no specific complaints.    Pat's family history is significant for her mother who was diagnosed with breast cancer in her 80's.    REVIEW OF SYSTEMS:   14 point ROS was reviewed and is negative other than as noted above in HPI.       HOME MEDICATIONS:  Current Outpatient Medications   Medication Sig Dispense Refill     ASPIRIN PO Take 81 mg by mouth daily       atorvastatin (LIPITOR) 20 MG tablet Take 1 tablet (20 mg) by mouth daily 90 tablet 3     Blood Pressure KIT Please check your BP daily. 1 kit 0     Cholecalciferol (VITAMIN D) 1000 UNITS capsule Take 1 capsule by mouth daily.       desvenlafaxine (PRISTIQ) 100 MG 24 hr tablet Take 1 tablet (100 mg) by mouth daily 90 tablet 1     hydrOXYzine (ATARAX) 25 MG tablet        Multiple Vitamins-Minerals (MULTIVITAMIN OR) Take by mouth daily           ALLERGIES:  Allergies   Allergen Reactions     Penicillins Swelling, Rash and Hives     Amoxicillin     Sulfa Drugs Hives     Trazodone Fatigue     Really tired         PAST MEDICAL HISTORY:  Past Medical History:   Diagnosis Date     Breast lesion     LEFT     Generalized anxiety disorder      Hyperlipidemia LDL goal <130      Hypertension      Mild major depression (H)      Vitamin D deficiency disease      Gynecologic history:  Age of menarche at 13-14; G0, no HRT or OCP use.      PAST SURGICAL  HISTORY:  Past Surgical History:   Procedure Laterality Date     BIOPSY BREAST SEED LOCALIZATION Left 10/28/2020    Procedure: 2 SEED LOCALIZED LEFT BREAST BIOPSY;  Surgeon: Eileen Montes MD;  Location:  OR     COLONOSCOPY       EYE SURGERY       GYN SURGERY       H ARGON LASER FOR RETINAL TEAR Left 3/2014     HEAD & NECK SURGERY      wisdom teeth     LAPAROSCOPIC SINGLE SITE SALPINGO-OOPHORECTOMY Bilateral 4/15/2015    Procedure: LAPAROSCOPIC SINGLE SITE SALPINGO-OOPHORECTOMY;  Surgeon: Leonora Zavaleta MD;  Location:  [facility]     wisdom teeth removal  age 19         SOCIAL HISTORY:  Social History     Socioeconomic History     Marital status: Single     Spouse name: Not on file     Number of children: Not on file     Years of education: Not on file     Highest education level: Not on file   Occupational History     Employer: RETIRED     Comment: [employer]   Social Needs     Financial resource strain: Not on file     Food insecurity     Worry: Not on file     Inability: Not on file     Transportation needs     Medical: Not on file     Non-medical: Not on file   Tobacco Use     Smoking status: Never Smoker     Smokeless tobacco: Never Used   Substance and Sexual Activity     Alcohol use: Yes     Alcohol/week: 0.0 standard drinks     Comment: socially     Drug use: No     Sexual activity: Not Currently     Partners: Female   Lifestyle     Physical activity     Days per week: Not on file     Minutes per session: Not on file     Stress: Not on file   Relationships     Social connections     Talks on phone: Not on file     Gets together: Not on file     Attends Jainism service: Not on file     Active member of club or organization: Not on file     Attends meetings of clubs or organizations: Not on file     Relationship status: Not on file     Intimate partner violence     Fear of current or ex partner: Not on file     Emotionally abused: Not on file     Physically abused: Not on file     Forced sexual  activity: Not on file   Other Topics Concern     Parent/sibling w/ CABG, MI or angioplasty before 65F 55M? Yes      Service Not Asked     Blood Transfusions Not Asked     Caffeine Concern Not Asked     Occupational Exposure Not Asked     Hobby Hazards Not Asked     Sleep Concern Not Asked     Stress Concern Not Asked     Weight Concern Not Asked     Special Diet Not Asked     Back Care Not Asked     Exercise Not Asked     Bike Helmet Not Asked     Seat Belt Yes     Self-Exams Not Asked   Social History Narrative     Not on file         FAMILY HISTORY:  Family History   Problem Relation Age of Onset     Breast Cancer Mother 80     Neurologic Disorder Father         Parkinson's     Coronary Artery Disease Father 44        MI     Lipids Brother      Lipids Brother      Family History Negative Brother      Lipids Sister      Family History Negative Sister      Family History Negative Sister      Family History Negative Sister      Cerebrovascular Disease Maternal Grandmother 86     Cancer Maternal Grandfather 69        leukemia     Cancer Paternal Grandfather 69        stomach     Diabetes No family hx of      Cancer - colorectal No family hx of      Hypertension No family hx of      Hyperlipidemia No family hx of      Colon Cancer No family hx of      Prostate Cancer No family hx of      Other Cancer No family hx of      Depression No family hx of      Anxiety Disorder No family hx of      Mental Illness No family hx of      Substance Abuse No family hx of      Anesthesia Reaction No family hx of      Asthma No family hx of      Osteoporosis No family hx of      Genetic Disorder No family hx of      Thyroid Disease No family hx of      Obesity No family hx of      Unknown/Adopted No family hx of          PHYSICAL EXAM:  Vital signs:  Not taken.  ECO  GENERAL: No acute distress.  EYES: No scleral icterus. No overt erythema.  RESPIRATORY: No cough.  No labored breathing.  MUSCULOSKELETAL: Range of motion in  the neck, shoulders, and arms appear normal.  SKIN: No overt rashes, discolorations, or lesions over the face and neck.  NEUROLOGIC: Alert.  No overt tremors.  PSYCHIATRIC: Normal affect and mood.  Does not appear anxious.    The rest of a comprehensive physical examination is deferred due to PHE (public health emergency) video visit restrictions.    LABS:  CBC RESULTS:   Recent Labs   Lab Test 08/27/20  0821   WBC 5.0   RBC 4.96   HGB 15.5   HCT 45.4   MCV 92   MCH 31.3   MCHC 34.1   RDW 12.4          Recent Labs   Lab Test 08/27/20  0821 08/02/19  1027 07/02/19  0824     --  141   POTASSIUM 3.5  --  4.0   CHLORIDE 106  --  109   CO2 26  --  24   ANIONGAP 6  --  8   * 98 145*   BUN 15  --  11   CR 0.81  --  0.77   JAYDEN 9.4  --  9.4         PATHOLOGY:  Reviewed as per HPI.    IMAGING:  Reviewed as per HPI.    ASSESSMENT/PLAN:  Emma Rudolph is a 70 year old female with the following issues:  1. Left breast atypical ductal hyperplasia  -I discussed with Celi that atypical ductal  hyperplasia is considered a marker for increased risk of developing invasive breast cancer, approximately ranging 11-40%, 5-15 years from time of diagnosis of ADH.  I told her that this is not a cancer.  -I discussed the option of tamoxifen for risk reduction.  Per recent studies, a low dose of tamoxifen 10 mg every other day can effectively reduce the risk of developing invasive breast cancer by 52% with less side effects than the full dose of 20 mg as per the FLORES-01 [place] study.  -Also discussed alternate option of raloxifene, another selective estrogen receptor modular like tamoxifen but which might be more tolerable. Also discussed that both drugs have positive effects on bone density.  -I discussed the potential side effects of tamoxifen, including, but not limited to: mood changes, hot flashes, night sweats, weight gain, myalgias, fatigue, nausea, earlier cataract formation and small risk of blood clots and  uterine cancer.  -She would like to try raloxifene -- prescription issued.  -I did offer high risk surveillance with clinical breast exam and breast imaging every 6 months with yearly mammograms alternating with yearly breast MRI.  I discussed the pros and cons of breast MRI, including the high false positive rate of breast MRI. She is okay with proceeding with this plan of care.  -Will plan for breast MRI in 3/2021.    2. Anxiety  -Mood stable.  -Continue desvenlafaxine.    Return in 3/2021 after breast MRI.    Priya Ontiveros MD  Hematology/Oncology  [facility]

## 2020-11-17 ENCOUNTER — TELEPHONE (OUTPATIENT)
Dept: ONCOLOGY | Facility: CLINIC | Age: 70
End: 2020-11-17

## 2020-11-17 NOTE — TELEPHONE ENCOUNTER
Called Celi regarding her new vitual visit tomorrow with Dr. Ontiveros. Pat did not realize that it was a virtual visit. She would like to use her computer rather than  a smart phone. She is aware that visit could also be telephone if her computer link does not work. Madeleine Stokes RN,BSN,OCN

## 2020-11-18 ENCOUNTER — TELEPHONE (OUTPATIENT)
Dept: ONCOLOGY | Facility: CLINIC | Age: 70
End: 2020-11-18

## 2020-11-18 ENCOUNTER — PRE VISIT (OUTPATIENT)
Dept: ONCOLOGY | Facility: CLINIC | Age: 70
End: 2020-11-18

## 2020-11-18 ENCOUNTER — VIRTUAL VISIT (OUTPATIENT)
Dept: ONCOLOGY | Facility: CLINIC | Age: 70
End: 2020-11-18
Attending: SURGERY
Payer: COMMERCIAL

## 2020-11-18 DIAGNOSIS — F41.9 ANXIETY: ICD-10-CM

## 2020-11-18 DIAGNOSIS — N60.92 ATYPICAL DUCTAL HYPERPLASIA OF LEFT BREAST: Primary | ICD-10-CM

## 2020-11-18 PROCEDURE — 999N001193 HC VIDEO/TELEPHONE VISIT; NO CHARGE

## 2020-11-18 PROCEDURE — 99204 OFFICE O/P NEW MOD 45 MIN: CPT | Mod: 95 | Performed by: INTERNAL MEDICINE

## 2020-11-18 RX ORDER — RALOXIFENE HYDROCHLORIDE 60 MG/1
60 TABLET, FILM COATED ORAL DAILY
Qty: 90 TABLET | Refills: 3 | Status: SHIPPED | OUTPATIENT
Start: 2020-11-18 | End: 2021-02-16

## 2020-11-18 ASSESSMENT — PAIN SCALES - GENERAL: PAINLEVEL: NO PAIN (0)

## 2020-11-18 NOTE — LETTER
"    11/18/2020         RE: Emma Rudolph  [address]  [facility] [address]        Dear Colleague,    Thank you for referring your patient, Emma Rudolph, to the [facility]. Please see a copy of my visit note below.    [facility]    Hematology/Oncology New Patient Note      Today's Date: 11/18/20    Reason for Consult: Atypical ductal hyperplasia.    Emma Rudolph is a 70 year old female who is being evaluated via a billable video visit.      The patient has been notified of following:     \"This video visit will be conducted via a call between you and your physician/provider. We have found that certain health care needs can be provided without the need for an in-person physical exam.  This service lets us provide the care you need with a video conversation during the COVID-19 pandemic.  If a prescription is necessary we can send it directly to your pharmacy.  If lab work is needed we can place an order for that and you can then stop by our lab to have the test done at a later time.    Video visits are billed at different rates depending on your insurance coverage.  Please reach out to your insurance provider with any questions.    If during the course of the call the physician/provider feels a video visit is not appropriate, you will not be charged for this service.\"    Patient has given verbal consent for Video visit? Yes    How would you like to obtain your AVS? Yared    Patient would like the video invitation sent by: Text to cell phone: [phone]         Video-Visit Details    Type of service:  Video Visit      Originating Location (pt. Location): Home    Distant Location (provider location):  [facility]     Mode of Communication:  Video Conference via Doximity    Video visit duration: 20 minutes      HISTORY OF PRESENT ILLNESS: Emma Rudolph is a 70 year old female who presents with the following " issues:  1. 9/18/2020: Mammogram showed architectural distortion in left breast at 11:00-1:00 retroareolar mid-depth. No concerning findings in right breast.  2. 9/24/2020: Left breast U/S showed oval hypoechoic mass measuring 1.1 cm at 11:00, 3 cm from nipple; oval hypoechoic mass measuring 1 cm at 12:00, 3 cm from nipple; normal-appearing parenchyma between 2 lesions. No enlarged lymph nodes in left axilla.  3. 10/2/2020: U/S-guided left breast needles biopsy at 12:00 showed a complex sclerosing lesion, negative for atypica and malignancy. Biopsy at 11:00 showed benign breast tissue with adenosis, negative for atypica and malignancy.  4. 10/28/2020: Underwent left breast excision under care of Dr. Eileen Montes. Pathology was negative for malignancy but showed atypical ductal hyperplasia, apocrine metaplasia, papillomatosis, microcalcifications.    Pat reports feeling overall well with no specific complaints.    Pat's family history is significant for her mother who was diagnosed with breast cancer in her 80's.    REVIEW OF SYSTEMS:   14 point ROS was reviewed and is negative other than as noted above in HPI.       HOME MEDICATIONS:  Current Outpatient Medications   Medication Sig Dispense Refill     ASPIRIN PO Take 81 mg by mouth daily       atorvastatin (LIPITOR) 20 MG tablet Take 1 tablet (20 mg) by mouth daily 90 tablet 3     Blood Pressure KIT Please check your BP daily. 1 kit 0     Cholecalciferol (VITAMIN D) 1000 UNITS capsule Take 1 capsule by mouth daily.       desvenlafaxine (PRISTIQ) 100 MG 24 hr tablet Take 1 tablet (100 mg) by mouth daily 90 tablet 1     hydrOXYzine (ATARAX) 25 MG tablet        Multiple Vitamins-Minerals (MULTIVITAMIN OR) Take by mouth daily           ALLERGIES:  Allergies   Allergen Reactions     Penicillins Swelling, Rash and Hives     Amoxicillin     Sulfa Drugs Hives     Trazodone Fatigue     Really tired         PAST MEDICAL HISTORY:  Past Medical History:   Diagnosis Date      Breast lesion     LEFT     Generalized anxiety disorder      Hyperlipidemia LDL goal <130      Hypertension      Mild major depression (H)      Vitamin D deficiency disease      Gynecologic history:  Age of menarche at 13-14; G0, no HRT or OCP use.      PAST SURGICAL HISTORY:  Past Surgical History:   Procedure Laterality Date     BIOPSY BREAST SEED LOCALIZATION Left 10/28/2020    Procedure: 2 SEED LOCALIZED LEFT BREAST BIOPSY;  Surgeon: Eileen Montes MD;  Location:  OR     COLONOSCOPY       EYE SURGERY       GYN SURGERY       H ARGON LASER FOR RETINAL TEAR Left 3/2014     HEAD & NECK SURGERY      wisdom teeth     LAPAROSCOPIC SINGLE SITE SALPINGO-OOPHORECTOMY Bilateral 4/15/2015    Procedure: LAPAROSCOPIC SINGLE SITE SALPINGO-OOPHORECTOMY;  Surgeon: Leonora Zavaleta MD;  Location:  [facility]     wisdom teeth removal  age 19         SOCIAL HISTORY:  Social History     Socioeconomic History     Marital status: Single     Spouse name: Not on file     Number of children: Not on file     Years of education: Not on file     Highest education level: Not on file   Occupational History     Employer: RETIRED     Comment: [employer]   Social Needs     Financial resource strain: Not on file     Food insecurity     Worry: Not on file     Inability: Not on file     Transportation needs     Medical: Not on file     Non-medical: Not on file   Tobacco Use     Smoking status: Never Smoker     Smokeless tobacco: Never Used   Substance and Sexual Activity     Alcohol use: Yes     Alcohol/week: 0.0 standard drinks     Comment: socially     Drug use: No     Sexual activity: Not Currently     Partners: Female   Lifestyle     Physical activity     Days per week: Not on file     Minutes per session: Not on file     Stress: Not on file   Relationships     Social connections     Talks on phone: Not on file     Gets together: Not on file     Attends Episcopalian service: Not on file     Active member of club or organization: Not on file      Attends meetings of clubs or organizations: Not on file     Relationship status: Not on file     Intimate partner violence     Fear of current or ex partner: Not on file     Emotionally abused: Not on file     Physically abused: Not on file     Forced sexual activity: Not on file   Other Topics Concern     Parent/sibling w/ CABG, MI or angioplasty before 65F 55M? Yes      Service Not Asked     Blood Transfusions Not Asked     Caffeine Concern Not Asked     Occupational Exposure Not Asked     Hobby Hazards Not Asked     Sleep Concern Not Asked     Stress Concern Not Asked     Weight Concern Not Asked     Special Diet Not Asked     Back Care Not Asked     Exercise Not Asked     Bike Helmet Not Asked     Seat Belt Yes     Self-Exams Not Asked   Social History Narrative     Not on file         FAMILY HISTORY:  Family History   Problem Relation Age of Onset     Breast Cancer Mother 80     Neurologic Disorder Father         Parkinson's     Coronary Artery Disease Father 44        MI     Lipids Brother      Lipids Brother      Family History Negative Brother      Lipids Sister      Family History Negative Sister      Family History Negative Sister      Family History Negative Sister      Cerebrovascular Disease Maternal Grandmother 86     Cancer Maternal Grandfather 69        leukemia     Cancer Paternal Grandfather 69        stomach     Diabetes No family hx of      Cancer - colorectal No family hx of      Hypertension No family hx of      Hyperlipidemia No family hx of      Colon Cancer No family hx of      Prostate Cancer No family hx of      Other Cancer No family hx of      Depression No family hx of      Anxiety Disorder No family hx of      Mental Illness No family hx of      Substance Abuse No family hx of      Anesthesia Reaction No family hx of      Asthma No family hx of      Osteoporosis No family hx of      Genetic Disorder No family hx of      Thyroid Disease No family hx of      Obesity No family hx  of      Unknown/Adopted No family hx of          PHYSICAL EXAM:  Vital signs:  Not taken.  ECO  GENERAL: No acute distress.  EYES: No scleral icterus. No overt erythema.  RESPIRATORY: No cough.  No labored breathing.  MUSCULOSKELETAL: Range of motion in the neck, shoulders, and arms appear normal.  SKIN: No overt rashes, discolorations, or lesions over the face and neck.  NEUROLOGIC: Alert.  No overt tremors.  PSYCHIATRIC: Normal affect and mood.  Does not appear anxious.    The rest of a comprehensive physical examination is deferred due to PHE (public health emergency) video visit restrictions.    LABS:  CBC RESULTS:   Recent Labs   Lab Test 20  0821   WBC 5.0   RBC 4.96   HGB 15.5   HCT 45.4   MCV 92   MCH 31.3   MCHC 34.1   RDW 12.4          Recent Labs   Lab Test 20  0821 19  1027 19  0824     --  141   POTASSIUM 3.5  --  4.0   CHLORIDE 106  --  109   CO2 26  --  24   ANIONGAP 6  --  8   * 98 145*   BUN 15  --  11   CR 0.81  --  0.77   JAYDEN 9.4  --  9.4         PATHOLOGY:  Reviewed as per HPI.    IMAGING:  Reviewed as per HPI.    ASSESSMENT/PLAN:  Emma Rudolph is a 70 year old female with the following issues:  1. Left breast atypical ductal hyperplasia  -I discussed with Celi that atypical ductal  hyperplasia is considered a marker for increased risk of developing invasive breast cancer, approximately ranging 11-40%, 5-15 years from time of diagnosis of ADH.  I told her that this is not a cancer.  -I discussed the option of tamoxifen for risk reduction.  Per recent studies, a low dose of tamoxifen 10 mg every other day can effectively reduce the risk of developing invasive breast cancer by 52% with less side effects than the full dose of 20 mg as per the FLORES-01 Nepali study.  -Also discussed alternate option of raloxifene, another selective estrogen receptor modular like tamoxifen but which might be more tolerable. Also discussed that both drugs have  "positive effects on bone density.  -I discussed the potential side effects of tamoxifen, including, but not limited to: mood changes, hot flashes, night sweats, weight gain, myalgias, fatigue, nausea, earlier cataract formation and small risk of blood clots and uterine cancer.  -She would like to try raloxifene -- prescription issued.  -I did offer high risk surveillance with clinical breast exam and breast imaging every 6 months with yearly mammograms alternating with yearly breast MRI.  I discussed the pros and cons of breast MRI, including the high false positive rate of breast MRI. She is okay with proceeding with this plan of care.  -Will plan for breast MRI in 3/2021.    2. Anxiety  -Mood stable.  -Continue desvenlafaxine.    Return in 3/2021 after breast MRI.    Priya Ontiveros MD  Hematology/Oncology  [facility]    Emma Rudolph is a 70 year old female who is being evaluated via a billable video visit.      The patient has been notified of following:     \"This video visit will be conducted via a call between you and your physician/provider. We have found that certain health care needs can be provided without the need for an in-person physical exam.  This service lets us provide the care you need with a video conversation.  If a prescription is necessary we can send it directly to your pharmacy.  If lab work is needed we can place an order for that and you can then stop by our lab to have the test done at a later time.    Video visits are billed at different rates depending on your insurance coverage.  Please reach out to your insurance provider with any questions.    If during the course of the call the physician/provider feels a video visit is not appropriate, you will not be charged for this service.\"    Patient has given verbal consent for Video visit? Yes  How would you like to obtain your AVS? MyChart  If you are dropped from the video visit, the video invite should be " resent to: Text to cell phone: [phone]   Will anyone else be joining your video visit? No      Video-Visit Details    Type of service:  Video Visit      Originating Location (pt. Location): Home    Distant Location (provider location):  [facility]     Platform used for Video Visit: Doximity Shari J. Schoenberger, CMA          Again, thank you for allowing me to participate in the care of your patient.        Sincerely,        Priya Ontiveros MD

## 2020-11-18 NOTE — TELEPHONE ENCOUNTER
Pat was  E mailed information on Evista. Called Pat and reviewed Evista side effects with patient. Pat will also be mailed information to her home address. Madeleine Stokes RN,BSN,OCN

## 2020-11-18 NOTE — PROGRESS NOTES
"Emma Rudolph is a 70 year old female who is being evaluated via a billable video visit.      The patient has been notified of following:     \"This video visit will be conducted via a call between you and your physician/provider. We have found that certain health care needs can be provided without the need for an in-person physical exam.  This service lets us provide the care you need with a video conversation.  If a prescription is necessary we can send it directly to your pharmacy.  If lab work is needed we can place an order for that and you can then stop by our lab to have the test done at a later time.    Video visits are billed at different rates depending on your insurance coverage.  Please reach out to your insurance provider with any questions.    If during the course of the call the physician/provider feels a video visit is not appropriate, you will not be charged for this service.\"    Patient has given verbal consent for Video visit? Yes  How would you like to obtain your AVS? MyChart  If you are dropped from the video visit, the video invite should be resent to: Text to cell phone: [phone]   Will anyone else be joining your video visit? No      Video-Visit Details    Type of service:  Video Visit      Originating Location (pt. Location): Home    Distant Location (provider location):  [facility]     Platform used for Video Visit: Doximity Shari J. Schoenberger, [facility]      "

## 2021-01-15 ENCOUNTER — HEALTH MAINTENANCE LETTER (OUTPATIENT)
Age: 71
End: 2021-01-15

## 2021-02-16 DIAGNOSIS — N60.92 ATYPICAL DUCTAL HYPERPLASIA OF LEFT BREAST: ICD-10-CM

## 2021-02-16 RX ORDER — RALOXIFENE HYDROCHLORIDE 60 MG/1
60 TABLET, FILM COATED ORAL DAILY
Qty: 90 TABLET | Refills: 3 | Status: SHIPPED | OUTPATIENT
Start: 2021-02-16 | End: 2021-12-08

## 2021-03-04 ENCOUNTER — IMMUNIZATION (OUTPATIENT)
Dept: NURSING | Facility: CLINIC | Age: 71
End: 2021-03-04
Payer: COMMERCIAL

## 2021-03-04 PROCEDURE — 91301 PR COVID VAC MODERNA 100 MCG/0.5 ML IM: CPT

## 2021-03-04 PROCEDURE — 0011A PR COVID VAC MODERNA 100 MCG/0.5 ML IM: CPT

## 2021-03-09 NOTE — PROGRESS NOTES
[facility]    Hematology/Oncology Established Patient Note      Today's Date: 3/15/2021    Reason for follow-up: Atypical ductal hyperplasia.    HISTORY OF PRESENT ILLNESS: Emma Rudolph is a 70 year old female who presents with the following issues:  1. 9/18/2020: Mammogram showed architectural distortion in left breast at 11:00-1:00 retroareolar mid-depth. No concerning findings in right breast.  2. 9/24/2020: Left breast U/S showed oval hypoechoic mass measuring 1.1 cm at 11:00, 3 cm from nipple; oval hypoechoic mass measuring 1 cm at 12:00, 3 cm from nipple; normal-appearing parenchyma between 2 lesions. No enlarged lymph nodes in left axilla.  3. 10/2/2020: U/S-guided left breast needles biopsy at 12:00 showed a complex sclerosing lesion, negative for atypica and malignancy. Biopsy at 11:00 showed benign breast tissue with adenosis, negative for atypica and malignancy.  4. 10/28/2020: Underwent left breast excision under care of Dr. Eileen Montes. Pathology was negative for malignancy but showed atypical ductal hyperplasia, apocrine metaplasia, papillomatosis, microcalcifications.  5. 11/18/2020: Started raloxifene.    INTERIM HISTORY:  Celi reports feeling overall well. She did have an episode of a right calf cramp a few days ago.     REVIEW OF SYSTEMS:   14 point ROS was reviewed and is negative other than as noted above in HPI.       HOME MEDICATIONS:  Current Outpatient Medications   Medication Sig Dispense Refill     ASPIRIN PO Take 81 mg by mouth daily       atorvastatin (LIPITOR) 20 MG tablet Take 1 tablet (20 mg) by mouth daily 90 tablet 3     Blood Pressure KIT Please check your BP daily. 1 kit 0     Cholecalciferol (VITAMIN D) 1000 UNITS capsule Take 1 capsule by mouth daily.       desvenlafaxine (PRISTIQ) 100 MG 24 hr tablet Take 1 tablet (100 mg) by mouth daily 90 tablet 1     hydrOXYzine (ATARAX) 25 MG tablet        Multiple Vitamins-Minerals (MULTIVITAMIN OR) Take by mouth  daily       raloxifene (EVISTA) 60 MG tablet Take 1 tablet (60 mg) by mouth daily 90 tablet 3         ALLERGIES:  Allergies   Allergen Reactions     Penicillins Swelling, Rash and Hives     Amoxicillin     Sulfa Drugs Hives     Trazodone Fatigue     Really tired         PAST MEDICAL HISTORY:  Past Medical History:   Diagnosis Date     Breast lesion     LEFT     Generalized anxiety disorder      Hyperlipidemia LDL goal <130      Hypertension      Mild major depression (H)      Vitamin D deficiency disease      Gynecologic history:  Age of menarche at 13-14; G0, no HRT or OCP use.      PAST SURGICAL HISTORY:  Past Surgical History:   Procedure Laterality Date     BIOPSY BREAST SEED LOCALIZATION Left 10/28/2020    Procedure: 2 SEED LOCALIZED LEFT BREAST BIOPSY;  Surgeon: Eileen Montes MD;  Location:  OR     COLONOSCOPY       EYE SURGERY Left 11/2020    vitrictomy      GYN SURGERY       H ARGON LASER FOR RETINAL TEAR Left 3/2014     HEAD & NECK SURGERY      wisdom teeth     LAPAROSCOPIC SINGLE SITE SALPINGO-OOPHORECTOMY Bilateral 4/15/2015    Procedure: LAPAROSCOPIC SINGLE SITE SALPINGO-OOPHORECTOMY;  Surgeon: Leonora Zavaleta MD;  Location:  [facility]     wisdom teeth removal  age 19         SOCIAL HISTORY:  Social History     Socioeconomic History     Marital status: Single     Spouse name: Not on file     Number of children: Not on file     Years of education: Not on file     Highest education level: Not on file   Occupational History     Employer: RETIRED     Comment: [employer]   Social Needs     Financial resource strain: Not on file     Food insecurity     Worry: Not on file     Inability: Not on file     Transportation needs     Medical: Not on file     Non-medical: Not on file   Tobacco Use     Smoking status: Never Smoker     Smokeless tobacco: Never Used   Substance and Sexual Activity     Alcohol use: Yes     Alcohol/week: 0.0 standard drinks     Comment: socially     Drug use: No     Sexual activity:  Not Currently     Partners: Female   Lifestyle     Physical activity     Days per week: Not on file     Minutes per session: Not on file     Stress: Not on file   Relationships     Social connections     Talks on phone: Not on file     Gets together: Not on file     Attends Taoism service: Not on file     Active member of club or organization: Not on file     Attends meetings of clubs or organizations: Not on file     Relationship status: Not on file     Intimate partner violence     Fear of current or ex partner: Not on file     Emotionally abused: Not on file     Physically abused: Not on file     Forced sexual activity: Not on file   Other Topics Concern     Parent/sibling w/ CABG, MI or angioplasty before 65F 55M? Yes      Service Not Asked     Blood Transfusions Not Asked     Caffeine Concern Not Asked     Occupational Exposure Not Asked     Hobby Hazards Not Asked     Sleep Concern Not Asked     Stress Concern Not Asked     Weight Concern Not Asked     Special Diet Not Asked     Back Care Not Asked     Exercise Not Asked     Bike Helmet Not Asked     Seat Belt Yes     Self-Exams Not Asked   Social History Narrative     Not on file         FAMILY HISTORY:  Family History   Problem Relation Age of Onset     Breast Cancer Mother 80     Neurologic Disorder Father         Parkinson's     Coronary Artery Disease Father 44        MI     Lipids Brother      Lipids Brother      Family History Negative Brother      Lipids Sister      Family History Negative Sister      Family History Negative Sister      Family History Negative Sister      Cerebrovascular Disease Maternal Grandmother 86     Cancer Maternal Grandfather 69        leukemia     Cancer Paternal Grandfather 69        stomach     Diabetes No family hx of      Cancer - colorectal No family hx of      Hypertension No family hx of      Hyperlipidemia No family hx of      Colon Cancer No family hx of      Prostate Cancer No family hx of      Other  "Cancer No family hx of      Depression No family hx of      Anxiety Disorder No family hx of      Mental Illness No family hx of      Substance Abuse No family hx of      Anesthesia Reaction No family hx of      Asthma No family hx of      Osteoporosis No family hx of      Genetic Disorder No family hx of      Thyroid Disease No family hx of      Obesity No family hx of      Unknown/Adopted No family hx of          PHYSICAL EXAM:  Vital signs:  BP (!) 166/84   Pulse 88   Temp 97.7  F (36.5  C) (Oral)   Resp 16   Ht 1.626 m (5' 4\")   Wt 76.9 kg (169 lb 9.6 oz)   LMP  (LMP Unknown)   SpO2 100%   BMI 29.11 kg/m    GENERAL/CONSTITUTIONAL: No acute distress.  EYES: No erythema or scleral icterus.  LYMPH: No cervical, supraclavicular, axillary, epitroclear adenopathy.   BREAST: No palpable masses in either breast; nipples everted with no discharge; no rash or ulceration.  RESPIRATORY: No audible cough or wheezing.   MUSCULOSKELETAL: Warm and well-perfused, no cyanosis, clubbing, or edema.  NEUROLOGIC: Alert, oriented, answers questions appropriately.  INTEGUMENTARY: No rashes or jaundice.  GAIT: Steady, does not use assistive device    LABS:  CBC RESULTS:   Recent Labs   Lab Test 08/27/20  0821   WBC 5.0   RBC 4.96   HGB 15.5   HCT 45.4   MCV 92   MCH 31.3   MCHC 34.1   RDW 12.4          Recent Labs   Lab Test 08/27/20  0821 08/02/19  1027 07/02/19  0824     --  141   POTASSIUM 3.5  --  4.0   CHLORIDE 106  --  109   CO2 26  --  24   ANIONGAP 6  --  8   * 98 145*   BUN 15  --  11   CR 0.81  --  0.77   JAYDEN 9.4  --  9.4         PATHOLOGY:  Reviewed as per HPI.    IMAGING:  Reviewed as per HPI.    ASSESSMENT/PLAN:  Emma Rudolph is a 70 year old female with the following issues:  1. Left breast atypical ductal hyperplasia  -Pat is aware that atypical ductal  hyperplasia is considered a marker for increased risk of developing invasive breast cancer, approximately ranging 11-40%, 5-15 years " from time of diagnosis of ADH.  She is aware this is not a cancer.  -She is tolerating raloxifene well for risk reduction. Recommended 5 years of raloxifene.  -Continue high risk surveillance with clinical breast exam and breast imaging every 6 months with yearly mammograms alternating with yearly breast MRI.  -Will plan for mammogram in 9/2021.    2. Anxiety  -Mood stable.  -Continue desvenlafaxine.    Return in 6 months with mammogram.    Priya Ontiveros MD  Hematology/Oncology  [facility]

## 2021-03-11 ENCOUNTER — HOSPITAL ENCOUNTER (OUTPATIENT)
Dept: MRI IMAGING | Facility: CLINIC | Age: 71
Discharge: HOME | End: 2021-03-11
Attending: INTERNAL MEDICINE | Admitting: INTERNAL MEDICINE
Payer: COMMERCIAL

## 2021-03-11 DIAGNOSIS — N60.92 ATYPICAL DUCTAL HYPERPLASIA OF LEFT BREAST: ICD-10-CM

## 2021-03-11 LAB
CREAT BLD-MCNC: 0.8 MG/DL (ref 0.52–1.04)
GFR SERPL CREATININE-BSD FRML MDRD: 71 ML/MIN/{1.73_M2}

## 2021-03-11 PROCEDURE — 82565 ASSAY OF CREATININE: CPT

## 2021-03-11 PROCEDURE — A9585 GADOBUTROL INJECTION: HCPCS | Performed by: INTERNAL MEDICINE

## 2021-03-11 PROCEDURE — 255N000002 HC RX 255 OP 636: Performed by: INTERNAL MEDICINE

## 2021-03-11 PROCEDURE — 77049 MRI BREAST C-+ W/CAD BI: CPT | Mod: GZ

## 2021-03-11 RX ORDER — GADOBUTROL 604.72 MG/ML
6 INJECTION INTRAVENOUS ONCE
Status: COMPLETED | OUTPATIENT
Start: 2021-03-11 | End: 2021-03-11

## 2021-03-11 RX ADMIN — GADOBUTROL 6 ML: 604.72 INJECTION INTRAVENOUS at 11:26

## 2021-03-15 ENCOUNTER — ONCOLOGY VISIT (OUTPATIENT)
Dept: ONCOLOGY | Facility: CLINIC | Age: 71
End: 2021-03-15
Attending: INTERNAL MEDICINE
Payer: COMMERCIAL

## 2021-03-15 VITALS
WEIGHT: 169.6 LBS | OXYGEN SATURATION: 100 % | HEIGHT: 64 IN | SYSTOLIC BLOOD PRESSURE: 166 MMHG | TEMPERATURE: 97.7 F | DIASTOLIC BLOOD PRESSURE: 84 MMHG | RESPIRATION RATE: 16 BRPM | HEART RATE: 88 BPM | BODY MASS INDEX: 28.95 KG/M2

## 2021-03-15 DIAGNOSIS — F41.9 ANXIETY: ICD-10-CM

## 2021-03-15 DIAGNOSIS — N60.92 ATYPICAL DUCTAL HYPERPLASIA OF LEFT BREAST: Primary | ICD-10-CM

## 2021-03-15 PROCEDURE — 99213 OFFICE O/P EST LOW 20 MIN: CPT | Performed by: INTERNAL MEDICINE

## 2021-03-15 PROCEDURE — G0463 HOSPITAL OUTPT CLINIC VISIT: HCPCS

## 2021-03-15 ASSESSMENT — PAIN SCALES - GENERAL: PAINLEVEL: NO PAIN (0)

## 2021-03-15 ASSESSMENT — MIFFLIN-ST. JEOR: SCORE: 1274.3

## 2021-03-15 NOTE — PROGRESS NOTES
"Oncology Rooming Note    March 15, 2021 2:53 PM   Emma Rudolph is a 70 year old female who presents for:    Chief Complaint   Patient presents with     Oncology Clinic Visit     Initial Vitals: BP (!) 166/84   Pulse 88   Temp 97.7  F (36.5  C) (Oral)   Resp 16   Ht 1.626 m (5' 4\")   Wt 76.9 kg (169 lb 9.6 oz)   LMP  (LMP Unknown)   SpO2 100%   BMI 29.11 kg/m   Estimated body mass index is 29.11 kg/m  as calculated from the following:    Height as of this encounter: 1.626 m (5' 4\").    Weight as of this encounter: 76.9 kg (169 lb 9.6 oz). Body surface area is 1.86 meters squared.  No Pain (0) Comment: Data Unavailable   No LMP recorded (lmp unknown). Patient is postmenopausal.  Allergies reviewed: Yes  Medications reviewed: Yes    Medications: Medication refills not needed today.  Pharmacy name entered into EPIC:    [facility] - [facility], [place] - [address] [place] [address] [id number] - [place], [place] - [address] AT [facility] [address]    Clinical concerns: None       Angelina Ribeiro MA                  "

## 2021-03-15 NOTE — LETTER
3/15/2021         RE: Emma Rudolph  [address]  [facility] [address]        Dear Colleague,    Thank you for referring your patient, Emma Rudolph, to the [facility] [place]. Please see a copy of my visit note below.    [facility]    Hematology/Oncology Established Patient Note      Today's Date: 3/15/2021    Reason for follow-up: Atypical ductal hyperplasia.    HISTORY OF PRESENT ILLNESS: Emma Rudolph is a 70 year old female who presents with the following issues:  1. 9/18/2020: Mammogram showed architectural distortion in left breast at 11:00-1:00 retroareolar mid-depth. No concerning findings in right breast.  2. 9/24/2020: Left breast U/S showed oval hypoechoic mass measuring 1.1 cm at 11:00, 3 cm from nipple; oval hypoechoic mass measuring 1 cm at 12:00, 3 cm from nipple; normal-appearing parenchyma between 2 lesions. No enlarged lymph nodes in left axilla.  3. 10/2/2020: U/S-guided left breast needles biopsy at 12:00 showed a complex sclerosing lesion, negative for atypica and malignancy. Biopsy at 11:00 showed benign breast tissue with adenosis, negative for atypica and malignancy.  4. 10/28/2020: Underwent left breast excision under care of Dr. Eileen Montes. Pathology was negative for malignancy but showed atypical ductal hyperplasia, apocrine metaplasia, papillomatosis, microcalcifications.  5. 11/18/2020: Started raloxifene.    INTERIM HISTORY:  Pat reports feeling overall well. She did have an episode of a right calf cramp a few days ago.     REVIEW OF SYSTEMS:   14 point ROS was reviewed and is negative other than as noted above in HPI.       HOME MEDICATIONS:  Current Outpatient Medications   Medication Sig Dispense Refill     ASPIRIN PO Take 81 mg by mouth daily       atorvastatin (LIPITOR) 20 MG tablet Take 1 tablet (20 mg) by mouth daily 90 tablet 3     Blood Pressure KIT Please check your BP daily. 1 kit 0     Cholecalciferol  (VITAMIN D) 1000 UNITS capsule Take 1 capsule by mouth daily.       desvenlafaxine (PRISTIQ) 100 MG 24 hr tablet Take 1 tablet (100 mg) by mouth daily 90 tablet 1     hydrOXYzine (ATARAX) 25 MG tablet        Multiple Vitamins-Minerals (MULTIVITAMIN OR) Take by mouth daily       raloxifene (EVISTA) 60 MG tablet Take 1 tablet (60 mg) by mouth daily 90 tablet 3         ALLERGIES:  Allergies   Allergen Reactions     Penicillins Swelling, Rash and Hives     Amoxicillin     Sulfa Drugs Hives     Trazodone Fatigue     Really tired         PAST MEDICAL HISTORY:  Past Medical History:   Diagnosis Date     Breast lesion     LEFT     Generalized anxiety disorder      Hyperlipidemia LDL goal <130      Hypertension      Mild major depression (H)      Vitamin D deficiency disease      Gynecologic history:  Age of menarche at 13-14; G0, no HRT or OCP use.      PAST SURGICAL HISTORY:  Past Surgical History:   Procedure Laterality Date     BIOPSY BREAST SEED LOCALIZATION Left 10/28/2020    Procedure: 2 SEED LOCALIZED LEFT BREAST BIOPSY;  Surgeon: Eileen Montes MD;  Location:  OR     COLONOSCOPY       EYE SURGERY Left 11/2020    vitrictomy      GYN SURGERY       H ARGON LASER FOR RETINAL TEAR Left 3/2014     HEAD & NECK SURGERY      wisdom teeth     LAPAROSCOPIC SINGLE SITE SALPINGO-OOPHORECTOMY Bilateral 4/15/2015    Procedure: LAPAROSCOPIC SINGLE SITE SALPINGO-OOPHORECTOMY;  Surgeon: Leonora Zavaleta MD;  Location:  [facility]     wisdom teeth removal  age 19         SOCIAL HISTORY:  Social History     Socioeconomic History     Marital status: Single     Spouse name: Not on file     Number of children: Not on file     Years of education: Not on file     Highest education level: Not on file   Occupational History     Employer: RETIRED     Comment: [employer]   Social Needs     Financial resource strain: Not on file     Food insecurity     Worry: Not on file     Inability: Not on file     Transportation needs     Medical: Not on  file     Non-medical: Not on file   Tobacco Use     Smoking status: Never Smoker     Smokeless tobacco: Never Used   Substance and Sexual Activity     Alcohol use: Yes     Alcohol/week: 0.0 standard drinks     Comment: socially     Drug use: No     Sexual activity: Not Currently     Partners: Female   Lifestyle     Physical activity     Days per week: Not on file     Minutes per session: Not on file     Stress: Not on file   Relationships     Social connections     Talks on phone: Not on file     Gets together: Not on file     Attends Faith service: Not on file     Active member of club or organization: Not on file     Attends meetings of clubs or organizations: Not on file     Relationship status: Not on file     Intimate partner violence     Fear of current or ex partner: Not on file     Emotionally abused: Not on file     Physically abused: Not on file     Forced sexual activity: Not on file   Other Topics Concern     Parent/sibling w/ CABG, MI or angioplasty before 65F 55M? Yes      Service Not Asked     Blood Transfusions Not Asked     Caffeine Concern Not Asked     Occupational Exposure Not Asked     Hobby Hazards Not Asked     Sleep Concern Not Asked     Stress Concern Not Asked     Weight Concern Not Asked     Special Diet Not Asked     Back Care Not Asked     Exercise Not Asked     Bike Helmet Not Asked     Seat Belt Yes     Self-Exams Not Asked   Social History Narrative     Not on file         FAMILY HISTORY:  Family History   Problem Relation Age of Onset     Breast Cancer Mother 80     Neurologic Disorder Father         Parkinson's     Coronary Artery Disease Father 44        MI     Lipids Brother      Lipids Brother      Family History Negative Brother      Lipids Sister      Family History Negative Sister      Family History Negative Sister      Family History Negative Sister      Cerebrovascular Disease Maternal Grandmother 86     Cancer Maternal Grandfather 69        leukemia     Cancer  "Paternal Grandfather 69        stomach     Diabetes No family hx of      Cancer - colorectal No family hx of      Hypertension No family hx of      Hyperlipidemia No family hx of      Colon Cancer No family hx of      Prostate Cancer No family hx of      Other Cancer No family hx of      Depression No family hx of      Anxiety Disorder No family hx of      Mental Illness No family hx of      Substance Abuse No family hx of      Anesthesia Reaction No family hx of      Asthma No family hx of      Osteoporosis No family hx of      Genetic Disorder No family hx of      Thyroid Disease No family hx of      Obesity No family hx of      Unknown/Adopted No family hx of          PHYSICAL EXAM:  Vital signs:  BP (!) 166/84   Pulse 88   Temp 97.7  F (36.5  C) (Oral)   Resp 16   Ht 1.626 m (5' 4\")   Wt 76.9 kg (169 lb 9.6 oz)   LMP  (LMP Unknown)   SpO2 100%   BMI 29.11 kg/m    GENERAL/CONSTITUTIONAL: No acute distress.  EYES: No erythema or scleral icterus.  LYMPH: No cervical, supraclavicular, axillary, epitroclear adenopathy.   BREAST: No palpable masses in either breast; nipples everted with no discharge; no rash or ulceration.  RESPIRATORY: No audible cough or wheezing.   MUSCULOSKELETAL: Warm and well-perfused, no cyanosis, clubbing, or edema.  NEUROLOGIC: Alert, oriented, answers questions appropriately.  INTEGUMENTARY: No rashes or jaundice.  GAIT: Steady, does not use assistive device    LABS:  CBC RESULTS:   Recent Labs   Lab Test 08/27/20  0821   WBC 5.0   RBC 4.96   HGB 15.5   HCT 45.4   MCV 92   MCH 31.3   MCHC 34.1   RDW 12.4          Recent Labs   Lab Test 08/27/20  0821 08/02/19  1027 07/02/19  0824     --  141   POTASSIUM 3.5  --  4.0   CHLORIDE 106  --  109   CO2 26  --  24   ANIONGAP 6  --  8   * 98 145*   BUN 15  --  11   CR 0.81  --  0.77   JAYDEN 9.4  --  9.4         PATHOLOGY:  Reviewed as per HPI.    IMAGING:  Reviewed as per HPI.    ASSESSMENT/PLAN:  Emma Rudolph is a 70 " "year old female with the following issues:  1. Left breast atypical ductal hyperplasia  -Pat is aware that atypical ductal  hyperplasia is considered a marker for increased risk of developing invasive breast cancer, approximately ranging 11-40%, 5-15 years from time of diagnosis of ADH.  She is aware this is not a cancer.  -She is tolerating raloxifene well for risk reduction. Recommended 5 years of raloxifene.  -Continue high risk surveillance with clinical breast exam and breast imaging every 6 months with yearly mammograms alternating with yearly breast MRI.  -Will plan for mammogram in 9/2021.    2. Anxiety  -Mood stable.  -Continue desvenlafaxine.    Return in 6 months with mammogram.    Priya Ontiveros MD  Hematology/Oncology  [facility]    Oncology Rooming Note    March 15, 2021 2:53 PM   Emma Rudolph is a 70 year old female who presents for:    Chief Complaint   Patient presents with     Oncology Clinic Visit     Initial Vitals: BP (!) 166/84   Pulse 88   Temp 97.7  F (36.5  C) (Oral)   Resp 16   Ht 1.626 m (5' 4\")   Wt 76.9 kg (169 lb 9.6 oz)   LMP  (LMP Unknown)   SpO2 100%   BMI 29.11 kg/m   Estimated body mass index is 29.11 kg/m  as calculated from the following:    Height as of this encounter: 1.626 m (5' 4\").    Weight as of this encounter: 76.9 kg (169 lb 9.6 oz). Body surface area is 1.86 meters squared.  No Pain (0) Comment: Data Unavailable   No LMP recorded (lmp unknown). Patient is postmenopausal.  Allergies reviewed: Yes  Medications reviewed: Yes    Medications: Medication refills not needed today.  Pharmacy name entered into EPIC:    [facility] [place] - [address] [place] [address] [id number] - [place], [place] - [address] AT [facility] [address]    Clinical concerns: None       Angelina Ribeiro MA                      Again, thank you for allowing me to participate in the care of your patient.  "       Sincerely,        Priya Ontiveros MD

## 2021-04-01 ENCOUNTER — IMMUNIZATION (OUTPATIENT)
Dept: NURSING | Facility: CLINIC | Age: 71
End: 2021-04-01
Attending: INTERNAL MEDICINE
Payer: COMMERCIAL

## 2021-04-01 PROCEDURE — 0012A PR COVID VAC MODERNA 100 MCG/0.5 ML IM: CPT

## 2021-04-01 PROCEDURE — 91301 PR COVID VAC MODERNA 100 MCG/0.5 ML IM: CPT

## 2021-04-06 ENCOUNTER — OFFICE VISIT (OUTPATIENT)
Dept: INTERNAL MEDICINE | Facility: CLINIC | Age: 71
End: 2021-04-06
Payer: COMMERCIAL

## 2021-04-06 VITALS
RESPIRATION RATE: 18 BRPM | BODY MASS INDEX: 28.53 KG/M2 | HEART RATE: 86 BPM | WEIGHT: 166.2 LBS | OXYGEN SATURATION: 96 % | DIASTOLIC BLOOD PRESSURE: 88 MMHG | SYSTOLIC BLOOD PRESSURE: 156 MMHG

## 2021-04-06 DIAGNOSIS — R03.0 ELEVATED BLOOD PRESSURE READING WITHOUT DIAGNOSIS OF HYPERTENSION: ICD-10-CM

## 2021-04-06 DIAGNOSIS — S76.212A INGUINAL STRAIN, LEFT, INITIAL ENCOUNTER: Primary | ICD-10-CM

## 2021-04-06 PROCEDURE — 99213 OFFICE O/P EST LOW 20 MIN: CPT | Performed by: PHYSICIAN ASSISTANT

## 2021-04-06 NOTE — PATIENT INSTRUCTIONS
Ibuprofen - 200 mg  - take 600 mg ( 3 tabs ) up to three times a day for one week.    Naprosyn 225mg - Take 2 tabs twice a day for one week     Monitor blood pressure outside of clinic if > 140/90 then see a primary MD>

## 2021-04-06 NOTE — PROGRESS NOTES
"    Assessment & Plan     Inguinal strain, left, initial encounter    - [facility]ERR[facility]; Future    Elevated blood pressure reading without diagnosis of hypertension                 BMI:   Estimated body mass index is 28.53 kg/m  as calculated from the following:    Height as of 3/15/21: 1.626 m (5' 4\").    Weight as of this encounter: 75.4 kg (166 lb 3.2 oz).       Patient Instructions   Ibuprofen - 200 mg  - take 600 mg ( 3 tabs ) up to three times a day for one week.    Naprosyn 225mg - Take 2 tabs twice a day for one week     Monitor blood pressure outside of clinic if > 140/90 then see a primary MD>       Return for Dr Jackson - to establish care in the future .    TAI Mckenzie [facility]    Subjective   Pat is a 70 year old who presents for the following health issues     HPI     Musculoskeletal problem/pain  Onset/Duration: 1 week  Description  Location: Hip area - left  Joint Swelling: no  Redness: no  Pain: YES  Warmth: no  Intensity:  mild, moderate  Progression of Symptoms:  Improving slightly  Accompanying signs and symptoms:   Fevers: no  Numbness/tingling/weakness: no  History  Trauma to the area: YES  Recent illness:  no  Previous similar problem: no  Previous evaluation:  no  Precipitating or alleviating factors:  Aggravating factors include: sitting and overuse  Sat for awhile then went to get up - and with groin  Pain.    Therapies tried and outcome: heat and ice            Review of Systems   Constitutional, HEENT, cardiovascular, pulmonary, gi and gu systems are negative, except as otherwise noted.      Objective    BP (!) 156/88   Pulse 86   Resp 18   Wt 75.4 kg (166 lb 3.2 oz)   LMP  (LMP Unknown)   SpO2 96%   BMI 28.53 kg/m    Body mass index is 28.53 kg/m .  Physical Exam   GENERAL: healthy, alert and no distress  RESP: lungs clear to auscultation - no rales, rhonchi or wheezes  CV: regular rate and rhythm, normal S1 S2, no S3 or S4, " no murmur, click or rub, no peripheral edema and peripheral pulses strong  ABDOMEN: soft, nontender, no hepatosplenomegaly, no masses and bowel sounds normal  MS: tenderness upper groin/hip flexors   No lateral hip pain  Good ROM of the hip joint.   Quads tight /hamstrings tight   SKIN: no suspicious lesions or rashes

## 2021-04-09 ENCOUNTER — THERAPY VISIT (OUTPATIENT)
Dept: PHYSICAL THERAPY | Facility: CLINIC | Age: 71
End: 2021-04-09
Payer: COMMERCIAL

## 2021-04-09 DIAGNOSIS — R10.32 LEFT GROIN PAIN: ICD-10-CM

## 2021-04-09 DIAGNOSIS — S76.212A INGUINAL STRAIN, LEFT, INITIAL ENCOUNTER: ICD-10-CM

## 2021-04-09 DIAGNOSIS — M25.552 HIP PAIN, LEFT: ICD-10-CM

## 2021-04-09 PROCEDURE — 97161 PT EVAL LOW COMPLEX 20 MIN: CPT | Mod: GP | Performed by: PHYSICAL THERAPIST

## 2021-04-09 PROCEDURE — 97110 THERAPEUTIC EXERCISES: CPT | Mod: GP | Performed by: PHYSICAL THERAPIST

## 2021-04-09 ASSESSMENT — ACTIVITIES OF DAILY LIVING (ADL)
GETTING_INTO_AND_OUT_OF_AN_AVERAGE_CAR: NO DIFFICULTY AT ALL
WALKING_15_MINUTES_OR_GREATER: MODERATE DIFFICULTY
HOS_ADL_HIGHEST_POTENTIAL_SCORE: 40
SITTING_FOR_15_MINUTES: SLIGHT DIFFICULTY
HOW_WOULD_YOU_RATE_YOUR_CURRENT_LEVEL_OF_FUNCTION_DURING_YOUR_USUAL_ACTIVITIES_OF_DAILY_LIVING_FROM_0_TO_100_WITH_100_BEING_YOUR_LEVEL_OF_FUNCTION_PRIOR_TO_YOUR_HIP_PROBLEM_AND_0_BEING_THE_INABILITY_TO_PERFORM_ANY_OF_YOUR_USUAL_DAILY_ACTIVITIES?: 10
PUTTING_ON_SOCKS_AND_SHOES: NO DIFFICULTY AT ALL
TWISTING/PIVOTING_ON_INVOLVED_LEG: SLIGHT DIFFICULTY
STANDING_FOR_15_MINUTES: SLIGHT DIFFICULTY
HOS_ADL_ITEM_SCORE_TOTAL: 32
WALKING_APPROXIMATELY_10_MINUTES: MODERATE DIFFICULTY
LIGHT_TO_MODERATE_WORK: SLIGHT DIFFICULTY
GETTING_INTO_AND_OUT_OF_A_BATHTUB: NO DIFFICULTY AT ALL
WALKING_INITIALLY: SLIGHT DIFFICULTY
HOS_ADL_COUNT: 10
DEEP_SQUATTING: NO DIFFICULTY AT ALL
ROLLING_OVER_IN_BED: NO DIFFICULTY AT ALL

## 2021-04-09 NOTE — PROGRESS NOTES
Physical Therapy Initial Evaluation  Subjective:    Patient Health History  Emma Rudolph being seen for Sore left hip flexor.     Problem began: 3/15/2021.   Problem occurred: too much sitting at a table   Pain is reported as 2/10 on pain scale.  General health as reported by patient is excellent.  Pertinent medical history includes: none.        Surgeries include:  Other.    Current medications:  Anti-depressants, anti-inflammatory and other. Other medications details: cholesterol.    Current occupation is retired.   Primary job tasks include:  Computer work and prolonged sitting.                  Therapist Generated HPI Evaluation  Problem details: Pt presents with complaints of left groin pain. Pt reported insidious onset of sx's approximately 2 weeks ago. Pt reported no prior history of groin pain. Pt reported difficulty with sit to stand and prolonged walking; pt reported minimizing activities with initial onset of sx's with improvement but reports ongoing sx's to a lesser intensity.         Type of problem:  Left hip.    This is a new condition.  Condition occurred with:  Insidious onset.  Where condition occurred: for unknown reasons.  Patient reports pain:  Groin and lateral.  and is constant (0-2/10).  Pain radiates to:  No radiation. Pain is worse during the day.    Symptoms are exacerbated by transfers and walking  and relieved by NSAID's, ice and heat.                              Objective:    Gait:    Gait Type:  Normal   Assistive Devices:  None                                                   Hip Evaluation  Hip PROM:    Flexion: Left: WNL  Right:  Extension: Left: WNL  Right:    Adduction: Left: WNL; lateral groin with end range movement   Right:  Internal Rotation: Left: WNL   Right:  External Rotation: Left: WFL   Right:  Knee Flexion: Left: WNL     Right:             Hip Strength:    Flexion:   Left: 5/5   Pain:                        Abduction:  Left: 5/5     Pain:      External  Rotation:  Right: 5/5   Pain:            Hip Special Testing:    Left hip positive for the following special tests:  Doug and Fadir/Labrum                   General     ROS    Assessment/Plan:    Patient is a 70 year old female with left side hip complaints.    Patient has the following significant findings with corresponding treatment plan.                Diagnosis 1:  L groin/hip pain  Pain -  manual therapy, self management, education and home program  Decreased ROM/flexibility - manual therapy and therapeutic exercise  Decreased function - therapeutic activities    Therapy Evaluation Codes:   1) History comprised of:   Personal factors that impact the plan of care:      None.    Comorbidity factors that impact the plan of care are:      None.     Medications impacting care: None.  2) Examination of Body Systems comprised of:   Body structures and functions that impact the plan of care:      Hip.   Activity limitations that impact the plan of care are:      Walking and transfers-sit to stand.  3) Clinical presentation characteristics are:   Stable/Uncomplicated.  Cumulative Therapy Evaluation is: Low complexity.    Previous and current functional limitations:  (See Goal Flow Sheet for this information)    Short term and Long term goals: (See Goal Flow Sheet for this information)     Communication ability:  Patient appears to be able to clearly communicate and understand verbal and written communication and follow directions correctly.  Treatment Explanation - The following has been discussed with the patient:   RX ordered/plan of care  Anticipated outcomes  Possible risks and side effects  This patient would benefit from PT intervention to resume normal activities.   Rehab potential is fair.    Frequency:  1 X week, once daily  Duration:  for 6 weeks  Discharge Plan:  Independent in home treatment program.  Reach maximal therapeutic benefit.    Please refer to the daily flowsheet for treatment today, total  treatment time and time spent performing 1:1 timed codes.

## 2021-04-16 ENCOUNTER — THERAPY VISIT (OUTPATIENT)
Dept: PHYSICAL THERAPY | Facility: CLINIC | Age: 71
End: 2021-04-16
Payer: COMMERCIAL

## 2021-04-16 DIAGNOSIS — R10.32 LEFT GROIN PAIN: ICD-10-CM

## 2021-04-16 PROCEDURE — 97110 THERAPEUTIC EXERCISES: CPT | Mod: GP | Performed by: PHYSICAL THERAPIST

## 2021-04-16 PROCEDURE — 97530 THERAPEUTIC ACTIVITIES: CPT | Mod: GP | Performed by: PHYSICAL THERAPIST

## 2021-04-30 ENCOUNTER — THERAPY VISIT (OUTPATIENT)
Dept: PHYSICAL THERAPY | Facility: CLINIC | Age: 71
End: 2021-04-30
Payer: COMMERCIAL

## 2021-04-30 DIAGNOSIS — R10.32 LEFT GROIN PAIN: ICD-10-CM

## 2021-04-30 PROCEDURE — 97530 THERAPEUTIC ACTIVITIES: CPT | Mod: GP | Performed by: PHYSICAL THERAPIST

## 2021-04-30 PROCEDURE — 97110 THERAPEUTIC EXERCISES: CPT | Mod: GP | Performed by: PHYSICAL THERAPIST

## 2021-04-30 ASSESSMENT — ACTIVITIES OF DAILY LIVING (ADL)
SITTING_FOR_15_MINUTES: SLIGHT DIFFICULTY
HEAVY_WORK: SLIGHT DIFFICULTY
HOS_ADL_HIGHEST_POTENTIAL_SCORE: 60
GETTING_INTO_AND_OUT_OF_A_BATHTUB: NO DIFFICULTY AT ALL
LIGHT_TO_MODERATE_WORK: SLIGHT DIFFICULTY
PUTTING_ON_SOCKS_AND_SHOES: NO DIFFICULTY AT ALL
TWISTING/PIVOTING_ON_INVOLVED_LEG: SLIGHT DIFFICULTY
HOW_WOULD_YOU_RATE_YOUR_CURRENT_LEVEL_OF_FUNCTION_DURING_YOUR_USUAL_ACTIVITIES_OF_DAILY_LIVING_FROM_0_TO_100_WITH_100_BEING_YOUR_LEVEL_OF_FUNCTION_PRIOR_TO_YOUR_HIP_PROBLEM_AND_0_BEING_THE_INABILITY_TO_PERFORM_ANY_OF_YOUR_USUAL_DAILY_ACTIVITIES?: 90
RECREATIONAL_ACTIVITIES: SLIGHT DIFFICULTY
DEEP_SQUATTING: NO DIFFICULTY AT ALL
WALKING_15_MINUTES_OR_GREATER: SLIGHT DIFFICULTY
GOING_DOWN_1_FLIGHT_OF_STAIRS: NO DIFFICULTY AT ALL
ROLLING_OVER_IN_BED: SLIGHT DIFFICULTY
GETTING_INTO_AND_OUT_OF_AN_AVERAGE_CAR: NO DIFFICULTY AT ALL
STANDING_FOR_15_MINUTES: SLIGHT DIFFICULTY
WALKING_APPROXIMATELY_10_MINUTES: SLIGHT DIFFICULTY
WALKING_INITIALLY: SLIGHT DIFFICULTY
STEPPING_UP_AND_DOWN_CURBS: NO DIFFICULTY AT ALL
HOS_ADL_ITEM_SCORE_TOTAL: 50
HOS_ADL_SCORE(%): 83.33
HOS_ADL_COUNT: 15
GOING_UP_1_FLIGHT_OF_STAIRS: SLIGHT DIFFICULTY

## 2021-05-12 DIAGNOSIS — F41.1 GENERALIZED ANXIETY DISORDER: ICD-10-CM

## 2021-05-12 DIAGNOSIS — F32.5 MAJOR DEPRESSION IN COMPLETE REMISSION (H): ICD-10-CM

## 2021-05-12 RX ORDER — DESVENLAFAXINE 100 MG/1
100 TABLET, EXTENDED RELEASE ORAL DAILY
Qty: 90 TABLET | Refills: 1 | Status: SHIPPED | OUTPATIENT
Start: 2021-05-12 | End: 2021-10-22

## 2021-05-12 NOTE — TELEPHONE ENCOUNTER
Routing refill request to provider for review/approval because:  Labs out of range:    BP Readings from Last 3 Encounters:   04/06/21 (!) 156/88   03/15/21 (!) 166/84   11/03/20 (!) 142/88       Labs not current:    PHQ 7/2/2019 3/19/2020 8/27/2020   PHQ-9 Total Score 4 5 0   Q9: Thoughts of better off dead/self-harm past 2 weeks Not at all Not at all Not at all     Kathe Martinez RN, BSN

## 2021-08-12 PROBLEM — R10.32 LEFT GROIN PAIN: Status: RESOLVED | Noted: 2021-04-09 | Resolved: 2021-08-12

## 2021-08-12 NOTE — PROGRESS NOTES
DISCHARGE REPORT    Progress reporting period is from 4-9-21 to 4-30-21. Pt completed 3 visits.       SUBJECTIVE  Subjective changes noted by patient: Reported sx's continue to decrease. Unaware of sx's during the day; unaware of sx's last night for the first time.    Current pain level is 0/10.     Previous pain level was  0-2/10.   Changes in function:  Yes (See Goal flowsheet attached for changes in current functional level)  Adverse reaction to treatment or activity: None    OBJECTIVE  Objective: PROM L hip: slight anterior groin sx's with end range flexion only. DANA/FADIR both continue to provoke sx's anteriorly.     ASSESSMENT/PLAN  Updated problem list and treatment plan: Diagnosis 1:  Left groin/hip pain   STG/LTGs have been met or progress has been made towards goals:  Yes (See Goal flow sheet completed today.)  Assessment of Progress: Patient is meeting short term goals and is progressing towards long term goals.  Self Management Plans:  Patient has been instructed in a home treatment program.  Patient is independent in a home treatment program.  Emma continues to require the following intervention to meet STG and LTG's:  No further PT planned at last visit; pt to contact PT if needed.    Recommendations:  Discharge.    Please refer to the daily flowsheet for treatment today, total treatment time and time spent performing 1:1 timed codes.

## 2021-09-15 ENCOUNTER — HOSPITAL ENCOUNTER (OUTPATIENT)
Dept: MAMMOGRAPHY | Facility: CLINIC | Age: 71
Discharge: HOME | End: 2021-09-15
Attending: INTERNAL MEDICINE | Admitting: INTERNAL MEDICINE
Payer: COMMERCIAL

## 2021-09-15 DIAGNOSIS — N60.92 ATYPICAL DUCTAL HYPERPLASIA OF LEFT BREAST: ICD-10-CM

## 2021-09-15 PROCEDURE — 77063 BREAST TOMOSYNTHESIS BI: CPT

## 2021-09-16 NOTE — PROGRESS NOTES
Oncology/Hematology Visit Note  Sep 17, 2021    Reason for Visit: Follow up of atypical ductal hyperplasia    History of Present Illness: Emma Rudolph is a 70 year old female who presents with the following issues:  1. 9/18/2020: Mammogram showed architectural distortion in left breast at 11:00-1:00 retroareolar mid-depth. No concerning findings in right breast.  2. 9/24/2020: Left breast U/S showed oval hypoechoic mass measuring 1.1 cm at 11:00, 3 cm from nipple; oval hypoechoic mass measuring 1 cm at 12:00, 3 cm from nipple; normal-appearing parenchyma between 2 lesions. No enlarged lymph nodes in left axilla.  3. 10/2/2020: U/S-guided left breast needles biopsy at 12:00 showed a complex sclerosing lesion, negative for atypica and malignancy. Biopsy at 11:00 showed benign breast tissue with adenosis, negative for atypica and malignancy.  4. 10/28/2020: Underwent left breast excision under care of Dr. Eileen Montes. Pathology was negative for malignancy but showed atypical ductal hyperplasia, apocrine metaplasia, papillomatosis, microcalcifications.  5. 11/18/2020: Started raloxifene.    Interval History:  Pat was seen today for oncology follow-up. She is overall feeling well. She denies any side effects from Raloxifene. No breast concerns. Complete ROS negative. She does not smoke. She notes she has gained weight recently. Her BP is high today and she denies any headaches.      Review of Systems:  Patient denies fevers, chills, night sweats, unexplained weight changes, headaches, dizziness, vision or hearing changes, new lumps or bumps, chest pain, shortness of breath, cough, abdominal pain, nausea, vomiting, changes to bowel or bladder, swelling of extremities, bleeding issues, or rash.     Current Outpatient Medications   Medication Sig Dispense Refill     ASPIRIN PO Take 81 mg by mouth daily       atorvastatin (LIPITOR) 20 MG tablet Take 1 tablet (20 mg) by mouth daily 90 tablet 3     Blood Pressure KIT  Please check your BP daily. 1 kit 0     Cholecalciferol (VITAMIN D) 1000 UNITS capsule Take 1 capsule by mouth daily.       desvenlafaxine (PRISTIQ) 100 MG 24 hr tablet Take 1 tablet (100 mg) by mouth daily 90 tablet 1     hydrOXYzine (ATARAX) 25 MG tablet        Multiple Vitamins-Minerals (MULTIVITAMIN OR) Take by mouth daily       raloxifene (EVISTA) 60 MG tablet Take 1 tablet (60 mg) by mouth daily 90 tablet 3     Physical Examination:  BP (!) 183/84   Pulse 83   Temp 97.9  F (36.6  C) (Oral)   Resp 16   LMP  (LMP Unknown)   SpO2 98%   Wt Readings from Last 10 Encounters:   04/06/21 75.4 kg (166 lb 3.2 oz)   03/15/21 76.9 kg (169 lb 9.6 oz)   11/03/20 73.9 kg (163 lb)   10/27/20 73.9 kg (163 lb)   10/09/20 72.6 kg (160 lb)   08/27/20 73 kg (161 lb)   09/20/19 72.6 kg (160 lb)   08/02/19 72.6 kg (160 lb)   07/02/19 72.1 kg (159 lb)   10/30/18 72.6 kg (160 lb)     Constitutional: Well-appearing female in no acute distress.  Eyes: EOMI, PERRL. No scleral icterus.  ENT: Oral mucosa is moist without lesions or thrush.   Lymphatic: Neck is supple without cervical or supraclavicular lymphadenopathy. No axillary lymphadenopathy.  Breast: No palpable abnormalities in either breast. No skin changes or nipple concerns. Well healed left breast scan.   Cardiovascular: Regular rate and rhythm. No murmurs, gallops, or rubs. No peripheral edema.  Respiratory: Clear to auscultation bilaterally. No wheezes or crackles.  Gastrointestinal: Bowel sounds present. Abdomen soft, non-tender. No palpable hepatosplenomegaly or masses.   Neurologic: Cranial nerves II through XII are grossly intact.  Skin: No rashes, petechiae, or bruising noted on exposed skin.    Laboratory Data:  No labs to review    Mammogram 9/15/21  BILATERAL FULL FIELD DIGITAL SCREENING MAMMOGRAM WITH TOMOSYNTHESIS     Performed on: 9/15/21     Compared to: 09/18/2020, 09/18/2019, 09/11/2018, and 08/04/2017     Technique:  This study was evaluated with the  assistance of Computer-Aided Detection.  Breast Tomosynthesis was used in interpretation.     Findings: The breasts have scattered areas of fibroglandular density.  There are post-surgical changes in the left breast.  There is no radiographic evidence of malignancy.      IMPRESSION: ACR BI-RADS Category 2: Benign     RECOMMENDED FOLLOW-UP: Annual routine screening mammogram     The results and recommendations of this examination will be communicated to the patient.      Assessment and Plan:  Emma Rudolph is a 70 year old female with the following issues:  1. Left breast atypical ductal hyperplasia  -Pat is aware that atypical ductal  hyperplasia is considered a marker for increased risk of developing invasive breast cancer, approximately ranging 11-40%, 5-15 years from time of diagnosis of ADH.  She is aware this is not a cancer.  -She is tolerating raloxifene well for risk reduction. Recommended 5 years of raloxifene.  -Continue high risk surveillance with clinical breast exam and breast imaging every 6 months with yearly mammograms alternating with yearly breast MRI.  -Will plan for MRI in 3/2022.      2. Anxiety  -Mood stable.  -Continue desvenlafaxine.    3. HTN  Hx elevated BP, now further elevated (initial 160/102, recheck 183/84) asymptomatic. She is not on any antihypertensive medications. She is in the process of finding new PCP. Given no current PCP and significant elevation did start Lisinopril 10mg daily. She will monitor for cough. She will work to schedule PCP visit for f/u and monitor BP at home. Will check BMP in 2-3 weeks to ensure no renal or potassium issues with starting.      Return in 6 months with MRI.    35 minutes spent on the date of the encounter doing chart review, review of test results, patient visit and documentation      Jarek Rivera PA-C  [facility]  [address] [place] [id number] [phone]

## 2021-09-17 ENCOUNTER — ONCOLOGY VISIT (OUTPATIENT)
Dept: ONCOLOGY | Facility: CLINIC | Age: 71
End: 2021-09-17
Attending: INTERNAL MEDICINE
Payer: COMMERCIAL

## 2021-09-17 VITALS
TEMPERATURE: 97.9 F | HEART RATE: 83 BPM | DIASTOLIC BLOOD PRESSURE: 84 MMHG | RESPIRATION RATE: 16 BRPM | OXYGEN SATURATION: 98 % | SYSTOLIC BLOOD PRESSURE: 183 MMHG

## 2021-09-17 DIAGNOSIS — I10 BENIGN ESSENTIAL HYPERTENSION: ICD-10-CM

## 2021-09-17 DIAGNOSIS — N64.89 OTHER SPECIFIED DISORDERS OF BREAST: ICD-10-CM

## 2021-09-17 DIAGNOSIS — N60.92 ATYPICAL DUCTAL HYPERPLASIA OF LEFT BREAST: Primary | ICD-10-CM

## 2021-09-17 PROCEDURE — 99214 OFFICE O/P EST MOD 30 MIN: CPT | Performed by: PHYSICIAN ASSISTANT

## 2021-09-17 RX ORDER — LISINOPRIL 10 MG/1
10 TABLET ORAL DAILY
Qty: 60 TABLET | Refills: 0 | Status: SHIPPED | OUTPATIENT
Start: 2021-09-17 | End: 2021-10-24

## 2021-09-17 ASSESSMENT — PAIN SCALES - GENERAL: PAINLEVEL: NO PAIN (0)

## 2021-09-17 NOTE — LETTER
9/17/2021         RE: Emma Rudolph  [address] [facility] [address]      Oncology/Hematology Visit Note  Sep 17, 2021    Reason for Visit: Follow up of atypical ductal hyperplasia    History of Present Illness: Emma Rudolph is a 70 year old female who presents with the following issues:  1. 9/18/2020: Mammogram showed architectural distortion in left breast at 11:00-1:00 retroareolar mid-depth. No concerning findings in right breast.  2. 9/24/2020: Left breast U/S showed oval hypoechoic mass measuring 1.1 cm at 11:00, 3 cm from nipple; oval hypoechoic mass measuring 1 cm at 12:00, 3 cm from nipple; normal-appearing parenchyma between 2 lesions. No enlarged lymph nodes in left axilla.  3. 10/2/2020: U/S-guided left breast needles biopsy at 12:00 showed a complex sclerosing lesion, negative for atypica and malignancy. Biopsy at 11:00 showed benign breast tissue with adenosis, negative for atypica and malignancy.  4. 10/28/2020: Underwent left breast excision under care of Dr. Eileen Montes. Pathology was negative for malignancy but showed atypical ductal hyperplasia, apocrine metaplasia, papillomatosis, microcalcifications.  5. 11/18/2020: Started raloxifene.    Interval History:  Pat was seen today for oncology follow-up. She is overall feeling well. She denies any side effects from Raloxifene. No breast concerns. Complete ROS negative. She does not smoke. She notes she has gained weight recently. Her BP is high today and she denies any headaches.      Review of Systems:  Patient denies fevers, chills, night sweats, unexplained weight changes, headaches, dizziness, vision or hearing changes, new lumps or bumps, chest pain, shortness of breath, cough, abdominal pain, nausea, vomiting, changes to bowel or bladder, swelling of extremities, bleeding issues, or rash.     Current Outpatient Medications   Medication Sig Dispense Refill     ASPIRIN PO Take 81 mg by mouth daily       atorvastatin  (LIPITOR) 20 MG tablet Take 1 tablet (20 mg) by mouth daily 90 tablet 3     Blood Pressure KIT Please check your BP daily. 1 kit 0     Cholecalciferol (VITAMIN D) 1000 UNITS capsule Take 1 capsule by mouth daily.       desvenlafaxine (PRISTIQ) 100 MG 24 hr tablet Take 1 tablet (100 mg) by mouth daily 90 tablet 1     hydrOXYzine (ATARAX) 25 MG tablet        Multiple Vitamins-Minerals (MULTIVITAMIN OR) Take by mouth daily       raloxifene (EVISTA) 60 MG tablet Take 1 tablet (60 mg) by mouth daily 90 tablet 3     Physical Examination:  BP (!) 183/84   Pulse 83   Temp 97.9  F (36.6  C) (Oral)   Resp 16   LMP  (LMP Unknown)   SpO2 98%   Wt Readings from Last 10 Encounters:   04/06/21 75.4 kg (166 lb 3.2 oz)   03/15/21 76.9 kg (169 lb 9.6 oz)   11/03/20 73.9 kg (163 lb)   10/27/20 73.9 kg (163 lb)   10/09/20 72.6 kg (160 lb)   08/27/20 73 kg (161 lb)   09/20/19 72.6 kg (160 lb)   08/02/19 72.6 kg (160 lb)   07/02/19 72.1 kg (159 lb)   10/30/18 72.6 kg (160 lb)     Constitutional: Well-appearing female in no acute distress.  Eyes: EOMI, PERRL. No scleral icterus.  ENT: Oral mucosa is moist without lesions or thrush.   Lymphatic: Neck is supple without cervical or supraclavicular lymphadenopathy. No axillary lymphadenopathy.  Breast: No palpable abnormalities in either breast. No skin changes or nipple concerns. Well healed left breast scan.   Cardiovascular: Regular rate and rhythm. No murmurs, gallops, or rubs. No peripheral edema.  Respiratory: Clear to auscultation bilaterally. No wheezes or crackles.  Gastrointestinal: Bowel sounds present. Abdomen soft, non-tender. No palpable hepatosplenomegaly or masses.   Neurologic: Cranial nerves II through XII are grossly intact.  Skin: No rashes, petechiae, or bruising noted on exposed skin.    Laboratory Data:  No labs to review    Mammogram 9/15/21  BILATERAL FULL FIELD DIGITAL SCREENING MAMMOGRAM WITH TOMOSYNTHESIS     Performed on: 9/15/21     Compared to: 09/18/2020,  09/18/2019, 09/11/2018, and 08/04/2017     Technique:  This study was evaluated with the assistance of Computer-Aided Detection.  Breast Tomosynthesis was used in interpretation.     Findings: The breasts have scattered areas of fibroglandular density.  There are post-surgical changes in the left breast.  There is no radiographic evidence of malignancy.      IMPRESSION: ACR BI-RADS Category 2: Benign     RECOMMENDED FOLLOW-UP: Annual routine screening mammogram     The results and recommendations of this examination will be communicated to the patient.      Assessment and Plan:  Emma Rudolph is a 70 year old female with the following issues:  1. Left breast atypical ductal hyperplasia  -Pat is aware that atypical ductal  hyperplasia is considered a marker for increased risk of developing invasive breast cancer, approximately ranging 11-40%, 5-15 years from time of diagnosis of ADH.  She is aware this is not a cancer.  -She is tolerating raloxifene well for risk reduction. Recommended 5 years of raloxifene.  -Continue high risk surveillance with clinical breast exam and breast imaging every 6 months with yearly mammograms alternating with yearly breast MRI.  -Will plan for MRI in 3/2022.      2. Anxiety  -Mood stable.  -Continue desvenlafaxine.    3. HTN  Hx elevated BP, now further elevated (initial 160/102, recheck 183/84) asymptomatic. She is not on any antihypertensive medications. She is in the process of finding new PCP. Given no current PCP and significant elevation did start Lisinopril 10mg daily. She will monitor for cough. She will work to schedule PCP visit for f/u and monitor BP at home. Will check BMP in 2-3 weeks to ensure no renal or potassium issues with starting.      Return in 6 months with MRI.    35 minutes spent on the date of the encounter doing chart review, review of test results, patient visit and documentation      Jarek Rivera PA-C  [facility]  [address] [place] [address]  [place] [address]  [phone]          MICHELLE Garrison

## 2021-09-18 ENCOUNTER — HEALTH MAINTENANCE LETTER (OUTPATIENT)
Age: 71
End: 2021-09-18

## 2021-10-08 ENCOUNTER — LAB (OUTPATIENT)
Dept: INFUSION THERAPY | Facility: CLINIC | Age: 71
End: 2021-10-08
Attending: INTERNAL MEDICINE
Payer: COMMERCIAL

## 2021-10-08 DIAGNOSIS — I10 BENIGN ESSENTIAL HYPERTENSION: ICD-10-CM

## 2021-10-08 LAB
ANION GAP SERPL CALCULATED.3IONS-SCNC: 8 MMOL/L (ref 3–14)
BUN SERPL-MCNC: 12 MG/DL (ref 7–30)
CALCIUM SERPL-MCNC: 9.3 MG/DL (ref 8.5–10.1)
CHLORIDE BLD-SCNC: 107 MMOL/L (ref 94–109)
CO2 SERPL-SCNC: 25 MMOL/L (ref 20–32)
CREAT SERPL-MCNC: 0.86 MG/DL (ref 0.52–1.04)
GFR SERPL CREATININE-BSD FRML MDRD: 69 ML/MIN/1.73M2
GLUCOSE BLD-MCNC: 104 MG/DL (ref 70–99)
POTASSIUM BLD-SCNC: 3.7 MMOL/L (ref 3.4–5.3)
SODIUM SERPL-SCNC: 140 MMOL/L (ref 133–144)

## 2021-10-08 PROCEDURE — 80048 BASIC METABOLIC PNL TOTAL CA: CPT | Performed by: PHYSICIAN ASSISTANT

## 2021-10-08 PROCEDURE — 36415 COLL VENOUS BLD VENIPUNCTURE: CPT

## 2021-10-08 NOTE — PROGRESS NOTES
Medical Assistant Note:  Emma Rudolph presents today for blood draw.    Patient seen by provider today: No.   present during visit today: Not Applicable.    Concerns: No Concerns.    Procedure:  Lab draw site: rac, Needle type: bf, Gauge: 23.    Post Assessment:  Labs drawn without difficulty: Yes.    Discharge Plan:  Departure Mode: Ambulatory.    Face to Face Time: 5 min  .    Sheila Chiang, CMA

## 2021-10-20 DIAGNOSIS — F32.5 MAJOR DEPRESSION IN COMPLETE REMISSION (H): ICD-10-CM

## 2021-10-20 DIAGNOSIS — F41.1 GENERALIZED ANXIETY DISORDER: ICD-10-CM

## 2021-10-22 RX ORDER — DESVENLAFAXINE 100 MG/1
TABLET, EXTENDED RELEASE ORAL
Qty: 90 TABLET | Refills: 0 | Status: SHIPPED | OUTPATIENT
Start: 2021-10-22 | End: 2021-12-08

## 2021-10-22 NOTE — TELEPHONE ENCOUNTER
Routing refill request to provider for review/approval because:  Serotonin-Norepinephrine Reuptake Inhibitors Tymqyy41/20/2021 11:27 PM   Blood pressure under 140/90 in past 12 months Protocol Details    PHQ-9 score of less than 5 in past 6 months     Recent (6 mo) or future (30 days) visit within the authorizing provider's specialty      PHQ 7/2/2019 3/19/2020 8/27/2020   PHQ-9 Total Score 4 5 0   Q9: Thoughts of better off dead/self-harm past 2 weeks Not at all Not at all Not at all     BP Readings from Last 1 Encounters:   09/17/21 (!) 183/84    Stacey Suarez, RN   Stacey Suarez RN

## 2021-10-24 ENCOUNTER — MYC REFILL (OUTPATIENT)
Dept: ONCOLOGY | Facility: CLINIC | Age: 71
End: 2021-10-24

## 2021-10-24 DIAGNOSIS — I10 BENIGN ESSENTIAL HYPERTENSION: ICD-10-CM

## 2021-10-25 RX ORDER — LISINOPRIL 10 MG/1
10 TABLET ORAL DAILY
Qty: 60 TABLET | Refills: 0 | Status: SHIPPED | OUTPATIENT
Start: 2021-10-25 | End: 2021-12-08

## 2021-10-25 NOTE — TELEPHONE ENCOUNTER
Per Jarek MARTINEZ Please have her schedule visit with PCP as I can only do one more refill. Thank you    Call placed to Celi and she will make a follow up with her PCP office.

## 2021-10-25 NOTE — TELEPHONE ENCOUNTER
Last prescribing provider: Jarek MARTINEZ    Last clinic visit date: 9/17/21 Jarek MARTINEZ    Any missed appointments or no-shows since last clinic visit?: No     Recommendations for requested medication (if none, N/A): Copied from chart note 9/17/21 Jarek MARTINEZ  Given no current PCP and significant elevation did start Lisinopril 10mg daily. She will monitor for cough. She will work to schedule PCP visit for f/u and monitor BP at home. Will check BMP in 2-3 weeks to ensure no renal or potassium issues with starting.       Next clinic visit date: Not yet scheduled     Any other pertinent information (if none, N/A): N/A

## 2021-11-13 ENCOUNTER — HEALTH MAINTENANCE LETTER (OUTPATIENT)
Age: 71
End: 2021-11-13

## 2021-12-08 ENCOUNTER — VIRTUAL VISIT (OUTPATIENT)
Dept: FAMILY MEDICINE | Facility: CLINIC | Age: 71
End: 2021-12-08
Payer: COMMERCIAL

## 2021-12-08 DIAGNOSIS — I10 BENIGN ESSENTIAL HYPERTENSION: Primary | ICD-10-CM

## 2021-12-08 DIAGNOSIS — F41.1 GENERALIZED ANXIETY DISORDER: ICD-10-CM

## 2021-12-08 DIAGNOSIS — E78.2 MIXED HYPERLIPIDEMIA: ICD-10-CM

## 2021-12-08 DIAGNOSIS — N60.92 ATYPICAL DUCTAL HYPERPLASIA OF LEFT BREAST: ICD-10-CM

## 2021-12-08 DIAGNOSIS — F32.5 MAJOR DEPRESSION IN COMPLETE REMISSION (H): ICD-10-CM

## 2021-12-08 PROBLEM — M25.552 HIP PAIN, LEFT: Status: RESOLVED | Noted: 2021-04-09 | Resolved: 2021-12-08

## 2021-12-08 PROCEDURE — 99214 OFFICE O/P EST MOD 30 MIN: CPT | Mod: 95 | Performed by: PHYSICIAN ASSISTANT

## 2021-12-08 RX ORDER — RALOXIFENE HYDROCHLORIDE 60 MG/1
60 TABLET, FILM COATED ORAL DAILY
Qty: 90 TABLET | Refills: 3 | Status: SHIPPED | OUTPATIENT
Start: 2021-12-08 | End: 2023-01-11

## 2021-12-08 RX ORDER — ATORVASTATIN CALCIUM 20 MG/1
20 TABLET, FILM COATED ORAL DAILY
Qty: 90 TABLET | Refills: 3 | Status: SHIPPED | OUTPATIENT
Start: 2021-12-08 | End: 2022-11-15

## 2021-12-08 RX ORDER — LISINOPRIL 10 MG/1
10 TABLET ORAL DAILY
Qty: 90 TABLET | Refills: 3 | Status: SHIPPED | OUTPATIENT
Start: 2021-12-08 | End: 2022-11-22

## 2021-12-08 RX ORDER — DESVENLAFAXINE 100 MG/1
100 TABLET, EXTENDED RELEASE ORAL DAILY
Qty: 90 TABLET | Refills: 3 | Status: SHIPPED | OUTPATIENT
Start: 2021-12-08 | End: 2023-01-13

## 2021-12-08 ASSESSMENT — PATIENT HEALTH QUESTIONNAIRE - PHQ9
SUM OF ALL RESPONSES TO PHQ QUESTIONS 1-9: 0
10. IF YOU CHECKED OFF ANY PROBLEMS, HOW DIFFICULT HAVE THESE PROBLEMS MADE IT FOR YOU TO DO YOUR WORK, TAKE CARE OF THINGS AT HOME, OR GET ALONG WITH OTHER PEOPLE: NOT DIFFICULT AT ALL
SUM OF ALL RESPONSES TO PHQ QUESTIONS 1-9: 0

## 2021-12-08 NOTE — PROGRESS NOTES
"Celi is a 71 year old who is being evaluated via a billable video visit.      How would you like to obtain your AVS? MyChart  If the video visit is dropped, the invitation should be resent by: {video visit invitation:164146}  Will anyone else be joining your video visit? {:050641}  {If patient encounters technical issues they should call [phone] :720223}    Video Start Time: {video visit start/end time for provider to select:107263}    {PROVIDER CHARTING PREFERENCE:614832}    Subjective   Pat is a 71 year old who presents for the following health issues {ACCOMPANIED BY STATEMENT (Optional):988914}    History of Present Illness       She eats 2-3 servings of fruits and vegetables daily.She consumes 0 sweetened beverage(s) daily.She exercises with enough effort to increase her heart rate 10 to 19 minutes per day.  She exercises with enough effort to increase her heart rate 3 or less days per week.   She is taking medications regularly.       {SUPERLIST (Optional):642138}  {additonal problems for provider to add (Optional):806760}    Review of Systems   {ROS COMP (Optional):078946}      Objective           Vitals:  No vitals were obtained today due to virtual visit.    Physical Exam   {video visit exam brief selected:554784::\"GENERAL: Healthy, alert and no distress\",\"EYES: Eyes grossly normal to inspection.  No discharge or erythema, or obvious scleral/conjunctival abnormalities.\",\"RESP: No audible wheeze, cough, or visible cyanosis.  No visible retractions or increased work of breathing.  \",\"SKIN: Visible skin clear. No significant rash, abnormal pigmentation or lesions.\",\"NEURO: Cranial nerves grossly intact.  Mentation and speech appropriate for age.\",\"PSYCH: Mentation appears normal, affect normal/bright, judgement and insight intact, normal speech and appearance well-groomed.\"}    {Diagnostic Test Results (Optional):463592}    {AMBULATORY ATTESTATION (Optional):725691}        Video-Visit Details    Type of " "service:  Video Visit    Video End Time:{video visit start/end time for provider to select:264076}    Originating Location (pt. Location): {video visit patient location:281741::\"Home\"}    Distant Location (provider location):  [facility]     Platform used for Video Visit: {Virtual Visit Platforms:600058::\"appAttach\"}  Answers for HPI/ROS submitted by the patient on 12/8/2021  If you checked off any problems, how difficult have these problems made it for you to do your work, take care of things at home, or get along with other people?: Not difficult at all  PHQ9 TOTAL SCORE: 0      "

## 2021-12-08 NOTE — PROGRESS NOTES
"Celi is a 71 year old who is being evaluated via a billable video visit.      How would you like to obtain your AVS? MyChart  If the video visit is dropped, the invitation should be resent by: Text to cell phone: [phone]  Will anyone else be joining your video visit? No    Video Start Time: 9:50 AM        Assessment & Plan     Benign essential hypertension  Will have nurse only blood pressure check at [facility].  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    Weight as of 4/6/21: 75.4 kg (166 lb 3.2 oz).           Return in about 2 weeks (around 12/22/2021) for nurse only bp at [facility].    Angelina Puckett PA-C  [facility]   Celi is a 71 year old who presents for the following health issues     History of Present Illness       She eats 2-3 servings of fruits and vegetables daily.She consumes 0 sweetened beverage(s) daily.She exercises with enough effort to increase her heart rate 10 to 19 minutes per day.  She exercises with enough effort to increase her heart rate 3 or less days per week.   She is taking medications regularly.       Medication Followup for refills    Taking Medication as prescribed: yes    Side Effects:  None    Medication Helping Symptoms:  yes   Has trouble checking blood pressure at home.  " Was having no symptoms when it was high.  No chest pain or feeling SOB. No dizziness.  Will get nurse only blood pressure.            Review of Systems   Constitutional, HEENT, cardiovascular, pulmonary, GI, , musculoskeletal, neuro, skin, endocrine and psych systems are negative, except as otherwise noted.      Objective           Vitals:  No vitals were obtained today due to virtual visit.    Physical Exam   GENERAL: Healthy, alert and no distress  EYES: Eyes grossly normal to inspection.  No discharge or erythema, or obvious scleral/conjunctival abnormalities.  RESP: No audible wheeze, cough, or visible cyanosis.  No visible retractions or increased work of breathing.    SKIN: Visible skin clear. No significant rash, abnormal pigmentation or lesions.  NEURO: Cranial nerves grossly intact.  Mentation and speech appropriate for age.  PSYCH: Mentation appears normal, affect normal/bright, judgement and insight intact, normal speech and appearance well-groomed.            Video-Visit Details    Type of service:  Video Visit    Video End Time:10:12 AM    Originating Location (pt. Location): Home    Distant Location (provider location):  [facility]     Platform used for Video Visit: [employer]ik[employer]  Answers for HPI/ROS submitted by the patient on 12/8/2021  If you checked off any problems, how difficult have these problems made it for you to do your work, take care of things at home, or get along with other people?: Not difficult at all  PHQ9 TOTAL SCORE: 0

## 2021-12-22 ENCOUNTER — ALLIED HEALTH/NURSE VISIT (OUTPATIENT)
Dept: INTERNAL MEDICINE | Facility: CLINIC | Age: 71
End: 2021-12-22
Payer: COMMERCIAL

## 2021-12-22 VITALS — SYSTOLIC BLOOD PRESSURE: 148 MMHG | DIASTOLIC BLOOD PRESSURE: 82 MMHG

## 2021-12-22 DIAGNOSIS — I10 BENIGN ESSENTIAL HYPERTENSION: Primary | ICD-10-CM

## 2021-12-22 PROCEDURE — 99207 PR NO CHARGE NURSE ONLY: CPT

## 2022-01-04 ENCOUNTER — MYC MEDICAL ADVICE (OUTPATIENT)
Dept: FAMILY MEDICINE | Facility: CLINIC | Age: 72
End: 2022-01-04
Payer: COMMERCIAL

## 2022-04-02 ASSESSMENT — ENCOUNTER SYMPTOMS
JOINT SWELLING: 0
WEAKNESS: 0
NERVOUS/ANXIOUS: 0
FREQUENCY: 0
HEADACHES: 0
NAUSEA: 0
COUGH: 1
ABDOMINAL PAIN: 0
MYALGIAS: 0
DYSURIA: 0
EYE PAIN: 0
FEVER: 0
DIZZINESS: 0
HEMATURIA: 0
PARESTHESIAS: 0
DIARRHEA: 0
BREAST MASS: 0
HEMATOCHEZIA: 0
CHILLS: 0
SORE THROAT: 0
SHORTNESS OF BREATH: 0
ARTHRALGIAS: 0
CONSTIPATION: 0
PALPITATIONS: 0
HEARTBURN: 0

## 2022-04-02 ASSESSMENT — ACTIVITIES OF DAILY LIVING (ADL): CURRENT_FUNCTION: NO ASSISTANCE NEEDED

## 2022-04-05 ENCOUNTER — OFFICE VISIT (OUTPATIENT)
Dept: INTERNAL MEDICINE | Facility: CLINIC | Age: 72
End: 2022-04-05
Payer: COMMERCIAL

## 2022-04-05 VITALS
TEMPERATURE: 97.8 F | BODY MASS INDEX: 29.16 KG/M2 | HEART RATE: 98 BPM | OXYGEN SATURATION: 98 % | WEIGHT: 170.8 LBS | RESPIRATION RATE: 16 BRPM | DIASTOLIC BLOOD PRESSURE: 80 MMHG | SYSTOLIC BLOOD PRESSURE: 125 MMHG | HEIGHT: 64 IN

## 2022-04-05 DIAGNOSIS — F32.5 MAJOR DEPRESSION IN COMPLETE REMISSION (H): ICD-10-CM

## 2022-04-05 DIAGNOSIS — E78.2 MIXED HYPERLIPIDEMIA: ICD-10-CM

## 2022-04-05 DIAGNOSIS — Z23 NEED FOR COVID-19 VACCINE: ICD-10-CM

## 2022-04-05 DIAGNOSIS — R21 RASH AND NONSPECIFIC SKIN ERUPTION: ICD-10-CM

## 2022-04-05 DIAGNOSIS — Z13.220 SCREENING FOR HYPERLIPIDEMIA: ICD-10-CM

## 2022-04-05 DIAGNOSIS — R79.9 ABNORMAL FINDING OF BLOOD CHEMISTRY, UNSPECIFIED: ICD-10-CM

## 2022-04-05 DIAGNOSIS — Z00.00 ENCOUNTER FOR MEDICARE ANNUAL WELLNESS EXAM: Primary | ICD-10-CM

## 2022-04-05 DIAGNOSIS — N18.2 CKD (CHRONIC KIDNEY DISEASE) STAGE 2, GFR 60-89 ML/MIN: ICD-10-CM

## 2022-04-05 PROCEDURE — 99397 PER PM REEVAL EST PAT 65+ YR: CPT | Mod: 25 | Performed by: NURSE PRACTITIONER

## 2022-04-05 PROCEDURE — 0064A COVID-19,PF,MODERNA (18+ YRS BOOSTER .25ML): CPT | Performed by: NURSE PRACTITIONER

## 2022-04-05 PROCEDURE — 91306 COVID-19,PF,MODERNA (18+ YRS BOOSTER .25ML): CPT | Performed by: NURSE PRACTITIONER

## 2022-04-05 RX ORDER — TRIAMCINOLONE ACETONIDE 1 MG/G
CREAM TOPICAL 2 TIMES DAILY
Qty: 15 G | Refills: 0 | Status: SHIPPED | OUTPATIENT
Start: 2022-04-05 | End: 2022-12-01

## 2022-04-05 ASSESSMENT — ACTIVITIES OF DAILY LIVING (ADL): CURRENT_FUNCTION: NO ASSISTANCE NEEDED

## 2022-04-05 NOTE — PROGRESS NOTES
"SUBJECTIVE:   Emma Rudolph is a 71 year old female who presents for Preventive Visit.    Split Bill scripting  The purpose of this visit is to discuss your medical history and prevent health problems before you are sick. You may be responsible for a co-pay, coinsurance, or deductible if your visit today includes services such as checking on a sore throat, having an x-ray or lab test, or treating and evaluating a new or existing condition     Patient has been advised of split billing requirements and indicates understanding: Yes     Are you in the first 12 months of your Medicare coverage?  No    Healthy Habits:     In general, how would you rate your overall health?  Excellent    Frequency of exercise:  1 day/week    Duration of exercise:  Less than 15 minutes    Do you usually eat at least 4 servings of fruit and vegetables a day, include whole grains    & fiber and avoid regularly eating high fat or \"junk\" foods?  No    Taking medications regularly:  Yes    Medication side effects:  None    Ability to successfully perform activities of daily living:  No assistance needed    Home Safety:  No safety concerns identified    Hearing Impairment:  No hearing concerns    In the past 6 months, have you been bothered by leaking of urine?  No    In general, how would you rate your overall mental or emotional health?  Excellent      PHQ-2 Total Score: 0    Additional concerns today:  No    Health Maintenance Screening:    -Immunizations:   -Influenza: Declines   -Pneumococcal: Up to date   -Td/Tdap: Up to date   -Shingles: Up to date   -HPV: NA   -MMR: NA   -COVID Up to date, vaccinated; qualifies for 4th dose- agreeable to receive today    -Colon Cancer Screening:  Colonoscopy in 2015- due 10 years    -Lung Cancer Screening:  NA    -Breast Cancer Screening: Follows with hematology/oncology for atypical ductal hyperplasia of last breast, currently maintained on Evista daily.  Breast MRI scheduled in May.    -Cervical " "Cancer Screening: Aged out, Pap in 2015 NIL, Neg HPV.    -STD Screen:  Low risk    -Cholesterol Screening:  Due    -Diabetes Screening:  Due    -Depression Screening:  Due; currently stable, treated with Prestiq daily; no concerns    PHQ-2 Score:   PHQ-2 ( 1999 Pfizer) 4/2/2022 12/8/2021   Q1: Little interest or pleasure in doing things 0 0   Q2: Feeling down, depressed or hopeless 0 0   PHQ-2 Score 0 0   PHQ-2 Total Score (12-17 Years)- Positive if 3 or more points; Administer PHQ-A if positive - -   Q1: Little interest or pleasure in doing things Not at all Not at all   Q2: Feeling down, depressed or hopeless Not at all Not at all   PHQ-2 Score 0 0     Rash:  Reports noting some \"pimple\" like lesions on right chest wall for the past 3 weeks.  Were itchy, but not so much now.  No new lesions.     Cough:  Reports dry cough for past 1-2 weeks; improving.  No other ill symptoms.      Do you feel safe in your environment? Yes    Have you ever done Advance Care Planning? (For example, a Health Directive, POLST, or a discussion with a medical provider or your loved ones about your wishes): No, advance care planning information given to patient to review.  Patient plans to discuss their wishes with loved ones or provider.        Fall risk  Fallen 2 or more times in the past year?: No  Any fall with injury in the past year?: No    Cognitive Screening   1) Repeat 3 items (Leader, Season, Table)    2) Clock draw: NORMAL  3) 3 item recall: Recalls 3 objects  Results: 3 items recalled: COGNITIVE IMPAIRMENT LESS LIKELY    Mini-CogTM Copyright S Owen. Licensed by the author for use in [facility]; reprinted with permission (geoff@.[email or web address]). All rights reserved.      Do you have sleep apnea, excessive snoring or daytime drowsiness?: no    Reviewed and updated as needed this visit by clinical staff                  Reviewed and updated as needed this visit by Provider                 Social History     Tobacco Use     " Smoking status: Never Smoker     Smokeless tobacco: Never Used   Substance Use Topics     Alcohol use: Yes     Alcohol/week: 0.0 standard drinks     Comment: socially     If you drink alcohol do you typically have >3 drinks per day or >7 drinks per week? No    Alcohol Use 4/2/2022   Prescreen: >3 drinks/day or >7 drinks/week? No   Prescreen: >3 drinks/day or >7 drinks/week? -   No flowsheet data found.      Current providers sharing in care for this patient include:    Patient Care Team:  Angelina Puckett PA-C as PCP - General (Physician Assistant)  Eileen Montes MD as Assigned Surgical Provider  Priya Ontiveros MD as Assigned Cancer Care Provider  Angelina Puckett PA-C as Assigned PCP    The following health maintenance items are reviewed in Epic and correct as of today:  Health Maintenance Due   Topic Date Due     ANNUAL REVIEW OF HM ORDERS  Never done     URINALYSIS  Never done     MEDICARE ANNUAL WELLNESS VISIT  08/27/2021     LIPID  08/27/2021     MICROALBUMIN  08/27/2021     INFLUENZA VACCINE (1) 09/01/2021     Lab work is in process  Labs reviewed in EPIC        Review of Systems  CONSTITUTIONAL: NEGATIVE for fever, chills, change in weight  INTEGUMENTARY/SKIN: POSITIVE for rash right upper chest  EYES: NEGATIVE for vision changes or irritation  ENT/MOUTH: NEGATIVE for ear, mouth and throat problems  RESP: NEGATIVE for significant SOB; POSITIVE for dry cough  BREAST: NEGATIVE for masses, tenderness or discharge  CV: NEGATIVE for chest pain, palpitations or peripheral edema  GI: NEGATIVE for nausea, abdominal pain, heartburn, or change in bowel habits  : NEGATIVE for frequency, dysuria, or hematuria  MUSCULOSKELETAL: NEGATIVE for significant arthralgias or myalgia  NEURO: NEGATIVE for weakness, dizziness or paresthesias  ENDOCRINE: NEGATIVE for temperature intolerance, skin/hair changes  HEME: NEGATIVE for bleeding problems  PSYCHIATRIC: NEGATIVE for changes in mood or affect-  "States mood stable on Prestiq    OBJECTIVE:   /80 (BP Location: Left arm, Patient Position: Chair, Cuff Size: Adult Large)   Pulse 98   Temp 97.8  F (36.6  C) (Oral)   Resp 16   Ht 1.626 m (5' 4\")   Wt 77.5 kg (170 lb 12.8 oz)   LMP  (LMP Unknown)   SpO2 98%   BMI 29.32 kg/m   Estimated body mass index is 29.32 kg/m  as calculated from the following:    Height as of this encounter: 1.626 m (5' 4\").    Weight as of this encounter: 77.5 kg (170 lb 12.8 oz).     Physical Exam  GENERAL APPEARANCE: healthy, alert and no distress  EYES: Eyes grossly normal to inspection, PERRL and conjunctivae and sclerae normal  HENT: ear canals and TM's normal, nose and mouth without ulcers or lesions, oropharynx clear and oral mucous membranes moist  NECK: no adenopathy, no asymmetry, masses, or scars and thyroid normal to palpation  RESP: lungs clear to auscultation - no rales, rhonchi or wheezes  CV: regular rate and rhythm, normal S1 S2, no S3 or S4, no murmur, click or rub, no peripheral edema and peripheral pulses strong  ABDOMEN: soft, nontender, no hepatosplenomegaly, no masses and bowel sounds normal  MS: no musculoskeletal defects are noted and gait is age appropriate without ataxia  SKIN: few small, raised red pruritic appearing lesions right upper chest wall/lower neck  NEURO: Normal strength and tone, sensory exam grossly normal, mentation intact and speech normal  PSYCH: mentation appears normal and affect normal/bright    Diagnostic Test Results:  Labs reviewed in Epic    ASSESSMENT / PLAN:   Emma was seen today for medicare visit.    Diagnoses and all orders for this visit:    Encounter for Medicare annual wellness exam  -     REVIEW OF HEALTH MAINTENANCE PROTOCOL ORDERS    Screening for hyperlipidemia  -     Lipid panel reflex to direct LDL Fasting; Future    Mixed hyperlipidemia  -     Lipid Profile (Chol, Trig, HDL, LDL calc); Future  -     Continue atorvastatin    CKD (chronic kidney disease) stage " "2, GFR 60-89 ml/min  -     Albumin Random Urine Quantitative with Creat Ratio; Future  -     CBC with platelets; Future  -     Comprehensive metabolic panel (BMP + Alb, Alk Phos, ALT, AST, Total. Bili, TP); Future  -     Glucose; Future    Major depression in complete remission (H)  - Well controlled; continue Prestiq    Need for COVID-19 vaccine  -     COVID-19,PF,MODERNA (18+ YRS BOOSTER .25ML)- 4th booster shot today    Abnormal finding of blood chemistry, unspecified   -     Glucose; Future    Rash and nonspecific skin eruption  -     triamcinolone (KENALOG) 0.1 % external cream; Apply topically 2 times daily Apply to chest twice daily for 1 week        Patient has been advised of split billing requirements and indicates understanding: Yes    COUNSELING:  Reviewed preventive health counseling, as reflected in patient instructions    Estimated body mass index is 28.53 kg/m  as calculated from the following:    Height as of 3/15/21: 1.626 m (5' 4\").    Weight as of 4/6/21: 75.4 kg (166 lb 3.2 oz).        She reports that she has never smoked. She has never used smokeless tobacco.      Appropriate preventive services were discussed with this patient, including applicable screening as appropriate for cardiovascular disease, diabetes, osteopenia/osteoporosis, and glaucoma.  As appropriate for age/gender, discussed screening for colorectal cancer, prostate cancer, breast cancer, and cervical cancer. Checklist reviewing preventive services available has been given to the patient.    Reviewed patients plan of care and provided an AVS. The Basic Care Plan (routine screening as documented in Health Maintenance) for Emma meets the Care Plan requirement. This Care Plan has been established and reviewed with the Patient.    Counseling Resources:  ATP IV Guidelines  Pooled Cohorts Equation Calculator  Breast Cancer Risk Calculator  Breast Cancer: Medication to Reduce Risk  FRAX Risk Assessment  ICSI Preventive " Guidelines  Dietary Guidelines for Americans, 2010  USDA's MyPlate  ASA Prophylaxis  Lung CA Screening    LIDYA Kunz CNP  [facility]    Identified Health Risks:

## 2022-04-05 NOTE — PATIENT INSTRUCTIONS
-      Patient Education   Personalized Prevention Plan  You are due for the preventive services outlined below.  Your care team is available to assist you in scheduling these services.  If you have already completed any of these items, please share that information with your care team to update in your medical record.  Health Maintenance Due   Topic Date Due     ANNUAL REVIEW OF HM ORDERS  Never done     Urine Test  Never done     Cholesterol Lab  08/27/2021     Kidney Microalbumin Urine Test  08/27/2021     Flu Vaccine (1) 09/01/2021       Exercise for a Healthier Heart  You may wonder how you can improve the health of your heart. If you re thinking about exercise, you re on the right track. You don t need to become an athlete. But you do need a certain amount of brisk exercise to help strengthen your heart. If you have been diagnosed with a heart condition, your healthcare provider may advise exercise to help stabilize your condition. To help make exercise a habit, choose safe, fun activities.      Exercise with a friend. When activity is fun, you're more likely to stick with it.   Before you start  Check with your healthcare provider before starting an exercise program. This is especially important if you have not been active for a while. It's also important if you have a long-term (chronic) health problem such as heart disease, diabetes, or obesity. Or if you are at high risk for having these problems.   Why exercise?  Exercising regularly offers many healthy rewards. It can help you do all of the following:     Improve your blood cholesterol level to help prevent further heart trouble    Lower your blood pressure to help prevent a stroke or heart attack    Control diabetes, or reduce your risk of getting this disease    Improve your heart and lung function    Reach and stay at a healthy weight    Make your muscles stronger so you can stay active    Prevent falls and fractures by slowing the loss of bone mass  (osteoporosis)    Manage stress better    Reduce your blood pressure    Improve your sense of self and your body image  Exercise tips      Ease into your routine. Set small goals. Then build on them. If you are not sure what your activity level should be, talk with your healthcare provider first before starting an exercise routine.    Exercise on most days. Aim for a total of 150 minutes (2 hours and 30 minutes) or more of moderate-intensity aerobic activity each week. Or 75 minutes (1 hour and 15 minutes) or more of vigorous-intensity aerobic activity each week. Or try for a combination of both. Moderate activity means that you breathe heavier and your heart rate increases but you can still talk. Think about doing 40 minutes of moderate exercise, 3 to 4 times a week. For best results, activity should last for about 40 minutes to lower blood pressure and cholesterol. It's OK to work up to the 40-minute period over time. Examples of moderate-intensity activity are walking 1 mile in 15 minutes. Or doing 30 to 45 minutes of yard work.    Step up your daily activity level.  Along with your exercise program, try being more active the whole day. Walk instead of drive. Or park further away so that you take more steps each day. Do more household tasks or yard work. You may not be able to meet the advised mount of physical activity. But doing some moderate- or vigorous-intensity aerobic activity can help reduce your risk for heart disease. Your healthcare provider can help you figure out what is best for you.    Choose 1 or more activities you enjoy.  Walking is one of the easiest things you can do. You can also try swimming, riding a bike, dancing, or taking an exercise class.    When to call your healthcare provider  Call your healthcare provider if you have any of these:     Chest pain or feel dizzy or lightheaded    Burning, tightness, pressure, or heaviness in your chest, neck, shoulders, back, or arms    Abnormal  shortness of breath    More joint or muscle pain    A very fast or irregular heartbeat (palpitations)  [employer] last reviewed this educational content on 7/1/2019 2000-2021 [employer]. All rights reserved. This information is not intended as a substitute for professional medical care. Always follow your healthcare professional's instructions.          Understanding USDA MyPlate  The USDA has guidelines to help you make healthy food choices. These are called MyPlate. MyPlate shows the food groups that make up healthy meals using the image of a place setting. Before you eat, think about the healthiest choices for what to put on your plate or in your cup or bowl. To learn more about building a healthy plate, visit www.choosemyplate.gov.    The food groups    Fruits. Any fruit or 100% fruit juice counts as part of the Fruit Group. Fruits may be fresh, canned, frozen, or dried, and may be whole, cut-up, or pureed. Make 1/2 of your plate fruits and vegetables.    Vegetables. Any vegetable or 100% vegetable juice counts as a member of the Vegetable Group. Vegetables may be fresh, frozen, canned, or dried. They can be served raw or cooked and may be whole, cut-up, or mashed. Make 1/2 of your plate fruits and vegetables.    Grains. All foods made from grains are part of the Grains Group. These include wheat, rice, oats, cornmeal, and barley. Grains are often used to make foods such as bread, pasta, oatmeal, cereal, tortillas, and grits. Grains should be no more than 1/4 of your plate. At least half of your grains should be whole grains.    Protein. This group includes meat, poultry, seafood, beans and peas, eggs, processed soy products (such as tofu), nuts (including nut butters), and seeds. Make protein choices no more than 1/4 of your plate. Meat and poultry choices should be lean or low fat.    Dairy. The Dairy Group includes all fluid milk products and foods made from milk that contain calcium, such as  yogurt and cheese. (Foods that have little calcium, such as cream, butter, and cream cheese, are not part of this group.) Most dairy choices should be low-fat or fat-free.    Oils. Oils aren't a food group, but they do contain essential nutrients. However it's important to watch your intake of oils. These are fats that are liquid at room temperature. They include canola, corn, olive, soybean, vegetable, and sunflower oil. Foods that are mainly oil include mayonnaise, certain salad dressings, and soft margarines. You likely already get your daily oil allowance from the foods you eat.  Things to limit  Eating healthy also means limiting these things in your diet:       Salt (sodium). Many processed foods have a lot of sodium. To keep sodium intake down, eat fresh vegetables, meats, poultry, and seafood when possible. Purchase low-sodium, reduced-sodium, or no-salt-added food products at the store. And don't add salt to your meals at home. Instead, season them with herbs and spices such as dill, oregano, cumin, and paprika. Or try adding flavor with lemon or lime zest and juice.    Saturated fat. Saturated fats are most often found in animal products such as beef, pork, and chicken. They are often solid at room temperature, such as butter. To reduce your saturated fat intake, choose leaner cuts of meat and poultry. And try healthier cooking methods such as grilling, broiling, roasting, or baking. For a simple lower-fat swap, use plain nonfat yogurt instead of mayonnaise when making potato salad or macaroni salad.    Added sugars. These are sugars added to foods. They are in foods such as ice cream, candy, soda, fruit drinks, sports drinks, energy drinks, cookies, pastries, jams, and syrups. Cut down on added sugars by sharing sweet treats with a family member or friend. You can also choose fruit for dessert, and drink water or other unsweetened beverages.     [employer] last reviewed this educational content on  6/1/2020 2000-2021 [employer]. All rights reserved. This information is not intended as a substitute for professional medical care. Always follow your healthcare professional's instructions.    -Schedule lab visit for fasting blood work  -Fourth Moderna shot today  -Use the triamcinolone cream twice daily to your chest wall rash for 1 week- if no improvement, please let me know and I would like you to see Dermatology

## 2022-04-30 NOTE — PROGRESS NOTES
[facility]    Hematology/Oncology Established Patient Note      Today's Date: 5/9/2022    Reason for follow-up: Atypical ductal hyperplasia.    HISTORY OF PRESENT ILLNESS: Emma Rudolph is a 71 year old female who presents with the following issues:  1. 9/18/2020: Mammogram showed architectural distortion in left breast at 11:00-1:00 retroareolar mid-depth. No concerning findings in right breast.  2. 9/24/2020: Left breast U/S showed oval hypoechoic mass measuring 1.1 cm at 11:00, 3 cm from nipple; oval hypoechoic mass measuring 1 cm at 12:00, 3 cm from nipple; normal-appearing parenchyma between 2 lesions. No enlarged lymph nodes in left axilla.  3. 10/2/2020: U/S-guided left breast needles biopsy at 12:00 showed a complex sclerosing lesion, negative for atypica and malignancy. Biopsy at 11:00 showed benign breast tissue with adenosis, negative for atypica and malignancy.  4. 10/28/2020: Underwent left breast excision under care of Dr. Eileen Montes. Pathology was negative for malignancy but showed atypical ductal hyperplasia, apocrine metaplasia, papillomatosis, microcalcifications.  5. 11/18/2020: Started raloxifene.    INTERIM HISTORY:  Celi reports feeling overall well with no new breast complaints.    REVIEW OF SYSTEMS:   14 point ROS was reviewed and is negative other than as noted above in HPI.       HOME MEDICATIONS:  Current Outpatient Medications   Medication Sig Dispense Refill     ASPIRIN PO Take 81 mg by mouth daily       atorvastatin (LIPITOR) 20 MG tablet Take 1 tablet (20 mg) by mouth daily 90 tablet 3     Blood Pressure KIT Please check your BP daily. 1 kit 0     Cholecalciferol (VITAMIN D) 1000 UNITS capsule Take 1 capsule by mouth daily.       desvenlafaxine (PRISTIQ) 100 MG 24 hr tablet Take 1 tablet (100 mg) by mouth daily 90 tablet 3     lisinopril (ZESTRIL) 10 MG tablet Take 1 tablet (10 mg) by mouth daily 90 tablet 3     Multiple Vitamins-Minerals (MULTIVITAMIN OR)  Take by mouth daily       raloxifene (EVISTA) 60 MG tablet Take 1 tablet (60 mg) by mouth daily 90 tablet 3     triamcinolone (KENALOG) 0.1 % external cream Apply topically 2 times daily Apply to chest twice daily for 1 week 15 g 0         ALLERGIES:  Allergies   Allergen Reactions     Penicillins Swelling, Rash and Hives     Amoxicillin     Sulfa Drugs Hives         PAST MEDICAL HISTORY:  Past Medical History:   Diagnosis Date     Breast lesion     LEFT     Generalized anxiety disorder      Hyperlipidemia LDL goal <130      Hypertension      Mild major depression (H)      Osteoarthritis of multiple joints, unspecified osteoarthritis type of #2&3 fingers bilat R>L      Vitamin D deficiency disease      Gynecologic history:  Age of menarche at 13-14; G0, no HRT or OCP use.      PAST SURGICAL HISTORY:  Past Surgical History:   Procedure Laterality Date     BIOPSY BREAST SEED LOCALIZATION Left 10/28/2020    Procedure: 2 SEED LOCALIZED LEFT BREAST BIOPSY;  Surgeon: Eileen Montes MD;  Location:  OR     BREAST SURGERY  2020     COLONOSCOPY       EYE SURGERY Left 11/2020    vitrictomy      GYN SURGERY       H ARGON LASER FOR RETINAL TEAR Left 3/2014     HEAD & NECK SURGERY      wisdom teeth     LAPAROSCOPIC SINGLE SITE SALPINGO-OOPHORECTOMY Bilateral 4/15/2015    Procedure: LAPAROSCOPIC SINGLE SITE SALPINGO-OOPHORECTOMY;  Surgeon: Leonora Zavaleta MD;  Location:  [facility]     wisdom teeth removal  age 19         SOCIAL HISTORY:  Social History     Socioeconomic History     Marital status: Single     Spouse name: Not on file     Number of children: Not on file     Years of education: Not on file     Highest education level: Not on file   Occupational History     Employer: RETIRED     Comment: [employer]   Tobacco Use     Smoking status: Never Smoker     Smokeless tobacco: Never Used   Substance and Sexual Activity     Alcohol use: Yes     Alcohol/week: 0.0 standard drinks     Comment: infrequent     Drug use: No      Sexual activity: Not Currently     Partners: Female   Other Topics Concern     Parent/sibling w/ CABG, MI or angioplasty before 65F 55M? Yes     Comment: father, infarction at age 44      Service Not Asked     Blood Transfusions Not Asked     Caffeine Concern Not Asked     Occupational Exposure Not Asked     Hobby Hazards Not Asked     Sleep Concern Not Asked     Stress Concern Not Asked     Weight Concern Not Asked     Special Diet Not Asked     Back Care Not Asked     Exercise Not Asked     Bike Helmet Not Asked     Seat Belt Yes     Self-Exams Not Asked   Social History Narrative     Not on file     Social Determinants of Health     Financial Resource Strain: Not on file   Food Insecurity: Not on file   Transportation Needs: Not on file   Physical Activity: Not on file   Stress: Not on file   Social Connections: Not on file   Intimate Partner Violence: Not on file   Housing Stability: Not on file         FAMILY HISTORY:  Family History   Problem Relation Age of Onset     Breast Cancer Mother 80     Neurologic Disorder Father         Parkinson's     Coronary Artery Disease Father 44        MI     Hyperlipidemia Father      Anxiety Disorder Father      Lipids Brother      Hypertension Brother      Hyperlipidemia Brother      Lipids Brother      Family History Negative Brother      Lipids Sister      Hypertension Sister      Hyperlipidemia Sister      Family History Negative Sister      Family History Negative Sister      Family History Negative Sister      Cerebrovascular Disease Maternal Grandmother 86     Cancer Maternal Grandfather 69        leukemia     Other Cancer Maternal Grandfather         Leukemia     Cancer Paternal Grandfather 69        stomach     Other Cancer Paternal Grandfather         stomach cancer     Diabetes No family hx of      Cancer - colorectal No family hx of      Hypertension No family hx of      Hyperlipidemia No family hx of      Colon Cancer No family hx of      Prostate Cancer  "No family hx of      Other Cancer No family hx of      Depression No family hx of      Anxiety Disorder No family hx of      Mental Illness No family hx of      Substance Abuse No family hx of      Anesthesia Reaction No family hx of      Asthma No family hx of      Osteoporosis No family hx of      Genetic Disorder No family hx of      Thyroid Disease No family hx of      Obesity No family hx of      Unknown/Adopted No family hx of          PHYSICAL EXAM:  Vital signs:  BP (!) 161/86   Pulse 94   Resp 16   Ht 1.626 m (5' 4\")   Wt 76.7 kg (169 lb)   LMP  (LMP Unknown)   SpO2 98%   BMI 29.01 kg/m    GENERAL/CONSTITUTIONAL: No acute distress.  EYES: No erythema or scleral icterus.  LYMPH: No cervical, supraclavicular, axillary, epitroclear adenopathy.   BREAST: No palpable masses in either breast; nipples everted with no discharge; no rash or ulceration.  RESPIRATORY: No audible cough or wheezing.   MUSCULOSKELETAL: Warm and well-perfused, no cyanosis, clubbing, or edema.  NEUROLOGIC: Alert, oriented, answers questions appropriately.  INTEGUMENTARY: No rashes or jaundice.  GAIT: Steady, does not use assistive device    LABS:  CBC RESULTS:   Recent Labs   Lab Test 08/27/20  0821   WBC 5.0   RBC 4.96   HGB 15.5   HCT 45.4   MCV 92   MCH 31.3   MCHC 34.1   RDW 12.4        Last Comprehensive Metabolic Panel:  Sodium   Date Value Ref Range Status   10/08/2021 140 133 - 144 mmol/L Final   08/27/2020 138 133 - 144 mmol/L Final     Potassium   Date Value Ref Range Status   10/08/2021 3.7 3.4 - 5.3 mmol/L Final   08/27/2020 3.5 3.4 - 5.3 mmol/L Final     Chloride   Date Value Ref Range Status   10/08/2021 107 94 - 109 mmol/L Final   08/27/2020 106 94 - 109 mmol/L Final     Carbon Dioxide   Date Value Ref Range Status   08/27/2020 26 20 - 32 mmol/L Final     Carbon Dioxide (CO2)   Date Value Ref Range Status   10/08/2021 25 20 - 32 mmol/L Final     Anion Gap   Date Value Ref Range Status   10/08/2021 8 3 - 14 " mmol/L Final   2020 6 3 - 14 mmol/L Final     Glucose   Date Value Ref Range Status   10/08/2021 104 (H) 70 - 99 mg/dL Final   2020 110 (H) 70 - 99 mg/dL Final     Comment:     Fasting specimen     Urea Nitrogen   Date Value Ref Range Status   10/08/2021 12 7 - 30 mg/dL Final   2020 15 7 - 30 mg/dL Final     Creatinine   Date Value Ref Range Status   10/08/2021 0.86 0.52 - 1.04 mg/dL Final   2020 0.81 0.52 - 1.04 mg/dL Final     GFR Estimate   Date Value Ref Range Status   10/08/2021 69 >60 mL/min/1.73m2 Final     Comment:     As of 2021, eGFR is calculated by the CKD-EPI creatinine equation, without race adjustment. eGFR can be influenced by muscle mass, exercise, and diet. The reported eGFR is an estimation only and is only applicable if the renal function is stable.   2021 71 >60 mL/min/[1.73_m2] Final     Calcium   Date Value Ref Range Status   10/08/2021 9.3 8.5 - 10.1 mg/dL Final   2020 9.4 8.5 - 10.1 mg/dL Final     Bilirubin Total   Date Value Ref Range Status   2020 1.3 0.2 - 1.3 mg/dL Final     Alkaline Phosphatase   Date Value Ref Range Status   2020 122 40 - 150 U/L Final     ALT   Date Value Ref Range Status   2020 65 (H) 0 - 50 U/L Final     AST   Date Value Ref Range Status   2020 35 0 - 45 U/L Final         PATHOLOGY:  None new.    IMAGIN/15/2021: Mammogram showed no suspicious findings.    2022: Breast MRI showed no concerning findings.    ASSESSMENT/PLAN:  Emma Rudolph is a 71 year old female with the following issues:  1. Left breast atypical ductal hyperplasia  -Celi is aware that atypical ductal  hyperplasia is considered a marker for increased risk of developing invasive breast cancer, approximately ranging 11-40%, 5-15 years from time of diagnosis of ADH.  She is aware this is not a cancer.  -She is tolerating raloxifene well for risk reduction. Recommended total 5 years of raloxifene.  -She has no breast  abnormalities on exam today or breast MRI reviewed from 5/5/2022.  -Continue high risk surveillance with clinical breast exam and breast imaging every 6 months with yearly mammograms alternating with yearly breast MRI.    2. Anxiety  -Mood stable.  -Continue desvenlafaxine.    Return in 6 months with mammogram.    Priya Ontiveros MD  Hematology/Oncology  [facility]

## 2022-05-05 ENCOUNTER — HOSPITAL ENCOUNTER (OUTPATIENT)
Dept: MRI IMAGING | Facility: CLINIC | Age: 72
Discharge: HOME | End: 2022-05-05
Attending: PHYSICIAN ASSISTANT | Admitting: PHYSICIAN ASSISTANT
Payer: COMMERCIAL

## 2022-05-05 DIAGNOSIS — N60.92 ATYPICAL DUCTAL HYPERPLASIA OF LEFT BREAST: ICD-10-CM

## 2022-05-05 DIAGNOSIS — N64.89 OTHER SPECIFIED DISORDERS OF BREAST: ICD-10-CM

## 2022-05-05 PROCEDURE — 77049 MRI BREAST C-+ W/CAD BI: CPT

## 2022-05-05 PROCEDURE — 255N000002 HC RX 255 OP 636: Performed by: PHYSICIAN ASSISTANT

## 2022-05-05 PROCEDURE — A9585 GADOBUTROL INJECTION: HCPCS | Performed by: PHYSICIAN ASSISTANT

## 2022-05-05 RX ORDER — GADOBUTROL 604.72 MG/ML
8 INJECTION INTRAVENOUS ONCE
Status: COMPLETED | OUTPATIENT
Start: 2022-05-05 | End: 2022-05-05

## 2022-05-05 RX ADMIN — GADOBUTROL 8 ML: 604.72 INJECTION INTRAVENOUS at 09:07

## 2022-05-09 ENCOUNTER — ONCOLOGY VISIT (OUTPATIENT)
Dept: ONCOLOGY | Facility: CLINIC | Age: 72
End: 2022-05-09
Attending: PHYSICIAN ASSISTANT
Payer: COMMERCIAL

## 2022-05-09 VITALS
RESPIRATION RATE: 16 BRPM | WEIGHT: 169 LBS | BODY MASS INDEX: 28.85 KG/M2 | HEIGHT: 64 IN | OXYGEN SATURATION: 98 % | DIASTOLIC BLOOD PRESSURE: 86 MMHG | HEART RATE: 94 BPM | SYSTOLIC BLOOD PRESSURE: 161 MMHG

## 2022-05-09 DIAGNOSIS — Z12.31 ENCOUNTER FOR SCREENING MAMMOGRAM FOR BREAST CANCER: ICD-10-CM

## 2022-05-09 DIAGNOSIS — N60.92 ATYPICAL DUCTAL HYPERPLASIA OF LEFT BREAST: Primary | ICD-10-CM

## 2022-05-09 PROCEDURE — 99213 OFFICE O/P EST LOW 20 MIN: CPT | Performed by: INTERNAL MEDICINE

## 2022-05-09 PROCEDURE — G0463 HOSPITAL OUTPT CLINIC VISIT: HCPCS

## 2022-05-09 ASSESSMENT — PAIN SCALES - GENERAL: PAINLEVEL: NO PAIN (0)

## 2022-05-09 NOTE — LETTER
5/9/2022         RE: Emma Rudolph  [address] [facility] [address]        Dear Colleague,    Thank you for referring your patient, Emma Rudolph, to the [facility]. Please see a copy of my visit note below.    [facility]    Hematology/Oncology Established Patient Note      Today's Date: 5/9/2022    Reason for follow-up: Atypical ductal hyperplasia.    HISTORY OF PRESENT ILLNESS: Emma Rudolph is a 71 year old female who presents with the following issues:  1. 9/18/2020: Mammogram showed architectural distortion in left breast at 11:00-1:00 retroareolar mid-depth. No concerning findings in right breast.  2. 9/24/2020: Left breast U/S showed oval hypoechoic mass measuring 1.1 cm at 11:00, 3 cm from nipple; oval hypoechoic mass measuring 1 cm at 12:00, 3 cm from nipple; normal-appearing parenchyma between 2 lesions. No enlarged lymph nodes in left axilla.  3. 10/2/2020: U/S-guided left breast needles biopsy at 12:00 showed a complex sclerosing lesion, negative for atypica and malignancy. Biopsy at 11:00 showed benign breast tissue with adenosis, negative for atypica and malignancy.  4. 10/28/2020: Underwent left breast excision under care of Dr. Eileen Montes. Pathology was negative for malignancy but showed atypical ductal hyperplasia, apocrine metaplasia, papillomatosis, microcalcifications.  5. 11/18/2020: Started raloxifene.    INTERIM HISTORY:  Pat reports feeling overall well with no new breast complaints.    REVIEW OF SYSTEMS:   14 point ROS was reviewed and is negative other than as noted above in HPI.       HOME MEDICATIONS:  Current Outpatient Medications   Medication Sig Dispense Refill     ASPIRIN PO Take 81 mg by mouth daily       atorvastatin (LIPITOR) 20 MG tablet Take 1 tablet (20 mg) by mouth daily 90 tablet 3     Blood Pressure KIT Please check your BP daily. 1 kit 0     Cholecalciferol (VITAMIN D) 1000 UNITS capsule Take 1  capsule by mouth daily.       desvenlafaxine (PRISTIQ) 100 MG 24 hr tablet Take 1 tablet (100 mg) by mouth daily 90 tablet 3     lisinopril (ZESTRIL) 10 MG tablet Take 1 tablet (10 mg) by mouth daily 90 tablet 3     Multiple Vitamins-Minerals (MULTIVITAMIN OR) Take by mouth daily       raloxifene (EVISTA) 60 MG tablet Take 1 tablet (60 mg) by mouth daily 90 tablet 3     triamcinolone (KENALOG) 0.1 % external cream Apply topically 2 times daily Apply to chest twice daily for 1 week 15 g 0         ALLERGIES:  Allergies   Allergen Reactions     Penicillins Swelling, Rash and Hives     Amoxicillin     Sulfa Drugs Hives         PAST MEDICAL HISTORY:  Past Medical History:   Diagnosis Date     Breast lesion     LEFT     Generalized anxiety disorder      Hyperlipidemia LDL goal <130      Hypertension      Mild major depression (H)      Osteoarthritis of multiple joints, unspecified osteoarthritis type of #2&3 fingers bilat R>L      Vitamin D deficiency disease      Gynecologic history:  Age of menarche at [age]; G0, no HRT or OCP use.      PAST SURGICAL HISTORY:  Past Surgical History:   Procedure Laterality Date     BIOPSY BREAST SEED LOCALIZATION Left 10/28/2020    Procedure: 2 SEED LOCALIZED LEFT BREAST BIOPSY;  Surgeon: Eileen Montes MD;  Location:  OR     BREAST SURGERY  2020     COLONOSCOPY       EYE SURGERY Left 11/2020    vitrictomy      GYN SURGERY       H ARGON LASER FOR RETINAL TEAR Left 3/2014     HEAD & NECK SURGERY      wisdom teeth     LAPAROSCOPIC SINGLE SITE SALPINGO-OOPHORECTOMY Bilateral 4/15/2015    Procedure: LAPAROSCOPIC SINGLE SITE SALPINGO-OOPHORECTOMY;  Surgeon: Leonora Zavaleta MD;  Location:  [facility]     wisdom teeth removal  age 19         SOCIAL HISTORY:  Social History     Socioeconomic History     Marital status: Single     Spouse name: Not on file     Number of children: Not on file     Years of education: Not on file     Highest education level: Not on file   Occupational History      Employer: RETIRED     Comment: [employer]   Tobacco Use     Smoking status: Never Smoker     Smokeless tobacco: Never Used   Substance and Sexual Activity     Alcohol use: Yes     Alcohol/week: 0.0 standard drinks     Comment: infrequent     Drug use: No     Sexual activity: Not Currently     Partners: Female   Other Topics Concern     Parent/sibling w/ CABG, MI or angioplasty before 65F 55M? Yes     Comment: father, infarction at age 44      Service Not Asked     Blood Transfusions Not Asked     Caffeine Concern Not Asked     Occupational Exposure Not Asked     Hobby Hazards Not Asked     Sleep Concern Not Asked     Stress Concern Not Asked     Weight Concern Not Asked     Special Diet Not Asked     Back Care Not Asked     Exercise Not Asked     Bike Helmet Not Asked     Seat Belt Yes     Self-Exams Not Asked   Social History Narrative     Not on file     Social Determinants of Health     Financial Resource Strain: Not on file   Food Insecurity: Not on file   Transportation Needs: Not on file   Physical Activity: Not on file   Stress: Not on file   Social Connections: Not on file   Intimate Partner Violence: Not on file   Housing Stability: Not on file         FAMILY HISTORY:  Family History   Problem Relation Age of Onset     Breast Cancer Mother 80     Neurologic Disorder Father         Parkinson's     Coronary Artery Disease Father 44        MI     Hyperlipidemia Father      Anxiety Disorder Father      Lipids Brother      Hypertension Brother      Hyperlipidemia Brother      Lipids Brother      Family History Negative Brother      Lipids Sister      Hypertension Sister      Hyperlipidemia Sister      Family History Negative Sister      Family History Negative Sister      Family History Negative Sister      Cerebrovascular Disease Maternal Grandmother 86     Cancer Maternal Grandfather 69        leukemia     Other Cancer Maternal Grandfather         Leukemia     Cancer Paternal Grandfather 69      "   stomach     Other Cancer Paternal Grandfather         stomach cancer     Diabetes No family hx of      Cancer - colorectal No family hx of      Hypertension No family hx of      Hyperlipidemia No family hx of      Colon Cancer No family hx of      Prostate Cancer No family hx of      Other Cancer No family hx of      Depression No family hx of      Anxiety Disorder No family hx of      Mental Illness No family hx of      Substance Abuse No family hx of      Anesthesia Reaction No family hx of      Asthma No family hx of      Osteoporosis No family hx of      Genetic Disorder No family hx of      Thyroid Disease No family hx of      Obesity No family hx of      Unknown/Adopted No family hx of          PHYSICAL EXAM:  Vital signs:  BP (!) 161/86   Pulse 94   Resp 16   Ht 1.626 m (5' 4\")   Wt 76.7 kg (169 lb)   LMP  (LMP Unknown)   SpO2 98%   BMI 29.01 kg/m    GENERAL/CONSTITUTIONAL: No acute distress.  EYES: No erythema or scleral icterus.  LYMPH: No cervical, supraclavicular, axillary, epitroclear adenopathy.   BREAST: No palpable masses in either breast; nipples everted with no discharge; no rash or ulceration.  RESPIRATORY: No audible cough or wheezing.   MUSCULOSKELETAL: Warm and well-perfused, no cyanosis, clubbing, or edema.  NEUROLOGIC: Alert, oriented, answers questions appropriately.  INTEGUMENTARY: No rashes or jaundice.  GAIT: Steady, does not use assistive device    LABS:  CBC RESULTS:   Recent Labs   Lab Test 08/27/20  0821   WBC 5.0   RBC 4.96   HGB 15.5   HCT 45.4   MCV 92   MCH 31.3   MCHC 34.1   RDW 12.4        Last Comprehensive Metabolic Panel:  Sodium   Date Value Ref Range Status   10/08/2021 140 133 - 144 mmol/L Final   08/27/2020 138 133 - 144 mmol/L Final     Potassium   Date Value Ref Range Status   10/08/2021 3.7 3.4 - 5.3 mmol/L Final   08/27/2020 3.5 3.4 - 5.3 mmol/L Final     Chloride   Date Value Ref Range Status   10/08/2021 107 94 - 109 mmol/L Final   08/27/2020 106 94 " - 109 mmol/L Final     Carbon Dioxide   Date Value Ref Range Status   2020 26 20 - 32 mmol/L Final     Carbon Dioxide (CO2)   Date Value Ref Range Status   10/08/2021 25 20 - 32 mmol/L Final     Anion Gap   Date Value Ref Range Status   10/08/2021 8 3 - 14 mmol/L Final   2020 6 3 - 14 mmol/L Final     Glucose   Date Value Ref Range Status   10/08/2021 104 (H) 70 - 99 mg/dL Final   2020 110 (H) 70 - 99 mg/dL Final     Comment:     Fasting specimen     Urea Nitrogen   Date Value Ref Range Status   10/08/2021 12 7 - 30 mg/dL Final   2020 15 7 - 30 mg/dL Final     Creatinine   Date Value Ref Range Status   10/08/2021 0.86 0.52 - 1.04 mg/dL Final   2020 0.81 0.52 - 1.04 mg/dL Final     GFR Estimate   Date Value Ref Range Status   10/08/2021 69 >60 mL/min/1.73m2 Final     Comment:     As of 2021, eGFR is calculated by the CKD-EPI creatinine equation, without race adjustment. eGFR can be influenced by muscle mass, exercise, and diet. The reported eGFR is an estimation only and is only applicable if the renal function is stable.   2021 71 >60 mL/min/[1.73_m2] Final     Calcium   Date Value Ref Range Status   10/08/2021 9.3 8.5 - 10.1 mg/dL Final   2020 9.4 8.5 - 10.1 mg/dL Final     Bilirubin Total   Date Value Ref Range Status   2020 1.3 0.2 - 1.3 mg/dL Final     Alkaline Phosphatase   Date Value Ref Range Status   2020 122 40 - 150 U/L Final     ALT   Date Value Ref Range Status   2020 65 (H) 0 - 50 U/L Final     AST   Date Value Ref Range Status   2020 35 0 - 45 U/L Final         PATHOLOGY:  None new.    IMAGIN/15/2021: Mammogram showed no suspicious findings.    2022: Breast MRI showed no concerning findings.    ASSESSMENT/PLAN:  Emma Rudolph is a 71 year old female with the following issues:  1. Left breast atypical ductal hyperplasia  -Pat is aware that atypical ductal  hyperplasia is considered a marker for increased risk of  "developing invasive breast cancer, approximately ranging 11-40%, 5-15 years from time of diagnosis of ADH.  She is aware this is not a cancer.  -She is tolerating raloxifene well for risk reduction. Recommended total 5 years of raloxifene.  -She has no breast abnormalities on exam today or breast MRI reviewed from 5/5/2022.  -Continue high risk surveillance with clinical breast exam and breast imaging every 6 months with yearly mammograms alternating with yearly breast MRI.    2. Anxiety  -Mood stable.  -Continue desvenlafaxine.    Return in 6 months with mammogram.    Priya Ontiveros MD  Hematology/Oncology  [facility]    Oncology Rooming Note    May 9, 2022 2:35 PM   Emma Rudolph is a 71 year old female who presents for:    Chief Complaint   Patient presents with     Oncology Clinic Visit     Initial Vitals: LMP  (LMP Unknown)  Estimated body mass index is 29.32 kg/m  as calculated from the following:    Height as of 4/5/22: 1.626 m (5' 4\").    Weight as of 4/5/22: 77.5 kg (170 lb 12.8 oz). There is no height or weight on file to calculate BSA.  Data Unavailable Comment: Data Unavailable   No LMP recorded (lmp unknown). Patient is postmenopausal.  Allergies reviewed: Yes  Medications reviewed: Yes    Medications: Medication refills not needed today.  Pharmacy name entered into EPIC:    [facility] - [facility], [place] - [address] [place] [address] [id number] - [place], [place] - [address] AT [facility] [address]    Clinical concerns:  doctor was notified.      Lise Barnes MA                Again, thank you for allowing me to participate in the care of your patient.        Sincerely,        Priya Ontiveros MD    "

## 2022-05-09 NOTE — PROGRESS NOTES
"Oncology Rooming Note    May 9, 2022 2:35 PM   Emma Rudolph is a 71 year old female who presents for:    Chief Complaint   Patient presents with     Oncology Clinic Visit     Initial Vitals: LMP  (LMP Unknown)  Estimated body mass index is 29.32 kg/m  as calculated from the following:    Height as of 4/5/22: 1.626 m (5' 4\").    Weight as of 4/5/22: 77.5 kg (170 lb 12.8 oz). There is no height or weight on file to calculate BSA.  Data Unavailable Comment: Data Unavailable   No LMP recorded (lmp unknown). Patient is postmenopausal.  Allergies reviewed: Yes  Medications reviewed: Yes    Medications: Medication refills not needed today.  Pharmacy name entered into EPIC:    [facility] - [facility], [place] - [address] [place] [address] DRUG STORE #[id number] - [place], [place] - [address] AT [facility] [address]    Clinical concerns:  doctor was notified.      Lise Barnes MA            "

## 2022-08-02 ENCOUNTER — OFFICE VISIT (OUTPATIENT)
Dept: URGENT CARE | Facility: URGENT CARE | Age: 72
End: 2022-08-02

## 2022-08-02 ENCOUNTER — VIRTUAL VISIT (OUTPATIENT)
Dept: INTERNAL MEDICINE | Facility: CLINIC | Age: 72
End: 2022-08-02
Payer: COMMERCIAL

## 2022-08-02 VITALS
OXYGEN SATURATION: 96 % | DIASTOLIC BLOOD PRESSURE: 84 MMHG | HEART RATE: 97 BPM | BODY MASS INDEX: 29.01 KG/M2 | RESPIRATION RATE: 18 BRPM | SYSTOLIC BLOOD PRESSURE: 156 MMHG | WEIGHT: 169 LBS | TEMPERATURE: 98.9 F

## 2022-08-02 DIAGNOSIS — R23.2 FLUSHING: Primary | ICD-10-CM

## 2022-08-02 DIAGNOSIS — R07.89 CHEST HEAVINESS: ICD-10-CM

## 2022-08-02 DIAGNOSIS — R07.89 CHEST TIGHTNESS: Primary | ICD-10-CM

## 2022-08-02 DIAGNOSIS — F41.9 ANXIETY: ICD-10-CM

## 2022-08-02 LAB — TROPONIN I SERPL HS-MCNC: 4 NG/L

## 2022-08-02 PROCEDURE — 99207 PR NON-BILLABLE SERV PER CHARTING: CPT | Performed by: INTERNAL MEDICINE

## 2022-08-02 PROCEDURE — 99214 OFFICE O/P EST MOD 30 MIN: CPT | Performed by: FAMILY MEDICINE

## 2022-08-02 PROCEDURE — 84484 ASSAY OF TROPONIN QUANT: CPT | Performed by: FAMILY MEDICINE

## 2022-08-02 PROCEDURE — 93000 ELECTROCARDIOGRAM COMPLETE: CPT | Performed by: FAMILY MEDICINE

## 2022-08-02 PROCEDURE — 36415 COLL VENOUS BLD VENIPUNCTURE: CPT | Performed by: FAMILY MEDICINE

## 2022-08-02 RX ORDER — ALPRAZOLAM 0.25 MG
0.25 TABLET ORAL 3 TIMES DAILY PRN
Qty: 8 TABLET | Refills: 0 | Status: SHIPPED | OUTPATIENT
Start: 2022-08-02 | End: 2022-08-08

## 2022-08-02 ASSESSMENT — ENCOUNTER SYMPTOMS
PALPITATIONS: 0
SHORTNESS OF BREATH: 0

## 2022-08-02 NOTE — PROGRESS NOTES
Celi is a 71 year old who is being evaluated via a billable telephone visit.      What phone number would you like to be contacted at? Home: [phone]   How would you like to obtain your AVS? MyChart    Assessment & Plan     Flushing  Chest heaviness  -not entirely sure what the cause of her sx are. Nothing is concerning other than this atypical chest sx she describes.   -would be wise to have her examined in person, maybe do ekg, given sx ongoing a few days- troponin if negative, would be reasurring.  Overall, low risk but warrants clinical eval in person                   No follow-ups on file.    Jose Antonio Araujo MD  [facility]    Subjective   Celi is a 71 year old, presenting for the following health issues:  No chief complaint on file.      History of Present Illness       Reason for visit:  Sunday and Monday have experienced hot flashes several times, like 5 times, each day.  Symptom onset:  1-3 days ago  Symptoms include:  Hot all over body, perspiring heavily. All I was doing was watching TV so not to do with exertion. Episodes about 5 minutes.  Symptom intensity:  Severe  Symptom progression:  Staying the same  Had these symptoms before:  No  What makes it worse:  No  What makes it better:  No, just having them go away. Not related to any stressful situation or exertion. I have never experienced this before.    She eats 2-3 servings of fruits and vegetables daily.She consumes 0 sweetened beverage(s) daily.She exercises with enough effort to increase her heart rate 10 to 19 minutes per day.  She exercises with enough effort to increase her heart rate 4 days per week.   She is taking medications regularly.         Review of Systems   HENT:        Flushing and warmth   Respiratory: Negative for shortness of breath.    Cardiovascular: Negative for chest pain, palpitations and peripheral edema.        Chest heaviness             Objective           Vitals:  No vitals were  obtained today due to virtual visit.    Physical Exam   alert and a bit anxious  PSYCH: Alert and oriented times 3; coherent speech, normal   rate and volume, able to articulate logical thoughts, able   to abstract reason, no tangential thoughts, no hallucinations   or delusions  Her affect is anxious  RESP: No cough, no audible wheezing, able to talk in full sentences  Remainder of exam unable to be completed due to telephone visits                Phone call duration: 13 minutes    .  ..

## 2022-08-03 NOTE — PROGRESS NOTES
SUBJECTIVE: Emma Rudolph is a 71 year old female presenting with a chief complaint of chest tightness, c/o anxiety which she has had b4.  Onset of symptoms was day(s) ago.  Course of illness is waxing and waning.    Current and Associated symptoms: none  Predisposing factors include HX of anxiety.    Past Medical History:   Diagnosis Date     Breast lesion     LEFT     Generalized anxiety disorder      Hyperlipidemia LDL goal <130      Hypertension      Mild major depression (H)      Osteoarthritis of multiple joints, unspecified osteoarthritis type of #2&3 fingers bilat R>L      Vitamin D deficiency disease      Allergies   Allergen Reactions     Penicillins Swelling, Rash and Hives     Amoxicillin     Sulfa Drugs Hives     Social History     Tobacco Use     Smoking status: Never Smoker     Smokeless tobacco: Never Used   Substance Use Topics     Alcohol use: Yes     Alcohol/week: 0.0 standard drinks     Comment: infrequent       ROS:  SKIN: no rash  GI: no vomiting    OBJECTIVE:  BP (!) 156/84   Pulse 97   Temp 98.9  F (37.2  C)   Resp 18   Wt 76.7 kg (169 lb)   LMP  (LMP Unknown)   SpO2 96%   BMI 29.01 kg/m  GENERAL APPEARANCE: healthy, alert and no distress  EYES: EOMI,  PERRL, conjunctiva clear  HENT: ear canals and TM's normal.  Nose and mouth without ulcers, erythema or lesions  RESP: lungs clear to auscultation - no rales, rhonchi or wheezes  CV: regular rates and rhythm, normal S1 S2, no murmur noted  ABDOMEN:  soft, nontender, no HSM or masses and bowel sounds normal  NEURO: Normal strength and tone, sensory exam grossly normal,  normal speech and mentation  SKIN: no suspicious lesions or rashes    EKG NSR without acute ST chnges      ICD-10-CM    1. Chest tightness  R07.89 Troponin I     EKG 12-lead complete w/read - Clinics   2. Anxiety  F41.9 ALPRAZolam (XANAX) 0.25 MG tablet     No MI by negative EKG nd troponin  Fluids/Rest, f/u if worse/not any better

## 2022-08-04 DIAGNOSIS — F41.9 ANXIETY: ICD-10-CM

## 2022-08-04 NOTE — TELEPHONE ENCOUNTER
Patient has called [facility] multiple times . They are telling her the RX was not approved 8/2/2022.    Can this RX be re-ordered?     Chantal Baca RN  [facility]

## 2022-08-05 RX ORDER — ALPRAZOLAM 0.25 MG
0.25 TABLET ORAL 3 TIMES DAILY PRN
Qty: 8 TABLET | Refills: 0 | OUTPATIENT
Start: 2022-08-05

## 2022-08-08 RX ORDER — ALPRAZOLAM 0.25 MG
0.25 TABLET ORAL 3 TIMES DAILY PRN
Qty: 8 TABLET | Refills: 0 | Status: SHIPPED | OUTPATIENT
Start: 2022-08-08 | End: 2022-12-01

## 2022-11-14 NOTE — PROGRESS NOTES
[facility]    Hematology/Oncology Established Patient Note      Today's Date: 12/1/2022    Reason for follow-up: Atypical ductal hyperplasia.    HISTORY OF PRESENT ILLNESS: Emma Rudolph is a 72 year old female who presents with the following issues:  1. 9/18/2020: Mammogram showed architectural distortion in left breast at 11:00-1:00 retroareolar mid-depth. No concerning findings in right breast.  2. 9/24/2020: Left breast U/S showed oval hypoechoic mass measuring 1.1 cm at 11:00, 3 cm from nipple; oval hypoechoic mass measuring 1 cm at 12:00, 3 cm from nipple; normal-appearing parenchyma between 2 lesions. No enlarged lymph nodes in left axilla.  3. 10/2/2020: U/S-guided left breast needles biopsy at 12:00 showed a complex sclerosing lesion, negative for atypica and malignancy. Biopsy at 11:00 showed benign breast tissue with adenosis, negative for atypica and malignancy.  4. 10/28/2020: Underwent left breast excision under care of Dr. Eileen Montes. Pathology was negative for malignancy but showed atypical ductal hyperplasia, apocrine metaplasia, papillomatosis, microcalcifications.  5. 11/18/2020: Started raloxifene.    INTERIM HISTORY:  Celi reports feeling overall well with no new breast complaints.    REVIEW OF SYSTEMS:   14 point ROS was reviewed and is negative other than as noted above in HPI.       HOME MEDICATIONS:  Current Outpatient Medications   Medication Sig Dispense Refill     ALPRAZolam (XANAX) 0.25 MG tablet Take 1 tablet (0.25 mg) by mouth 3 times daily as needed for anxiety 8 tablet 0     ASPIRIN PO Take 81 mg by mouth daily       atorvastatin (LIPITOR) 20 MG tablet Take 1 tablet (20 mg) by mouth daily 90 tablet 3     Blood Pressure KIT Please check your BP daily. 1 kit 0     Cholecalciferol (VITAMIN D) 1000 UNITS capsule Take 1 capsule by mouth daily.       desvenlafaxine (PRISTIQ) 100 MG 24 hr tablet Take 1 tablet (100 mg) by mouth daily 90 tablet 3     lisinopril  (ZESTRIL) 10 MG tablet Take 1 tablet (10 mg) by mouth daily 90 tablet 3     Multiple Vitamins-Minerals (MULTIVITAMIN OR) Take by mouth daily       raloxifene (EVISTA) 60 MG tablet Take 1 tablet (60 mg) by mouth daily 90 tablet 3     triamcinolone (KENALOG) 0.1 % external cream Apply topically 2 times daily Apply to chest twice daily for 1 week 15 g 0         ALLERGIES:  Allergies   Allergen Reactions     Penicillins Swelling, Rash and Hives     Amoxicillin     Sulfa Drugs Hives         PAST MEDICAL HISTORY:  Past Medical History:   Diagnosis Date     Breast lesion     LEFT     Generalized anxiety disorder      Hyperlipidemia LDL goal <130      Hypertension      Mild major depression (H)      Osteoarthritis of multiple joints, unspecified osteoarthritis type of #2&3 fingers bilat R>L      Vitamin D deficiency disease      Gynecologic history:  Age of menarche at 13-14; G0, no HRT or OCP use.      PAST SURGICAL HISTORY:  Past Surgical History:   Procedure Laterality Date     BIOPSY BREAST SEED LOCALIZATION Left 10/28/2020    Procedure: 2 SEED LOCALIZED LEFT BREAST BIOPSY;  Surgeon: Eileen Mnotes MD;  Location:  OR     BREAST SURGERY  2020     COLONOSCOPY       EYE SURGERY Left 11/2020    vitrictomy      GYN SURGERY       H ARGON LASER FOR RETINAL TEAR Left 3/2014     HEAD & NECK SURGERY      wisdom teeth     LAPAROSCOPIC SINGLE SITE SALPINGO-OOPHORECTOMY Bilateral 4/15/2015    Procedure: LAPAROSCOPIC SINGLE SITE SALPINGO-OOPHORECTOMY;  Surgeon: Leonora Zavaleta MD;  Location:  [facility]     wisdom teeth removal  age 19         SOCIAL HISTORY:  Social History     Socioeconomic History     Marital status: Single     Spouse name: Not on file     Number of children: Not on file     Years of education: Not on file     Highest education level: Not on file   Occupational History     Employer: RETIRED     Comment: [employer]   Tobacco Use     Smoking status: Never     Smokeless tobacco: Never   Vaping Use     Vaping  Use: Never used   Substance and Sexual Activity     Alcohol use: Yes     Alcohol/week: 0.0 standard drinks     Comment: infrequent     Drug use: No     Sexual activity: Not Currently     Partners: Female   Other Topics Concern     Parent/sibling w/ CABG, MI or angioplasty before 65F 55M? Yes     Comment: father, infarction at age 44      Service Not Asked     Blood Transfusions Not Asked     Caffeine Concern Not Asked     Occupational Exposure Not Asked     Hobby Hazards Not Asked     Sleep Concern Not Asked     Stress Concern Not Asked     Weight Concern Not Asked     Special Diet Not Asked     Back Care Not Asked     Exercise Not Asked     Bike Helmet Not Asked     Seat Belt Yes     Self-Exams Not Asked   Social History Narrative     Not on file     Social Determinants of Health     Financial Resource Strain: Not on file   Food Insecurity: Not on file   Transportation Needs: Not on file   Physical Activity: Not on file   Stress: Not on file   Social Connections: Not on file   Intimate Partner Violence: Not on file   Housing Stability: Not on file         FAMILY HISTORY:  Family History   Problem Relation Age of Onset     Breast Cancer Mother 80     Neurologic Disorder Father         Parkinson's     Coronary Artery Disease Father 44        MI     Hyperlipidemia Father      Anxiety Disorder Father      Lipids Brother      Hypertension Brother      Hyperlipidemia Brother      Lipids Brother      Family History Negative Brother      Lipids Sister      Hypertension Sister      Hyperlipidemia Sister      Family History Negative Sister      Family History Negative Sister      Family History Negative Sister      Cerebrovascular Disease Maternal Grandmother 86     Cancer Maternal Grandfather 69        leukemia     Other Cancer Maternal Grandfather         Leukemia     Cancer Paternal Grandfather 69        stomach     Other Cancer Paternal Grandfather         stomach cancer     Diabetes No family hx of      Cancer -  colorectal No family hx of      Hypertension No family hx of      Hyperlipidemia No family hx of      Colon Cancer No family hx of      Prostate Cancer No family hx of      Other Cancer No family hx of      Depression No family hx of      Anxiety Disorder No family hx of      Mental Illness No family hx of      Substance Abuse No family hx of      Anesthesia Reaction No family hx of      Asthma No family hx of      Osteoporosis No family hx of      Genetic Disorder No family hx of      Thyroid Disease No family hx of      Obesity No family hx of      Unknown/Adopted No family hx of          PHYSICAL EXAM:  Vital signs:  BP (!) 158/87   Pulse 88   Resp 16   Wt 78 kg (172 lb)   LMP  (LMP Unknown)   SpO2 99%   BMI 29.52 kg/m    GENERAL/CONSTITUTIONAL: No acute distress.  EYES: No erythema or scleral icterus.  LYMPH: No cervical, supraclavicular, axillary, epitroclear adenopathy.   BREAST: No palpable masses in either breast; nipples everted with no discharge; no rash or ulceration. Slight concavity at the left breast prior excision site.  RESPIRATORY: No audible cough or wheezing.   MUSCULOSKELETAL: Warm and well-perfused, no cyanosis, clubbing, or edema.  NEUROLOGIC: Alert, oriented, answers questions appropriately.  INTEGUMENTARY: No rashes or jaundice.  GAIT: Steady, does not use assistive device    LABS:  CBC RESULTS:   Recent Labs   Lab Test 08/27/20  0821   WBC 5.0   RBC 4.96   HGB 15.5   HCT 45.4   MCV 92   MCH 31.3   MCHC 34.1   RDW 12.4        Last Comprehensive Metabolic Panel:  Sodium   Date Value Ref Range Status   10/08/2021 140 133 - 144 mmol/L Final   08/27/2020 138 133 - 144 mmol/L Final     Potassium   Date Value Ref Range Status   10/08/2021 3.7 3.4 - 5.3 mmol/L Final   08/27/2020 3.5 3.4 - 5.3 mmol/L Final     Chloride   Date Value Ref Range Status   10/08/2021 107 94 - 109 mmol/L Final   08/27/2020 106 94 - 109 mmol/L Final     Carbon Dioxide   Date Value Ref Range Status   08/27/2020  26 20 - 32 mmol/L Final     Carbon Dioxide (CO2)   Date Value Ref Range Status   10/08/2021 25 20 - 32 mmol/L Final     Anion Gap   Date Value Ref Range Status   10/08/2021 8 3 - 14 mmol/L Final   2020 6 3 - 14 mmol/L Final     Glucose   Date Value Ref Range Status   10/08/2021 104 (H) 70 - 99 mg/dL Final   2020 110 (H) 70 - 99 mg/dL Final     Comment:     Fasting specimen     Urea Nitrogen   Date Value Ref Range Status   10/08/2021 12 7 - 30 mg/dL Final   2020 15 7 - 30 mg/dL Final     Creatinine   Date Value Ref Range Status   10/08/2021 0.86 0.52 - 1.04 mg/dL Final   2020 0.81 0.52 - 1.04 mg/dL Final     GFR Estimate   Date Value Ref Range Status   10/08/2021 69 >60 mL/min/1.73m2 Final     Comment:     As of 2021, eGFR is calculated by the CKD-EPI creatinine equation, without race adjustment. eGFR can be influenced by muscle mass, exercise, and diet. The reported eGFR is an estimation only and is only applicable if the renal function is stable.   2021 71 >60 mL/min/[1.73_m2] Final     Calcium   Date Value Ref Range Status   10/08/2021 9.3 8.5 - 10.1 mg/dL Final   2020 9.4 8.5 - 10.1 mg/dL Final     Bilirubin Total   Date Value Ref Range Status   2020 1.3 0.2 - 1.3 mg/dL Final     Alkaline Phosphatase   Date Value Ref Range Status   2020 122 40 - 150 U/L Final     ALT   Date Value Ref Range Status   2020 65 (H) 0 - 50 U/L Final     AST   Date Value Ref Range Status   2020 35 0 - 45 U/L Final         PATHOLOGY:  None new.    IMAGIN2022: Mammogram showed no malignant findings.    2022: Breast MRI showed no concerning findings.    ASSESSMENT/PLAN:  Emma Rudolph is a 72 year old female with the following issues:  1. Left breast atypical ductal hyperplasia  -Pat is aware that atypical ductal hyperplasia is considered a marker for increased risk of developing invasive breast cancer, approximately ranging 11-40%, 5-15 years from  time of diagnosis of ADH.  She is aware this is not a cancer.  -She is tolerating raloxifene well for risk reduction. Advised total 5 years of raloxifene, through 11/2025.  -She has no breast abnormalities on physical exam today or mammogram reviewed from 11/28/2022.  -Continue high risk surveillance with clinical breast exam and breast imaging every 6 months with yearly mammograms alternating with yearly breast MRI.    2. Anxiety  -Mood stable.  -Continue desvenlafaxine.    Return in 6 months with breast MRI.    Priya Ontiveros MD  Hematology/Oncology  [facility]    Total time spent: 20 minutes in patient evaluation, counseling, documentation, and coordination of care.

## 2022-11-20 ENCOUNTER — HEALTH MAINTENANCE LETTER (OUTPATIENT)
Age: 72
End: 2022-11-20

## 2022-11-22 ENCOUNTER — VIRTUAL VISIT (OUTPATIENT)
Dept: FAMILY MEDICINE | Facility: CLINIC | Age: 72
End: 2022-11-22
Payer: COMMERCIAL

## 2022-11-22 DIAGNOSIS — Z12.11 SCREEN FOR COLON CANCER: ICD-10-CM

## 2022-11-22 DIAGNOSIS — I10 BENIGN ESSENTIAL HYPERTENSION: Primary | ICD-10-CM

## 2022-11-22 DIAGNOSIS — E78.2 MIXED HYPERLIPIDEMIA: ICD-10-CM

## 2022-11-22 PROCEDURE — 99214 OFFICE O/P EST MOD 30 MIN: CPT | Mod: 95 | Performed by: PHYSICIAN ASSISTANT

## 2022-11-22 RX ORDER — ATORVASTATIN CALCIUM 20 MG/1
20 TABLET, FILM COATED ORAL DAILY
Qty: 90 TABLET | Refills: 1 | Status: SHIPPED | OUTPATIENT
Start: 2022-11-22 | End: 2023-03-01

## 2022-11-22 RX ORDER — LISINOPRIL 10 MG/1
10 TABLET ORAL DAILY
Qty: 90 TABLET | Refills: 1 | Status: SHIPPED | OUTPATIENT
Start: 2022-11-22 | End: 2023-03-01

## 2022-11-22 NOTE — PROGRESS NOTES
Celi is a 72 year old who is being evaluated via a billable video visit.      How would you like to obtain your AVS? Mail a copy  If the video visit is dropped, the invitation should be resent by: Send to e-mail at: [email or web address]  Will anyone else be joining your video visit? No        Assessment & Plan  appears well, will check FIT card given recent likely isolated acute diarrheal event  Problem List Items Addressed This Visit     Mixed hyperlipidemia    Relevant Medications    atorvastatin (LIPITOR) 20 MG tablet    Benign essential hypertension - Primary    Relevant Medications    lisinopril (ZESTRIL) 10 MG tablet   Other Visit Diagnoses     Screen for colon cancer        Relevant Orders    Fecal colorectal cancer screen FIT            14 minutes spent on the date of the encounter doing chart review, history and exam, documentation and further activities per the note  {      Return in about 2 weeks (around 12/6/2022) for  , Lab Work.    MICHELLE Carolina  [facility]   Celi is a 72 year old, presenting for the following health issues:  Medication Refill      History of Present Illness       Reason for visit:  Lisinopril refill prescription with [facility] account    She eats 2-3 servings of fruits and vegetables daily.She consumes 0 sweetened beverage(s) daily.She exercises with enough effort to increase her heart rate 10 to 19 minutes per day.  She exercises with enough effort to increase her heart rate 4 days per week.   She is taking medications regularly.       Hypertension Follow-up      Do you check your blood pressure regularly outside of the clinic? No     Are you following a low salt diet? Yes    Are your blood pressures ever more than 140 on the top number (systolic) OR more   than 90 on the bottom number (diastolic), for example 140/90? Yes     Had episode two days ago, cramping and diarrhea with some blood, one time then resolved. Normal  colonoscpy in 2015    Routine high risk mammo next week     Review of Systems   Cardio- hx HTN      Objective           Vitals:  No vitals were obtained today due to virtual visit.    Physical Exam   GENERAL: Healthy, alert and no distress  EYES: Eyes grossly normal to inspection.  No discharge or erythema, or obvious scleral/conjunctival abnormalities.  RESP: No audible wheeze, cough, or visible cyanosis.  No visible retractions or increased work of breathing.    SKIN: Visible skin clear. No significant rash, abnormal pigmentation or lesions.  NEURO: Cranial nerves grossly intact.  Mentation and speech appropriate for age.  PSYCH: Mentation appears normal, affect normal/bright, judgement and insight intact, normal speech and appearance well-groomed.            Video-Visit Details    Video Start Time: 4:38 PM    Type of service:  Video Visit    Video End Time:4:45 PM    Originating Location (pt. Location): Home    Distant Location (provider location):  On-site    Platform used for Video Visit: Martin

## 2022-11-28 ENCOUNTER — HOSPITAL ENCOUNTER (OUTPATIENT)
Dept: MAMMOGRAPHY | Facility: CLINIC | Age: 72
Discharge: HOME | End: 2022-11-28
Attending: INTERNAL MEDICINE | Admitting: INTERNAL MEDICINE
Payer: COMMERCIAL

## 2022-11-28 DIAGNOSIS — Z12.31 ENCOUNTER FOR SCREENING MAMMOGRAM FOR BREAST CANCER: ICD-10-CM

## 2022-11-28 DIAGNOSIS — N60.92 ATYPICAL DUCTAL HYPERPLASIA OF LEFT BREAST: ICD-10-CM

## 2022-11-28 PROCEDURE — 77067 SCR MAMMO BI INCL CAD: CPT

## 2022-12-01 ENCOUNTER — ONCOLOGY VISIT (OUTPATIENT)
Dept: ONCOLOGY | Facility: CLINIC | Age: 72
End: 2022-12-01
Attending: INTERNAL MEDICINE
Payer: COMMERCIAL

## 2022-12-01 VITALS
DIASTOLIC BLOOD PRESSURE: 87 MMHG | HEART RATE: 88 BPM | SYSTOLIC BLOOD PRESSURE: 158 MMHG | RESPIRATION RATE: 16 BRPM | BODY MASS INDEX: 29.52 KG/M2 | OXYGEN SATURATION: 99 % | WEIGHT: 172 LBS

## 2022-12-01 DIAGNOSIS — Z12.31 ENCOUNTER FOR SCREENING MAMMOGRAM FOR BREAST CANCER: ICD-10-CM

## 2022-12-01 DIAGNOSIS — N60.92 ATYPICAL DUCTAL HYPERPLASIA OF LEFT BREAST: Primary | ICD-10-CM

## 2022-12-01 DIAGNOSIS — N64.89 OTHER SPECIFIED DISORDERS OF BREAST: ICD-10-CM

## 2022-12-01 PROCEDURE — 99213 OFFICE O/P EST LOW 20 MIN: CPT | Performed by: INTERNAL MEDICINE

## 2022-12-01 PROCEDURE — G0463 HOSPITAL OUTPT CLINIC VISIT: HCPCS

## 2022-12-01 ASSESSMENT — PAIN SCALES - GENERAL: PAINLEVEL: NO PAIN (0)

## 2022-12-01 NOTE — LETTER
12/1/2022         RE: Emma Rudolph  [address] [facility] [address]        Dear Colleague,    Thank you for referring your patient, Emma Rudolph, to the [facility] [place]. Please see a copy of my visit note below.    [facility]    Hematology/Oncology Established Patient Note      Today's Date: 12/1/2022    Reason for follow-up: Atypical ductal hyperplasia.    HISTORY OF PRESENT ILLNESS: Emma Rudolph is a 72 year old female who presents with the following issues:  1. 9/18/2020: Mammogram showed architectural distortion in left breast at 11:00-1:00 retroareolar mid-depth. No concerning findings in right breast.  2. 9/24/2020: Left breast U/S showed oval hypoechoic mass measuring 1.1 cm at 11:00, 3 cm from nipple; oval hypoechoic mass measuring 1 cm at 12:00, 3 cm from nipple; normal-appearing parenchyma between 2 lesions. No enlarged lymph nodes in left axilla.  3. 10/2/2020: U/S-guided left breast needles biopsy at 12:00 showed a complex sclerosing lesion, negative for atypica and malignancy. Biopsy at 11:00 showed benign breast tissue with adenosis, negative for atypica and malignancy.  4. 10/28/2020: Underwent left breast excision under care of Dr. Eileen Montes. Pathology was negative for malignancy but showed atypical ductal hyperplasia, apocrine metaplasia, papillomatosis, microcalcifications.  5. 11/18/2020: Started raloxifene.    INTERIM HISTORY:  Pat reports feeling overall well with no new breast complaints.    REVIEW OF SYSTEMS:   14 point ROS was reviewed and is negative other than as noted above in HPI.       HOME MEDICATIONS:  Current Outpatient Medications   Medication Sig Dispense Refill     ALPRAZolam (XANAX) 0.25 MG tablet Take 1 tablet (0.25 mg) by mouth 3 times daily as needed for anxiety 8 tablet 0     ASPIRIN PO Take 81 mg by mouth daily       atorvastatin (LIPITOR) 20 MG tablet Take 1 tablet (20 mg) by mouth daily 90 tablet  3     Blood Pressure KIT Please check your BP daily. 1 kit 0     Cholecalciferol (VITAMIN D) 1000 UNITS capsule Take 1 capsule by mouth daily.       desvenlafaxine (PRISTIQ) 100 MG 24 hr tablet Take 1 tablet (100 mg) by mouth daily 90 tablet 3     lisinopril (ZESTRIL) 10 MG tablet Take 1 tablet (10 mg) by mouth daily 90 tablet 3     Multiple Vitamins-Minerals (MULTIVITAMIN OR) Take by mouth daily       raloxifene (EVISTA) 60 MG tablet Take 1 tablet (60 mg) by mouth daily 90 tablet 3     triamcinolone (KENALOG) 0.1 % external cream Apply topically 2 times daily Apply to chest twice daily for 1 week 15 g 0         ALLERGIES:  Allergies   Allergen Reactions     Penicillins Swelling, Rash and Hives     Amoxicillin     Sulfa Drugs Hives         PAST MEDICAL HISTORY:  Past Medical History:   Diagnosis Date     Breast lesion     LEFT     Generalized anxiety disorder      Hyperlipidemia LDL goal <130      Hypertension      Mild major depression (H)      Osteoarthritis of multiple joints, unspecified osteoarthritis type of #2&3 fingers bilat R>L      Vitamin D deficiency disease      Gynecologic history:  Age of menarche at 13-14; G0, no HRT or OCP use.      PAST SURGICAL HISTORY:  Past Surgical History:   Procedure Laterality Date     BIOPSY BREAST SEED LOCALIZATION Left 10/28/2020    Procedure: 2 SEED LOCALIZED LEFT BREAST BIOPSY;  Surgeon: Eileen Montes MD;  Location:  OR     BREAST SURGERY  2020     COLONOSCOPY       EYE SURGERY Left 11/2020    vitrictomy      GYN SURGERY       H ARGON LASER FOR RETINAL TEAR Left 3/2014     HEAD & NECK SURGERY      wisdom teeth     LAPAROSCOPIC SINGLE SITE SALPINGO-OOPHORECTOMY Bilateral 4/15/2015    Procedure: LAPAROSCOPIC SINGLE SITE SALPINGO-OOPHORECTOMY;  Surgeon: Leonora Zavaleta MD;  Location:  SD     wisdom teeth removal  age 19         SOCIAL HISTORY:  Social History     Socioeconomic History     Marital status: Single     Spouse name: Not on file     Number of children: Not  on file     Years of education: Not on file     Highest education level: Not on file   Occupational History     Employer: RETIRED     Comment: [employer]   Tobacco Use     Smoking status: Never     Smokeless tobacco: Never   Vaping Use     Vaping Use: Never used   Substance and Sexual Activity     Alcohol use: Yes     Alcohol/week: 0.0 standard drinks     Comment: infrequent     Drug use: No     Sexual activity: Not Currently     Partners: Female   Other Topics Concern     Parent/sibling w/ CABG, MI or angioplasty before 65F 55M? Yes     Comment: father, infarction at age 44      Service Not Asked     Blood Transfusions Not Asked     Caffeine Concern Not Asked     Occupational Exposure Not Asked     Hobby Hazards Not Asked     Sleep Concern Not Asked     Stress Concern Not Asked     Weight Concern Not Asked     Special Diet Not Asked     Back Care Not Asked     Exercise Not Asked     Bike Helmet Not Asked     Seat Belt Yes     Self-Exams Not Asked   Social History Narrative     Not on file     Social Determinants of Health     Financial Resource Strain: Not on file   Food Insecurity: Not on file   Transportation Needs: Not on file   Physical Activity: Not on file   Stress: Not on file   Social Connections: Not on file   Intimate Partner Violence: Not on file   Housing Stability: Not on file         FAMILY HISTORY:  Family History   Problem Relation Age of Onset     Breast Cancer Mother 80     Neurologic Disorder Father         Parkinson's     Coronary Artery Disease Father 44        MI     Hyperlipidemia Father      Anxiety Disorder Father      Lipids Brother      Hypertension Brother      Hyperlipidemia Brother      Lipids Brother      Family History Negative Brother      Lipids Sister      Hypertension Sister      Hyperlipidemia Sister      Family History Negative Sister      Family History Negative Sister      Family History Negative Sister      Cerebrovascular Disease Maternal Grandmother 86      Cancer Maternal Grandfather 69        leukemia     Other Cancer Maternal Grandfather         Leukemia     Cancer Paternal Grandfather 69        stomach     Other Cancer Paternal Grandfather         stomach cancer     Diabetes No family hx of      Cancer - colorectal No family hx of      Hypertension No family hx of      Hyperlipidemia No family hx of      Colon Cancer No family hx of      Prostate Cancer No family hx of      Other Cancer No family hx of      Depression No family hx of      Anxiety Disorder No family hx of      Mental Illness No family hx of      Substance Abuse No family hx of      Anesthesia Reaction No family hx of      Asthma No family hx of      Osteoporosis No family hx of      Genetic Disorder No family hx of      Thyroid Disease No family hx of      Obesity No family hx of      Unknown/Adopted No family hx of          PHYSICAL EXAM:  Vital signs:  BP (!) 158/87   Pulse 88   Resp 16   Wt 78 kg (172 lb)   LMP  (LMP Unknown)   SpO2 99%   BMI 29.52 kg/m    GENERAL/CONSTITUTIONAL: No acute distress.  EYES: No erythema or scleral icterus.  LYMPH: No cervical, supraclavicular, axillary, epitroclear adenopathy.   BREAST: No palpable masses in either breast; nipples everted with no discharge; no rash or ulceration. Slight concavity at the left breast prior excision site.  RESPIRATORY: No audible cough or wheezing.   MUSCULOSKELETAL: Warm and well-perfused, no cyanosis, clubbing, or edema.  NEUROLOGIC: Alert, oriented, answers questions appropriately.  INTEGUMENTARY: No rashes or jaundice.  GAIT: Steady, does not use assistive device    LABS:  CBC RESULTS:   Recent Labs   Lab Test 08/27/20  0821   WBC 5.0   RBC 4.96   HGB 15.5   HCT 45.4   MCV 92   MCH 31.3   MCHC 34.1   RDW 12.4        Last Comprehensive Metabolic Panel:  Sodium   Date Value Ref Range Status   10/08/2021 140 133 - 144 mmol/L Final   08/27/2020 138 133 - 144 mmol/L Final     Potassium   Date Value Ref Range Status    10/08/2021 3.7 3.4 - 5.3 mmol/L Final   2020 3.5 3.4 - 5.3 mmol/L Final     Chloride   Date Value Ref Range Status   10/08/2021 107 94 - 109 mmol/L Final   2020 106 94 - 109 mmol/L Final     Carbon Dioxide   Date Value Ref Range Status   2020 26 20 - 32 mmol/L Final     Carbon Dioxide (CO2)   Date Value Ref Range Status   10/08/2021 25 20 - 32 mmol/L Final     Anion Gap   Date Value Ref Range Status   10/08/2021 8 3 - 14 mmol/L Final   2020 6 3 - 14 mmol/L Final     Glucose   Date Value Ref Range Status   10/08/2021 104 (H) 70 - 99 mg/dL Final   2020 110 (H) 70 - 99 mg/dL Final     Comment:     Fasting specimen     Urea Nitrogen   Date Value Ref Range Status   10/08/2021 12 7 - 30 mg/dL Final   2020 15 7 - 30 mg/dL Final     Creatinine   Date Value Ref Range Status   10/08/2021 0.86 0.52 - 1.04 mg/dL Final   2020 0.81 0.52 - 1.04 mg/dL Final     GFR Estimate   Date Value Ref Range Status   10/08/2021 69 >60 mL/min/1.73m2 Final     Comment:     As of 2021, eGFR is calculated by the CKD-EPI creatinine equation, without race adjustment. eGFR can be influenced by muscle mass, exercise, and diet. The reported eGFR is an estimation only and is only applicable if the renal function is stable.   2021 71 >60 mL/min/[1.73_m2] Final     Calcium   Date Value Ref Range Status   10/08/2021 9.3 8.5 - 10.1 mg/dL Final   2020 9.4 8.5 - 10.1 mg/dL Final     Bilirubin Total   Date Value Ref Range Status   2020 1.3 0.2 - 1.3 mg/dL Final     Alkaline Phosphatase   Date Value Ref Range Status   2020 122 40 - 150 U/L Final     ALT   Date Value Ref Range Status   2020 65 (H) 0 - 50 U/L Final     AST   Date Value Ref Range Status   2020 35 0 - 45 U/L Final         PATHOLOGY:  None new.    IMAGIN2022: Mammogram showed no malignant findings.    2022: Breast MRI showed no concerning findings.    ASSESSMENT/PLAN:  Emma Rudolph is a 72  year old female with the following issues:  1. Left breast atypical ductal hyperplasia  -Pat is aware that atypical ductal hyperplasia is considered a marker for increased risk of developing invasive breast cancer, approximately ranging 11-40%, 5-15 years from time of diagnosis of ADH.  She is aware this is not a cancer.  -She is tolerating raloxifene well for risk reduction. Advised total 5 years of raloxifene, through 11/2025.  -She has no breast abnormalities on physical exam today or mammogram reviewed from 11/28/2022.  -Continue high risk surveillance with clinical breast exam and breast imaging every 6 months with yearly mammograms alternating with yearly breast MRI.    2. Anxiety  -Mood stable.  -Continue desvenlafaxine.    Return in 6 months with breast MRI.    Priya Ontiveros MD  Hematology/Oncology  [facility]    Total time spent: 20 minutes in patient evaluation, counseling, documentation, and coordination of care.      Again, thank you for allowing me to participate in the care of your patient.        Sincerely,        Priya Ontiveros MD

## 2023-01-11 ENCOUNTER — TELEPHONE (OUTPATIENT)
Dept: ONCOLOGY | Facility: CLINIC | Age: 73
End: 2023-01-11

## 2023-01-11 ENCOUNTER — E-VISIT (OUTPATIENT)
Dept: INTERNAL MEDICINE | Facility: CLINIC | Age: 73
End: 2023-01-11

## 2023-01-11 DIAGNOSIS — N60.92 ATYPICAL DUCTAL HYPERPLASIA OF LEFT BREAST: ICD-10-CM

## 2023-01-11 DIAGNOSIS — G47.9 DIFFICULTY SLEEPING: Primary | ICD-10-CM

## 2023-01-11 RX ORDER — RALOXIFENE HYDROCHLORIDE 60 MG/1
60 TABLET, FILM COATED ORAL DAILY
Qty: 90 TABLET | Refills: 3 | Status: CANCELLED | OUTPATIENT
Start: 2023-01-11

## 2023-01-11 RX ORDER — RALOXIFENE HYDROCHLORIDE 60 MG/1
60 TABLET, FILM COATED ORAL DAILY
Qty: 90 TABLET | Refills: 3 | Status: SHIPPED | OUTPATIENT
Start: 2023-01-11 | End: 2023-12-19

## 2023-01-11 NOTE — TELEPHONE ENCOUNTER
Patient needs refills on medication. Patient had recent visit in Dec and would like this refilled please. Medication pended along with pharmacy.

## 2023-01-12 DIAGNOSIS — F32.5 MAJOR DEPRESSION IN COMPLETE REMISSION (H): ICD-10-CM

## 2023-01-12 DIAGNOSIS — F41.1 GENERALIZED ANXIETY DISORDER: ICD-10-CM

## 2023-01-12 NOTE — PATIENT INSTRUCTIONS
Thank you for choosing us for your care. As I have never met you, I am unable to fill your medications. I would recommend following up with the other members of your regular care team, or making an appointment with any provider to establish care. You won t be charged for this eVisit.      You can schedule an appointment right here in [facility], or call [phone]

## 2023-01-12 NOTE — TELEPHONE ENCOUNTER
Medication Question or Refill    Contacts       Type Contact Phone/Fax    01/12/2023 03:11 PM CST Phone (Incoming) RudolphCeli jensen (Self) [phone] (H)          What medication are you calling about (include dose and sig)?: desvenlafaxine (100mg 24hr tab)    Controlled Substance Agreement on file:   CSA -- Patient Level:    CSA: None found at the patient level.       Who prescribed the medication?: Previous PCP (Angelina Puckett)    Do you need a refill? Yes: Will be out of medication roughly 2 weeks before appt (3/1)    When did you use the medication last? Today (1/12)    Patient offered an appointment? Yes: Scheduled to establish new care with provider for 3/1.    Do you have any questions or concerns?  No    Preferred Pharmacy:     [facility] - [place] [address]  Phone: [phone] Fax: [phone]    Could we send this information to you in [facility] or would you prefer to receive a phone call?:   No preference   Okay to leave a detailed message?: Yes at Home number on file [phone] (home)

## 2023-01-13 RX ORDER — DESVENLAFAXINE 100 MG/1
100 TABLET, EXTENDED RELEASE ORAL DAILY
Qty: 90 TABLET | Refills: 0 | Status: SHIPPED | OUTPATIENT
Start: 2023-01-13 | End: 2023-03-01

## 2023-03-01 ENCOUNTER — ANCILLARY PROCEDURE (OUTPATIENT)
Dept: GENERAL RADIOLOGY | Facility: CLINIC | Age: 73
End: 2023-03-01
Attending: INTERNAL MEDICINE
Payer: COMMERCIAL

## 2023-03-01 ENCOUNTER — OFFICE VISIT (OUTPATIENT)
Dept: INTERNAL MEDICINE | Facility: CLINIC | Age: 73
End: 2023-03-01
Payer: COMMERCIAL

## 2023-03-01 VITALS
RESPIRATION RATE: 16 BRPM | HEART RATE: 98 BPM | DIASTOLIC BLOOD PRESSURE: 82 MMHG | WEIGHT: 175.6 LBS | BODY MASS INDEX: 29.98 KG/M2 | OXYGEN SATURATION: 98 % | TEMPERATURE: 97.8 F | HEIGHT: 64 IN | SYSTOLIC BLOOD PRESSURE: 134 MMHG

## 2023-03-01 DIAGNOSIS — S99.911A ANKLE INJURY, RIGHT, INITIAL ENCOUNTER: ICD-10-CM

## 2023-03-01 DIAGNOSIS — S82.401A CLOSED FRACTURE OF RIGHT TIBIA AND FIBULA, INITIAL ENCOUNTER: ICD-10-CM

## 2023-03-01 DIAGNOSIS — F33.42 RECURRENT MAJOR DEPRESSIVE DISORDER, IN FULL REMISSION (H): ICD-10-CM

## 2023-03-01 DIAGNOSIS — I10 ESSENTIAL HYPERTENSION: ICD-10-CM

## 2023-03-01 DIAGNOSIS — F41.1 GENERALIZED ANXIETY DISORDER: ICD-10-CM

## 2023-03-01 DIAGNOSIS — S99.911A ANKLE INJURY, RIGHT, INITIAL ENCOUNTER: Primary | ICD-10-CM

## 2023-03-01 DIAGNOSIS — S82.201A CLOSED FRACTURE OF RIGHT TIBIA AND FIBULA, INITIAL ENCOUNTER: ICD-10-CM

## 2023-03-01 DIAGNOSIS — E78.5 HYPERLIPIDEMIA LDL GOAL <100: ICD-10-CM

## 2023-03-01 LAB
ALT SERPL W P-5'-P-CCNC: 17 U/L (ref 10–35)
ANION GAP SERPL CALCULATED.3IONS-SCNC: 13 MMOL/L (ref 7–15)
BUN SERPL-MCNC: 21.7 MG/DL (ref 8–23)
CALCIUM SERPL-MCNC: 9.9 MG/DL (ref 8.8–10.2)
CHLORIDE SERPL-SCNC: 104 MMOL/L (ref 98–107)
CHOLEST SERPL-MCNC: 185 MG/DL
CREAT SERPL-MCNC: 0.97 MG/DL (ref 0.51–0.95)
DEPRECATED HCO3 PLAS-SCNC: 25 MMOL/L (ref 22–29)
GFR SERPL CREATININE-BSD FRML MDRD: 62 ML/MIN/1.73M2
GLUCOSE SERPL-MCNC: 110 MG/DL (ref 70–99)
HDLC SERPL-MCNC: 53 MG/DL
LDLC SERPL CALC-MCNC: 101 MG/DL
NONHDLC SERPL-MCNC: 132 MG/DL
POTASSIUM SERPL-SCNC: 4.7 MMOL/L (ref 3.4–5.3)
SODIUM SERPL-SCNC: 142 MMOL/L (ref 136–145)
TRIGL SERPL-MCNC: 156 MG/DL

## 2023-03-01 PROCEDURE — 80061 LIPID PANEL: CPT | Performed by: INTERNAL MEDICINE

## 2023-03-01 PROCEDURE — 80048 BASIC METABOLIC PNL TOTAL CA: CPT | Performed by: INTERNAL MEDICINE

## 2023-03-01 PROCEDURE — 99214 OFFICE O/P EST MOD 30 MIN: CPT | Performed by: INTERNAL MEDICINE

## 2023-03-01 PROCEDURE — 73590 X-RAY EXAM OF LOWER LEG: CPT | Mod: TC | Performed by: RADIOLOGY

## 2023-03-01 PROCEDURE — 36415 COLL VENOUS BLD VENIPUNCTURE: CPT | Performed by: INTERNAL MEDICINE

## 2023-03-01 PROCEDURE — 73610 X-RAY EXAM OF ANKLE: CPT | Mod: TC | Performed by: RADIOLOGY

## 2023-03-01 PROCEDURE — 84460 ALANINE AMINO (ALT) (SGPT): CPT | Performed by: INTERNAL MEDICINE

## 2023-03-01 RX ORDER — ATORVASTATIN CALCIUM 20 MG/1
20 TABLET, FILM COATED ORAL DAILY
Qty: 90 TABLET | Refills: 4 | Status: SHIPPED | OUTPATIENT
Start: 2023-03-01 | End: 2024-01-17

## 2023-03-01 RX ORDER — LISINOPRIL 10 MG/1
10 TABLET ORAL DAILY
Qty: 90 TABLET | Refills: 4 | Status: SHIPPED | OUTPATIENT
Start: 2023-03-01 | End: 2023-07-06

## 2023-03-01 RX ORDER — DESVENLAFAXINE 100 MG/1
100 TABLET, EXTENDED RELEASE ORAL DAILY
Qty: 90 TABLET | Refills: 4 | Status: SHIPPED | OUTPATIENT
Start: 2023-03-01 | End: 2024-01-17

## 2023-03-01 ASSESSMENT — PATIENT HEALTH QUESTIONNAIRE - PHQ9
SUM OF ALL RESPONSES TO PHQ QUESTIONS 1-9: 0
SUM OF ALL RESPONSES TO PHQ QUESTIONS 1-9: 0
10. IF YOU CHECKED OFF ANY PROBLEMS, HOW DIFFICULT HAVE THESE PROBLEMS MADE IT FOR YOU TO DO YOUR WORK, TAKE CARE OF THINGS AT HOME, OR GET ALONG WITH OTHER PEOPLE: NOT DIFFICULT AT ALL

## 2023-03-01 NOTE — PROGRESS NOTES
"Assessment & Plan   Ankle injury, right, initial encounter  Occurred ~2 weeks ago. Still painful to bear weight. TTP over R proximal tibia and fibular areas. XR today reviewed by myself and shows fairly obvious fibular fracture. I contacted Celi this evening via telephone and communicated that finding to her. I have asked her to stop bearing weight on that leg and have placed urgent ortho referral. She will reach out to their office first thing in the morning. If [facility] ortho unable to see her in the next 48 hours, I recommended she present to [facility] or [facility].  - XR Ankle Right G/E 3 Views; Future  - XR Tibia and Fibula Right 2 Views; Future    Essential hypertension  BP at goal. Refilled lisinopril. Overdue for labs - will check today.  - lisinopril (ZESTRIL) 10 MG tablet; Take 1 tablet (10 mg) by mouth daily  - Basic metabolic panel; Future    Hyperlipidemia LDL goal <100  Overdue for labs. Refilled atorvastatin.  - atorvastatin (LIPITOR) 20 MG tablet; Take 1 tablet (20 mg) by mouth daily  - Lipid panel reflex to direct LDL Non-fasting; Future  - ALT; Future    Generalized anxiety disorder  Recurrent major depressive disorder, in full remission (H)  Reports her mood is controlled on this medication. Refilled.  - desvenlafaxine (PRISTIQ) 100 MG 24 hr tablet; Take 1 tablet (100 mg) by mouth daily    F/u with ortho ASAP    Flynn Gibbons MD  [facility]    Subjective   Celi is a 72 year old who presents today for a med check. This is the first time I have met Celi. She reports ~10 days ago she slipped down 3 stairs and twisted her ankle. Her RLE below knee also has been hurting.    Review of Systems   Constitutional, cardiovascular, MSK systems are negative, except as otherwise noted.      Objective    /82   Pulse 98   Temp 97.8  F (36.6  C) (Tympanic)   Resp 16   Ht 1.626 m (5' 4\")   Wt 79.7 kg (175 lb 9.6 oz)   LMP  (LMP Unknown)   SpO2 98%   BMI 30.14 kg/m    Body " mass index is 30.14 kg/m .     Physical Exam   GENERAL: alert and in no distress.  EYES: conjunctivae/corneas clear. EOMs grossly intact  HENT: Facies symmetric.  RESP: No iWOB.  MSK: Moves all four extremities freely. Edema noted in R ankle. Strength 5/5 in all planes. TTP over R tibial tuberosity. TTP over lateral ankle ligaments.  SKIN: Bruising noted along R tibia and R ankle. No other significant ulcers, lesions, or rashes on the visualized portions of the skin  NEURO: CN II-XII grossly intact.

## 2023-03-01 NOTE — PATIENT INSTRUCTIONS
- I will send you a message on [employer] when I am able to look at the results of your tests from today

## 2023-03-02 ENCOUNTER — OFFICE VISIT (OUTPATIENT)
Dept: PODIATRY | Facility: CLINIC | Age: 73
End: 2023-03-02
Payer: COMMERCIAL

## 2023-03-02 VITALS
WEIGHT: 175 LBS | DIASTOLIC BLOOD PRESSURE: 78 MMHG | BODY MASS INDEX: 29.88 KG/M2 | SYSTOLIC BLOOD PRESSURE: 130 MMHG | HEIGHT: 64 IN

## 2023-03-02 DIAGNOSIS — S82.401A CLOSED FRACTURE OF SHAFT OF RIGHT FIBULA, UNSPECIFIED FRACTURE MORPHOLOGY, INITIAL ENCOUNTER: Primary | ICD-10-CM

## 2023-03-02 PROCEDURE — 99203 OFFICE O/P NEW LOW 30 MIN: CPT | Performed by: PODIATRIST

## 2023-03-02 NOTE — TELEPHONE ENCOUNTER
DIAGNOSIS: right tibia and fibula   APPOINTMENT DATE: 3.8.23   NOTES STATUS DETAILS   OFFICE NOTE from referring provider Internal 3.1.23 Flynn Hamilton MD   MEDICATION LIST Internal    XRAYS (IMAGES & REPORTS) Internal 3.1.23 R tib/fib  3.1.23 R ankle

## 2023-03-02 NOTE — LETTER
3/2/2023         RE: Emma Rudolph  [address] [place] [id number]        Dear Colleague,    Thank you for referring your patient, Emma Rudolph, to the [facility]. Please see a copy of my visit note below.    ASSESSMENT:  Encounter Diagnosis   Name Primary?     Closed fracture of shaft of right fibula, unspecified fracture morphology, initial encounter Yes     MEDICAL DECISION MAKING:  I personally reviewed the x-rays.  This is a high fibular fracture with 6 mm displacement.  This is not within my scope of practice.  I discussed treatment options with our sports medicine colleague.  Celi was placed in a tall Aircast.  She is scheduled to follow-up with Dr. Contreras, orthopedics, on Monday  She is to continue PRICE therapy  I did discuss the importance of removing the Aircast when driving.  She can also remove it when seated for gentle ankle range of motion exercises.    There was no pain reported or found on palpatory exam involving the foot or ankle.    Disclaimer: This note consists of symbols derived from keyboarding, dictation and/or voice recognition software. As a result, there may be errors in the script that have gone undetected. Please consider this when interpreting information found in this chart.    Ced Gonzalez DPM, FACFAS, MS    [place] [facility] & Ankle [facility]      ____________________________________________________________________    HPI:         Celi presents today after missing the last 3 steps, descending stairs 3 weeks ago.  She came down hard, on the right foot.   Cement stevie  She did not fall.  Her pain is located over the anterior lateral leg just distal to the knee.  She was evaluated in primary care yesterday.  She has a proximal fibular fracture.    She denies any other pain more distally.  *  Past Medical History:   Diagnosis Date     Breast lesion     LEFT     Generalized anxiety disorder      Hyperlipidemia  "LDL goal <130      Hypertension      Mild major depression (H)      Osteoarthritis of multiple joints, unspecified osteoarthritis type of #2&3 fingers bilat R>L      Vitamin D deficiency disease    *  *  Past Surgical History:   Procedure Laterality Date     BIOPSY BREAST SEED LOCALIZATION Left 10/28/2020    Procedure: 2 SEED LOCALIZED LEFT BREAST BIOPSY;  Surgeon: Eileen Montes MD;  Location:  OR     BREAST SURGERY  2020     COLONOSCOPY       EYE SURGERY Left 11/2020    vitrictomy      GYN SURGERY       H ARGON LASER FOR RETINAL TEAR Left 3/2014     HEAD & NECK SURGERY      wisdom teeth     LAPAROSCOPIC SINGLE SITE SALPINGO-OOPHORECTOMY Bilateral 4/15/2015    Procedure: LAPAROSCOPIC SINGLE SITE SALPINGO-OOPHORECTOMY;  Surgeon: Leonora Zavaleat MD;  Location:  [facility]     wisdom teeth removal  age 19   *  *  Current Outpatient Medications   Medication Sig Dispense Refill     atorvastatin (LIPITOR) 20 MG tablet Take 1 tablet (20 mg) by mouth daily 90 tablet 4     Cholecalciferol (VITAMIN D) 1000 UNITS capsule Take 1 capsule by mouth daily.       desvenlafaxine (PRISTIQ) 100 MG 24 hr tablet Take 1 tablet (100 mg) by mouth daily 90 tablet 4     lisinopril (ZESTRIL) 10 MG tablet Take 1 tablet (10 mg) by mouth daily 90 tablet 4     Multiple Vitamins-Minerals (MULTIVITAMIN OR) Take by mouth daily       raloxifene (EVISTA) 60 MG tablet Take 1 tablet (60 mg) by mouth daily 90 tablet 3         EXAM:    Vitals: /78   Ht 1.626 m (5' 4\")   Wt 79.4 kg (175 lb)   LMP  (LMP Unknown)   BMI 30.04 kg/m    BMI: Body mass index is 30.04 kg/m .    Constitutional:  Emma Rudolph is in no apparent distress, appears well-nourished.  Cooperative with history and physical exam.    Vascular:  Pedal pulses are palpable for both the DP and PT arteries.  CFT < 3 sec.  No edema.      Neuro: Light touch sensation is intact to the L4, L5, S1 distributions  No evidence of weakness, spasticity, or contracture in the lower extremities. "     Derm: Normal texture and turgor.  No erythema, ecchymosis, or cyanosis.  No open lesions.      Musculoskeletal:    Lower extremity muscle strength is normal. No gross deformities.   Pain on palpation over the proximal fibula, right side.  No pain on examination of the right ankle and foot.    EXAM: XR TIBIA AND FIBULA RIGHT 2 VIEWS, XR ANKLE RIGHT G/E 3 VIEWS  LOCATION: [facility]  DATE/TIME: 3/1/2023 4:57 PM     INDICATION: Right proximal calf pain.  COMPARISON: None.                                                                IMPRESSION: Acute simple oblique fracture of the proximal fibular metadiaphysis, with 6 mm displacement. No additional fracture in the right lower leg or ankle. Normal joint alignment. The ankle mortise is symmetric. Normal knee joint alignment. Question   mild soft tissue swelling in the lower calf and ankle.          Again, thank you for allowing me to participate in the care of your patient.        Sincerely,        Ced Gonzalez DPM

## 2023-03-02 NOTE — PATIENT INSTRUCTIONS
Thank you for choosing [facility] Podiat[facility] / Foot & Ankle Surgery!    DR. DIANA'S CLINIC LOCATIONS:     [facility] TRIAGE LINE: [phone]   [address] [place] [address] APPOINTMENTS: [phone]   [place] [address] RADIOLOGY: [phone]   (Every other Tues - Wed - Fri PM) SET UP SURGERY: [phone]    PHYSICAL THERAPY: [phone]   [place] [facility] BILLING QUESTIONS: [phone] [id number] FAX: [phone]   [place] [address]    (Thurs & Fri AM)       PRICE THERAPY  Many aches and pains throughout the foot and ankle can be helped with many simple treatments. This is usually described as PRICE Therapy.      P - Protection - often times, inflammation/pain in the lower extremity is not able to improve simply because the areas involved are never allowed to rest. Every step we take can bother the problematic area. Protecting those areas is an important step in the healing process. This may involve a walking cast boot, a special insert/orthotic device, an ankle brace, or simply avoiding barefoot walking.    R - Rest - in addition to protecting the foot/ankle, resting is an important, but often times difficult, treatment option. Getting off your feet when they bother you, and specifically avoiding activities that cause pain/discomfort, are very beneficial to prevent, and treat, foot/ankle pain.      I - Ice - icing regularly can help to decrease inflammation and swelling in the foot, thus decreasing pain. Using an ice pack or a bag of frozen veggies works very well. Ice for 20 minutes multiple times per day as needed.  Do not place the ice directly on the skin as this can cause tissue damage.    C - Compression - using a compression wrap or an ACE wrap can help to decrease swelling, which can help to decrease pain. Wearing the wraps is generally not needed at night, but they should be worn on a regular basis when you are going to be on your feet for prolonged periods as gravity tends  to pull fluids down to your feet/ankles.    E - Elevation - elevating your lower extremities multiple times daily for 15-20 minutes can help to decrease swelling, which works well in decreasing pain levels.    NSAID/Tylenol - Anti-inflammatories like Aleve or ibuprofen, and/or a pain medication, such as Tylenol, can help to improve pain levels and get the issue resolved sooner rather than later. Anyone with liver issues should be careful with Tylenol, and anyone with high blood pressure or heart, stomach or kidney issues should be careful with anti-inflammatories. Please ask if you have questions about these medications, including dosage.    Follow Up: with Sports Medicine in 3 weeks

## 2023-03-02 NOTE — PROGRESS NOTES
ASSESSMENT:  Encounter Diagnosis   Name Primary?     Closed fracture of shaft of right fibula, unspecified fracture morphology, initial encounter Yes     MEDICAL DECISION MAKING:  I personally reviewed the x-rays.  This is a high fibular fracture with 6 mm displacement.  This is not within my scope of practice.  I discussed treatment options with our sports medicine colleague.  Celi was placed in a tall Aircast.  She is scheduled to follow-up with Dr. Contreras, orthopedics, on Monday  She is to continue PRICE therapy  I did discuss the importance of removing the Aircast when driving.  She can also remove it when seated for gentle ankle range of motion exercises.    There was no pain reported or found on palpatory exam involving the foot or ankle.    Disclaimer: This note consists of symbols derived from keyboarding, dictation and/or voice recognition software. As a result, there may be errors in the script that have gone undetected. Please consider this when interpreting information found in this chart.    Ced Gonzalez DPM, FACFAS, [facility] Department of Podiatry/Foot & Ankle Surgery      ____________________________________________________________________    HPI:         Celi presents today after missing the last 3 steps, descending stairs 3 weeks ago.  She came down hard, on the right foot.   Cement stevie  She did not fall.  Her pain is located over the anterior lateral leg just distal to the knee.  She was evaluated in primary care yesterday.  She has a proximal fibular fracture.    She denies any other pain more distally.  *  Past Medical History:   Diagnosis Date     Breast lesion     LEFT     Generalized anxiety disorder      Hyperlipidemia LDL goal <130      Hypertension      Mild major depression (H)      Osteoarthritis of multiple joints, unspecified osteoarthritis type of #2&3 fingers bilat R>L      Vitamin D deficiency disease    *  *  Past Surgical History:   Procedure Laterality Date     BIOPSY  "BREAST SEED LOCALIZATION Left 10/28/2020    Procedure: 2 SEED LOCALIZED LEFT BREAST BIOPSY;  Surgeon: Eileen Montes MD;  Location:  OR     BREAST SURGERY  2020     COLONOSCOPY       EYE SURGERY Left 11/2020    vitrictomy      GYN SURGERY       H ARGON LASER FOR RETINAL TEAR Left 3/2014     HEAD & NECK SURGERY      wisdom teeth     LAPAROSCOPIC SINGLE SITE SALPINGO-OOPHORECTOMY Bilateral 4/15/2015    Procedure: LAPAROSCOPIC SINGLE SITE SALPINGO-OOPHORECTOMY;  Surgeon: Leonora Zavaleta MD;  Location:  SD     wisdom teeth removal  age 19   *  *  Current Outpatient Medications   Medication Sig Dispense Refill     atorvastatin (LIPITOR) 20 MG tablet Take 1 tablet (20 mg) by mouth daily 90 tablet 4     Cholecalciferol (VITAMIN D) 1000 UNITS capsule Take 1 capsule by mouth daily.       desvenlafaxine (PRISTIQ) 100 MG 24 hr tablet Take 1 tablet (100 mg) by mouth daily 90 tablet 4     lisinopril (ZESTRIL) 10 MG tablet Take 1 tablet (10 mg) by mouth daily 90 tablet 4     Multiple Vitamins-Minerals (MULTIVITAMIN OR) Take by mouth daily       raloxifene (EVISTA) 60 MG tablet Take 1 tablet (60 mg) by mouth daily 90 tablet 3         EXAM:    Vitals: /78   Ht 1.626 m (5' 4\")   Wt 79.4 kg (175 lb)   LMP  (LMP Unknown)   BMI 30.04 kg/m    BMI: Body mass index is 30.04 kg/m .    Constitutional:  Emma Rudolph is in no apparent distress, appears well-nourished.  Cooperative with history and physical exam.    Vascular:  Pedal pulses are palpable for both the DP and PT arteries.  CFT < 3 sec.  No edema.      Neuro: Light touch sensation is intact to the L4, L5, S1 distributions  No evidence of weakness, spasticity, or contracture in the lower extremities.     Derm: Normal texture and turgor.  No erythema, ecchymosis, or cyanosis.  No open lesions.      Musculoskeletal:    Lower extremity muscle strength is normal. No gross deformities.   Pain on palpation over the proximal fibula, right side.  No pain on examination " of the right ankle and foot.    EXAM: XR TIBIA AND FIBULA RIGHT 2 VIEWS, XR ANKLE RIGHT G/E 3 VIEWS  LOCATION: [facility]  DATE/TIME: 3/1/2023 4:57 PM     INDICATION: Right proximal calf pain.  COMPARISON: None.                                                                IMPRESSION: Acute simple oblique fracture of the proximal fibular metadiaphysis, with 6 mm displacement. No additional fracture in the right lower leg or ankle. Normal joint alignment. The ankle mortise is symmetric. Normal knee joint alignment. Question   mild soft tissue swelling in the lower calf and ankle.

## 2023-03-03 NOTE — PROGRESS NOTES
"S: Patient is a 72 year old  female seen today in consultation for right leg pain.  They report onset of symptoms ~3 weeks ago. Patient states she slipped down the stairs and \"jammed\" her foot into the concrete. Notes her left shoulder is bothering a bit as well, no injury causing this shoulder pain.  Pain is located proximal lateral lower leg.  Increased pain with walking and weight bearing, increased pain with pneumatic boot.  Pain sometimes wakes at nighttime. Pain is improved by rest/elevation.  They have tried the following therapies: RICE, tall pneumatic boot, taking ibuprofen for pain relief       Current pain level: 5/10, Worst pain level: 0/10, worsens with weight bearing and walking.     Patient is currently retired.          Patient Active Problem List   Diagnosis     Generalized anxiety disorder     Mixed hyperlipidemia     CKD (chronic kidney disease) stage 2, GFR 60-89 ml/min     Major depression in complete remission (H) on meds since 6-12-13     Osteoarthritis of multiple joints, unspecified osteoarthritis type of #2&3 fingers bilat R>L     Family history of ischemic heart disease     Memory loss     Primary insomnia     Benign essential hypertension     Breast mass, left            Past Medical History:   Diagnosis Date     Breast lesion     LEFT     Generalized anxiety disorder      Hyperlipidemia LDL goal <130      Hypertension      Mild major depression (H)      Osteoarthritis of multiple joints, unspecified osteoarthritis type of #2&3 fingers bilat R>L      Vitamin D deficiency disease             Past Surgical History:   Procedure Laterality Date     BIOPSY BREAST SEED LOCALIZATION Left 10/28/2020    Procedure: 2 SEED LOCALIZED LEFT BREAST BIOPSY;  Surgeon: Eileen Montes MD;  Location:  OR     BREAST SURGERY  2020     COLONOSCOPY       EYE SURGERY Left 11/2020    vitrictomy      GYN SURGERY       H ARGON LASER FOR RETINAL TEAR Left 3/2014     HEAD & NECK SURGERY      wisdom teeth     " LAPAROSCOPIC SINGLE SITE SALPINGO-OOPHORECTOMY Bilateral 4/15/2015    Procedure: LAPAROSCOPIC SINGLE SITE SALPINGO-OOPHORECTOMY;  Surgeon: Leonora Zavaleta MD;  Location:  SD     wisdom teeth removal  age 19            Social History     Tobacco Use     Smoking status: Never     Smokeless tobacco: Never   Substance Use Topics     Alcohol use: Yes     Alcohol/week: 0.0 standard drinks     Comment: infrequent            Family History   Problem Relation Age of Onset     Breast Cancer Mother 80     Neurologic Disorder Father         Parkinson's     Coronary Artery Disease Father 44        MI     Hyperlipidemia Father      Anxiety Disorder Father      Lipids Brother      Hypertension Brother      Hyperlipidemia Brother      Lipids Brother      Family History Negative Brother      Lipids Sister      Hypertension Sister      Hyperlipidemia Sister      Family History Negative Sister      Family History Negative Sister      Family History Negative Sister      Cerebrovascular Disease Maternal Grandmother 86     Cancer Maternal Grandfather 69        leukemia     Other Cancer Maternal Grandfather         Leukemia     Cancer Paternal Grandfather 69        stomach     Other Cancer Paternal Grandfather         stomach cancer     Diabetes No family hx of      Cancer - colorectal No family hx of      Hypertension No family hx of      Hyperlipidemia No family hx of      Colon Cancer No family hx of      Prostate Cancer No family hx of      Other Cancer No family hx of      Depression No family hx of      Anxiety Disorder No family hx of      Mental Illness No family hx of      Substance Abuse No family hx of      Anesthesia Reaction No family hx of      Asthma No family hx of      Osteoporosis No family hx of      Genetic Disorder No family hx of      Thyroid Disease No family hx of      Obesity No family hx of      Unknown/Adopted No family hx of                Allergies   Allergen Reactions     Penicillins Swelling, Rash and Hives      Amoxicillin     Sulfa Drugs Hives            Current Outpatient Medications   Medication Sig Dispense Refill     atorvastatin (LIPITOR) 20 MG tablet Take 1 tablet (20 mg) by mouth daily 90 tablet 4     Cholecalciferol (VITAMIN D) 1000 UNITS capsule Take 1 capsule by mouth daily.       desvenlafaxine (PRISTIQ) 100 MG 24 hr tablet Take 1 tablet (100 mg) by mouth daily 90 tablet 4     lisinopril (ZESTRIL) 10 MG tablet Take 1 tablet (10 mg) by mouth daily 90 tablet 4     Multiple Vitamins-Minerals (MULTIVITAMIN OR) Take by mouth daily       raloxifene (EVISTA) 60 MG tablet Take 1 tablet (60 mg) by mouth daily 90 tablet 3          Review Of Systems  Skin: negative  Eyes: negative  Ears/Nose/Throat: negative  Respiratory: No shortness of breath, dyspnea on exertion, cough, or hemoptysis    O: physical exam:  Pain to palpation R proximal lateral fibula, pain to palpation medial malleolus, edema about ankle.  Non tender over syndesmosis.  CMS intact to RLE.    Lab:       HgbA1C 5.6 2019    Images:  DATE/TIME: 3/1/2023 4:57 PM     INDICATION: Right proximal calf pain.  COMPARISON: None.                                                                      IMPRESSION: Acute simple oblique fracture of the proximal fibular metadiaphysis, with 6 mm displacement. No additional fracture in the right lower leg or ankle. Normal joint alignment. The ankle mortise is symmetric. Normal knee joint alignment. Question   mild soft tissue swelling in the lower calf and ankle.    ( I also see avulsion fragment medial malleolus), no widening of ankle mortise)    A:  R ankle fracture with proximal fibular fracture, stable ankle mortise    P:  Obtain labs, evaluate for osteoporosis, continue cam boot to protect ankle mortise  See back after studies  Notify if exacerbation symptoms.           In addition to the above assessment and plan each active problem on Emma's problem list was evaluated today. This included the questioning of  Emma for any medication problems. We will continue the current treatment plan for these active problems except as noted.

## 2023-03-06 ENCOUNTER — OFFICE VISIT (OUTPATIENT)
Dept: ORTHOPEDICS | Facility: CLINIC | Age: 73
End: 2023-03-06
Payer: COMMERCIAL

## 2023-03-06 VITALS — BODY MASS INDEX: 29.88 KG/M2 | WEIGHT: 175 LBS | HEIGHT: 64 IN

## 2023-03-06 DIAGNOSIS — Z86.39 PERSONAL HISTORY OF OTHER ENDOCRINE, NUTRITIONAL AND METABOLIC DISEASE: ICD-10-CM

## 2023-03-06 DIAGNOSIS — M84.40XA INSUFFICIENCY FRACTURE: Primary | ICD-10-CM

## 2023-03-06 DIAGNOSIS — M81.0 AGE-RELATED OSTEOPOROSIS WITHOUT CURRENT PATHOLOGICAL FRACTURE: ICD-10-CM

## 2023-03-06 DIAGNOSIS — M83.9 ADULT OSTEOMALACIA, UNSPECIFIED: ICD-10-CM

## 2023-03-06 PROCEDURE — 99203 OFFICE O/P NEW LOW 30 MIN: CPT | Performed by: ORTHOPAEDIC SURGERY

## 2023-03-06 ASSESSMENT — KOOS JR
KOOS JR SCORING: 84.6
BENDING TO THE FLOOR TO PICK UP OBJECT: MILD
RISING FROM SITTING: MILD

## 2023-03-06 NOTE — LETTER
"    3/6/2023         RE: Emma Rudolph  [address] [facility] [address]        Dear Colleague,    Thank you for referring your patient, Emma Rudolph, to the [facility] [place]. Please see a copy of my visit note below.    S: Patient is a 72 year old  female seen today in consultation for right leg pain.  They report onset of symptoms ~3 weeks ago. Patient states she slipped down the stairs and \"jammed\" her foot into the concrete. Notes her left shoulder is bothering a bit as well, no injury causing this shoulder pain.  Pain is located proximal lateral lower leg.  Increased pain with walking and weight bearing, increased pain with pneumatic boot.  Pain sometimes wakes at nighttime. Pain is improved by rest/elevation.  They have tried the following therapies: RICE, tall pneumatic boot, taking ibuprofen for pain relief       Current pain level: 5/10, Worst pain level: 0/10, worsens with weight bearing and walking.     Patient is currently retired.          Patient Active Problem List   Diagnosis     Generalized anxiety disorder     Mixed hyperlipidemia     CKD (chronic kidney disease) stage 2, GFR 60-89 ml/min     Major depression in complete remission (H) on meds since 6-12-13     Osteoarthritis of multiple joints, unspecified osteoarthritis type of #2&3 fingers bilat R>L     Family history of ischemic heart disease     Memory loss     Primary insomnia     Benign essential hypertension     Breast mass, left            Past Medical History:   Diagnosis Date     Breast lesion     LEFT     Generalized anxiety disorder      Hyperlipidemia LDL goal <130      Hypertension      Mild major depression (H)      Osteoarthritis of multiple joints, unspecified osteoarthritis type of #2&3 fingers bilat R>L      Vitamin D deficiency disease             Past Surgical History:   Procedure Laterality Date     BIOPSY BREAST SEED LOCALIZATION Left 10/28/2020    Procedure: 2 SEED " LOCALIZED LEFT BREAST BIOPSY;  Surgeon: Eileen Montes MD;  Location:  OR     BREAST SURGERY  2020     COLONOSCOPY       EYE SURGERY Left 11/2020    vitrictomy      GYN SURGERY       H ARGON LASER FOR RETINAL TEAR Left 3/2014     HEAD & NECK SURGERY      wisdom teeth     LAPAROSCOPIC SINGLE SITE SALPINGO-OOPHORECTOMY Bilateral 4/15/2015    Procedure: LAPAROSCOPIC SINGLE SITE SALPINGO-OOPHORECTOMY;  Surgeon: Leonora Zavaleta MD;  Location:  [facility]     wisdom teeth removal  age 19            Social History     Tobacco Use     Smoking status: Never     Smokeless tobacco: Never   Substance Use Topics     Alcohol use: Yes     Alcohol/week: 0.0 standard drinks     Comment: infrequent            Family History   Problem Relation Age of Onset     Breast Cancer Mother 80     Neurologic Disorder Father         Parkinson's     Coronary Artery Disease Father 44        MI     Hyperlipidemia Father      Anxiety Disorder Father      Lipids Brother      Hypertension Brother      Hyperlipidemia Brother      Lipids Brother      Family History Negative Brother      Lipids Sister      Hypertension Sister      Hyperlipidemia Sister      Family History Negative Sister      Family History Negative Sister      Family History Negative Sister      Cerebrovascular Disease Maternal Grandmother 86     Cancer Maternal Grandfather 69        leukemia     Other Cancer Maternal Grandfather         Leukemia     Cancer Paternal Grandfather 69        stomach     Other Cancer Paternal Grandfather         stomach cancer     Diabetes No family hx of      Cancer - colorectal No family hx of      Hypertension No family hx of      Hyperlipidemia No family hx of      Colon Cancer No family hx of      Prostate Cancer No family hx of      Other Cancer No family hx of      Depression No family hx of      Anxiety Disorder No family hx of      Mental Illness No family hx of      Substance Abuse No family hx of      Anesthesia Reaction No family hx of      Asthma  No family hx of      Osteoporosis No family hx of      Genetic Disorder No family hx of      Thyroid Disease No family hx of      Obesity No family hx of      Unknown/Adopted No family hx of                Allergies   Allergen Reactions     Penicillins Swelling, Rash and Hives     Amoxicillin     Sulfa Drugs Hives            Current Outpatient Medications   Medication Sig Dispense Refill     atorvastatin (LIPITOR) 20 MG tablet Take 1 tablet (20 mg) by mouth daily 90 tablet 4     Cholecalciferol (VITAMIN D) 1000 UNITS capsule Take 1 capsule by mouth daily.       desvenlafaxine (PRISTIQ) 100 MG 24 hr tablet Take 1 tablet (100 mg) by mouth daily 90 tablet 4     lisinopril (ZESTRIL) 10 MG tablet Take 1 tablet (10 mg) by mouth daily 90 tablet 4     Multiple Vitamins-Minerals (MULTIVITAMIN OR) Take by mouth daily       raloxifene (EVISTA) 60 MG tablet Take 1 tablet (60 mg) by mouth daily 90 tablet 3          Review Of Systems  Skin: negative  Eyes: negative  Ears/Nose/Throat: negative  Respiratory: No shortness of breath, dyspnea on exertion, cough, or hemoptysis    O: physical exam:  Pain to palpation R proximal lateral fibula, pain to palpation medial malleolus, edema about ankle.  Non tender over syndesmosis.  CMS intact to RLE.    Lab:       HgbA1C 5.6 2019    Images:  DATE/TIME: 3/1/2023 4:57 PM     INDICATION: Right proximal calf pain.  COMPARISON: None.                                                                      IMPRESSION: Acute simple oblique fracture of the proximal fibular metadiaphysis, with 6 mm displacement. No additional fracture in the right lower leg or ankle. Normal joint alignment. The ankle mortise is symmetric. Normal knee joint alignment. Question   mild soft tissue swelling in the lower calf and ankle.    ( I also see avulsion fragment medial malleolus), no widening of ankle mortise)    A:  R ankle fracture with proximal fibular fracture, stable ankle mortise    P:  Obtain labs, evaluate  for osteoporosis, continue cam boot to protect ankle mortise  See back after studies  Notify if exacerbation symptoms.           In addition to the above assessment and plan each active problem on Emma's problem list was evaluated today. This included the questioning of Emma for any medication problems. We will continue the current treatment plan for these active problems except as noted.        Again, thank you for allowing me to participate in the care of your patient.        Sincerely,        ENID CROOKS MD

## 2023-03-08 ENCOUNTER — PRE VISIT (OUTPATIENT)
Dept: ORTHOPEDICS | Facility: CLINIC | Age: 73
End: 2023-03-08

## 2023-03-08 ENCOUNTER — TELEPHONE (OUTPATIENT)
Dept: ORTHOPEDICS | Facility: CLINIC | Age: 73
End: 2023-03-08

## 2023-03-08 NOTE — TELEPHONE ENCOUNTER
Spoke with patient and she is in agreement to cancel today's visit with Dr. Frey. She has no questions/ concerns at this time. Advised patient that message will be sent to Dr. Contreras to confirm follow up plan, and she will be contacted to discuss further. She was appreciative of call.     Valerie Grace MSA, ATC  Certified Athletic Trainer

## 2023-03-08 NOTE — TELEPHONE ENCOUNTER
Called patient to inquire about her appoint this afternoon with Dr. Frey. Patient was just added on today but was seen by Dr. Contreras on Monday for the same issue. Patient was unsure why an appointment was scheduled for today, just that she was contacted to schedule an appointment so one was made.     Dr. Contreras's note is incomplete so unable to determine what follow up recommendations were given to patient.     Writer huddled with Dr. Frey. At this time will recommend patient's appointment this afternoon with Dr. Frey be cancelled since she was just seen by ortho 2 days ago. Will contact Dr. Contreras to inquire about follow up recommendations.     Valerie Grace MSA, ATC  Certified Athletic Trainer

## 2023-03-08 NOTE — TELEPHONE ENCOUNTER
Dr. Contreras- patient was seen 3/6/23 and note is not yet complete. Please advise on follow up plan. Patient was scheduled with Dr. Frey for today but staff cancelled appointment since she was just seen 2 days ago.     Valerie Grace MSA, ATC  Certified Athletic Trainer   swinging/impaired

## 2023-03-10 NOTE — TELEPHONE ENCOUNTER
"Note has been completed.   Plan is for patient to follow up with Dr. Contreras after she has labs and Dexa scan completed.   Need to cancel appointment with Dr. Gonzalez on 3/16/23.     Per chart review, lab status says \"Needs to be collected\". Phone call to the lab. They are not sure how that happens occasionally but nothing needs to be changed. Patient can schedule to have labs drawn.     Phone call to patient and she was informed of the above. Patient appeared to have spoken with 3 different schedulers and was scheduled with 3 different providers for her leg fracture. Cancelled appointment with Dr. Gonzalez and apologized for the confusion.     Patient states she has been fairly healthy all her life and hardly ever goes to the doctor. Went over instructions and phone numbers to call for labs and dexa scan multiple times and had patient write it down. She was having difficulty recalling what each appointment was for. Appointment scheduled with Dr. Contreras for follow up on 3/27/23. She mentions her left shoulder is hurting and did not remember that she spoke with Dr. Contreras about it. Recommended that if it was still bothering her to bring it up at her future appointment. Provided  call back number for future questions: [phone]. She verbalized understanding.     LAURENT Gonzalez RN    "

## 2023-03-14 ENCOUNTER — ANCILLARY PROCEDURE (OUTPATIENT)
Dept: GENERAL RADIOLOGY | Facility: CLINIC | Age: 73
End: 2023-03-14
Attending: PHYSICIAN ASSISTANT
Payer: COMMERCIAL

## 2023-03-14 ENCOUNTER — OFFICE VISIT (OUTPATIENT)
Dept: URGENT CARE | Facility: URGENT CARE | Age: 73
End: 2023-03-14
Payer: COMMERCIAL

## 2023-03-14 VITALS
BODY MASS INDEX: 30.04 KG/M2 | DIASTOLIC BLOOD PRESSURE: 95 MMHG | SYSTOLIC BLOOD PRESSURE: 176 MMHG | TEMPERATURE: 99.2 F | WEIGHT: 175 LBS | HEART RATE: 114 BPM | RESPIRATION RATE: 25 BRPM | OXYGEN SATURATION: 97 %

## 2023-03-14 DIAGNOSIS — E88.810 METABOLIC SYNDROME: ICD-10-CM

## 2023-03-14 DIAGNOSIS — S49.92XA INJURY OF LEFT SHOULDER, INITIAL ENCOUNTER: ICD-10-CM

## 2023-03-14 DIAGNOSIS — E55.9 VITAMIN D DEFICIENCY: Primary | ICD-10-CM

## 2023-03-14 DIAGNOSIS — N18.2 CKD (CHRONIC KIDNEY DISEASE) STAGE 2, GFR 60-89 ML/MIN: ICD-10-CM

## 2023-03-14 DIAGNOSIS — I10 HYPERTENSION, UNSPECIFIED TYPE: ICD-10-CM

## 2023-03-14 DIAGNOSIS — R73.09 OTHER ABNORMAL GLUCOSE: ICD-10-CM

## 2023-03-14 LAB — HBA1C MFR BLD: 5.5 % (ref 0–5.6)

## 2023-03-14 PROCEDURE — 36415 COLL VENOUS BLD VENIPUNCTURE: CPT | Performed by: PHYSICIAN ASSISTANT

## 2023-03-14 PROCEDURE — 99214 OFFICE O/P EST MOD 30 MIN: CPT | Performed by: PHYSICIAN ASSISTANT

## 2023-03-14 PROCEDURE — 82306 VITAMIN D 25 HYDROXY: CPT | Performed by: PHYSICIAN ASSISTANT

## 2023-03-14 PROCEDURE — 83036 HEMOGLOBIN GLYCOSYLATED A1C: CPT | Performed by: PHYSICIAN ASSISTANT

## 2023-03-14 PROCEDURE — 73030 X-RAY EXAM OF SHOULDER: CPT | Mod: TC | Performed by: RADIOLOGY

## 2023-03-14 RX ORDER — LISINOPRIL 20 MG/1
20 TABLET ORAL DAILY
Qty: 30 TABLET | Refills: 0 | Status: SHIPPED | OUTPATIENT
Start: 2023-03-14 | End: 2023-04-27

## 2023-03-14 NOTE — PROGRESS NOTES
"  Assessment & Plan     Vitamin D deficiency    Vit D deficiency  Pending report    - Vitamin D Deficiency    Metabolic syndrome    emoglobin A1c     Status: Normal   Result Value Ref Range    Hemoglobin A1C 5.5 0.0 - 5.6 %     Normal and no signs of diabetes    - Hemoglobin A1c    Injury of left shoulder, initial encounter    Xray shoulder Negative for acute findings, read by Rogers BEAULIEU at time of visit.    Rest, ice  Tylenol  ROM exercises  Follow up with PCP as needed    - XR Shoulder Left G/E 3 Views; Future      CKD (chronic kidney disease) stage 2, GFR 60-89 ml/min    Estimated Creatinine Clearance: 53.5 mL/min (A) (based on SCr of 0.97 mg/dL (H)).      Hypertension, unspecified type    Patient advised to follow up with PCP for recheck blood pressure   Information given to patient on diet modifications, including lowering salt in diet, decrease calories, weight loss and exercise.  Monitor blood pressure at home and if BP maintains over 140/90 then advise having a recheck with PCP in 2 weeks.     Lisinopril increased to 20mg daily  Advised to follow up with PCP in 3 weeks for recheck    - lisinopril (ZESTRIL) 20 MG tablet; Take 1 tablet (20 mg) by mouth daily    Review of external notes as documented elsewhere in note  40 minutes spent on the date of the encounter doing chart review, history and exam, documentation and further activities per the note       BMI:   Estimated body mass index is 30.04 kg/m  as calculated from the following:    Height as of 3/6/23: 1.626 m (5' 4\").    Weight as of this encounter: 79.4 kg (175 lb).     At today's visit with Emma MASSIEL Rudolph , we discussed results, diagnosis, medications and formulated a plan.  We also discussed red flags for immediate return to clinic/ER, as well as indications for follow up with PCP if not improved in 3 days. Patient understood and agreed to plan. Emma Rudolph was discharged with stable vitals and has no further questions. "       No follow-ups on file.    Rogers Hughes, BRITTNEE, PAYEVGENIY  [facility] ABIGAIL Alatorre is a 72 year old, presenting for the following health issues:  Fall (Left shoulder/arm pain since fall )      HPI   Review of Systems   Constitutional, HEENT, cardiovascular, pulmonary, gi and gu systems are negative, except as otherwise noted.      Objective    BP (!) 176/95   Pulse 114   Temp 99.2  F (37.3  C) (Tympanic)   Resp 25   Wt 79.4 kg (175 lb)   LMP  (LMP Unknown)   SpO2 97%   BMI 30.04 kg/m    Body mass index is 30.04 kg/m .  Physical Exam   GENERAL: healthy, alert and no distress  EYES: Eyes grossly normal to inspection, PERRL and conjunctivae and sclerae normal  HENT: ear canals and TM's normal, nose and mouth without ulcers or lesions  NECK: no adenopathy, no asymmetry, masses, or scars and thyroid normal to palpation  RESP: lungs clear to auscultation - no rales, rhonchi or wheezes  CV: regular rate and rhythm, normal S1 S2, no S3 or S4, no murmur, click or rub, no peripheral edema and peripheral pulses strong  ABDOMEN: soft, nontender, no hepatosplenomegaly, no masses and bowel sounds normal  MS: Positive for lateral shoulder and posterior shoulder tenderness. Full ROM is present  SKIN: no suspicious lesions or rashes  NEURO: Normal strength and tone, mentation intact and speech normal  PSYCH: mentation appears normal, affect normal/bright    Xray - Reviewed and interpreted by me.  Negative for acute findings, read by Rogers BEAULIEU at time of visit.

## 2023-03-16 LAB — DEPRECATED CALCIDIOL+CALCIFEROL SERPL-MC: 55 UG/L (ref 20–75)

## 2023-03-24 NOTE — PROGRESS NOTES
S:Patient is a 72 year old, female seen today in follow up for fibular fracture .    They were previously evaluated on 03/06/23,  they are 6 weeks out from injury.  Since this visit they report feeling, good, no pain and patent has no other complaints at tthis time .  Patient does have pain with bursitis in the left shoulder   That is botheringwhen she is sleeping or using it   Current pain level: 0/10, Worst pain level: 0/10.                Patient Active Problem List   Diagnosis     Generalized anxiety disorder     Mixed hyperlipidemia     CKD (chronic kidney disease) stage 2, GFR 60-89 ml/min     Major depression in complete remission (H) on meds since 6-12-13     Osteoarthritis of multiple joints, unspecified osteoarthritis type of #2&3 fingers bilat R>L     Family history of ischemic heart disease     Memory loss     Primary insomnia     Benign essential hypertension     Breast mass, left            Past Medical History:   Diagnosis Date     Breast lesion     LEFT     Generalized anxiety disorder      Hyperlipidemia LDL goal <130      Hypertension      Mild major depression (H)      Osteoarthritis of multiple joints, unspecified osteoarthritis type of #2&3 fingers bilat R>L      Vitamin D deficiency disease             Past Surgical History:   Procedure Laterality Date     BIOPSY BREAST SEED LOCALIZATION Left 10/28/2020    Procedure: 2 SEED LOCALIZED LEFT BREAST BIOPSY;  Surgeon: Eileen Montes MD;  Location:  OR     BREAST SURGERY  2020     COLONOSCOPY       EYE SURGERY Left 11/2020    vitrictomy      GYN SURGERY       H ARGON LASER FOR RETINAL TEAR Left 3/2014     HEAD & NECK SURGERY      wisdom teeth     LAPAROSCOPIC SINGLE SITE SALPINGO-OOPHORECTOMY Bilateral 4/15/2015    Procedure: LAPAROSCOPIC SINGLE SITE SALPINGO-OOPHORECTOMY;  Surgeon: Leonora Zavaleta MD;  Location:  SD     wisdom teeth removal  age 19            Social History     Tobacco Use     Smoking status: Never     Smokeless tobacco: Never    Substance Use Topics     Alcohol use: Yes     Alcohol/week: 0.0 standard drinks     Comment: infrequent            Family History   Problem Relation Age of Onset     Breast Cancer Mother 80     Neurologic Disorder Father         Parkinson's     Coronary Artery Disease Father 44        MI     Hyperlipidemia Father      Anxiety Disorder Father      Lipids Brother      Hypertension Brother      Hyperlipidemia Brother      Lipids Brother      Family History Negative Brother      Lipids Sister      Hypertension Sister      Hyperlipidemia Sister      Family History Negative Sister      Family History Negative Sister      Family History Negative Sister      Cerebrovascular Disease Maternal Grandmother 86     Cancer Maternal Grandfather 69        leukemia     Other Cancer Maternal Grandfather         Leukemia     Cancer Paternal Grandfather 69        stomach     Other Cancer Paternal Grandfather         stomach cancer     Diabetes No family hx of      Cancer - colorectal No family hx of      Hypertension No family hx of      Hyperlipidemia No family hx of      Colon Cancer No family hx of      Prostate Cancer No family hx of      Other Cancer No family hx of      Depression No family hx of      Anxiety Disorder No family hx of      Mental Illness No family hx of      Substance Abuse No family hx of      Anesthesia Reaction No family hx of      Asthma No family hx of      Osteoporosis No family hx of      Genetic Disorder No family hx of      Thyroid Disease No family hx of      Obesity No family hx of      Unknown/Adopted No family hx of                Allergies   Allergen Reactions     Penicillins Swelling, Rash and Hives     Amoxicillin     Sulfa Drugs Hives            Current Outpatient Medications   Medication Sig Dispense Refill     atorvastatin (LIPITOR) 20 MG tablet Take 1 tablet (20 mg) by mouth daily 90 tablet 4     Cholecalciferol (VITAMIN D) 1000 UNITS capsule Take 1 capsule by mouth daily.       desvenlafaxine  (PRISTIQ) 100 MG 24 hr tablet Take 1 tablet (100 mg) by mouth daily 90 tablet 4     lisinopril (ZESTRIL) 10 MG tablet Take 1 tablet (10 mg) by mouth daily 90 tablet 4     lisinopril (ZESTRIL) 20 MG tablet Take 1 tablet (20 mg) by mouth daily 30 tablet 0     Multiple Vitamins-Minerals (MULTIVITAMIN OR) Take by mouth daily       raloxifene (EVISTA) 60 MG tablet Take 1 tablet (60 mg) by mouth daily 90 tablet 3          Review Of Systems  Skin: negative  Eyes: negative  Ears/Nose/Throat: negative  Respiratory: No shortness of breath, dyspnea on exertion, cough, or hemoptysis    O: physical exam:  Positive forced forward flexion impingement  Sign L shoulder.  Tender about  Anterolateral acromion to palpation.  Painful  Arc. Full active abduction/full forward flexion.    Lab:HgbA1C  5.5, 25 OH Vit D 55    Images:  Healing R fibular fracture with intact R ankle mortise and healing medial  Malleolar avulsion fracture.         A:  Stable healing R proximal fracture, no evidence syndesmotic disruption at ankle, shoulder bursitis      P:   Inject L shoulder  Bursitis after verbal and  Written consent,  ethyl chloride/chloroprep.  Follow outcomes:  Improved today with forced forward flexion after injection decreased/improved painful  Arc.  Notify if exacerbation symptoms  Discontinue  CAM boot if able/asymptomatic  See back as needed       Large Joint Injection/Arthocentesis: L subacromial bursa    Date/Time: 3/27/2023 3:43 PM  Performed by: Ced Contreras MD  Authorized by: Ced Contreras MD     Location:  Shoulder      Site:  L subacromial bursa  Medications:  40 mg triamcinolone 40 MG/ML; 2 mL lidocaine 1 %; 2 mL bupivacaine HCl (PF) 0.25 %  Outcome:  Tolerated well, no immediate complications  Procedure discussed: discussed risks, benefits, and alternatives    Consent Given by:  Patient  Timeout: timeout called immediately prior to procedure    Prep: patient was prepped and draped in usual sterile fashion               In  addition to the above assessment and plan each active problem on Emma's problem list was evaluated today. This included the questioning of Emma for any medication problems. We will continue the current treatment plan for these active problems except as noted.

## 2023-03-27 ENCOUNTER — OFFICE VISIT (OUTPATIENT)
Dept: ORTHOPEDICS | Facility: CLINIC | Age: 73
End: 2023-03-27
Payer: COMMERCIAL

## 2023-03-27 ENCOUNTER — ANCILLARY PROCEDURE (OUTPATIENT)
Dept: GENERAL RADIOLOGY | Facility: CLINIC | Age: 73
End: 2023-03-27
Attending: ORTHOPAEDIC SURGERY
Payer: COMMERCIAL

## 2023-03-27 VITALS — SYSTOLIC BLOOD PRESSURE: 160 MMHG | DIASTOLIC BLOOD PRESSURE: 88 MMHG

## 2023-03-27 DIAGNOSIS — M84.40XA INSUFFICIENCY FRACTURE: Primary | ICD-10-CM

## 2023-03-27 DIAGNOSIS — M75.52 ACUTE SHOULDER BURSITIS, LEFT: ICD-10-CM

## 2023-03-27 DIAGNOSIS — M84.40XA INSUFFICIENCY FRACTURE: ICD-10-CM

## 2023-03-27 PROCEDURE — 20610 DRAIN/INJ JOINT/BURSA W/O US: CPT | Mod: LT | Performed by: ORTHOPAEDIC SURGERY

## 2023-03-27 PROCEDURE — 73590 X-RAY EXAM OF LOWER LEG: CPT | Mod: TC | Performed by: RADIOLOGY

## 2023-03-27 PROCEDURE — 73600 X-RAY EXAM OF ANKLE: CPT | Mod: TC | Performed by: RADIOLOGY

## 2023-03-27 PROCEDURE — 99213 OFFICE O/P EST LOW 20 MIN: CPT | Mod: 25 | Performed by: ORTHOPAEDIC SURGERY

## 2023-03-27 RX ORDER — LIDOCAINE HYDROCHLORIDE 10 MG/ML
2 INJECTION, SOLUTION INFILTRATION; PERINEURAL
Status: SHIPPED | OUTPATIENT
Start: 2023-03-27

## 2023-03-27 RX ORDER — BUPIVACAINE HYDROCHLORIDE 2.5 MG/ML
2 INJECTION, SOLUTION EPIDURAL; INFILTRATION; INTRACAUDAL
Status: SHIPPED | OUTPATIENT
Start: 2023-03-27

## 2023-03-27 RX ORDER — TRIAMCINOLONE ACETONIDE 40 MG/ML
40 INJECTION, SUSPENSION INTRA-ARTICULAR; INTRAMUSCULAR
Status: SHIPPED | OUTPATIENT
Start: 2023-03-27

## 2023-03-27 RX ADMIN — BUPIVACAINE HYDROCHLORIDE 2 ML: 2.5 INJECTION, SOLUTION EPIDURAL; INFILTRATION; INTRACAUDAL at 15:43

## 2023-03-27 RX ADMIN — LIDOCAINE HYDROCHLORIDE 2 ML: 10 INJECTION, SOLUTION INFILTRATION; PERINEURAL at 15:43

## 2023-03-27 RX ADMIN — TRIAMCINOLONE ACETONIDE 40 MG: 40 INJECTION, SUSPENSION INTRA-ARTICULAR; INTRAMUSCULAR at 15:43

## 2023-03-27 NOTE — LETTER
3/27/2023         RE: Emma Rudolph  [address] [facility] [address]        Dear Colleague,    Thank you for referring your patient, Emma Rudolph, to the [facility] [place]. Please see a copy of my visit note below.    S:Patient is a 72 year old, female seen today in follow up for fibular fracture .    They were previously evaluated on 03/06/23,  they are 6 weeks out from injury.  Since this visit they report feeling, good, no pain and patent has no other complaints at tthis time .  Patient does have pain with bursitis in the left shoulder   That is botheringwhen she is sleeping or using it   Current pain level: 0/10, Worst pain level: 0/10.                Patient Active Problem List   Diagnosis     Generalized anxiety disorder     Mixed hyperlipidemia     CKD (chronic kidney disease) stage 2, GFR 60-89 ml/min     Major depression in complete remission (H) on meds since 6-12-13     Osteoarthritis of multiple joints, unspecified osteoarthritis type of #2&3 fingers bilat R>L     Family history of ischemic heart disease     Memory loss     Primary insomnia     Benign essential hypertension     Breast mass, left            Past Medical History:   Diagnosis Date     Breast lesion     LEFT     Generalized anxiety disorder      Hyperlipidemia LDL goal <130      Hypertension      Mild major depression (H)      Osteoarthritis of multiple joints, unspecified osteoarthritis type of #2&3 fingers bilat R>L      Vitamin D deficiency disease             Past Surgical History:   Procedure Laterality Date     BIOPSY BREAST SEED LOCALIZATION Left 10/28/2020    Procedure: 2 SEED LOCALIZED LEFT BREAST BIOPSY;  Surgeon: Eileen Montes MD;  Location:  OR     BREAST SURGERY  2020     COLONOSCOPY       EYE SURGERY Left 11/2020    vitrictomy      GYN SURGERY       H ARGON LASER FOR RETINAL TEAR Left 3/2014     HEAD & NECK SURGERY      wisdom teeth     LAPAROSCOPIC SINGLE SITE  SALPINGO-OOPHORECTOMY Bilateral 4/15/2015    Procedure: LAPAROSCOPIC SINGLE SITE SALPINGO-OOPHORECTOMY;  Surgeon: Leonora Zavaleta MD;  Location: [facility]     wisdom teeth removal  age 19            Social History     Tobacco Use     Smoking status: Never     Smokeless tobacco: Never   Substance Use Topics     Alcohol use: Yes     Alcohol/week: 0.0 standard drinks     Comment: infrequent            Family History   Problem Relation Age of Onset     Breast Cancer Mother 80     Neurologic Disorder Father         Parkinson's     Coronary Artery Disease Father 44        MI     Hyperlipidemia Father      Anxiety Disorder Father      Lipids Brother      Hypertension Brother      Hyperlipidemia Brother      Lipids Brother      Family History Negative Brother      Lipids Sister      Hypertension Sister      Hyperlipidemia Sister      Family History Negative Sister      Family History Negative Sister      Family History Negative Sister      Cerebrovascular Disease Maternal Grandmother 86     Cancer Maternal Grandfather 69        leukemia     Other Cancer Maternal Grandfather         Leukemia     Cancer Paternal Grandfather 69        stomach     Other Cancer Paternal Grandfather         stomach cancer     Diabetes No family hx of      Cancer - colorectal No family hx of      Hypertension No family hx of      Hyperlipidemia No family hx of      Colon Cancer No family hx of      Prostate Cancer No family hx of      Other Cancer No family hx of      Depression No family hx of      Anxiety Disorder No family hx of      Mental Illness No family hx of      Substance Abuse No family hx of      Anesthesia Reaction No family hx of      Asthma No family hx of      Osteoporosis No family hx of      Genetic Disorder No family hx of      Thyroid Disease No family hx of      Obesity No family hx of      Unknown/Adopted No family hx of                Allergies   Allergen Reactions     Penicillins Swelling, Rash and Hives     Amoxicillin      Sulfa Drugs Hives            Current Outpatient Medications   Medication Sig Dispense Refill     atorvastatin (LIPITOR) 20 MG tablet Take 1 tablet (20 mg) by mouth daily 90 tablet 4     Cholecalciferol (VITAMIN D) 1000 UNITS capsule Take 1 capsule by mouth daily.       desvenlafaxine (PRISTIQ) 100 MG 24 hr tablet Take 1 tablet (100 mg) by mouth daily 90 tablet 4     lisinopril (ZESTRIL) 10 MG tablet Take 1 tablet (10 mg) by mouth daily 90 tablet 4     lisinopril (ZESTRIL) 20 MG tablet Take 1 tablet (20 mg) by mouth daily 30 tablet 0     Multiple Vitamins-Minerals (MULTIVITAMIN OR) Take by mouth daily       raloxifene (EVISTA) 60 MG tablet Take 1 tablet (60 mg) by mouth daily 90 tablet 3          Review Of Systems  Skin: negative  Eyes: negative  Ears/Nose/Throat: negative  Respiratory: No shortness of breath, dyspnea on exertion, cough, or hemoptysis    O: physical exam:  Positive forced forward flexion impingement  Sign L shoulder.  Tender about  Anterolateral acromion to palpation.  Painful  Arc. Full active abduction/full forward flexion.    Lab:HgbA1C  5.5, 25 OH Vit D 55    Images:  Healing R fibular fracture with intact R ankle mortise and healing medial  Malleolar avulsion fracture.         A:  Stable healing R proximal fracture, no evidence syndesmotic disruption at ankle, shoulder bursitis      P:   Inject L shoulder  Bursitis after verbal and  Written consent,  ethyl chloride/chloroprep.  Follow outcomes:  Improved today with forced forward flexion after injection decreased/improved painful  Arc.  Notify if exacerbation symptoms  Discontinue  CAM boot if able/asymptomatic  See back as needed       Large Joint Injection/Arthocentesis: L subacromial bursa    Date/Time: 3/27/2023 3:43 PM  Performed by: Ced Contreras MD  Authorized by: Ced Contreras MD     Location:  Shoulder      Site:  L subacromial bursa  Medications:  40 mg triamcinolone 40 MG/ML; 2 mL lidocaine 1 %; 2 mL bupivacaine HCl (PF) 0.25  %  Outcome:  Tolerated well, no immediate complications  Procedure discussed: discussed risks, benefits, and alternatives    Consent Given by:  Patient  Timeout: timeout called immediately prior to procedure    Prep: patient was prepped and draped in usual sterile fashion               In addition to the above assessment and plan each active problem on Emma's problem list was evaluated today. This included the questioning of Emma for any medication problems. We will continue the current treatment plan for these active problems except as noted.          Again, thank you for allowing me to participate in the care of your patient.        Sincerely,        ENID CROOKS MD

## 2023-03-27 NOTE — LETTER
March 27, 2023      Emma Rudolph  [address] [place] [address]        To Whom It May Concern:    Emma Rudolph was seen in our clinic. Patient received injection today for left shoulder subacromial  bursitis  2 ml 1% lidocaine, 2 ml,  Bupivacaine .25% ,1ml 40mg triamcinolone acetonide were injected.    Sincerely,        ENID CROOKS MD

## 2023-03-27 NOTE — PATIENT INSTRUCTIONS
Thank you for choosing [facility]    Dr. Contreras Locations:    [facility] - [address], [address]  [place], [place] [address] [place] [id number]   Appointments: [phone] Appointments: [phone]   Fax: [phone] Fax: [phone]       Follow up: as needed Please call [phone] to schedule your follow up appointment.       Steroid injection of the left shoulder: subacromial space was performed today in clinic    - Would not soak in a hot tub, bath or swimming pool for 48 hours  - Ok to shower  - Ice today and only do your normal amounts of activity  - The lidocaine (what is giving you pain relief right now) will likely stop working in 1-2 hours.  You will then have pain again, similar to before you received the injection. The corticosteroid will not start working until approximately 1-2 weeks from now.  In a small percentage of people, cortisone can cause flushing/redness in the face. This usually lasts for 1-3 days and resolves. Cool compress and Ibuprofen/Tylenol can help if this happens.

## 2023-04-03 ENCOUNTER — ALLIED HEALTH/NURSE VISIT (OUTPATIENT)
Dept: INTERNAL MEDICINE | Facility: CLINIC | Age: 73
End: 2023-04-03
Payer: COMMERCIAL

## 2023-04-03 VITALS — SYSTOLIC BLOOD PRESSURE: 140 MMHG | DIASTOLIC BLOOD PRESSURE: 74 MMHG

## 2023-04-03 DIAGNOSIS — I10 BENIGN ESSENTIAL HYPERTENSION: Primary | ICD-10-CM

## 2023-04-03 PROCEDURE — 99207 PR NO CHARGE NURSE ONLY: CPT | Performed by: INTERNAL MEDICINE

## 2023-04-03 NOTE — PROGRESS NOTES
Emma Rudolph was evaluated at [facility] on April 3, 2023 at which time her blood pressure was:    BP Readings from Last 3 Encounters:   04/03/23 (!) 140/74   03/27/23 (!) 160/88   03/14/23 (!) 176/95     Pulse Readings from Last 3 Encounters:   03/14/23 114   03/01/23 98   12/01/22 88       Reviewed lifestyle modifications for blood pressure control and reduction: including making healthy food choices, managing weight, getting regular exercise, smoking cessation, reducing alcohol consumption, monitoring blood pressure regularly.     Symptoms: None    BP Goal:< 140/90 mmHg    BP Assessment:  BP too high    Potential Reasons for BP too high: Unknown/Other: Just started new dose of BP med (per pt)    BP Follow-Up Plan: Recheck BP in 30 days at pharmacy    Recommendation to Provider: Patient dose of lisinopril was just increased to 20 mg daily.  Recommend having patient recheck BP at pharmacy in 1-2 weeks and if still elevated at that time adjust therapy.    Note completed by: Piter Bustos, PharmD  [facility]  [phone]

## 2023-04-25 ENCOUNTER — TELEPHONE (OUTPATIENT)
Dept: INTERNAL MEDICINE | Facility: CLINIC | Age: 73
End: 2023-04-25

## 2023-04-25 ENCOUNTER — ALLIED HEALTH/NURSE VISIT (OUTPATIENT)
Dept: INTERNAL MEDICINE | Facility: CLINIC | Age: 73
End: 2023-04-25
Payer: COMMERCIAL

## 2023-04-25 VITALS — DIASTOLIC BLOOD PRESSURE: 82 MMHG | SYSTOLIC BLOOD PRESSURE: 142 MMHG

## 2023-04-25 DIAGNOSIS — I10 HYPERTENSION, UNSPECIFIED TYPE: ICD-10-CM

## 2023-04-25 DIAGNOSIS — Z01.30 BP CHECK: Primary | ICD-10-CM

## 2023-04-25 PROCEDURE — 99207 PR NO CHARGE NURSE ONLY: CPT | Performed by: INTERNAL MEDICINE

## 2023-04-25 NOTE — TELEPHONE ENCOUNTER
Pt stopped by IM today after her BP check in the pharmacy.  She wanted to give you some recent BP readings.    Today in the pharmacy : 142/82  4/23 133/78  4/21  126/75  4/18 163/94  4/14  139/80  4/13 142/76    She is taking her lisinopril at 20 mg daily and would just like a call if anymore medication changes are made.

## 2023-04-25 NOTE — TELEPHONE ENCOUNTER
Goal BP is <130/80. She can increase lisinopril to 40mg daily. Follow-up in clinic or via video visit as next step if that does not get her to goal.

## 2023-04-25 NOTE — Clinical Note
Emma Rudolph was evaluated in a [facility] today at which time her blood pressure was noted to be elevated. She has been advised to follow up with her primary care provider or with a nurse only visit.  We are also routing this message to her primary care provider as an FYI.'

## 2023-04-25 NOTE — PROGRESS NOTES
Emma Rudolph was evaluated at [facility] on April 25, 2023 at which time her blood pressure was:    BP Readings from Last 3 Encounters:   04/25/23 (!) 142/82   04/03/23 (!) 140/74   03/27/23 (!) 160/88     Pulse Readings from Last 3 Encounters:   03/14/23 114   03/01/23 98   12/01/22 88       Reviewed lifestyle modifications for blood pressure control and reduction: including making healthy food choices, managing weight, getting regular exercise, smoking cessation, reducing alcohol consumption, monitoring blood pressure regularly.     Symptoms: None    BP Goal:< 140/90 mmHg    BP Assessment:  BP not at goal    Potential Reasons for BP too high: Started new medication, may need an increase in dose    BP Follow-Up Plan: Referral to PCP      Note completed by: Nancy Penny, PharmD  [facility]  [phone]

## 2023-04-27 RX ORDER — LISINOPRIL 40 MG/1
40 TABLET ORAL DAILY
Qty: 90 TABLET | Refills: 1 | Status: SHIPPED | OUTPATIENT
Start: 2023-04-27 | End: 2023-05-01

## 2023-05-01 DIAGNOSIS — I10 HYPERTENSION, UNSPECIFIED TYPE: ICD-10-CM

## 2023-05-01 RX ORDER — LISINOPRIL 40 MG/1
40 TABLET ORAL DAILY
Qty: 90 TABLET | Refills: 1 | Status: SHIPPED | OUTPATIENT
Start: 2023-05-01 | End: 2023-12-12

## 2023-05-19 ENCOUNTER — ALLIED HEALTH/NURSE VISIT (OUTPATIENT)
Dept: INTERNAL MEDICINE | Facility: CLINIC | Age: 73
End: 2023-05-19

## 2023-05-19 ENCOUNTER — VIRTUAL VISIT (OUTPATIENT)
Dept: INTERNAL MEDICINE | Facility: CLINIC | Age: 73
End: 2023-05-19
Payer: COMMERCIAL

## 2023-05-19 VITALS — DIASTOLIC BLOOD PRESSURE: 68 MMHG | SYSTOLIC BLOOD PRESSURE: 148 MMHG

## 2023-05-19 DIAGNOSIS — M75.52 ACUTE BURSITIS OF LEFT SHOULDER: ICD-10-CM

## 2023-05-19 DIAGNOSIS — Z01.30 BP CHECK: Primary | ICD-10-CM

## 2023-05-19 DIAGNOSIS — I10 ESSENTIAL HYPERTENSION: Primary | ICD-10-CM

## 2023-05-19 PROCEDURE — 99441 PR PHYSICIAN TELEPHONE EVALUATION 5-10 MIN: CPT | Mod: 95 | Performed by: INTERNAL MEDICINE

## 2023-05-19 PROCEDURE — 99207 PR NO CHARGE NURSE ONLY: CPT | Performed by: INTERNAL MEDICINE

## 2023-05-19 RX ORDER — AMLODIPINE BESYLATE 5 MG/1
5 TABLET ORAL DAILY
Qty: 90 TABLET | Refills: 4 | Status: SHIPPED | OUTPATIENT
Start: 2023-05-19 | End: 2023-09-25

## 2023-05-19 NOTE — PROGRESS NOTES
Emma Rudolph was evaluated at [facility] on May 19, 2023 at which time her blood pressure was:    BP Readings from Last 1 Encounters:   05/19/23 (!) 148/68     No data recorded      Reviewed lifestyle modifications for blood pressure control and reduction: including making healthy food choices, managing weight, getting regular exercise, smoking cessation, reducing alcohol consumption, monitoring blood pressure regularly.     Symptoms: None    BP Goal:< 140/90 mmHg    BP Assessment:  BP too high    Potential Reasons for BP too high: NA - Not applicable    BP Follow-Up Plan: Recheck BP in 30 days at pharmacy    Recommendation to Provider: Emma Rudolph was evaluated in a [facility] today at which time her blood pressure was noted to be elevated. She has been advised to follow up with her primary care provider or with a nurse only visit.  We are also routing this message to her primary care provider as an FYI.    Note completed by: Freddy Bellamy RPH

## 2023-05-19 NOTE — PROGRESS NOTES
Celi is a 72 year old who is being evaluated via a billable telephone visit.      What phone number would you like to be contacted at? [phone]  How would you like to obtain your AVS? MyChart  Distant Location (provider location):  Off-site     Assessment & Plan   Essential hypertension  BP still consistently >140/90. Discussed new guidelines that aim for <130/80. Patient open to starting additional medication today. Continue lisinopril 40mg daily. Will initiate amlodipine. Counseled on possible side effects such as leg swelling and constipation. Encouraged to continue checking BP at home and to let me know via MyChart or f/u visit what those readings are in case we need to further titrate medication to obtain optimal BP control.  - amLODIPine (NORVASC) 5 MG tablet; Take 1 tablet (5 mg) by mouth daily    Acute bursitis of left shoulder  Encouraged her to reach back out to the ortho team that saw her for this issue 2 months ago to discuss next steps.    Flynn Gibbons MD  [facility]   Celi is a 72 year old who presents for a same day acute care virtual telephone visit with chief concern of:  Hypertension    HPI   Hypertension Follow-up    Do you check your blood pressure regularly outside of the clinic? Yes     Are you following a low salt diet? Yes    Are your blood pressures ever more than 140 on the top number (systolic) OR more than 90 on the bottom number (diastolic), for example 140/90? Yes    Tolerating lisinopril well. BP still too high. Shoulder still hurts. Injection really helped.    Review of Systems   Constitutional, cardiovascular, MSK systems are negative, except as otherwise noted.      Objective    Vitals - Patient Reported  Weight (Patient Reported): 77.1 kg (170 lb)  Vitals: No vitals were obtained today due to virtual visit.    Physical Exam   healthy, alert and no distress  PSYCH: Alert and oriented times 3; coherent speech, normal rate and volume,  able to articulate logical thoughts, able to abstract reason, no tangential thoughts, no hallucinations or delusions. Her affect is normal.  RESP: No cough, no audible wheezing, able to talk in full sentences  Remainder of exam unable to be completed due to telephone visits    Phone call duration: 7 minutes

## 2023-05-24 ENCOUNTER — HOSPITAL ENCOUNTER (EMERGENCY)
Facility: CLINIC | Age: 73
Discharge: HOME | End: 2023-05-24
Attending: EMERGENCY MEDICINE | Admitting: EMERGENCY MEDICINE
Payer: COMMERCIAL

## 2023-05-24 VITALS
DIASTOLIC BLOOD PRESSURE: 75 MMHG | HEIGHT: 64 IN | WEIGHT: 170 LBS | HEART RATE: 115 BPM | RESPIRATION RATE: 16 BRPM | TEMPERATURE: 98.2 F | OXYGEN SATURATION: 96 % | SYSTOLIC BLOOD PRESSURE: 161 MMHG | BODY MASS INDEX: 29.02 KG/M2

## 2023-05-24 DIAGNOSIS — I10 HYPERTENSION, UNSPECIFIED TYPE: ICD-10-CM

## 2023-05-24 LAB
ALBUMIN SERPL BCG-MCNC: 4.2 G/DL (ref 3.5–5.2)
ALP SERPL-CCNC: 105 U/L (ref 35–104)
ALT SERPL W P-5'-P-CCNC: 25 U/L (ref 10–35)
ANION GAP SERPL CALCULATED.3IONS-SCNC: 12 MMOL/L (ref 7–15)
AST SERPL W P-5'-P-CCNC: 33 U/L (ref 10–35)
BASOPHILS # BLD AUTO: 0 10E3/UL (ref 0–0.2)
BASOPHILS NFR BLD AUTO: 1 %
BILIRUB SERPL-MCNC: 0.8 MG/DL
BUN SERPL-MCNC: 14.5 MG/DL (ref 8–23)
CALCIUM SERPL-MCNC: 9.5 MG/DL (ref 8.8–10.2)
CHLORIDE SERPL-SCNC: 102 MMOL/L (ref 98–107)
CREAT SERPL-MCNC: 0.85 MG/DL (ref 0.51–0.95)
DEPRECATED HCO3 PLAS-SCNC: 25 MMOL/L (ref 22–29)
EOSINOPHIL # BLD AUTO: 0.1 10E3/UL (ref 0–0.7)
EOSINOPHIL NFR BLD AUTO: 2 %
ERYTHROCYTE [DISTWIDTH] IN BLOOD BY AUTOMATED COUNT: 12.2 % (ref 10–15)
GFR SERPL CREATININE-BSD FRML MDRD: 72 ML/MIN/1.73M2
GLUCOSE SERPL-MCNC: 95 MG/DL (ref 70–99)
HCT VFR BLD AUTO: 42.4 % (ref 35–47)
HGB BLD-MCNC: 14.3 G/DL (ref 11.7–15.7)
HOLD SPECIMEN: NORMAL
IMM GRANULOCYTES # BLD: 0 10E3/UL
IMM GRANULOCYTES NFR BLD: 0 %
LYMPHOCYTES # BLD AUTO: 3 10E3/UL (ref 0.8–5.3)
LYMPHOCYTES NFR BLD AUTO: 39 %
MCH RBC QN AUTO: 32 PG (ref 26.5–33)
MCHC RBC AUTO-ENTMCNC: 33.7 G/DL (ref 31.5–36.5)
MCV RBC AUTO: 95 FL (ref 78–100)
MONOCYTES # BLD AUTO: 0.5 10E3/UL (ref 0–1.3)
MONOCYTES NFR BLD AUTO: 6 %
NEUTROPHILS # BLD AUTO: 3.9 10E3/UL (ref 1.6–8.3)
NEUTROPHILS NFR BLD AUTO: 52 %
NRBC # BLD AUTO: 0 10E3/UL
NRBC BLD AUTO-RTO: 0 /100
PLATELET # BLD AUTO: 269 10E3/UL (ref 150–450)
POTASSIUM SERPL-SCNC: 3.1 MMOL/L (ref 3.4–5.3)
PROT SERPL-MCNC: 7.9 G/DL (ref 6.4–8.3)
RBC # BLD AUTO: 4.47 10E6/UL (ref 3.8–5.2)
SODIUM SERPL-SCNC: 139 MMOL/L (ref 136–145)
WBC # BLD AUTO: 7.5 10E3/UL (ref 4–11)

## 2023-05-24 PROCEDURE — 36415 COLL VENOUS BLD VENIPUNCTURE: CPT | Performed by: EMERGENCY MEDICINE

## 2023-05-24 PROCEDURE — 93005 ELECTROCARDIOGRAM TRACING: CPT

## 2023-05-24 PROCEDURE — 80053 COMPREHEN METABOLIC PANEL: CPT | Performed by: EMERGENCY MEDICINE

## 2023-05-24 PROCEDURE — 99284 EMERGENCY DEPT VISIT MOD MDM: CPT | Mod: 25

## 2023-05-24 PROCEDURE — 85025 COMPLETE CBC W/AUTO DIFF WBC: CPT | Performed by: EMERGENCY MEDICINE

## 2023-05-24 ASSESSMENT — ACTIVITIES OF DAILY LIVING (ADL): ADLS_ACUITY_SCORE: 35

## 2023-05-25 ENCOUNTER — PATIENT OUTREACH (OUTPATIENT)
Dept: CARE COORDINATION | Facility: CLINIC | Age: 73
End: 2023-05-25
Payer: COMMERCIAL

## 2023-05-25 LAB
ATRIAL RATE - MUSE: 105 BPM
DIASTOLIC BLOOD PRESSURE - MUSE: NORMAL MMHG
INTERPRETATION ECG - MUSE: NORMAL
P AXIS - MUSE: 39 DEGREES
PR INTERVAL - MUSE: 164 MS
QRS DURATION - MUSE: 92 MS
QT - MUSE: 362 MS
QTC - MUSE: 478 MS
R AXIS - MUSE: -19 DEGREES
SYSTOLIC BLOOD PRESSURE - MUSE: NORMAL MMHG
T AXIS - MUSE: 77 DEGREES
VENTRICULAR RATE- MUSE: 105 BPM

## 2023-05-25 NOTE — PROGRESS NOTES
Clinic Care Coordination Contact  Community Health Worker Initial Outreach    CHW Initial Information Gathering:  Referral Source: ED Follow-Up  Preferred Hospital: [facility], [place]  [phone]  Preferred Urgent Care: [facility], [phone]  No PCP office visit in Past Year: No       Patient accepts CC: No, due to the patient stating that she has 7 siblings and has the support that she needs. At this time there are no needs for CCC. Patient will be sent Care Coordination introduction letter for future reference.     RIVERA Aldana  [phone]  Connected Care Resource [facility]

## 2023-05-25 NOTE — ED TRIAGE NOTES
Patient BIBA with reports of increasing BP, noted to be 190's Systolically at home. Patient called PCP 5 days ago with concerns of feeling flushed and not right with high BP, had increase in BP meds and a new med was prescribed. She had the same feeling today. Denies any pain, vision changes.      Triage Assessment     Row Name 05/24/23 7589       Triage Assessment (Adult)    Airway WDL WDL       Respiratory WDL    Respiratory WDL WDL       Skin Circulation/Temperature WDL    Skin Circulation/Temperature WDL WDL       Cardiac WDL    Cardiac WDL X;rhythm    Pulse Rate & Regularity tachycardic       Peripheral/Neurovascular WDL    Peripheral Neurovascular WDL WDL       Cognitive/Neuro/Behavioral WDL    Cognitive/Neuro/Behavioral WDL WDL

## 2023-05-25 NOTE — ED NOTES
Bed: ED15  Expected date:   Expected time:   Means of arrival:   Comments:  [facility] 72F high /82 ekg normal

## 2023-05-25 NOTE — LETTER
[facility] CARE COORDINATION  [address] [facility] [address]    May 25, 2023    Emma Rudolph  [address] [place] [id number]      Dear Emma,        I am a  clinic community health worker who works with Flynn Hamilton MD with the [facility]. I wanted to thank you for spending the time to talk with me.  Below is a description of clinic care coordination and how I can further assist you.       The clinic care coordination team is made up of a registered nurse, , financial resource worker and community health worker who understand the health care system. The goal of clinic care coordination is to help you manage your health and improve access to the health care system. Our team works alongside your provider to assist you in determining your health and social needs. We can help you obtain health care and community resources, providing you with necessary information and education. We can work with you through any barriers and develop a care plan that helps coordinate and strengthen the communication between you and your care team.  Our services are voluntary and are offered without charge to you personally.    If you wish to connect with the Clinic Care Coordination Team, please let your [facility] Primary Care Provider or Clinic Care Team know and they can place a referral. The Clinic Care Coordination team will then reach out by phone to further support you.    We are focused on providing you with the highest-quality healthcare experience possible.    Sincerely,   Your care team with [facility]

## 2023-05-25 NOTE — ED PROVIDER NOTES
"  History     Chief Complaint:  Hypertension     HPI   Emma Rudolph is a 72 year old female with a history of hypertension who presents today via EMS with concerns of hypertension. Patient recently started amlodipine on 5/19 and she has been taking since. She states that she started feeling flushed earlier today but that it has now subsided. She also says she \"feels like it is more in [her] head.\" She denies any chest pain or shortness of breath. Of note, Pat does have an appointment with her GP tomorrow to re-evaluate her blood pressure and have medication changes if necessary.     Independent Historian:   None - Patient Only    Review of External Notes:   I reviewed the patients virtual visit on 5/19/23 where she was started on amlodipine.     Medications:    Amlodipine - began 5/19  Atorvastatin  Desvenlafaxine  Lisinopril  Raloxifene  Cholecalciferol    Past Medical History:    Breast lesion  MILENA  Hyperlipidemia   Hypertension   Depression   Osteoarthritis   Vitamin D deficiency   Stage 2 CKD    Past Surgical History:    Biopsy breast seed localization  Breast surgery  Colonoscopy  Vitrectomy  Bilateral salpingo-oophorectomy  Laser for retinal tear, left  [place] teeth removal     Physical Exam     Patient Vitals for the past 24 hrs:   BP Temp Temp src Pulse Resp SpO2 Height Weight   05/24/23 2311 (!) 161/75 -- -- 115 16 96 % -- --   05/24/23 2154 (!) 170/84 -- -- 105 -- 100 % -- --   05/24/23 2144 (!) 196/92 98.2  F (36.8  C) Oral 115 20 97 % 1.626 m (5' 4\") 77.1 kg (170 lb)      Physical Exam  General/Appearance: appears stated age, well-groomed, appears comfortable  Eyes: EOMI, no scleral injection, no icterus  ENT: MMM  Neck: supple, nl ROM, no stiffness  Cardiovascular: RRR, nl S1S2, no m/r/g, 2+ pulses in all 4 extremities, cap refill <2sec  Respiratory: CTAB, good air movement throughout, no wheezes/rhonchi/rales, no increased WOB, no retractions  GI: abd soft, non-distended, nttp,  no HSM, no " rebound, no guarding, nl BS  MSK: VALDEZ, good tone, no bony abnormality  Skin: warm and well-perfused, no rash, no edema, no ecchymosis, nl turgor  Neuro: GCS 15, alert and oriented, no gross focal neuro deficits  Psych: interacts appropriately  Heme: no petechia, no purpura, no active bleeding    Emergency Department Course   ECG  ECG taken at 2141, ECG read at 2336  Sinus tachycardia   Nonspecific ST abnormality   Abnormal ECG    No significant change as compared to prior, dated 08/02/22.  Rate 105 bpm. FL interval 164 ms. QRS duration 92 ms. QT/QTc 362/478 ms. P-R-T axes 39 -19 77.     Laboratory:  Labs Ordered and Resulted from Time of ED Arrival to Time of ED Departure   COMPREHENSIVE METABOLIC PANEL - Abnormal       Result Value    Sodium 139      Potassium 3.1 (*)     Chloride 102      Carbon Dioxide (CO2) 25      Anion Gap 12      Urea Nitrogen 14.5      Creatinine 0.85      Calcium 9.5      Glucose 95      Alkaline Phosphatase 105 (*)     AST 33      ALT 25      Protein Total 7.9      Albumin 4.2      Bilirubin Total 0.8      GFR Estimate 72     CBC WITH PLATELETS AND DIFFERENTIAL    WBC Count 7.5      RBC Count 4.47      Hemoglobin 14.3      Hematocrit 42.4      MCV 95      MCH 32.0      MCHC 33.7      RDW 12.2      Platelet Count 269      % Neutrophils 52      % Lymphocytes 39      % Monocytes 6      % Eosinophils 2      % Basophils 1      % Immature Granulocytes 0      NRBCs per 100 WBC 0      Absolute Neutrophils 3.9      Absolute Lymphocytes 3.0      Absolute Monocytes 0.5      Absolute Eosinophils 0.1      Absolute Basophils 0.0      Absolute Immature Granulocytes 0.0      Absolute NRBCs 0.0        Procedures   None    Emergency Department Course & Assessments:       Interventions:  Medications - No data to display     Assessments:  2259 I obtained history and examined the patient as noted above. At this time, the patient was deemed safe to discharge home and she agreed to the plan of  care.    Independent Interpretation (X-rays, CTs, rhythm strip):  None    Consultations/Discussion of Management or Tests:  None        Social Determinants of Health affecting care:   None    Disposition:  The patient was discharged to home.     Impression & Plan    CMS Diagnoses: None    Medical Decision Making:  This patient is a pleasant 72-year-old female who has been dealing with increased blood pressure.  Her blood pressures recently were noted to be elevated at her PCP visit so they started her on amlodipine.  Plan is to follow-up tomorrow and discuss further increases of medications.  By the time we saw her she had been in the ER waiting for some time and she had a little bit of flushed feeling earlier today but denies any current feelings.  There was never any chest pain, shortness of breath, palpitations, lightheadedness.  Blood pressure here is elevated but not to a point where I am uncomfortable with that.  I think it is very fair for her to be discharged especially as she already has close follow-up for tomorrow.    Diagnosis:    ICD-10-CM    1. Hypertension, unspecified type  I10            Discharge Medications:  Discharge Medication List as of 5/24/2023 11:08 PM         Scribe Disclosure:  IEcho, am serving as a scribe  at 11:26 PM on 5/24/2023 to document services personally performed by Clarissa Blanco MD based on my observations and the provider's statements to me.      Scribe Disclosure:  IPrasad, am serving as a scribe at 11:16 PM on 5/24/2023 to document services personally performed by Clarissa Blanco MD based on my observations and the provider's statements to me.     5/24/2023   Clarissa Blanco MD Mahoney, Katherine Collins, MD  05/25/23 0145

## 2023-05-26 ENCOUNTER — ALLIED HEALTH/NURSE VISIT (OUTPATIENT)
Dept: INTERNAL MEDICINE | Facility: CLINIC | Age: 73
End: 2023-05-26
Payer: COMMERCIAL

## 2023-05-26 VITALS — SYSTOLIC BLOOD PRESSURE: 139 MMHG | DIASTOLIC BLOOD PRESSURE: 68 MMHG

## 2023-05-26 DIAGNOSIS — Z01.30 BP CHECK: Primary | ICD-10-CM

## 2023-05-26 PROCEDURE — 99207 PR NO CHARGE NURSE ONLY: CPT | Performed by: INTERNAL MEDICINE

## 2023-05-26 NOTE — PROGRESS NOTES
Emma Rudolph was evaluated at [facility] on May 26, 2023 at which time her blood pressure was:    BP Readings from Last 1 Encounters:   05/26/23 139/68     No data recorded      Reviewed lifestyle modifications for blood pressure control and reduction: including making healthy food choices, managing weight, getting regular exercise, smoking cessation, reducing alcohol consumption, monitoring blood pressure regularly.     Symptoms: None    BP Goal:< 140/90 mmHg    BP Assessment:  BP at goal    Potential Reasons for BP too high: NA - Not applicable    BP Follow-Up Plan: Recheck BP in 6 months at pharmacy    Recommendation to Provider: BP checked at pharmacy and noted to be at goal of <140/90.   Recommended patient follow-up in 6 months at the pharmacy.    Note completed by: Freddy Bellamy RPH

## 2023-05-26 NOTE — Clinical Note
BP checked at pharmacy and noted to be at goal of <140/90.  Recommended patient follow-up in 6 months at the pharmacy.   ~Thank you Stephanie Bellamy, PharmD Retail Pharmacist For [facility]

## 2023-05-31 ENCOUNTER — TELEPHONE (OUTPATIENT)
Dept: ONCOLOGY | Facility: CLINIC | Age: 73
End: 2023-05-31
Payer: COMMERCIAL

## 2023-06-02 ENCOUNTER — NURSE TRIAGE (OUTPATIENT)
Dept: INTERNAL MEDICINE | Facility: CLINIC | Age: 73
End: 2023-06-02
Payer: COMMERCIAL

## 2023-06-02 ENCOUNTER — HEALTH MAINTENANCE LETTER (OUTPATIENT)
Age: 73
End: 2023-06-02

## 2023-06-02 NOTE — TELEPHONE ENCOUNTER
Nurse Triage SBAR    Is this a 2nd Level Triage? YES, LICENSED PRACTITIONER REVIEW IS REQUIRED    Situation: Patient calling clinic reporting dizziness and lightheadedness after starting Amlodipine script. Patient stated symptoms began about 3 days ago.     Background: Patient was recently started on Amlodipine as well as an increased dose of Lisinopril 40 mg, Amlodipine started 5/19/2023, Lisinopril 5/1/2023.     Assessment: Patient did not endorse chest pain/pressure, no difficulty breathing, no other neurological symptoms, no running into objects or the entire room spinning. Patient stated she is still able to walk around but occasionally feels like she may fall, patient stated she has not fallen yet. Patient reported BP during time of call was 129/82 and denied rapid HR or palpitations.     Protocol Recommended Disposition:   See in Office Within 3 Days    Recommendation: Per disposition, RN advised patient to be seen for evaluation. Patient refused and would like PCP recommendations if medication adjustment would be appropriate. RN reviewed red flag symptoms with patient along with when to contact EMS if symptoms worsen, patient agreed with plan of care and will await PCP response.      Routed to provider. Patient reported increased dizziness and lightheadedness. Started Amlodipine and increased Lisinopril recently but patient stated BP has been in the 1302 over 80s.     Does the patient meet one of the following criteria for ADS visit consideration? 16+ years old, with an [facility] PCP     TIP  Providers, please consider if this condition is appropriate for management at one of our Acute and Diagnostic Services sites.     If patient is a good candidate, please use dotphrase <dot>triageresponse and select Refer to ADS to document.      Reason for Disposition    Brief dizziness or lightheadedness after standing up or eating    Additional Information    Negative: Systolic BP < 90 and feeling dizzy, lightheaded, or  weak    Negative: Started suddenly after an allergic medicine, an allergic food, or bee sting    Negative: Shock suspected (e.g., cold/pale/clammy skin, too weak to stand, low BP, rapid pulse)    Negative: Difficult to awaken or acting confused (e.g., disoriented, slurred speech)    Negative: Fainted    Negative: Chest pain    Negative: Bleeding (e.g., vomiting blood, rectal bleeding or tarry stools, severe vaginal bleeding)    Negative: Extra heart beats or heart is beating fast (i.e., 'palpitations')    Negative: Sounds like a life-threatening emergency to the triager    Negative: Systolic BP < 80 and NOT dizzy, lightheaded or weak (feels normal)    Negative: Abdominal pain    Negative: Major surgery in the past month    Negative: Fever > 100.4 F (38.0 C)    Negative: Drinking very little and has signs of dehydration (e.g., no urine > 12 hours, very dry mouth, very lightheaded)    Negative: Fall in systolic BP > 20 mm Hg from normal and feeling dizzy, lightheaded, or weak    Negative: Patient sounds very sick or weak to the triager    Negative: Systolic BP < 90 and NOT dizzy, lightheaded or weak    Negative: Systolic BP  while taking blood pressure medications and NOT dizzy, lightheaded or weak    Negative: Fall in systolic BP > 20 mm Hg from normal and NOT dizzy, lightheaded, or weak    Negative: Fall in systolic BP > 20 mmHg after standing up    Negative: Fall in systolic BP > 20 mmHg after eating a meal    Negative: Diastolic BP < 50 mm Hg    Protocols used: BLOOD PRESSURE - LOW-A-OH

## 2023-06-02 NOTE — TELEPHONE ENCOUNTER
Called and spoke to the patient. Relayed message from the provider. Patient expressed understanding and had no additional questions at this time. Patient will plan to reach out to the clinic either way early next week with an update.     Ila Priest RN

## 2023-06-02 NOTE — TELEPHONE ENCOUNTER
If her BP is normal as reported, doubt it is related to hypotension. Okay to stop amlodipine for the weekend to see if symptoms improve. If not, she should be seen by any available provider in-person (may need to go to )

## 2023-06-29 ENCOUNTER — HOSPITAL ENCOUNTER (OUTPATIENT)
Dept: MRI IMAGING | Facility: CLINIC | Age: 73
Discharge: HOME | End: 2023-06-29
Attending: INTERNAL MEDICINE | Admitting: INTERNAL MEDICINE
Payer: COMMERCIAL

## 2023-06-29 DIAGNOSIS — N60.92 ATYPICAL DUCTAL HYPERPLASIA OF LEFT BREAST: ICD-10-CM

## 2023-06-29 DIAGNOSIS — N64.89 OTHER SPECIFIED DISORDERS OF BREAST: ICD-10-CM

## 2023-06-29 PROCEDURE — A9585 GADOBUTROL INJECTION: HCPCS | Performed by: INTERNAL MEDICINE

## 2023-06-29 PROCEDURE — 255N000002 HC RX 255 OP 636: Performed by: INTERNAL MEDICINE

## 2023-06-29 PROCEDURE — 77049 MRI BREAST C-+ W/CAD BI: CPT

## 2023-06-29 RX ORDER — GADOBUTROL 604.72 MG/ML
8 INJECTION INTRAVENOUS ONCE
Status: COMPLETED | OUTPATIENT
Start: 2023-06-29 | End: 2023-06-29

## 2023-06-29 RX ADMIN — GADOBUTROL 8 ML: 604.72 INJECTION INTRAVENOUS at 07:53

## 2023-06-30 NOTE — PROGRESS NOTES
[facility]    Hematology/Oncology Established Patient Note      Today's Date: 7/6/2023    Reason for follow-up: Atypical ductal hyperplasia.    HISTORY OF PRESENT ILLNESS: Emma Rudolph is a 72 year old female who presents with the following issues:  1. 9/18/2020: Mammogram showed architectural distortion in left breast at 11:00-1:00 retroareolar mid-depth. No concerning findings in right breast.  2. 9/24/2020: Left breast U/S showed oval hypoechoic mass measuring 1.1 cm at 11:00, 3 cm from nipple; oval hypoechoic mass measuring 1 cm at 12:00, 3 cm from nipple; normal-appearing parenchyma between 2 lesions. No enlarged lymph nodes in left axilla.  3. 10/2/2020: U/S-guided left breast needles biopsy at 12:00 showed a complex sclerosing lesion, negative for atypica and malignancy. Biopsy at 11:00 showed benign breast tissue with adenosis, negative for atypica and malignancy.  4. 10/28/2020: Underwent left breast excision under care of Dr. Eileen Montes. Pathology was negative for malignancy but showed atypical ductal hyperplasia, apocrine metaplasia, papillomatosis, microcalcifications.  5. 11/18/2020: Started raloxifene.    INTERIM HISTORY:  Celi reports feeling overall well with no new breast complaints.    REVIEW OF SYSTEMS:   14 point ROS was reviewed and is negative other than as noted above in HPI.       HOME MEDICATIONS:  Current Outpatient Medications   Medication Sig Dispense Refill     amLODIPine (NORVASC) 5 MG tablet Take 1 tablet (5 mg) by mouth daily 90 tablet 4     atorvastatin (LIPITOR) 20 MG tablet Take 1 tablet (20 mg) by mouth daily 90 tablet 4     Cholecalciferol (VITAMIN D) 1000 UNITS capsule Take 1 capsule by mouth daily.       desvenlafaxine (PRISTIQ) 100 MG 24 hr tablet Take 1 tablet (100 mg) by mouth daily 90 tablet 4     lisinopril (ZESTRIL) 10 MG tablet Take 1 tablet (10 mg) by mouth daily (Patient not taking: Reported on 5/19/2023) 90 tablet 4     lisinopril (ZESTRIL)  40 MG tablet Take 1 tablet (40 mg) by mouth daily 90 tablet 1     Multiple Vitamins-Minerals (MULTIVITAMIN OR) Take by mouth daily       raloxifene (EVISTA) 60 MG tablet Take 1 tablet (60 mg) by mouth daily 90 tablet 3         ALLERGIES:  Allergies   Allergen Reactions     Penicillins Swelling, Rash and Hives     Amoxicillin     Sulfa Antibiotics Hives         PAST MEDICAL HISTORY:  Past Medical History:   Diagnosis Date     Breast lesion     LEFT     Generalized anxiety disorder      Hyperlipidemia LDL goal <130      Hypertension      Mild major depression (H)      Osteoarthritis of multiple joints, unspecified osteoarthritis type of #2&3 fingers bilat R>L      Vitamin D deficiency disease      Gynecologic history:  Age of menarche at 13-14; G0, no HRT or OCP use.      PAST SURGICAL HISTORY:  Past Surgical History:   Procedure Laterality Date     BIOPSY BREAST SEED LOCALIZATION Left 10/28/2020    Procedure: 2 SEED LOCALIZED LEFT BREAST BIOPSY;  Surgeon: Eileen Montes MD;  Location:  OR     BREAST SURGERY  2020     COLONOSCOPY       EYE SURGERY Left 11/2020    vitrictomy      GYN SURGERY       H ARGON LASER FOR RETINAL TEAR Left 3/2014     HEAD & NECK SURGERY      wisdom teeth     LAPAROSCOPIC SINGLE SITE SALPINGO-OOPHORECTOMY Bilateral 4/15/2015    Procedure: LAPAROSCOPIC SINGLE SITE SALPINGO-OOPHORECTOMY;  Surgeon: Leonora Zavaleta MD;  Location:  [facility]     wisdom teeth removal  age 19         SOCIAL HISTORY:  Social History     Socioeconomic History     Marital status: Single     Spouse name: Not on file     Number of children: Not on file     Years of education: Not on file     Highest education level: Not on file   Occupational History     Employer: RETIRED     Comment: [employer]   Tobacco Use     Smoking status: Never     Smokeless tobacco: Never   Vaping Use     Vaping Use: Never used   Substance and Sexual Activity     Alcohol use: Yes     Alcohol/week: 0.0 standard drinks of alcohol     Comment:  infrequent     Drug use: No     Sexual activity: Not Currently     Partners: Female   Other Topics Concern     Parent/sibling w/ CABG, MI or angioplasty before 65F 55M? Yes     Comment: father, infarction at age 44      Service Not Asked     Blood Transfusions Not Asked     Caffeine Concern Not Asked     Occupational Exposure Not Asked     Hobby Hazards Not Asked     Sleep Concern Not Asked     Stress Concern Not Asked     Weight Concern Not Asked     Special Diet Not Asked     Back Care Not Asked     Exercise Not Asked     Bike Helmet Not Asked     Seat Belt Yes     Self-Exams Not Asked   Social History Narrative     Not on file     Social Determinants of Health     Financial Resource Strain: Not on file   Food Insecurity: Not on file   Transportation Needs: Not on file   Physical Activity: Not on file   Stress: Not on file   Social Connections: Not on file   Intimate Partner Violence: Not on file   Housing Stability: Not on file         FAMILY HISTORY:  Family History   Problem Relation Age of Onset     Breast Cancer Mother 80     Neurologic Disorder Father         Parkinson's     Coronary Artery Disease Father 44        MI     Hyperlipidemia Father      Anxiety Disorder Father      Lipids Brother      Hypertension Brother      Hyperlipidemia Brother      Lipids Brother      Family History Negative Brother      Lipids Sister      Hypertension Sister      Hyperlipidemia Sister      Family History Negative Sister      Family History Negative Sister      Family History Negative Sister      Cerebrovascular Disease Maternal Grandmother 86     Cancer Maternal Grandfather 69        leukemia     Other Cancer Maternal Grandfather         Leukemia     Cancer Paternal Grandfather 69        stomach     Other Cancer Paternal Grandfather         stomach cancer     Diabetes No family hx of      Cancer - colorectal No family hx of      Hypertension No family hx of      Hyperlipidemia No family hx of      Colon Cancer No  family hx of      Prostate Cancer No family hx of      Other Cancer No family hx of      Depression No family hx of      Anxiety Disorder No family hx of      Mental Illness No family hx of      Substance Abuse No family hx of      Anesthesia Reaction No family hx of      Asthma No family hx of      Osteoporosis No family hx of      Genetic Disorder No family hx of      Thyroid Disease No family hx of      Obesity No family hx of      Unknown/Adopted No family hx of          PHYSICAL EXAM:  Vital signs:  BP (!) 143/67   Pulse 95   Resp 16   LMP  (LMP Unknown)   SpO2 97%   GENERAL/CONSTITUTIONAL: No acute distress.  EYES: No erythema or scleral icterus.  LYMPH: No cervical, supraclavicular, axillary, epitroclear adenopathy.   BREAST: No palpable masses in either breast; nipples everted with no discharge; no rash or ulceration. Slight concavity at the left breast prior excision site.  RESPIRATORY: No audible cough or wheezing.   MUSCULOSKELETAL: Warm and well-perfused, no cyanosis, clubbing, or edema.  NEUROLOGIC: Alert, oriented, answers questions appropriately.  INTEGUMENTARY: No rashes or jaundice.  GAIT: Steady, does not use assistive device    LABS:  CBC RESULTS: Recent Labs   Lab Test 05/24/23  2154   WBC 7.5   RBC 4.47   HGB 14.3   HCT 42.4   MCV 95   MCH 32.0   MCHC 33.7   RDW 12.2          Last Comprehensive Metabolic Panel:  Sodium   Date Value Ref Range Status   05/24/2023 139 136 - 145 mmol/L Final   08/27/2020 138 133 - 144 mmol/L Final     Potassium   Date Value Ref Range Status   05/24/2023 3.1 (L) 3.4 - 5.3 mmol/L Final   10/08/2021 3.7 3.4 - 5.3 mmol/L Final   08/27/2020 3.5 3.4 - 5.3 mmol/L Final     Chloride   Date Value Ref Range Status   05/24/2023 102 98 - 107 mmol/L Final   10/08/2021 107 94 - 109 mmol/L Final   08/27/2020 106 94 - 109 mmol/L Final     Carbon Dioxide   Date Value Ref Range Status   08/27/2020 26 20 - 32 mmol/L Final     Carbon Dioxide (CO2)   Date Value Ref Range  Status   2023 25 22 - 29 mmol/L Final   10/08/2021 25 20 - 32 mmol/L Final     Anion Gap   Date Value Ref Range Status   2023 12 7 - 15 mmol/L Final   10/08/2021 8 3 - 14 mmol/L Final   2020 6 3 - 14 mmol/L Final     Glucose   Date Value Ref Range Status   2023 95 70 - 99 mg/dL Final   10/08/2021 104 (H) 70 - 99 mg/dL Final   2020 110 (H) 70 - 99 mg/dL Final     Comment:     Fasting specimen     Urea Nitrogen   Date Value Ref Range Status   2023 14.5 8.0 - 23.0 mg/dL Final   10/08/2021 12 7 - 30 mg/dL Final   2020 15 7 - 30 mg/dL Final     Creatinine   Date Value Ref Range Status   2023 0.85 0.51 - 0.95 mg/dL Final   2020 0.81 0.52 - 1.04 mg/dL Final     GFR Estimate   Date Value Ref Range Status   2023 72 >60 mL/min/1.73m2 Final     Comment:     eGFR calculated using  CKD-EPI equation.   2021 71 >60 mL/min/[1.73_m2] Final     Calcium   Date Value Ref Range Status   2023 9.5 8.8 - 10.2 mg/dL Final   2020 9.4 8.5 - 10.1 mg/dL Final     Bilirubin Total   Date Value Ref Range Status   2023 0.8 <=1.2 mg/dL Final   2020 1.3 0.2 - 1.3 mg/dL Final     Alkaline Phosphatase   Date Value Ref Range Status   2023 105 (H) 35 - 104 U/L Final   2020 122 40 - 150 U/L Final     ALT   Date Value Ref Range Status   2023 25 10 - 35 U/L Final   2020 65 (H) 0 - 50 U/L Final     AST   Date Value Ref Range Status   2023 33 10 - 35 U/L Final   2020 35 0 - 45 U/L Final         PATHOLOGY:  None new.    IMAGIN2022: Mammogram showed no malignant findings.    2023: Breast MRI showed no concerning findings.    ASSESSMENT/PLAN:  Emma Rudolph is a 72 year old female with the following issues:  1. Left breast atypical ductal hyperplasia  -Pat is aware that atypical ductal hyperplasia is considered a marker for increased risk of developing invasive breast cancer, approximately ranging 11-40%, 5-15  years from time of diagnosis of ADH.  She is aware this is not a cancer.  -She is tolerating raloxifene well for risk reduction. Advised total 5 years of raloxifene, through 11/2025.  -She has no breast abnormalities on physical exam today or breast MRI reviewed from 6/29/2023.  -Continue high risk surveillance with clinical breast exam and breast imaging every 6 months with yearly mammograms alternating with yearly breast MRI.    2. Anxiety  -Mood stable.  -Continue desvenlafaxine.    Return in 6 months with mammogram.    Priya Ontiveros MD  Hematology/Oncology  [facility]    Total time spent today: 20 minutes in chart review, patient evaluation, counseling, documentation, test and/or medication/prescription orders, and coordination of care.

## 2023-07-06 ENCOUNTER — ONCOLOGY VISIT (OUTPATIENT)
Dept: ONCOLOGY | Facility: CLINIC | Age: 73
End: 2023-07-06
Attending: INTERNAL MEDICINE
Payer: COMMERCIAL

## 2023-07-06 VITALS
RESPIRATION RATE: 16 BRPM | OXYGEN SATURATION: 97 % | DIASTOLIC BLOOD PRESSURE: 67 MMHG | HEART RATE: 95 BPM | SYSTOLIC BLOOD PRESSURE: 143 MMHG

## 2023-07-06 DIAGNOSIS — Z12.31 ENCOUNTER FOR SCREENING MAMMOGRAM FOR BREAST CANCER: ICD-10-CM

## 2023-07-06 DIAGNOSIS — N60.92 ATYPICAL DUCTAL HYPERPLASIA OF LEFT BREAST: Primary | ICD-10-CM

## 2023-07-06 PROCEDURE — G0463 HOSPITAL OUTPT CLINIC VISIT: HCPCS | Performed by: INTERNAL MEDICINE

## 2023-07-06 PROCEDURE — 99213 OFFICE O/P EST LOW 20 MIN: CPT | Performed by: INTERNAL MEDICINE

## 2023-07-06 ASSESSMENT — PAIN SCALES - GENERAL: PAINLEVEL: NO PAIN (0)

## 2023-07-06 NOTE — PROGRESS NOTES
"Oncology Rooming Note    July 6, 2023 2:57 PM   Emma Rudolph is a 72 year old female who presents for:    Chief Complaint   Patient presents with     Oncology Clinic Visit     Initial Vitals: BP (!) 143/67   Pulse 95   Resp 16   LMP  (LMP Unknown)   SpO2 97%  Estimated body mass index is 29.18 kg/m  as calculated from the following:    Height as of 5/24/23: 1.626 m (5' 4\").    Weight as of 5/24/23: 77.1 kg (170 lb). There is no height or weight on file to calculate BSA.  No Pain (0) Comment: Data Unavailable   No LMP recorded (lmp unknown). Patient is postmenopausal.  Allergies reviewed: Yes  Medications reviewed: Yes    Medications: Medication refills not needed today.  Pharmacy name entered into EPIC:    [facility] - [facility], [place] - [address] [place] [address] [id number] [place], [place] - [address] [place] [facility] [place] [address]    Clinical concerns: no      Suri Moreno CMA            "

## 2023-07-06 NOTE — LETTER
7/6/2023         RE: Emma Rudolph  [address] [facility] [address]        Dear Colleague,    Thank you for referring your patient, Emma Rudolph, to the [facility] [place]. Please see a copy of my visit note below.    [facility]    Hematology/Oncology Established Patient Note      Today's Date: 7/6/2023    Reason for follow-up: Atypical ductal hyperplasia.    HISTORY OF PRESENT ILLNESS: Emma Rudolph is a 72 year old female who presents with the following issues:  1. 9/18/2020: Mammogram showed architectural distortion in left breast at 11:00-1:00 retroareolar mid-depth. No concerning findings in right breast.  2. 9/24/2020: Left breast U/S showed oval hypoechoic mass measuring 1.1 cm at 11:00, 3 cm from nipple; oval hypoechoic mass measuring 1 cm at 12:00, 3 cm from nipple; normal-appearing parenchyma between 2 lesions. No enlarged lymph nodes in left axilla.  3. 10/2/2020: U/S-guided left breast needles biopsy at 12:00 showed a complex sclerosing lesion, negative for atypica and malignancy. Biopsy at 11:00 showed benign breast tissue with adenosis, negative for atypica and malignancy.  4. 10/28/2020: Underwent left breast excision under care of Dr. Eileen Montes. Pathology was negative for malignancy but showed atypical ductal hyperplasia, apocrine metaplasia, papillomatosis, microcalcifications.  5. 11/18/2020: Started raloxifene.    INTERIM HISTORY:  Pat reports feeling overall well with no new breast complaints.    REVIEW OF SYSTEMS:   14 point ROS was reviewed and is negative other than as noted above in HPI.       HOME MEDICATIONS:  Current Outpatient Medications   Medication Sig Dispense Refill     amLODIPine (NORVASC) 5 MG tablet Take 1 tablet (5 mg) by mouth daily 90 tablet 4     atorvastatin (LIPITOR) 20 MG tablet Take 1 tablet (20 mg) by mouth daily 90 tablet 4     Cholecalciferol (VITAMIN D) 1000 UNITS capsule Take 1 capsule by mouth  daily.       desvenlafaxine (PRISTIQ) 100 MG 24 hr tablet Take 1 tablet (100 mg) by mouth daily 90 tablet 4     lisinopril (ZESTRIL) 10 MG tablet Take 1 tablet (10 mg) by mouth daily (Patient not taking: Reported on 5/19/2023) 90 tablet 4     lisinopril (ZESTRIL) 40 MG tablet Take 1 tablet (40 mg) by mouth daily 90 tablet 1     Multiple Vitamins-Minerals (MULTIVITAMIN OR) Take by mouth daily       raloxifene (EVISTA) 60 MG tablet Take 1 tablet (60 mg) by mouth daily 90 tablet 3         ALLERGIES:  Allergies   Allergen Reactions     Penicillins Swelling, Rash and Hives     Amoxicillin     Sulfa Antibiotics Hives         PAST MEDICAL HISTORY:  Past Medical History:   Diagnosis Date     Breast lesion     LEFT     Generalized anxiety disorder      Hyperlipidemia LDL goal <130      Hypertension      Mild major depression (H)      Osteoarthritis of multiple joints, unspecified osteoarthritis type of #2&3 fingers bilat R>L      Vitamin D deficiency disease      Gynecologic history:  Age of menarche at 13-14; G0, no HRT or OCP use.      PAST SURGICAL HISTORY:  Past Surgical History:   Procedure Laterality Date     BIOPSY BREAST SEED LOCALIZATION Left 10/28/2020    Procedure: 2 SEED LOCALIZED LEFT BREAST BIOPSY;  Surgeon: Eileen Montes MD;  Location:  OR     BREAST SURGERY  2020     COLONOSCOPY       EYE SURGERY Left 11/2020    vitrictomy      GYN SURGERY       H ARGON LASER FOR RETINAL TEAR Left 3/2014     HEAD & NECK SURGERY      wisdom teeth     LAPAROSCOPIC SINGLE SITE SALPINGO-OOPHORECTOMY Bilateral 4/15/2015    Procedure: LAPAROSCOPIC SINGLE SITE SALPINGO-OOPHORECTOMY;  Surgeon: Leonora Zavaleta MD;  [facility]:  [facility]     wisdom teeth removal  age 19         SOCIAL HISTORY:  Social History     Socioeconomic History     Marital status: Single     Spouse name: Not on file     Number of children: Not on file     Years of education: Not on file     Highest education level: Not on file   Occupational History     Employer:  RETIRED     Comment: [employer]   Tobacco Use     Smoking status: Never     Smokeless tobacco: Never   Vaping Use     Vaping Use: Never used   Substance and Sexual Activity     Alcohol use: Yes     Alcohol/week: 0.0 standard drinks of alcohol     Comment: infrequent     Drug use: No     Sexual activity: Not Currently     Partners: Female   Other Topics Concern     Parent/sibling w/ CABG, MI or angioplasty before 65F 55M? Yes     Comment: father, infarction at age 44      Service Not Asked     Blood Transfusions Not Asked     Caffeine Concern Not Asked     Occupational Exposure Not Asked     Hobby Hazards Not Asked     Sleep Concern Not Asked     Stress Concern Not Asked     Weight Concern Not Asked     Special Diet Not Asked     Back Care Not Asked     Exercise Not Asked     Bike Helmet Not Asked     Seat Belt Yes     Self-Exams Not Asked   Social History Narrative     Not on file     Social Determinants of Health     Financial Resource Strain: Not on file   Food Insecurity: Not on file   Transportation Needs: Not on file   Physical Activity: Not on file   Stress: Not on file   Social Connections: Not on file   Intimate Partner Violence: Not on file   Housing Stability: Not on file         FAMILY HISTORY:  Family History   Problem Relation Age of Onset     Breast Cancer Mother 80     Neurologic Disorder Father         Parkinson's     Coronary Artery Disease Father 44        MI     Hyperlipidemia Father      Anxiety Disorder Father      Lipids Brother      Hypertension Brother      Hyperlipidemia Brother      Lipids Brother      Family History Negative Brother      Lipids Sister      Hypertension Sister      Hyperlipidemia Sister      Family History Negative Sister      Family History Negative Sister      Family History Negative Sister      Cerebrovascular Disease Maternal Grandmother 86     Cancer Maternal Grandfather 69        leukemia     Other Cancer Maternal Grandfather         Leukemia      Cancer Paternal Grandfather 69        stomach     Other Cancer Paternal Grandfather         stomach cancer     Diabetes No family hx of      Cancer - colorectal No family hx of      Hypertension No family hx of      Hyperlipidemia No family hx of      Colon Cancer No family hx of      Prostate Cancer No family hx of      Other Cancer No family hx of      Depression No family hx of      Anxiety Disorder No family hx of      Mental Illness No family hx of      Substance Abuse No family hx of      Anesthesia Reaction No family hx of      Asthma No family hx of      Osteoporosis No family hx of      Genetic Disorder No family hx of      Thyroid Disease No family hx of      Obesity No family hx of      Unknown/Adopted No family hx of          PHYSICAL EXAM:  Vital signs:  BP (!) 143/67   Pulse 95   Resp 16   LMP  (LMP Unknown)   SpO2 97%   GENERAL/CONSTITUTIONAL: No acute distress.  EYES: No erythema or scleral icterus.  LYMPH: No cervical, supraclavicular, axillary, epitroclear adenopathy.   BREAST: No palpable masses in either breast; nipples everted with no discharge; no rash or ulceration. Slight concavity at the left breast prior excision site.  RESPIRATORY: No audible cough or wheezing.   MUSCULOSKELETAL: Warm and well-perfused, no cyanosis, clubbing, or edema.  NEUROLOGIC: Alert, oriented, answers questions appropriately.  INTEGUMENTARY: No rashes or jaundice.  GAIT: Steady, does not use assistive device    LABS:  CBC RESULTS: Recent Labs   Lab Test 05/24/23  2154   WBC 7.5   RBC 4.47   HGB 14.3   HCT 42.4   MCV 95   MCH 32.0   MCHC 33.7   RDW 12.2          Last Comprehensive Metabolic Panel:  Sodium   Date Value Ref Range Status   05/24/2023 139 136 - 145 mmol/L Final   08/27/2020 138 133 - 144 mmol/L Final     Potassium   Date Value Ref Range Status   05/24/2023 3.1 (L) 3.4 - 5.3 mmol/L Final   10/08/2021 3.7 3.4 - 5.3 mmol/L Final   08/27/2020 3.5 3.4 - 5.3 mmol/L Final     Chloride   Date Value Ref  Range Status   2023 102 98 - 107 mmol/L Final   10/08/2021 107 94 - 109 mmol/L Final   2020 106 94 - 109 mmol/L Final     Carbon Dioxide   Date Value Ref Range Status   2020 26 20 - 32 mmol/L Final     Carbon Dioxide (CO2)   Date Value Ref Range Status   2023 25 22 - 29 mmol/L Final   10/08/2021 25 20 - 32 mmol/L Final     Anion Gap   Date Value Ref Range Status   2023 12 7 - 15 mmol/L Final   10/08/2021 8 3 - 14 mmol/L Final   2020 6 3 - 14 mmol/L Final     Glucose   Date Value Ref Range Status   2023 95 70 - 99 mg/dL Final   10/08/2021 104 (H) 70 - 99 mg/dL Final   2020 110 (H) 70 - 99 mg/dL Final     Comment:     Fasting specimen     Urea Nitrogen   Date Value Ref Range Status   2023 14.5 8.0 - 23.0 mg/dL Final   10/08/2021 12 7 - 30 mg/dL Final   2020 15 7 - 30 mg/dL Final     Creatinine   Date Value Ref Range Status   2023 0.85 0.51 - 0.95 mg/dL Final   2020 0.81 0.52 - 1.04 mg/dL Final     GFR Estimate   Date Value Ref Range Status   2023 72 >60 mL/min/1.73m2 Final     Comment:     eGFR calculated using  CKD-EPI equation.   2021 71 >60 mL/min/[1.73_m2] Final     Calcium   Date Value Ref Range Status   2023 9.5 8.8 - 10.2 mg/dL Final   2020 9.4 8.5 - 10.1 mg/dL Final     Bilirubin Total   Date Value Ref Range Status   2023 0.8 <=1.2 mg/dL Final   2020 1.3 0.2 - 1.3 mg/dL Final     Alkaline Phosphatase   Date Value Ref Range Status   2023 105 (H) 35 - 104 U/L Final   2020 122 40 - 150 U/L Final     ALT   Date Value Ref Range Status   2023 25 10 - 35 U/L Final   2020 65 (H) 0 - 50 U/L Final     AST   Date Value Ref Range Status   2023 33 10 - 35 U/L Final   2020 35 0 - 45 U/L Final         PATHOLOGY:  None new.    IMAGIN2022: Mammogram showed no malignant findings.    2023: Breast MRI showed no concerning findings.    ASSESSMENT/PLAN:  Emma RANKIN  "Ronni is a 72 year old female with the following issues:  1. Left breast atypical ductal hyperplasia  -Pat is aware that atypical ductal hyperplasia is considered a marker for increased risk of developing invasive breast cancer, approximately ranging 11-40%, 5-15 years from time of diagnosis of ADH.  She is aware this is not a cancer.  -She is tolerating raloxifene well for risk reduction. Advised total 5 years of raloxifene, through 11/2025.  -She has no breast abnormalities on physical exam today or breast MRI reviewed from 6/29/2023.  -Continue high risk surveillance with clinical breast exam and breast imaging every 6 months with yearly mammograms alternating with yearly breast MRI.    2. Anxiety  -Mood stable.  -Continue desvenlafaxine.    Return in 6 months with mammogram.    Priya Ontiveros MD  Hematology/Oncology  [facility]    Total time spent today: 20 minutes in chart review, patient evaluation, counseling, documentation, test and/or medication/prescription orders, and coordination of care.    Oncology Rooming Note    July 6, 2023 2:57 PM   Emma Rudolph is a 72 year old female who presents for:    Chief Complaint   Patient presents with     Oncology Clinic Visit     Initial Vitals: BP (!) 143/67   Pulse 95   Resp 16   LMP  (LMP Unknown)   SpO2 97%  Estimated body mass index is 29.18 kg/m  as calculated from the following:    Height as of 5/24/23: 1.626 m (5' 4\").    Weight as of 5/24/23: 77.1 kg (170 lb). There is no height or weight on file to calculate BSA.  No Pain (0) Comment: Data Unavailable   No LMP recorded (lmp unknown). Patient is postmenopausal.  Allergies reviewed: Yes  Medications reviewed: Yes    Medications: Medication refills not needed today.  Pharmacy name entered into EPIC:    [facility] - [place], [place] - [address] [id number] - [place], [place] - [address] AT [facility] [address] " concerns: no      Suri Moreno, CMA                Again, thank you for allowing me to participate in the care of your patient.        Sincerely,        Priya Ontiveros MD

## 2023-08-07 ENCOUNTER — ALLIED HEALTH/NURSE VISIT (OUTPATIENT)
Dept: INTERNAL MEDICINE | Facility: CLINIC | Age: 73
End: 2023-08-07
Payer: COMMERCIAL

## 2023-08-07 VITALS — SYSTOLIC BLOOD PRESSURE: 140 MMHG | DIASTOLIC BLOOD PRESSURE: 80 MMHG

## 2023-08-07 DIAGNOSIS — I10 BENIGN ESSENTIAL HYPERTENSION: Primary | ICD-10-CM

## 2023-08-07 PROCEDURE — 99207 PR NO CHARGE NURSE ONLY: CPT | Performed by: INTERNAL MEDICINE

## 2023-08-07 NOTE — PROGRESS NOTES
Emma Rudolph was evaluated at [facility] on August 7, 2023 at which time her blood pressure was:    BP Readings from Last 3 Encounters:   08/07/23 (!) 140/80   07/06/23 (!) 143/67   05/26/23 139/68     Pulse Readings from Last 3 Encounters:   07/06/23 95   05/24/23 115   03/14/23 114       Reviewed lifestyle modifications for blood pressure control and reduction: including making healthy food choices, managing weight, getting regular exercise, smoking cessation, reducing alcohol consumption, monitoring blood pressure regularly.     Symptoms: None    BP Goal:< 140/90 mmHg    BP Assessment:  BP too high    Potential Reasons for BP too high: Dose of BP medication too low    BP Follow-Up Plan: Referral to PCP    Recommendation to Provider: Recommend increasing amlodipine to 10 mg daily and have patient recheck BP at pharmacy 2-4 weeks after dose change.    Note completed by: Piter Bustos, PharmD  [facility]  [phone]

## 2023-08-28 ENCOUNTER — OFFICE VISIT (OUTPATIENT)
Dept: ORTHOPEDICS | Facility: CLINIC | Age: 73
End: 2023-08-28
Payer: COMMERCIAL

## 2023-08-28 VITALS
DIASTOLIC BLOOD PRESSURE: 83 MMHG | HEART RATE: 96 BPM | BODY MASS INDEX: 28.68 KG/M2 | HEIGHT: 64 IN | WEIGHT: 168 LBS | SYSTOLIC BLOOD PRESSURE: 157 MMHG

## 2023-08-28 DIAGNOSIS — M75.50 BURSITIS OF SHOULDER, UNSPECIFIED LATERALITY: Primary | ICD-10-CM

## 2023-08-28 PROCEDURE — 99207 PR DROP WITH A PROCEDURE: CPT | Performed by: ORTHOPAEDIC SURGERY

## 2023-08-28 PROCEDURE — 20610 DRAIN/INJ JOINT/BURSA W/O US: CPT | Mod: LT | Performed by: ORTHOPAEDIC SURGERY

## 2023-08-28 RX ADMIN — TRIAMCINOLONE ACETONIDE 40 MG: 40 INJECTION, SUSPENSION INTRA-ARTICULAR; INTRAMUSCULAR at 14:43

## 2023-08-28 RX ADMIN — LIDOCAINE HYDROCHLORIDE 2 ML: 10 INJECTION, SOLUTION INFILTRATION; PERINEURAL at 14:43

## 2023-08-28 RX ADMIN — BUPIVACAINE HYDROCHLORIDE 2 ML: 2.5 INJECTION, SOLUTION EPIDURAL; INFILTRATION; INTRACAUDAL at 14:43

## 2023-08-28 NOTE — PROGRESS NOTES
S: Patient is a 72 year old female seen today in follow up for right shoulder pain.    They were previously evaluated on 3/27/23,  they are 8 months out from injury.  Since this visit they report still pain with overhead reaching motions, occasional sharp movements.    They have tried the following therapies: corticosteroid injection, home exercises, ice, mobility/stretching.      Current pain level: 0/10, Worst pain level: 2/10.              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    Osteoarthritis of multiple joints, unspecified osteoarthritis type of #2&3 fingers bilat R>L    Family history of ischemic heart disease    Memory loss    Primary insomnia    Benign essential hypertension    Breast mass, left            Past Medical History:   Diagnosis Date    Breast lesion     LEFT    Generalized anxiety disorder     Hyperlipidemia LDL goal <130     Hypertension     Mild major depression (H)     Osteoarthritis of multiple joints, unspecified osteoarthritis type of #2&3 fingers bilat R>L     Vitamin D deficiency disease             Past Surgical History:   Procedure Laterality Date    BIOPSY BREAST SEED LOCALIZATION Left 10/28/2020    Procedure: 2 SEED LOCALIZED LEFT BREAST BIOPSY;  Surgeon: Eileen Montes MD;  Location:  OR    BREAST SURGERY  2020    COLONOSCOPY      EYE SURGERY Left 11/2020    vitrictomy     GYN SURGERY      H ARGON LASER FOR RETINAL TEAR Left 3/2014    HEAD & NECK SURGERY      wisdom teeth    LAPAROSCOPIC SINGLE SITE SALPINGO-OOPHORECTOMY Bilateral 4/15/2015    Procedure: LAPAROSCOPIC SINGLE SITE SALPINGO-OOPHORECTOMY;  Surgeon: Leonora Zavaleta MD;  Location:  [facility]    wisdom teeth removal  age 19            Social History     Tobacco Use    Smoking status: Never    Smokeless tobacco: Never   Substance Use Topics     Alcohol use: Yes     Alcohol/week: 0.0 standard drinks of alcohol     Comment: infrequent            Family History   Problem Relation Age of Onset    Breast Cancer Mother 80    Neurologic Disorder Father         Parkinson's    Coronary Artery Disease Father 44        MI    Hyperlipidemia Father     Anxiety Disorder Father     Lipids Brother     Hypertension Brother     Hyperlipidemia Brother     Lipids Brother     Family History Negative Brother     Lipids Sister     Hypertension Sister     Hyperlipidemia Sister     Family History Negative Sister     Family History Negative Sister     Family History Negative Sister     Cerebrovascular Disease Maternal Grandmother 86    Cancer Maternal Grandfather 69        leukemia    Other Cancer Maternal Grandfather         Leukemia    Cancer Paternal Grandfather 69        stomach    Other Cancer Paternal Grandfather         stomach cancer    Diabetes No family hx of     Cancer - colorectal No family hx of     Hypertension No family hx of     Hyperlipidemia No family hx of     Colon Cancer No family hx of     Prostate Cancer No family hx of     Other Cancer No family hx of     Depression No family hx of     Anxiety Disorder No family hx of     Mental Illness No family hx of     Substance Abuse No family hx of     Anesthesia Reaction No family hx of     Asthma No family hx of     Osteoporosis No family hx of     Genetic Disorder No family hx of     Thyroid Disease No family hx of     Obesity No family hx of     Unknown/Adopted No family hx of                Allergies   Allergen Reactions    Penicillins Swelling, Rash and Hives     Amoxicillin    Sulfa Antibiotics Hives            Current Outpatient Medications   Medication Sig Dispense Refill    amLODIPine (NORVASC) 5 MG tablet Take 1 tablet (5 mg) by mouth daily 90 tablet 4    atorvastatin (LIPITOR) 20 MG tablet Take 1 tablet (20 mg) by mouth daily 90 tablet 4    Cholecalciferol (VITAMIN D) 1000 UNITS capsule Take 1 capsule by  mouth daily.      desvenlafaxine (PRISTIQ) 100 MG 24 hr tablet Take 1 tablet (100 mg) by mouth daily 90 tablet 4    lisinopril (ZESTRIL) 40 MG tablet Take 1 tablet (40 mg) by mouth daily 90 tablet 1    Multiple Vitamins-Minerals (MULTIVITAMIN OR) Take by mouth daily      raloxifene (EVISTA) 60 MG tablet Take 1 tablet (60 mg) by mouth daily 90 tablet 3          Review Of Systems  Skin: negative  Eyes: negative  Ears/Nose/Throat: negative  Respiratory: No shortness of breath, dyspnea on exertion, cough, or hemoptysis    O: Physical Exam:  Positive forced forward flexion and tender to palpation anterolateral acromion L shoulder.  No dead arm syndrome.    Lab:K 3.1, HgbA1C 5.5    Images:  Narrative & Impression   XR SHOULDER LEFT G/E 3 VIEWS   3/14/2023 2:26 PM      HISTORY: Injury of left shoulder, initial encounter  COMPARISON: None.                                                                       IMPRESSION: No acute fracture or dislocation.               A:  L shoulder bursitis      P:  Inject L shoulder bursitis after verbal and written consent, ethyl chloride, chloroprep  Follow outcomes  Notify if exacerbation symptoms  See back 3-6 mos             In addition to the above assessment and plan each active problem on Emma's problem list was evaluated today. This included the questioning of Emma for any medication problems. We will continue the current treatment plan for these active problems except as noted.      Large Joint Injection/Arthocentesis: L subacromial bursa    Date/Time: 8/28/2023 2:43 PM    Performed by: Ced Contreras MD  Authorized by: Ced Contreras MD    Indications:  Pain  Needle Size:  25 G  Guidance: landmark guided    Approach:  Anterolateral  Location:  Shoulder      Site:  L subacromial bursa  Medications:  40 mg triamcinolone 40 MG/ML; 2 mL BUPivacaine HCl (PF) 0.25 %; 2 mL lidocaine 1 %  Outcome:  Tolerated well, no immediate complications  Procedure discussed: discussed risks,  benefits, and alternatives    Consent Given by:  Patient  Timeout: timeout called immediately prior to procedure    Prep: patient was prepped and draped in usual sterile fashion    I examined and injected patient  Ced Contreras MD

## 2023-08-28 NOTE — LETTER
8/28/2023         RE: Emma Rudolph  [address] [facility] [address]        Dear Colleague,    Thank you for referring your patient, Emma Rudolph, to the [facility]. Please see a copy of my visit note below.    S: Patient is a 72 year old female seen today in follow up for right shoulder pain.    They were previously evaluated on 3/27/23,  they are 8 months out from injury.  Since this visit they report still pain with overhead reaching motions, occasional sharp movements.    They have tried the following therapies: corticosteroid injection, home exercises, ice, mobility/stretching.      Current pain level: 0/10, Worst pain level: 2/10.              Patient Active Problem List   Diagnosis     Generalized anxiety disorder     Mixed hyperlipidemia     CKD (chronic kidney disease) stage 2, GFR 60-89 ml/min     Major depression in complete remission (H) on meds since 6-12-13     Osteoarthritis of multiple joints, unspecified osteoarthritis type of #2&3 fingers bilat R>L     Family history of ischemic heart disease     Memory loss     Primary insomnia     Benign essential hypertension     Breast mass, left            Past Medical History:   Diagnosis Date     Breast lesion     LEFT     Generalized anxiety disorder      Hyperlipidemia LDL goal <130      Hypertension      Mild major depression (H)      Osteoarthritis of multiple joints, unspecified osteoarthritis type of #2&3 fingers bilat R>L      Vitamin D deficiency disease             Past Surgical History:   Procedure Laterality Date     BIOPSY BREAST SEED LOCALIZATION Left 10/28/2020    Procedure: 2 SEED LOCALIZED LEFT BREAST BIOPSY;  Surgeon: Eileen Montes MD;  Location:  OR     BREAST SURGERY  2020     COLONOSCOPY       EYE SURGERY Left 11/2020    vitrictomy      GYN SURGERY       H ARGON LASER FOR RETINAL TEAR Left 3/2014     HEAD & NECK SURGERY      wisdom teeth     LAPAROSCOPIC SINGLE SITE  SALPINGO-OOPHORECTOMY Bilateral 4/15/2015    Procedure: LAPAROSCOPIC SINGLE SITE SALPINGO-OOPHORECTOMY;  Surgeon: Leonora Zavaleta MD;  Location: [facility]     wisdom teeth removal  age 19            Social History     Tobacco Use     Smoking status: Never     Smokeless tobacco: Never   Substance Use Topics     Alcohol use: Yes     Alcohol/week: 0.0 standard drinks of alcohol     Comment: infrequent            Family History   Problem Relation Age of Onset     Breast Cancer Mother 80     Neurologic Disorder Father         Parkinson's     Coronary Artery Disease Father 44        MI     Hyperlipidemia Father      Anxiety Disorder Father      Lipids Brother      Hypertension Brother      Hyperlipidemia Brother      Lipids Brother      Family History Negative Brother      Lipids Sister      Hypertension Sister      Hyperlipidemia Sister      Family History Negative Sister      Family History Negative Sister      Family History Negative Sister      Cerebrovascular Disease Maternal Grandmother 86     Cancer Maternal Grandfather 69        leukemia     Other Cancer Maternal Grandfather         Leukemia     Cancer Paternal Grandfather 69        stomach     Other Cancer Paternal Grandfather         stomach cancer     Diabetes No family hx of      Cancer - colorectal No family hx of      Hypertension No family hx of      Hyperlipidemia No family hx of      Colon Cancer No family hx of      Prostate Cancer No family hx of      Other Cancer No family hx of      Depression No family hx of      Anxiety Disorder No family hx of      Mental Illness No family hx of      Substance Abuse No family hx of      Anesthesia Reaction No family hx of      Asthma No family hx of      Osteoporosis No family hx of      Genetic Disorder No family hx of      Thyroid Disease No family hx of      Obesity No family hx of      Unknown/Adopted No family hx of                Allergies   Allergen Reactions     Penicillins Swelling, Rash and Hives      Amoxicillin     Sulfa Antibiotics Hives            Current Outpatient Medications   Medication Sig Dispense Refill     amLODIPine (NORVASC) 5 MG tablet Take 1 tablet (5 mg) by mouth daily 90 tablet 4     atorvastatin (LIPITOR) 20 MG tablet Take 1 tablet (20 mg) by mouth daily 90 tablet 4     Cholecalciferol (VITAMIN D) 1000 UNITS capsule Take 1 capsule by mouth daily.       desvenlafaxine (PRISTIQ) 100 MG 24 hr tablet Take 1 tablet (100 mg) by mouth daily 90 tablet 4     lisinopril (ZESTRIL) 40 MG tablet Take 1 tablet (40 mg) by mouth daily 90 tablet 1     Multiple Vitamins-Minerals (MULTIVITAMIN OR) Take by mouth daily       raloxifene (EVISTA) 60 MG tablet Take 1 tablet (60 mg) by mouth daily 90 tablet 3          Review Of Systems  Skin: negative  Eyes: negative  Ears/Nose/Throat: negative  Respiratory: No shortness of breath, dyspnea on exertion, cough, or hemoptysis    O: Physical Exam:  Positive forced forward flexion and tender to palpation anterolateral acromion L shoulder.  No dead arm syndrome.    Lab:K 3.1, HgbA1C 5.5    Images:  Narrative & Impression   XR SHOULDER LEFT G/E 3 VIEWS   3/14/2023 2:26 PM      HISTORY: Injury of left shoulder, initial encounter  COMPARISON: None.                                                                       IMPRESSION: No acute fracture or dislocation.               A:  L shoulder bursitis      P:  Inject L shoulder bursitis after verbal and written consent, ethyl chloride, chloroprep  Follow outcomes  Notify if exacerbation symptoms  See back 3-6 mos             In addition to the above assessment and plan each active problem on Emma's problem list was evaluated today. This included the questioning of Emma for any medication problems. We will continue the current treatment plan for these active problems except as noted.      Large Joint Injection/Arthocentesis: L subacromial bursa    Date/Time: 8/28/2023 2:43 PM    Performed by: Ced Contreras MD  Authorized  by: Enid Crooks MD    Indications:  Pain  Needle Size:  25 G  Guidance: landmark guided    Approach:  Anterolateral  Location:  Shoulder      Site:  L subacromial bursa  Medications:  40 mg triamcinolone 40 MG/ML; 2 mL BUPivacaine HCl (PF) 0.25 %; 2 mL lidocaine 1 %  Outcome:  Tolerated well, no immediate complications  Procedure discussed: discussed risks, benefits, and alternatives    Consent Given by:  Patient  Timeout: timeout called immediately prior to procedure    Prep: patient was prepped and draped in usual sterile fashion    I examined and injected patient  Enid Crooks MD        Again, thank you for allowing me to participate in the care of your patient.        Sincerely,        ENID CROOKS MD

## 2023-08-29 ENCOUNTER — ALLIED HEALTH/NURSE VISIT (OUTPATIENT)
Dept: INTERNAL MEDICINE | Facility: CLINIC | Age: 73
End: 2023-08-29
Payer: COMMERCIAL

## 2023-08-29 VITALS — SYSTOLIC BLOOD PRESSURE: 138 MMHG | DIASTOLIC BLOOD PRESSURE: 79 MMHG

## 2023-08-29 DIAGNOSIS — Z01.30 BP CHECK: Primary | ICD-10-CM

## 2023-08-29 PROCEDURE — 99207 PR NO CHARGE NURSE ONLY: CPT | Performed by: INTERNAL MEDICINE

## 2023-08-29 NOTE — PROGRESS NOTES
Emma Rudolph was evaluated at [facility] on August 29, 2023 at which time her blood pressure was:    BP Readings from Last 1 Encounters:   08/29/23 138/79     No data recorded      Reviewed lifestyle modifications for blood pressure control and reduction: including making healthy food choices, managing weight, getting regular exercise, smoking cessation, reducing alcohol consumption, monitoring blood pressure regularly.     Symptoms: None    BP Goal:< 140/90 mmHg    BP Assessment:  BP at goal    Potential Reasons for BP too high: Unknown/Other: Pt has some shoulder pain and had cortisone shot yesterday (8/28/23), which may have kept her BP on the higher end today.    BP Follow-Up Plan: Other: Seeing provider in a week, will have blood pressure rechecked at that time.  Continue with current plan at this time.    Recommendation to Provider: Recheck BP in a week or two to determine if BP values go down any further.    Note completed by: Bre Nguyen RPH

## 2023-09-05 RX ORDER — LIDOCAINE HYDROCHLORIDE 10 MG/ML
2 INJECTION, SOLUTION INFILTRATION; PERINEURAL
Status: SHIPPED | OUTPATIENT
Start: 2023-08-28

## 2023-09-05 RX ORDER — BUPIVACAINE HYDROCHLORIDE 2.5 MG/ML
2 INJECTION, SOLUTION EPIDURAL; INFILTRATION; INTRACAUDAL
Status: SHIPPED | OUTPATIENT
Start: 2023-08-28

## 2023-09-05 RX ORDER — TRIAMCINOLONE ACETONIDE 40 MG/ML
40 INJECTION, SUSPENSION INTRA-ARTICULAR; INTRAMUSCULAR
Status: SHIPPED | OUTPATIENT
Start: 2023-08-28

## 2023-09-13 ASSESSMENT — ENCOUNTER SYMPTOMS
NERVOUS/ANXIOUS: 0
DIZZINESS: 0
HEMATURIA: 0
DIARRHEA: 0
FREQUENCY: 0
NAUSEA: 0
HEARTBURN: 0
CONSTIPATION: 0
DYSURIA: 0
HEMATOCHEZIA: 0
WEAKNESS: 0
SORE THROAT: 0
HEADACHES: 0
PARESTHESIAS: 0
MYALGIAS: 0
CHILLS: 0
PALPITATIONS: 0
JOINT SWELLING: 0
COUGH: 0
EYE PAIN: 0
SHORTNESS OF BREATH: 0
ABDOMINAL PAIN: 0
ARTHRALGIAS: 0
BREAST MASS: 0
FEVER: 0

## 2023-09-13 ASSESSMENT — ACTIVITIES OF DAILY LIVING (ADL): CURRENT_FUNCTION: NO ASSISTANCE NEEDED

## 2023-09-19 ASSESSMENT — PATIENT HEALTH QUESTIONNAIRE - PHQ9
SUM OF ALL RESPONSES TO PHQ QUESTIONS 1-9: 0
SUM OF ALL RESPONSES TO PHQ QUESTIONS 1-9: 0

## 2023-09-20 ENCOUNTER — OFFICE VISIT (OUTPATIENT)
Dept: INTERNAL MEDICINE | Facility: CLINIC | Age: 73
End: 2023-09-20
Payer: COMMERCIAL

## 2023-09-20 VITALS
HEIGHT: 64 IN | BODY MASS INDEX: 28.39 KG/M2 | WEIGHT: 166.3 LBS | HEART RATE: 96 BPM | OXYGEN SATURATION: 98 % | TEMPERATURE: 97.9 F | SYSTOLIC BLOOD PRESSURE: 147 MMHG | DIASTOLIC BLOOD PRESSURE: 84 MMHG

## 2023-09-20 DIAGNOSIS — M15.9 OSTEOARTHRITIS OF MULTIPLE JOINTS, UNSPECIFIED OSTEOARTHRITIS TYPE: ICD-10-CM

## 2023-09-20 DIAGNOSIS — N18.2 CKD (CHRONIC KIDNEY DISEASE) STAGE 2, GFR 60-89 ML/MIN: ICD-10-CM

## 2023-09-20 DIAGNOSIS — R41.3 MEMORY LOSS: ICD-10-CM

## 2023-09-20 DIAGNOSIS — E78.2 MIXED HYPERLIPIDEMIA: ICD-10-CM

## 2023-09-20 DIAGNOSIS — F41.1 GENERALIZED ANXIETY DISORDER: ICD-10-CM

## 2023-09-20 DIAGNOSIS — I10 BENIGN ESSENTIAL HYPERTENSION: ICD-10-CM

## 2023-09-20 DIAGNOSIS — E87.6 HYPOKALEMIA: ICD-10-CM

## 2023-09-20 DIAGNOSIS — Z00.00 ENCOUNTER FOR MEDICARE ANNUAL WELLNESS EXAM: Primary | ICD-10-CM

## 2023-09-20 LAB
ANION GAP SERPL CALCULATED.3IONS-SCNC: 9 MMOL/L (ref 7–15)
BUN SERPL-MCNC: 14.8 MG/DL (ref 8–23)
CALCIUM SERPL-MCNC: 9.6 MG/DL (ref 8.8–10.2)
CHLORIDE SERPL-SCNC: 104 MMOL/L (ref 98–107)
CREAT SERPL-MCNC: 0.81 MG/DL (ref 0.51–0.95)
CREAT UR-MCNC: 31.9 MG/DL
DEPRECATED HCO3 PLAS-SCNC: 27 MMOL/L (ref 22–29)
EGFRCR SERPLBLD CKD-EPI 2021: 77 ML/MIN/1.73M2
GLUCOSE SERPL-MCNC: 98 MG/DL (ref 70–99)
MICROALBUMIN UR-MCNC: <12 MG/L
MICROALBUMIN/CREAT UR: NORMAL MG/G{CREAT}
POTASSIUM SERPL-SCNC: 5.3 MMOL/L (ref 3.4–5.3)
SODIUM SERPL-SCNC: 140 MMOL/L (ref 136–145)

## 2023-09-20 PROCEDURE — G0439 PPPS, SUBSEQ VISIT: HCPCS | Performed by: INTERNAL MEDICINE

## 2023-09-20 PROCEDURE — 80048 BASIC METABOLIC PNL TOTAL CA: CPT | Performed by: INTERNAL MEDICINE

## 2023-09-20 PROCEDURE — 82043 UR ALBUMIN QUANTITATIVE: CPT | Performed by: INTERNAL MEDICINE

## 2023-09-20 PROCEDURE — 36415 COLL VENOUS BLD VENIPUNCTURE: CPT | Performed by: INTERNAL MEDICINE

## 2023-09-20 PROCEDURE — 82570 ASSAY OF URINE CREATININE: CPT | Performed by: INTERNAL MEDICINE

## 2023-09-20 PROCEDURE — 90662 IIV NO PRSV INCREASED AG IM: CPT | Performed by: INTERNAL MEDICINE

## 2023-09-20 PROCEDURE — 99214 OFFICE O/P EST MOD 30 MIN: CPT | Mod: 25 | Performed by: INTERNAL MEDICINE

## 2023-09-20 PROCEDURE — G0008 ADMIN INFLUENZA VIRUS VAC: HCPCS | Performed by: INTERNAL MEDICINE

## 2023-09-20 ASSESSMENT — ENCOUNTER SYMPTOMS
HEMATOCHEZIA: 0
ABDOMINAL PAIN: 0
NAUSEA: 0
FREQUENCY: 0
HEADACHES: 0
SHORTNESS OF BREATH: 0
DIZZINESS: 0
DYSURIA: 0
MYALGIAS: 0
ARTHRALGIAS: 0
JOINT SWELLING: 0
PALPITATIONS: 0
SORE THROAT: 0
HEARTBURN: 0
BREAST MASS: 0
PARESTHESIAS: 0
CONSTIPATION: 0
WEAKNESS: 0
FEVER: 0
EYE PAIN: 0
DIARRHEA: 0
COUGH: 0
HEMATURIA: 0
CHILLS: 0
NERVOUS/ANXIOUS: 0

## 2023-09-20 ASSESSMENT — ACTIVITIES OF DAILY LIVING (ADL): CURRENT_FUNCTION: NO ASSISTANCE NEEDED

## 2023-09-20 NOTE — PATIENT INSTRUCTIONS
Restart Amlodipine ( Norvasc ) at half tablet a day in the morning  Continue Lisinopril.    You have been referred to the Memory clinic.          Patient Education   Personalized Prevention Plan  You are due for the preventive services outlined below.  Your care team is available to assist you in scheduling these services.  If you have already completed any of these items, please share that information with your care team to update in your medical record.  Health Maintenance Due   Topic Date Due    Kidney Microalbumin Urine Test  08/27/2021    COVID-19 Vaccine (6 - Moderna series) 02/02/2023    ANNUAL REVIEW OF HM ORDERS  04/05/2023    Flu Vaccine (1) 09/01/2023     Learning About Dietary Guidelines  What are the Dietary Guidelines for Americans?     Dietary Guidelines for Americans provide tips for eating well and staying healthy. This helps reduce the risk for long-term (chronic) diseases.  These guidelines recommend that you:  Eat and drink the right amount for you. The U.S. government's food guide is called MyPlate. It can help you make your own well-balanced eating plan.  Try to balance your eating with your activity. This helps you stay at a healthy weight.  Drink alcohol in moderation, if at all.  Limit foods high in salt, saturated fat, trans fat, and added sugar.  These guidelines are from the U.S. Department of Agriculture and the U.S. Department of Health and Human Services. They are updated every 5 years.  What is MyPlate?  MyPlate is the U.S. government's food guide. It can help you make your own well-balanced eating plan. A balanced eating plan means that you eat enough, but not too much, and that your food gives you the nutrients you need to stay healthy.  MyPlate focuses on eating plenty of whole grains, fruits, and vegetables, and on limiting fat and sugar. It is available online at www.ChooseMyPlate.gov.  How can you get started?  If you're trying to eat healthier, you can slowly change your  "eating habits over time. You don't have to make big changes all at once. Start by adding one or two healthy foods to your meals each day.  Grains  Choose whole-grain breads and cereals and whole-wheat pasta and whole-grain crackers.  Vegetables  Eat a variety of vegetables every day. They have lots of nutrients and are part of a heart-healthy diet.  Fruits  Eat a variety of fruits every day. Fruits contain lots of nutrients. Choose fresh fruit instead of fruit juice.  Protein foods  Choose fish and lean poultry more often. Eat red meat and fried meats less often. Dried beans, tofu, and nuts are also good sources of protein.  Dairy  Choose low-fat or fat-free products from this food group. If you have problems digesting milk, try eating cheese or yogurt instead.  Fats and oils  Limit fats and oils if you're trying to cut calories. Choose healthy fats when you cook. These include canola oil and olive oil.  Where can you learn more?  Go to https://www.[employer]ippex.net/patiented  Enter D676 in the search box to learn more about \"Learning About Dietary Guidelines.\"  Current as of: March 1, 2023               Content Version: 13.7    1211-9657 [employer].   Care instructions adapted under license by your healthcare professional. If you have questions about a medical condition or this instruction, always ask your healthcare professional. [employer] disclaims any warranty or liability for your use of this information.      Bladder Training: Care Instructions  Your Care Instructions     Bladder training is used to treat urge incontinence and stress incontinence. Urge incontinence means that the need to urinate comes on so fast that you can't get to a toilet in time. Stress incontinence means that you leak urine because of pressure on your bladder. For example, it may happen when you laugh, cough, or lift something heavy.  Bladder training can increase how long you can wait before you have to " urinate. It can also help your bladder hold more urine. And it can give you better control over the urge to urinate.  It is important to remember that bladder training takes a few weeks to a few months to make a difference. You may not see results right away, but don't give up.  Follow-up care is a key part of your treatment and safety. Be sure to make and go to all appointments, and call your doctor if you are having problems. It's also a good idea to know your test results and keep a list of the medicines you take.  How can you care for yourself at home?  Work with your doctor to come up with a bladder training program that is right for you. You may use one or more of the following methods.  Delayed urination  In the beginning, try to keep from urinating for 5 minutes after you first feel the need to go.  While you wait, take deep, slow breaths to relax. Kegel exercises can also help you delay the need to go to the bathroom.  After some practice, when you can easily wait 5 minutes to urinate, try to wait 10 minutes before you urinate.  Slowly increase the waiting period until you are able to control when you have to urinate.  Scheduled urination  Empty your bladder when you first wake up in the morning.  Schedule times throughout the day when you will urinate.  Start by going to the bathroom every hour, even if you don't need to go.  Slowly increase the time between trips to the bathroom.  When you have found a schedule that works well for you, keep doing it.  If you wake up during the night and have to urinate, do it. Apply your schedule to waking hours only.  Kegel exercises  These tighten and strengthen pelvic muscles, which can help you control the flow of urine. (If doing these exercises causes pain, stop doing them and talk with your doctor.) To do Kegel exercises:  Squeeze your muscles as if you were trying not to pass gas. Or squeeze your muscles as if you were stopping the flow of urine. Your belly, legs,  "and buttocks shouldn't move.  Hold the squeeze for 3 seconds, then relax for 5 to 10 seconds.  Start with 3 seconds, then add 1 second each week until you are able to squeeze for 10 seconds.  Repeat the exercise 10 times a session. Do 3 to 8 sessions a day.  When should you call for help?  Watch closely for changes in your health, and be sure to contact your doctor if:    Your incontinence is getting worse.     You do not get better as expected.   Where can you learn more?  Go to https://www.JoK[employer].net/patiented  Enter V684 in the search box to learn more about \"Bladder Training: Care Instructions.\"  Current as of: March 1, 2023               Content Version: 13.7    0414-5954 [employer].   Care instructions adapted under license by your healthcare professional. If you have questions about a medical condition or this instruction, always ask your healthcare professional. [employer], [employer] disclaims any warranty or liability for your use of this information.         "

## 2023-09-20 NOTE — PROGRESS NOTES
The patient was counseled and encouraged to consider modifying their diet and eating habits. She was provided with information on recommended healthy diet options.  Information on urinary incontinence and treatment options given to patient.

## 2023-09-20 NOTE — PROGRESS NOTES
"SUBJECTIVE:   Celi is a 72 year old who presents for Preventive Visit.      Are you in the first 12 months of your Medicare coverage?  No    Healthy Habits:     In general, how would you rate your overall health?  Excellent    Frequency of exercise:  4-5 days/week    Duration of exercise:  Other    Do you usually eat at least 4 servings of fruit and vegetables a day, include whole grains    & fiber and avoid regularly eating high fat or \"junk\" foods?  No    Medication side effects:  None    Ability to successfully perform activities of daily living:  No assistance needed    Home Safety:  No safety concerns identified    Hearing Impairment:  No hearing concerns    In the past 6 months, have you been bothered by leaking of urine? Yes    In general, how would you rate your overall mental or emotional health?  Good      Today's PHQ-9 Score:       9/19/2023     3:30 PM   PHQ-9 SCORE   PHQ-9 Total Score MyChart 0   PHQ-9 Total Score 0     Having problems with memory.  Lives alone.    Has noticed memory problems for the last 2 years but states she is able to manage her checkbook.    Has hypertension and should be on lisinopril and amlodipine.  Apparently not taking amlodipine at home.  Does not know why  Was told to briefly hold it in the past but she has not been taking it.  Denies any chest pain, shortness of breath or dizziness.      Have you ever done Advance Care Planning? (For example, a Health Directive, POLST, or a discussion with a medical provider or your loved ones about your wishes): No, advance care planning information given to patient to review.  Patient plans to discuss their wishes with loved ones or provider.         Fall risk  Fallen 2 or more times in the past year?: No  Any fall with injury in the past year?: Yes    Cognitive Screening   1) Repeat 3 items (Leader, Season, Table)    2) Clock draw: NORMAL  3) 3 item recall: Recalls NO objects   Results: 0 items recalled: PROBABLE COGNITIVE IMPAIRMENT, " **INFORM PROVIDER**    Mini-CogTM Copyright LEANNE Weaver. Licensed by the author for use in [facility]; reprinted with permission (geoff@[facility]). All rights reserved.      Do you have sleep apnea, excessive snoring or daytime drowsiness? : no    Reviewed and updated as needed this visit by clinical staff   Tobacco  Allergies  Meds              Reviewed and updated as needed this visit by Provider                 Social History     Tobacco Use    Smoking status: Never    Smokeless tobacco: Never   Substance Use Topics    Alcohol use: Yes     Comment: infrequent             9/13/2023    12:06 PM   Alcohol Use   Prescreen: >3 drinks/day or >7 drinks/week? No     Do you have a current opioid prescription? No  Do you use any other controlled substances or medications that are not prescribed by a provider? None              Current providers sharing in care for this patient include:   Patient Care Team:  Flynn Hamilton MD as PCP - General (Internal Medicine)  Priya Ontiveros MD as Assigned Cancer Care Provider  Flynn Hamilton MD as Assigned PCP  Ced Contreras MD as Assigned Musculoskeletal Provider  Ced Gonzalez DPM as Assigned Surgical Provider    The following health maintenance items are reviewed in Epic and correct as of today:  Health Maintenance   Topic Date Due    MICROALBUMIN  08/27/2021    COVID-19 Vaccine (6 - Moderna series) 02/02/2023    MEDICARE ANNUAL WELLNESS VISIT  04/05/2023    ANNUAL REVIEW OF HM ORDERS  04/05/2023    INFLUENZA VACCINE (1) 09/01/2023    LIPID  03/01/2024    PHQ-9  03/20/2024    ALT  05/24/2024    BMP  05/24/2024    FALL RISK ASSESSMENT  09/20/2024    MAMMO SCREENING  11/28/2024    COLORECTAL CANCER SCREENING  04/08/2025    DTAP/TDAP/TD IMMUNIZATION (2 - Td or Tdap) 06/01/2027    ADVANCE CARE PLANNING  09/20/2028    DEXA  08/04/2032    HEPATITIS C SCREENING  Completed    DEPRESSION ACTION PLAN  Completed    Pneumococcal Vaccine: 65+ Years  Completed     "ZOSTER IMMUNIZATION  Completed    IPV IMMUNIZATION  Aged Out    HPV IMMUNIZATION  Aged Out    MENINGITIS IMMUNIZATION  Aged Out    URINALYSIS  Discontinued               Review of Systems   Constitutional:  Negative for chills and fever.   HENT:  Negative for congestion, hearing loss and sore throat.    Eyes:  Negative for pain and visual disturbance.   Respiratory:  Negative for cough and shortness of breath.    Cardiovascular:  Negative for chest pain, palpitations and peripheral edema.   Gastrointestinal:  Negative for abdominal pain, constipation, diarrhea, heartburn, hematochezia and nausea.   Breasts:  Negative for tenderness, breast mass and discharge.   Genitourinary:  Negative for dysuria, frequency, genital sores, hematuria, pelvic pain, urgency, vaginal bleeding and vaginal discharge.   Musculoskeletal:  Negative for arthralgias, joint swelling and myalgias.   Skin:  Negative for rash.   Neurological:  Negative for dizziness, weakness, headaches and paresthesias.   Psychiatric/Behavioral:  Negative for mood changes. The patient is not nervous/anxious.          OBJECTIVE:   BP (!) 147/84   Pulse 96   Temp 97.9  F (36.6  C) (Temporal)   Ht 1.626 m (5' 4\")   Wt 75.4 kg (166 lb 4.8 oz)   LMP  (LMP Unknown)   SpO2 98%   BMI 28.55 kg/m   Estimated body mass index is 28.55 kg/m  as calculated from the following:    Height as of this encounter: 1.626 m (5' 4\").    Weight as of this encounter: 75.4 kg (166 lb 4.8 oz).  Physical Exam  GENERAL: healthy, alert and no distress  EYES: Eyes grossly normal to inspection, PERRL and conjunctivae and sclerae normal  HENT: ear canals and TM's normal, nose and mouth without ulcers or lesions  NECK: no adenopathy, no asymmetry, masses, or scars and thyroid normal to palpation  RESP: lungs clear to auscultation - no rales, rhonchi or wheezes  CV: regular rate and rhythm, normal S1 S2, no S3 or S4, no murmur, click or rub, no peripheral edema and peripheral pulses " "strong  ABDOMEN: soft, nontender, no hepatosplenomegaly, no masses and bowel sounds normal  MS: no gross musculoskeletal defects noted, no edema  SKIN: no suspicious lesions or rashes  NEURO: Normal strength and tone, mentation intact and speech normal  PSYCH: mentation appears normal, affect normal/bright    Diagnostic Test Results:  Labs reviewed in Epic    ASSESSMENT / PLAN:   Emma was seen today for wellness visit.    Diagnoses and all orders for this visit:    Encounter for Medicare annual wellness exam    CKD (chronic kidney disease) stage 2, GFR 60-89 ml/min  Required labs ordered    Mixed hyperlipidemia  Continue statin    Benign essential hypertension  Continue lisinopril and restart amlodipine at 2.5 mg a day    Osteoarthritis of multiple joints, unspecified osteoarthritis type of #2&3 fingers bilat R>L    Generalized anxiety disorder    Memory loss  -Worsening memory loss, referral placed to neurology memory clinic    Hypokalemia  -Check BMP today    Other orders  -     REVIEW OF HEALTH MAINTENANCE PROTOCOL ORDERS  -     INFLUENZA VACCINE 65+ (FLUZONE HD)  -     PRIMARY CARE FOLLOW-UP SCHEDULING; Future        Patient has been advised of split billing requirements and indicates understanding: Yes      COUNSELING:  Reviewed preventive health counseling, as reflected in patient instructions      BMI:   Estimated body mass index is 28.55 kg/m  as calculated from the following:    Height as of this encounter: 1.626 m (5' 4\").    Weight as of this encounter: 75.4 kg (166 lb 4.8 oz).   Weight management plan: Discussed healthy diet and exercise guidelines      She reports that she has never smoked. She has never used smokeless tobacco.      Appropriate preventive services were discussed with this patient, including applicable screening as appropriate for cardiovascular disease, diabetes, osteopenia/osteoporosis, and glaucoma.  As appropriate for age/gender, discussed screening for colorectal cancer, " prostate cancer, breast cancer, and cervical cancer. Checklist reviewing preventive services available has been given to the patient.    Reviewed patients plan of care and provided an AVS. The Basic Care Plan (routine screening as documented in Health Maintenance) for Emma meets the Care Plan requirement. This Care Plan has been established and reviewed with the Patient.          Lubna Betancur MD  [facility]    Identified Health Risks:  I have reviewed Opioid Use Disorder and Substance Use Disorder risk factors and made any needed referrals.

## 2023-09-25 DIAGNOSIS — I10 ESSENTIAL HYPERTENSION: ICD-10-CM

## 2023-09-25 RX ORDER — AMLODIPINE BESYLATE 2.5 MG/1
2.5 TABLET ORAL DAILY
Qty: 90 TABLET | Refills: 0 | Status: SHIPPED | OUTPATIENT
Start: 2023-09-25 | End: 2023-10-31

## 2023-09-25 NOTE — TELEPHONE ENCOUNTER
"Patient calling clinic to request new Amlodipine script be sent to [facility] pharmacy. Patient stated she had thrown her previous Amlodipine script away in the garbage and could not recall why she did that. Patient attempted to contact [facility] for refill but due to insurance they did not refill the medication.     Patient reported BP readings have been around \"140s/80s\", patient did not report new or worsening symptoms associated with BP.     Script and pharmacy pended. Routing to refill pool for review.   "

## 2023-09-25 NOTE — TELEPHONE ENCOUNTER
Routing refill request to provider for review/approval because:  Labs out of range:    BP Readings from Last 3 Encounters:   09/20/23 (!) 147/84   08/29/23 138/79   08/28/23 (!) 157/83     See encounter for more information. Patient accidentally threw previous bottle away and has been without Amlodipine since May 2023. Please advise. Thanks.     Justice L. Phoenix, RN

## 2023-10-31 DIAGNOSIS — I10 ESSENTIAL HYPERTENSION: ICD-10-CM

## 2023-11-01 RX ORDER — AMLODIPINE BESYLATE 2.5 MG/1
2.5 TABLET ORAL DAILY
Qty: 90 TABLET | Refills: 0 | Status: SHIPPED | OUTPATIENT
Start: 2023-11-01 | End: 2023-12-21

## 2023-11-20 NOTE — PROGRESS NOTES
[facility]  Orthopaedic Surgery Consultation by Favio Banegas M.D.    Emma Rudolph MRN# [id number]   Age: 73 year old YOB: 1950     Requesting physician: No ref. provider found  Flynn Hamilton     Background history:  DX:  Left hip osteoarthritis  Left shoulder pain    TREATMENTS:  Left shoulder subacromial bursa injection 8/28/2023 with Dr. Contreras           History of Present Illness:   73 year old right-hand-dominant female who presents with a chief complaint of chronic left shoulder follow-up and chronic left hip pain.    Left shoulder pain ongoing since February 2023. It began will a fall down stairs in which she landed on an outstretched arm. She has intermittent pain but feels this has been improving. Initially her ROM was limited but his has improved. Patient had left shoulder subacromial bursa injection 8/28/23, Dr. Contreras, that has provided significant relief in symptoms.  She is able to achieve full range of motion of her shoulder and her pain has improved.  She does note some lateral arm pain with movement however this continues to improve.  She has not done any formal physical therapy.    Left hip pain ongoing 3 weeks, cannot recall an injury or trauma. She describes pain and tightness when sitting. Pain is in left groin. She has had a similar pain 2-4 years ago.  She has not done any physical therapy for her hip.  Patient denies any significant night pain, initiation stiffness or soreness.  Is not greatly limiting her in her activity level.  She has not tried any anti-inflammatory medications.  She has never had a hip injection.  She denies prior injury or surgery to her hip.    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 Normal  mood and affect.  VITAL SIGNS: not currently breastfeeding.  Reviewed nursing intake notes.   There is no height or weight on file to calculate BMI.  RESP: non labored breathing   ABD: benign, soft, non-tender, no acute peritoneal findings  SKIN: grossly normal   LYMPHATIC: grossly normal, no adenopathy, no extremity edema  NEURO: grossly normal , no motor deficits  VASCULAR: satisfactory perfusion of all extremities   MUSCULOSKELETAL:   Gait: Nonantalgic  Hips:   L hip: ROM  FF 90  , Extension 0  , IRF 20  , ERF 40  , ABD 60  , ADD 30   no radicular symptoms with straight leg raise.   R hip: ROM  FF 90  , Extension 0  , IRF 20  , ERF 40  , ABD 60  , ADD 30   no radicular symptoms with straight leg raise.     LLE:   Thigh and leg compartments soft and compressible   +Quad/TA/GSC/FHL/EHL   SILT DP/SP/Lata/Saph/Tib nerve distributions   Palpable dorsalis pedis pulse          Data:   All laboratory data reviewed  All imaging studies reviewed by me personally.    X-ray AP pelvis with AP and lateral left hip 11/29/2023:  My interpretation: Mild degenerative changes of left femoral acetabular joint.  No acute fracture or dislocation.            Assessment and Plan:   Assessment/Plan:  73-year-old female with chronic left hip osteoarthritis and chronic left shoulder pain.  Overall her left shoulder seems to be improving with the initiated treatment and subacromial injection.  Would recommend physical therapy for continued strengthening and stretching of the shoulder.  In terms of her left hip osteoarthritis at this point in time her symptoms are fairly mild.  We recommend conservative measures including anti-inflammatories and physical therapy for strengthening and stretching.  If in the future her pain becomes worse we could trial an intra-articular injection with a combination of lidocaine and cortisone of the left femoral acetabular joint.  An order for physical therapy was placed today.  All questions were answered.  We  will follow-up with patient on an as-needed basis.    Thank you for your referral.    Favio Banegas MD, PhD     Adult Reconstruction  [facility] Department of Orthopaedic Surgery    This note was created using dictation software and may contain errors.  Please contact the creator for any clarifications that are needed.    DATA for DOCUMENTATION:         Past Medical History:     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    Osteoarthritis of multiple joints, unspecified osteoarthritis type of #2&3 fingers bilat R>L    Family history of ischemic heart disease    Memory loss    Primary insomnia    Benign essential hypertension    Breast mass, left     Past Medical History:   Diagnosis Date    Breast lesion     LEFT    Generalized anxiety disorder     Hyperlipidemia LDL goal <130     Hypertension     Mild major depression (H)     Osteoarthritis of multiple joints, unspecified osteoarthritis type of #2&3 fingers bilat R>L     Vitamin D deficiency disease        Also see scanned health assessment forms.       Past Surgical History:     Past Surgical History:   Procedure Laterality Date    BIOPSY BREAST SEED LOCALIZATION Left 10/28/2020    Procedure: 2 SEED LOCALIZED LEFT BREAST BIOPSY;  Surgeon: Eileen Montes MD;  Location:  OR    BREAST SURGERY  2020    COLONOSCOPY      EYE SURGERY Left 11/2020    vitrictomy     GYN SURGERY      H ARGON LASER FOR RETINAL TEAR Left 3/2014    HEAD & NECK SURGERY      wisdom teeth    LAPAROSCOPIC SINGLE SITE SALPINGO-OOPHORECTOMY Bilateral 4/15/2015    Procedure: LAPAROSCOPIC SINGLE SITE SALPINGO-OOPHORECTOMY;  Surgeon: Leonora Zavaleta MD;  Location:  [facility]    wisdom teeth removal  age 19            Social History:     Social History     Socioeconomic History    Marital  status: Single     Spouse name: Not on file    Number of children: Not on file    Years of education: Not on file    Highest education level: Not on file   Occupational History     Employer: RETIRED     Comment: [employer]   Tobacco Use    Smoking status: Never    Smokeless tobacco: Never   Vaping Use    Vaping Use: Never used   Substance and Sexual Activity    Alcohol use: Yes     Comment: infrequent    Drug use: No    Sexual activity: Not Currently     Partners: Female   Other Topics Concern    Parent/sibling w/ CABG, MI or angioplasty before 65F 55M? Yes     Comment: father, infarction at age 44     Service Not Asked    Blood Transfusions Not Asked    Caffeine Concern Not Asked    Occupational Exposure Not Asked    Hobby Hazards Not Asked    Sleep Concern Not Asked    Stress Concern Not Asked    Weight Concern Not Asked    Special Diet Not Asked    Back Care Not Asked    Exercise Not Asked    Bike Helmet Not Asked    Seat Belt Yes    Self-Exams Not Asked   Social History Narrative    Not on file     Social Determinants of Health     Financial Resource Strain: Low Risk  (9/20/2023)    Financial Resource Strain     Within the past 12 months, have you or your family members you live with been unable to get utilities (heat, electricity) when it was really needed?: No   Food Insecurity: Low Risk  (9/20/2023)    Food Insecurity     Within the past 12 months, did you worry that your food would run out before you got money to buy more?: No     Within the past 12 months, did the food you bought just not last and you didn t have money to get more?: No   Transportation Needs: Low Risk  (9/20/2023)    Transportation Needs     Within the past 12 months, has lack of transportation kept you from medical appointments, getting your medicines, non-medical meetings or appointments, work, or from getting things that you need?: No   Physical Activity: Not on file   Stress: Not on file   Social Connections: Not on file    Interpersonal Safety: Not on file   Housing Stability: Low Risk  (9/20/2023)    Housing Stability     Do you have housing? : Yes     Are you worried about losing your housing?: No            Family History:       Family History   Problem Relation Age of Onset    Breast Cancer Mother 80    Neurologic Disorder Father         Parkinson's    Coronary Artery Disease Father 44        MI    Hyperlipidemia Father     Anxiety Disorder Father     Lipids Brother     Hypertension Brother     Hyperlipidemia Brother     Lipids Brother     Family History Negative Brother     Lipids Sister     Hypertension Sister     Hyperlipidemia Sister     Family History Negative Sister     Family History Negative Sister     Family History Negative Sister     Cerebrovascular Disease Maternal Grandmother 86    Cancer Maternal Grandfather 69        leukemia    Other Cancer Maternal Grandfather         Leukemia    Cancer Paternal Grandfather 69        stomach    Other Cancer Paternal Grandfather         stomach cancer    Diabetes No family hx of     Cancer - colorectal No family hx of     Hypertension No family hx of     Hyperlipidemia No family hx of     Colon Cancer No family hx of     Prostate Cancer No family hx of     Other Cancer No family hx of     Depression No family hx of     Anxiety Disorder No family hx of     Mental Illness No family hx of     Substance Abuse No family hx of     Anesthesia Reaction No family hx of     Asthma No family hx of     Osteoporosis No family hx of     Genetic Disorder No family hx of     Thyroid Disease No family hx of     Obesity No family hx of     Unknown/Adopted No family hx of             Medications:     Current Outpatient Medications   Medication Sig    amLODIPine (NORVASC) 2.5 MG tablet Take 1 tablet (2.5 mg) by mouth daily FOLLOW UP APPOINTMENT NEEDED FOR FUTURE REFILLS    atorvastatin (LIPITOR) 20 MG tablet Take 1 tablet (20 mg) by mouth daily    Cholecalciferol (VITAMIN D) 1000 UNITS capsule Take  1 capsule by mouth daily.    desvenlafaxine (PRISTIQ) 100 MG 24 hr tablet Take 1 tablet (100 mg) by mouth daily    lisinopril (ZESTRIL) 40 MG tablet Take 1 tablet (40 mg) by mouth daily    Multiple Vitamins-Minerals (MULTIVITAMIN OR) Take by mouth daily    raloxifene (EVISTA) 60 MG tablet Take 1 tablet (60 mg) by mouth daily     Current Facility-Administered Medications   Medication    2 mL bupivacaine (MARCAINE) preservative free injection 0.25% (10 mL vial)    2 mL bupivacaine (MARCAINE) preservative free injection 0.25% (10 mL vial)    lidocaine 1 % injection 2 mL    lidocaine 1 % injection 2 mL    triamcinolone (KENALOG-40) injection 40 mg    triamcinolone (KENALOG-40) injection 40 mg              Review of Systems:   A comprehensive 10 point review of systems (constitutional, ENT, cardiac, peripheral vascular, lymphatic, respiratory, GI, , Musculoskeletal, skin, Neurological) was performed and found to be negative except as described in this note.     See intake form completed by patient

## 2023-11-21 ENCOUNTER — ALLIED HEALTH/NURSE VISIT (OUTPATIENT)
Dept: INTERNAL MEDICINE | Facility: CLINIC | Age: 73
End: 2023-11-21
Payer: COMMERCIAL

## 2023-11-21 VITALS — DIASTOLIC BLOOD PRESSURE: 68 MMHG | SYSTOLIC BLOOD PRESSURE: 138 MMHG

## 2023-11-21 DIAGNOSIS — Z01.30 BP CHECK: Primary | ICD-10-CM

## 2023-11-21 PROCEDURE — 99207 PR NO CHARGE NURSE ONLY: CPT | Performed by: INTERNAL MEDICINE

## 2023-11-21 NOTE — PROGRESS NOTES
Emma Rudolph was evaluated at [facility] on November 21, 2023 at which time her blood pressure was:    BP Readings from Last 1 Encounters:   11/21/23 138/68     No data recorded      Reviewed lifestyle modifications for blood pressure control and reduction: including making healthy food choices, managing weight, getting regular exercise, smoking cessation, reducing alcohol consumption, monitoring blood pressure regularly.     Symptoms: None    BP Goal:< 140/90 mmHg    BP Assessment:  BP at goal    Potential Reasons for BP too high: NA - Not applicable    BP Follow-Up Plan: Recheck BP in 6 months at pharmacy    Recommendation to Provider: BP checked at pharmacy and noted to be at goal of <140/90.   Recommended patient follow-up in 6 months at the pharmacy.    Note completed by: Freddy Bellamy RPH

## 2023-11-29 ENCOUNTER — ANCILLARY PROCEDURE (OUTPATIENT)
Dept: GENERAL RADIOLOGY | Facility: CLINIC | Age: 73
End: 2023-11-29
Attending: STUDENT IN AN ORGANIZED HEALTH CARE EDUCATION/TRAINING PROGRAM
Payer: COMMERCIAL

## 2023-11-29 ENCOUNTER — OFFICE VISIT (OUTPATIENT)
Dept: ORTHOPEDICS | Facility: CLINIC | Age: 73
End: 2023-11-29
Payer: COMMERCIAL

## 2023-11-29 VITALS
DIASTOLIC BLOOD PRESSURE: 70 MMHG | SYSTOLIC BLOOD PRESSURE: 136 MMHG | WEIGHT: 167.8 LBS | BODY MASS INDEX: 28.65 KG/M2 | HEIGHT: 64 IN

## 2023-11-29 DIAGNOSIS — M16.12 OSTEOARTHRITIS OF ONE HIP, LEFT: ICD-10-CM

## 2023-11-29 DIAGNOSIS — M25.552 LEFT HIP PAIN: Primary | ICD-10-CM

## 2023-11-29 DIAGNOSIS — M75.52 SUBACROMIAL BURSITIS OF LEFT SHOULDER JOINT: ICD-10-CM

## 2023-11-29 DIAGNOSIS — M25.552 LEFT HIP PAIN: ICD-10-CM

## 2023-11-29 PROCEDURE — 99213 OFFICE O/P EST LOW 20 MIN: CPT | Performed by: STUDENT IN AN ORGANIZED HEALTH CARE EDUCATION/TRAINING PROGRAM

## 2023-11-29 PROCEDURE — 73502 X-RAY EXAM HIP UNI 2-3 VIEWS: CPT | Mod: TC | Performed by: RADIOLOGY

## 2023-11-29 NOTE — PATIENT INSTRUCTIONS
1. Left hip pain        Physical Therapy orders have been placed with [facility].  You can call [phone]  to schedule at your convenience.       Follow up with Dr. Banegas as needed.    Call my office with any questions or concerns, [phone].

## 2023-11-29 NOTE — LETTER
11/29/2023         RE: Emma Rudolph  [address]  [place] [address]        Dear Colleague,    Thank you for referring your patient, Emma Rudolph, to the [facility] [place]. Please see a copy of my visit note below.        [facility]  Orthopaedic Surgery Consultation by Favio Banegas M.D.    Emma Rudolph MRN# [id number]   Age: 73 year old YOB: 1950     Requesting physician: No ref. provider found  Flynn Hamilton     Background history:  DX:  Left hip osteoarthritis  Left shoulder pain    TREATMENTS:  Left shoulder subacromial bursa injection 8/28/2023 with Dr. Contreras           History of Present Illness:   73 year old right-hand-dominant female who presents with a chief complaint of chronic left shoulder follow-up and chronic left hip pain.    Left shoulder pain ongoing since February 2023. It began will a fall down stairs in which she landed on an outstretched arm. She has intermittent pain but feels this has been improving. Initially her ROM was limited but his has improved. Patient had left shoulder subacromial bursa injection 8/28/23, Dr. Contreras, that has provided significant relief in symptoms.  She is able to achieve full range of motion of her shoulder and her pain has improved.  She does note some lateral arm pain with movement however this continues to improve.  She has not done any formal physical therapy.    Left hip pain ongoing 3 weeks, cannot recall an injury or trauma. She describes pain and tightness when sitting. Pain is in left groin. She has had a similar pain 2-4 years ago.  She has not done any physical therapy for her hip.  Patient denies any significant night pain, initiation stiffness or soreness.  Is not greatly limiting her in her activity level.  She has not tried any anti-inflammatory medications.  She has never had a hip injection.  She denies prior injury or surgery to her hip.    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 Normal mood and affect.  VITAL SIGNS: not currently breastfeeding.  Reviewed nursing intake notes.   There is no height or weight on file to calculate BMI.  RESP: non labored breathing   ABD: benign, soft, non-tender, no acute peritoneal findings  SKIN: grossly normal   LYMPHATIC: grossly normal, no adenopathy, no extremity edema  NEURO: grossly normal , no motor deficits  VASCULAR: satisfactory perfusion of all extremities   MUSCULOSKELETAL:   Gait: Nonantalgic  Hips:   L hip: ROM  FF 90  , Extension 0  , IRF 20  , ERF 40  , ABD 60  , ADD 30   no radicular symptoms with straight leg raise.   R hip: ROM  FF 90  , Extension 0  , IRF 20  , ERF 40  , ABD 60  , ADD 30   no radicular symptoms with straight leg raise.     LLE:   Thigh and leg compartments soft and compressible   +Quad/TA/GSC/FHL/EHL   SILT DP/SP/Lata/Saph/Tib nerve distributions   Palpable dorsalis pedis pulse          Data:   All laboratory data reviewed  All imaging studies reviewed by me personally.    X-ray AP pelvis with AP and lateral left hip 11/29/2023:  My interpretation: Mild degenerative changes of left femoral acetabular joint.  No acute fracture or dislocation.            Assessment and Plan:   Assessment/Plan:  73-year-old female with chronic left hip osteoarthritis and chronic left shoulder pain.  Overall her left shoulder seems to be improving with the initiated treatment and subacromial injection.  Would recommend physical therapy for continued strengthening and stretching of the shoulder.  In terms of her left hip osteoarthritis at this point in time her symptoms are fairly mild.  We recommend conservative measures including anti-inflammatories and physical therapy for  strengthening and stretching.  If in the future her pain becomes worse we could trial an intra-articular injection with a combination of lidocaine and cortisone of the left femoral acetabular joint.  An order for physical therapy was placed today.  All questions were answered.  We will follow-up with patient on an as-needed basis.    Thank you for your referral.    Favio Banegas MD, PhD     Adult Reconstruction  [facility] Department of Orthopaedic Surgery    This note was created using dictation software and may contain errors.  Please contact the creator for any clarifications that are needed.    DATA for DOCUMENTATION:         Past Medical History:     Patient Active Problem List   Diagnosis     Generalized anxiety disorder     Mixed hyperlipidemia     CKD (chronic kidney disease) stage 2, GFR 60-89 ml/min     Major depression in complete remission (H) on meds since 6-12-13     Osteoarthritis of multiple joints, unspecified osteoarthritis type of #2&3 fingers bilat R>L     Family history of ischemic heart disease     Memory loss     Primary insomnia     Benign essential hypertension     Breast mass, left     Past Medical History:   Diagnosis Date     Breast lesion     LEFT     Generalized anxiety disorder      Hyperlipidemia LDL goal <130      Hypertension      Mild major depression (H)      Osteoarthritis of multiple joints, unspecified osteoarthritis type of #2&3 fingers bilat R>L      Vitamin D deficiency disease        Also see scanned health assessment forms.       Past Surgical History:     Past Surgical History:   Procedure Laterality Date     BIOPSY BREAST SEED LOCALIZATION Left 10/28/2020    Procedure: 2 SEED LOCALIZED LEFT BREAST BIOPSY;  Surgeon: Eileen Montes MD;  Location:  OR     BREAST SURGERY  2020     COLONOSCOPY       EYE SURGERY Left 11/2020    vitrictomy      GYN SURGERY       H ARGON LASER FOR RETINAL TEAR Left 3/2014     HEAD & NECK SURGERY       wisdom teeth     LAPAROSCOPIC SINGLE SITE SALPINGO-OOPHORECTOMY Bilateral 4/15/2015    Procedure: LAPAROSCOPIC SINGLE SITE SALPINGO-OOPHORECTOMY;  Surgeon: Leonora Zavaleta MD;  Location: [facility]     wisdom teeth removal  age 19            Social History:     Social History     Socioeconomic History     Marital status: Single     Spouse name: Not on file     Number of children: Not on file     Years of education: Not on file     Highest education level: Not on file   Occupational History     Employer: RETIRED     Comment: [employer]   Tobacco Use     Smoking status: Never     Smokeless tobacco: Never   Vaping Use     Vaping Use: Never used   Substance and Sexual Activity     Alcohol use: Yes     Comment: infrequent     Drug use: No     Sexual activity: Not Currently     Partners: Female   Other Topics Concern     Parent/sibling w/ CABG, MI or angioplasty before 65F 55M? Yes     Comment: father, infarction at age 44      Service Not Asked     Blood Transfusions Not Asked     Caffeine Concern Not Asked     Occupational Exposure Not Asked     Hobby Hazards Not Asked     Sleep Concern Not Asked     Stress Concern Not Asked     Weight Concern Not Asked     Special Diet Not Asked     Back Care Not Asked     Exercise Not Asked     Bike Helmet Not Asked     Seat Belt Yes     Self-Exams Not Asked   Social History Narrative     Not on file     Social Determinants of Health     Financial Resource Strain: Low Risk  (9/20/2023)    Financial Resource Strain      Within the past 12 months, have you or your family members you live with been unable to get utilities (heat, electricity) when it was really needed?: No   Food Insecurity: Low Risk  (9/20/2023)    Food Insecurity      Within the past 12 months, did you worry that your food would run out before you got money to buy more?: No      Within the past 12 months, did the food you bought just not last and you didn t have money to get more?: No   Transportation Needs:  Low Risk  (9/20/2023)    Transportation Needs      Within the past 12 months, has lack of transportation kept you from medical appointments, getting your medicines, non-medical meetings or appointments, work, or from getting things that you need?: No   Physical Activity: Not on file   Stress: Not on file   Social Connections: Not on file   Interpersonal Safety: Not on file   Housing Stability: Low Risk  (9/20/2023)    Housing Stability      Do you have housing? : Yes      Are you worried about losing your housing?: No            Family History:       Family History   Problem Relation Age of Onset     Breast Cancer Mother 80     Neurologic Disorder Father         Parkinson's     Coronary Artery Disease Father 44        MI     Hyperlipidemia Father      Anxiety Disorder Father      Lipids Brother      Hypertension Brother      Hyperlipidemia Brother      Lipids Brother      Family History Negative Brother      Lipids Sister      Hypertension Sister      Hyperlipidemia Sister      Family History Negative Sister      Family History Negative Sister      Family History Negative Sister      Cerebrovascular Disease Maternal Grandmother 86     Cancer Maternal Grandfather 69        leukemia     Other Cancer Maternal Grandfather         Leukemia     Cancer Paternal Grandfather 69        stomach     Other Cancer Paternal Grandfather         stomach cancer     Diabetes No family hx of      Cancer - colorectal No family hx of      Hypertension No family hx of      Hyperlipidemia No family hx of      Colon Cancer No family hx of      Prostate Cancer No family hx of      Other Cancer No family hx of      Depression No family hx of      Anxiety Disorder No family hx of      Mental Illness No family hx of      Substance Abuse No family hx of      Anesthesia Reaction No family hx of      Asthma No family hx of      Osteoporosis No family hx of      Genetic Disorder No family hx of      Thyroid Disease No family hx of      Obesity No  family hx of      Unknown/Adopted No family hx of             Medications:     Current Outpatient Medications   Medication Sig     amLODIPine (NORVASC) 2.5 MG tablet Take 1 tablet (2.5 mg) by mouth daily FOLLOW UP APPOINTMENT NEEDED FOR FUTURE REFILLS     atorvastatin (LIPITOR) 20 MG tablet Take 1 tablet (20 mg) by mouth daily     Cholecalciferol (VITAMIN D) 1000 UNITS capsule Take 1 capsule by mouth daily.     desvenlafaxine (PRISTIQ) 100 MG 24 hr tablet Take 1 tablet (100 mg) by mouth daily     lisinopril (ZESTRIL) 40 MG tablet Take 1 tablet (40 mg) by mouth daily     Multiple Vitamins-Minerals (MULTIVITAMIN OR) Take by mouth daily     raloxifene (EVISTA) 60 MG tablet Take 1 tablet (60 mg) by mouth daily     Current Facility-Administered Medications   Medication     2 mL bupivacaine (MARCAINE) preservative free injection 0.25% (10 mL vial)     2 mL bupivacaine (MARCAINE) preservative free injection 0.25% (10 mL vial)     lidocaine 1 % injection 2 mL     lidocaine 1 % injection 2 mL     triamcinolone (KENALOG-40) injection 40 mg     triamcinolone (KENALOG-40) injection 40 mg              Review of Systems:   A comprehensive 10 point review of systems (constitutional, ENT, cardiac, peripheral vascular, lymphatic, respiratory, GI, , Musculoskeletal, skin, Neurological) was performed and found to be negative except as described in this note.     See intake form completed by patient         Again, thank you for allowing me to participate in the care of your patient.        Sincerely,        Favio Banegas MD

## 2023-11-30 ENCOUNTER — HOSPITAL ENCOUNTER (OUTPATIENT)
Dept: MAMMOGRAPHY | Facility: CLINIC | Age: 73
Discharge: HOME | End: 2023-11-30
Attending: INTERNAL MEDICINE | Admitting: INTERNAL MEDICINE
Payer: COMMERCIAL

## 2023-11-30 DIAGNOSIS — Z12.31 ENCOUNTER FOR SCREENING MAMMOGRAM FOR BREAST CANCER: ICD-10-CM

## 2023-11-30 DIAGNOSIS — N60.92 ATYPICAL DUCTAL HYPERPLASIA OF LEFT BREAST: ICD-10-CM

## 2023-11-30 PROCEDURE — 77067 SCR MAMMO BI INCL CAD: CPT

## 2023-12-04 ENCOUNTER — THERAPY VISIT (OUTPATIENT)
Dept: PHYSICAL THERAPY | Facility: CLINIC | Age: 73
End: 2023-12-04
Attending: STUDENT IN AN ORGANIZED HEALTH CARE EDUCATION/TRAINING PROGRAM
Payer: COMMERCIAL

## 2023-12-04 DIAGNOSIS — M75.52 SUBACROMIAL BURSITIS OF LEFT SHOULDER JOINT: ICD-10-CM

## 2023-12-04 DIAGNOSIS — M16.12 OSTEOARTHRITIS OF ONE HIP, LEFT: ICD-10-CM

## 2023-12-04 PROCEDURE — 97110 THERAPEUTIC EXERCISES: CPT | Mod: GP | Performed by: PHYSICAL THERAPIST

## 2023-12-04 PROCEDURE — 97161 PT EVAL LOW COMPLEX 20 MIN: CPT | Mod: GP | Performed by: PHYSICAL THERAPIST

## 2023-12-04 ASSESSMENT — ACTIVITIES OF DAILY LIVING (ADL)
GOING_UP_1_FLIGHT_OF_STAIRS: SLIGHT DIFFICULTY
STANDING_FOR_15_MINUTES: NO DIFFICULTY AT ALL
WALKING_DOWN_STEEP_HILLS: SLIGHT DIFFICULTY
HOS_ADL_HIGHEST_POTENTIAL_SCORE: 64
RECREATIONAL_ACTIVITIES: SLIGHT DIFFICULTY
TWISTING/PIVOTING_ON_INVOLVED_LEG: NO DIFFICULTY AT ALL
HOS_ADL_SCORE(%): 87.5
WALKING_APPROXIMATELY_10_MINUTES: SLIGHT DIFFICULTY
DEEP_SQUATTING: SLIGHT DIFFICULTY
WALKING_UP_STEEP_HILLS: SLIGHT DIFFICULTY
GOING_DOWN_1_FLIGHT_OF_STAIRS: NO DIFFICULTY AT ALL
GETTING_INTO_AND_OUT_OF_A_BATHTUB: NO DIFFICULTY AT ALL
HOS_ADL_COUNT: 16
PUTTING_ON_SOCKS_AND_SHOES: NO DIFFICULTY AT ALL
ROLLING_OVER_IN_BED: NO DIFFICULTY AT ALL
WALKING_INITIALLY: NO DIFFICULTY AT ALL
LIGHT_TO_MODERATE_WORK: SLIGHT DIFFICULTY
GETTING_INTO_AND_OUT_OF_AN_AVERAGE_CAR: NO DIFFICULTY AT ALL
STEPPING_UP_AND_DOWN_CURBS: NO DIFFICULTY AT ALL
WALKING_15_MINUTES_OR_GREATER: SLIGHT DIFFICULTY
SITTING_FOR_15_MINUTES: SLIGHT DIFFICULTY
HOS_ADL_ITEM_SCORE_TOTAL: 56

## 2023-12-04 NOTE — PROGRESS NOTES
"PHYSICAL THERAPY EVALUATION  Type of Visit: Evaluation    See electronic medical record for Abuse and Falls Screening details.    Subjective       Presenting condition or subjective complaint:   Pain in L shoulder and L hip  Date of onset: 02/01/23    Relevant medical history:   menopause, HTN    Prior diagnostic imaging/testing results:     X-ray  Prior therapy history for the same diagnosis, illness or injury:    yes for hip    Prior Level of Function: Independent    Living Environment  Social support:   lives alone  Type of home:   single level  Stairs to enter the home:       2  Ramp:   no  Stairs inside the home:       13; to basement- doesn't have to use except for laundry    Employment:    retired  Hobbies/Interests:  researching history    Patient goals for therapy:  \"move leg and arm freely\"    Pain assessment: Pain present: L hip in groin region and L shoulder stiffness/discomfort  Symptoms in hip are:  Worse: more when walking, not all the time, twinge with stairs- distance walk; uneven is more bothersome - walks on the treadmill for 3 hours per well  Better: sitting    In Feb 2023 she fell doen't the stairs no other falls.    Symptoms in L shoulder are:  Worse: end range movements  Better: moves slowly    She is R hand dominant       Objective   HIP EVALUATION  GAIT:   Assistive Device(s): None  Gait Deviations:  decreased weight shift to L side and decreased L hip flexion in swing due to anterior groin discomfort; pt able to correct with cues- with slowed gait speed  BALANCE/PROPRIOCEPTION: Single Leg Stance Eyes Open (seconds): unable to maintain SL balance for >10 seconds without HHA on either side  ROM:  WFL B except for limited IR by 50% on L side and painful ER L hip at end range  PELVIC/SI SCREEN: WNL  STRENGTH:   Pain: - none + mild ++ moderate +++ severe  Strength Scale: 0-5/5 Left Right   Hip Flexion 4+ 5-   Hip Extension 5- 5-   Hip Abduction 4 5-   Hip Adduction     Hip Internal Rotation 4+ 5 "   Hip External Rotation 4+ 5   Knee Flexion 5 5   Knee Extension 5 5   LE FLEXIBILITY:  decreased R hamstring flex and decreased L hip flexor flex  SPECIAL TESTS:    Left Right   ASHLEY Tension and mild discomfort  Negative    FADIR/Labrum/LARISA     Femoral Nerve     Lissa's Positive Positive   Piriformis Positive Negative    Quadrant Testing     SLR Negative  Negative    Slump     Stork with Extension     Parrish Positive for hip flexor tension Negative    FUNCTIONAL TESTS:  double leg squat- able to complete but reports anterior pain discomfort  PALPATION:  mild TTP at proximal hip flexor and piriformis on L side  JOINT MOBILITY: WFL    SHOULDER EVALUATION  POSTURE: mild rounded fwd shoulders  ROM:   R shoulder joint WNL in all planes  L shoulder limitations in IR to waistline, horizontal abd, abduction, reaching overhead behind back  STRENGTH: will assess formally at future session  FLEXIBILITY: decreased L pec major compared to R side  SPECIAL TESTS:   FUNCTIONAL TESTS: reaches to cupboard feels stiffness; stiffness also with reaching behind back  PALPATION: TTP along posterior shoulder/scapular boarder and deltoid  JOINT MOBILITY: WFL    Assessment & Plan   CLINICAL IMPRESSIONS  Medical Diagnosis: Osteoarthritis of one hip, left  Subacromial bursitis of left shoulder joint    Treatment Diagnosis: L hip pain and L shoulder pain   Impression/Assessment: Patient is a 73 year old female with discomfort complaints in L hip and L shoulder.  The following significant findings have been identified: Pain, Decreased ROM/flexibility, Decreased joint mobility, Decreased strength, Impaired balance, Impaired gait, Impaired muscle performance, Decreased activity tolerance, and Impaired posture. These impairments interfere with their ability to perform self care tasks, recreational activities, household chores, driving , household mobility, and community mobility as compared to previous level of function.     Clinical Decision  Making (Complexity):  Clinical Presentation: Stable/Uncomplicated  Clinical Presentation Rationale: based on medical and personal factors listed in PT evaluation  Clinical Decision Making (Complexity): Low complexity    PLAN OF CARE  Treatment Interventions:  Interventions: Gait Training, Manual Therapy, Neuromuscular Re-education, Therapeutic Activity, Therapeutic Exercise    Long Term Goals     PT Goal 1  Goal Identifier: HEP  Goal Description: Patient will be consistent and independent with HEP in order to achieve functional goals.  Rationale: to maximize safety and independence with performance of ADLs and functional tasks;to maximize safety and independence within the home;to maximize safety and independence within the community;to maximize safety and independence with self cares  Target Date: 01/01/24  PT Goal 2  Goal Identifier: ROM  Goal Description: Patient will demonstrate ability to reach with L UE to countertop and behind her back without symptoms and symmetrical movement to R shoulder.  Rationale: to maximize safety and independence with performance of ADLs and functional tasks;to maximize safety and independence with self cares  Target Date: 01/29/24  PT Goal 3  Goal Identifier: Walking  Goal Description: Patient will tolerate walking at least 30 minutes on paved surface without increased L hip symptoms.  Rationale: to maximize safety and independence within the home;to maximize safety and independence within the community;to maximize safety and independence with transportation  Target Date: 01/29/24      Frequency of Treatment: 1x per week  Duration of Treatment: 8 weeks    Recommended Referrals to Other Professionals:   Education Assessment:   Learner/Method: Patient;No Barriers to Learning;Pictures/Video  Education Comments: no concerns    Risks and benefits of evaluation/treatment have been explained.   Patient/Family/caregiver agrees with Plan of Care.     Evaluation Time:     PT Eval, Low  Complexity Minutes (27844): 28     Signing Clinician: Eda Bella, PT      [facility]                                                                                   OUTPATIENT PHYSICAL THERAPY      PLAN OF TREATMENT FOR OUTPATIENT REHABILITATION   Patient's Last Name, First Name, Emma Andersen YOB: 1950   Provider's Name   [facility]   Medical Record No.  [id number]     Onset Date: 02/01/23  Start of Care Date: 12/04/23     Medical Diagnosis:  Osteoarthritis of one hip, left  Subacromial bursitis of left shoulder joint      PT Treatment Diagnosis:  L hip pain and L shoulder pain Plan of Treatment  Frequency/Duration: 1x per week/ 8 weeks    Certification date from 12/04/23 to 01/29/24         See note for plan of treatment details and functional goals     Eda Bella, PT                         I CERTIFY THE NEED FOR THESE SERVICES FURNISHED UNDER        THIS PLAN OF TREATMENT AND WHILE UNDER MY CARE     (Physician attestation of this document indicates review and certification of the therapy plan).              Referring Provider:  Favio Banegas    Initial Assessment  See Epic Evaluation- Start of Care Date: 12/04/23

## 2023-12-05 ENCOUNTER — OFFICE VISIT (OUTPATIENT)
Dept: NEUROLOGY | Facility: CLINIC | Age: 73
End: 2023-12-05
Attending: INTERNAL MEDICINE
Payer: COMMERCIAL

## 2023-12-05 VITALS — HEART RATE: 115 BPM | OXYGEN SATURATION: 97 % | SYSTOLIC BLOOD PRESSURE: 144 MMHG | DIASTOLIC BLOOD PRESSURE: 83 MMHG

## 2023-12-05 DIAGNOSIS — R41.3 MEMORY LOSS: Primary | ICD-10-CM

## 2023-12-05 PROCEDURE — 99203 OFFICE O/P NEW LOW 30 MIN: CPT | Mod: GC

## 2023-12-05 NOTE — NURSING NOTE
"Emma Rudolph is a 73 year old female who presents for:  Chief Complaint   Patient presents with    Referral     Memory loss        Initial Vitals:  BP (!) 144/83   Pulse 115   LMP  (LMP Unknown)   SpO2 97%  Estimated body mass index is 28.8 kg/m  as calculated from the following:    Height as of 11/29/23: 1.626 m (5' 4\").    Weight as of 11/29/23: 76.1 kg (167 lb 12.8 oz).. There is no height or weight on file to calculate BSA. BP completed using cuff size: elizabeth Wells    "

## 2023-12-05 NOTE — LETTER
12/5/2023         RE: Emma Rudolph  [address] [facility] [address]        Dear Colleague,    Thank you for referring your patient, Emma Rudolph, to the [facility] NEUROLOG[facility]. Please see a copy of my visit note below.    [facility]/[place]  Section of General Neurology  New Patient Visit      Emma Rudolph MRN# [id number]   Age: 73 year old YOB: 1950     Requesting physician: Flynn Jaffe     Reason for Consultation: Memory loss           Assessment and Plan:   Assessment:  Celi is a 73-year-old female with past medical history significant for hyperlipidemia, hypertension, generalized anxiety disorder, depression and a left breast mass status post resection presenting to the neurology clinic with short-term memory loss.    MoCA today was 24/30 with 5 points being lost in the delayed recall section alone (patient could not remember any of the 5 words, even with prompts).  This is consistent with a diagnosis of mild cognitive impairment with amnestic predominance.  Recommend at this time obtaining further workup including neuropsychological testing, brain MRI without contrast, serum amyloid 42/40 ratio, vitamin B1, B12 and a TSH level.  Will plan to follow-up with this patient after neuropsychological testing is complete to review all data.  Overall impression at this time is that this patient may be developing early signs of Alzheimer's dementia, as disproportionate decline in short-term memory is often the initial symptom, but will review further data as it is obtained to assist with diagnosis.     Plan:  -Lab work including vitamin B1, B12, TSH  -Serum amyloid 42/40 ratio  -MRI brain without contrast  -Neuropsychological testing  -Follow-up in 5 to 6 months after all data has been acquired          GIL Lagos D.O.  Resident Physician of Neurology  [facility]    Patient  discussed with my supervising physician Dr. Gilbert, who agrees with the critical findings, assessment, and plan as documented in the note above or as otherwise in their attestation.        History of Presenting Symptoms:   Emma Rudolph is a 73 year old female who presents today for evaluation of memory loss.  Patient has driven herself to the appointment today and is present alone.  Patient lives alone and is able to function without issue.  She pays her own bills, uses her own computer, and takes care of herself without assistance.  Per report, all ADLs and IADLs are intact.  Patient endorses volunteering 2 times a week at a local [employer] for [employer].      Upon entering the room to visit with the patient, patient does not know why she is at the appointment.  She is also unaware of the date and thinks it is Wednesday rather than Tuesday.  Of concern, she does not remember the date later on in the visit after it had earlier been told to her.  She denies any new strange behaviors or increased level of impulsivity recently.  She has not noticed any odd tremors or abnormal movements recently.  She denies hallucinations.  She does endorse having had a fall recently, but appears to have been an accident that occurred  tripping down some stairs outside.  Otherwise her gait is normal.  She denies any other neurological symptoms including numbness, tingling, weakness, vision changes, or dizziness at this time.          Past Medical History:     Patient Active Problem List   Diagnosis     Generalized anxiety disorder     Mixed hyperlipidemia     CKD (chronic kidney disease) stage 2, GFR 60-89 ml/min     Major depression in complete remission (H) on meds since 6-12-13     Osteoarthritis of multiple joints, unspecified osteoarthritis type of #2&3 fingers bilat R>L     Family history of ischemic heart disease     Memory loss     Primary insomnia     Benign essential hypertension     Breast mass,  left     Past Medical History:   Diagnosis Date     Breast lesion     LEFT     Generalized anxiety disorder      Hyperlipidemia LDL goal <130      Hypertension      Mild major depression (H24)      Osteoarthritis of multiple joints, unspecified osteoarthritis type of #2&3 fingers bilat R>L      Vitamin D deficiency disease         Past Surgical History:     Past Surgical History:   Procedure Laterality Date     BIOPSY BREAST SEED LOCALIZATION Left 10/28/2020    Procedure: 2 SEED LOCALIZED LEFT BREAST BIOPSY;  Surgeon: Eileen Montes MD;  Location:  OR     BREAST SURGERY  2020     COLONOSCOPY       EYE SURGERY Left 11/2020    vitrictomy      GYN SURGERY       H ARGON LASER FOR RETINAL TEAR Left 3/2014     HEAD & NECK SURGERY      wisdom teeth     LAPAROSCOPIC SINGLE SITE SALPINGO-OOPHORECTOMY Bilateral 4/15/2015    Procedure: LAPAROSCOPIC SINGLE SITE SALPINGO-OOPHORECTOMY;  Surgeon: eLonora Zavaleta MD;  Location:  [facility]     wisdom teeth removal  age 19        Social History:     Social History     Tobacco Use     Smoking status: Never     Smokeless tobacco: Never   Vaping Use     Vaping Use: Never used   Substance Use Topics     Alcohol use: Yes     Comment: infrequent     Drug use: No        Family History:     Family History   Problem Relation Age of Onset     Breast Cancer Mother 80     Neurologic Disorder Father         Parkinson's     Coronary Artery Disease Father 44        MI     Hyperlipidemia Father      Anxiety Disorder Father      Lipids Brother      Hypertension Brother      Hyperlipidemia Brother      Lipids Brother      Family History Negative Brother      Lipids Sister      Hypertension Sister      Hyperlipidemia Sister      Family History Negative Sister      Family History Negative Sister      Family History Negative Sister      Cerebrovascular Disease Maternal Grandmother 86     Cancer Maternal Grandfather 69        leukemia     Other Cancer Maternal Grandfather         Leukemia     Cancer  Paternal Grandfather 69        stomach     Other Cancer Paternal Grandfather         stomach cancer     Diabetes No family hx of      Cancer - colorectal No family hx of      Hypertension No family hx of      Hyperlipidemia No family hx of      Colon Cancer No family hx of      Prostate Cancer No family hx of      Other Cancer No family hx of      Depression No family hx of      Anxiety Disorder No family hx of      Mental Illness No family hx of      Substance Abuse No family hx of      Anesthesia Reaction No family hx of      Asthma No family hx of      Osteoporosis No family hx of      Genetic Disorder No family hx of      Thyroid Disease No family hx of      Obesity No family hx of      Unknown/Adopted No family hx of         Medications:     Current Outpatient Medications   Medication Sig     amLODIPine (NORVASC) 2.5 MG tablet Take 1 tablet (2.5 mg) by mouth daily FOLLOW UP APPOINTMENT NEEDED FOR FUTURE REFILLS     atorvastatin (LIPITOR) 20 MG tablet Take 1 tablet (20 mg) by mouth daily     Cholecalciferol (VITAMIN D) 1000 UNITS capsule Take 1 capsule by mouth daily.     desvenlafaxine (PRISTIQ) 100 MG 24 hr tablet Take 1 tablet (100 mg) by mouth daily     lisinopril (ZESTRIL) 40 MG tablet Take 1 tablet (40 mg) by mouth daily     Multiple Vitamins-Minerals (MULTIVITAMIN OR) Take by mouth daily     raloxifene (EVISTA) 60 MG tablet Take 1 tablet (60 mg) by mouth daily     Current Facility-Administered Medications   Medication     2 mL bupivacaine (MARCAINE) preservative free injection 0.25% (10 mL vial)     2 mL bupivacaine (MARCAINE) preservative free injection 0.25% (10 mL vial)     lidocaine 1 % injection 2 mL     lidocaine 1 % injection 2 mL     triamcinolone (KENALOG-40) injection 40 mg     triamcinolone (KENALOG-40) injection 40 mg        Allergies:     Allergies   Allergen Reactions     Penicillins Swelling, Rash and Hives     Amoxicillin     Sulfa Antibiotics Hives        Review of Systems:   As noted  above     Physical Exam:   Vitals: BP (!) 144/83   Pulse 115   LMP  (LMP Unknown)   SpO2 97%   CV: peripheral pulse appreciated  Lungs: breathing comfortably  Extremities: no edema  Skin: No rashes    Neuro:   General Appearance: No apparent distress, well-nourished, well-groomed, pleasant     Mental Status: Alert and oriented to person, place, and time. Speech fluent and comprehension intact. No dysarthria.  Poor short-term memory, long-term memory intact.  Normal fund of knowledge, attention/concentration, and language    Cranial Nerves:   II: Visual fields: normal  III: Pupils: 3 mm, equal, round, reactive to light   III,IV,VI: Extraocular Movements: intact   V: Facial sensation: intact to light touch  VII: Facial strength: intact without asymmetry  VIII: Hearing: intact grossly  IX: Palate: intact   XI: Shoulder shrug: intact  XII: Tongue movement: normal     Motor Exam:   5/5 Diffusely    No drift is present. No abnormal movements. Tone is normal throughout.    Sensory: intact to light touch throughout    Coordination: no dysmetria with finger-to-nose bilaterally    Reflexes: biceps, triceps, brachioradialis, patellar, and ankle jerks 2+ and symmetric. Toes are downgoing bilaterally    Gait: normal casual gait, normal stride length    MoCA score  Visuospatial/Executive  5/5  Naming 3/3  Memory/Delayed Recall  0/5 -could not remember any of the 5 words, even with cues  Attention  6/6  Language Repetition 2/2  Language Fluency  1/1  Abstraction 2/2  Orientation  4/6 -does not know the date or day of the week  Total 23/30           Data: Pertinent prior to visit   Imaging:  Reviewed -no brain MRI on file    Procedures:  Reviewed    Laboratory:  Reviewed           Attestation signed by Stefan Gilbert MD at 12/6/2023  8:58 AM:  Attending Attestation    I saw and evaluated the patient on 12/5/23 and agree with the findings and the plan of care as documented in the resident's note.       I personally reviewed  vital signs, medications, other pertinent data      As noted we discussed our concern for possible amnestic mild cognitive impairment.  She is doing pretty well otherwise/outside of short term memory.  Would be wise to gather more data to support or refute this hypothesis.  Discussed medication options, their benefits/limitations.  Will await this data before deciding for or against a medication which aligns with her preference.  If serum amyloid testing comes back in a high yield range I would favor starting a medication sooner, however.     All questions answered, remainder of plan as noted in body of this note per Dr. Lagos.        Alexis Gilbert MD   of Neurology  [facility]      Again, thank you for allowing me to participate in the care of your patient.        Sincerely,        Johnathan Laogs MD

## 2023-12-12 DIAGNOSIS — I10 HYPERTENSION, UNSPECIFIED TYPE: ICD-10-CM

## 2023-12-12 RX ORDER — LISINOPRIL 40 MG/1
40 TABLET ORAL DAILY
Qty: 90 TABLET | Refills: 2 | Status: SHIPPED | OUTPATIENT
Start: 2023-12-12 | End: 2024-01-17

## 2023-12-18 ENCOUNTER — THERAPY VISIT (OUTPATIENT)
Dept: PHYSICAL THERAPY | Facility: CLINIC | Age: 73
End: 2023-12-18
Attending: STUDENT IN AN ORGANIZED HEALTH CARE EDUCATION/TRAINING PROGRAM
Payer: COMMERCIAL

## 2023-12-18 DIAGNOSIS — M16.12 OSTEOARTHRITIS OF ONE HIP, LEFT: Primary | ICD-10-CM

## 2023-12-18 DIAGNOSIS — N60.92 ATYPICAL DUCTAL HYPERPLASIA OF LEFT BREAST: ICD-10-CM

## 2023-12-18 PROCEDURE — 97110 THERAPEUTIC EXERCISES: CPT | Mod: GP | Performed by: PHYSICAL THERAPIST

## 2023-12-19 ENCOUNTER — ALLIED HEALTH/NURSE VISIT (OUTPATIENT)
Dept: INTERNAL MEDICINE | Facility: CLINIC | Age: 73
End: 2023-12-19
Payer: COMMERCIAL

## 2023-12-19 VITALS — SYSTOLIC BLOOD PRESSURE: 126 MMHG | DIASTOLIC BLOOD PRESSURE: 70 MMHG

## 2023-12-19 DIAGNOSIS — Z01.30 BP CHECK: Primary | ICD-10-CM

## 2023-12-19 PROCEDURE — 99207 PR NO CHARGE NURSE ONLY: CPT | Performed by: INTERNAL MEDICINE

## 2023-12-19 RX ORDER — RALOXIFENE HYDROCHLORIDE 60 MG/1
1 TABLET, FILM COATED ORAL DAILY
Qty: 90 TABLET | Refills: 3 | Status: SHIPPED | OUTPATIENT
Start: 2023-12-19

## 2023-12-19 NOTE — PROGRESS NOTES
Emma Rudolph was evaluated at [facility] on December 19, 2023 at which time her blood pressure was:    BP Readings from Last 1 Encounters:   12/19/23 126/70     No data recorded      Reviewed lifestyle modifications for blood pressure control and reduction: including making healthy food choices, managing weight, getting regular exercise, smoking cessation, reducing alcohol consumption, monitoring blood pressure regularly.     Symptoms: None    BP Goal:< 140/90 mmHg    BP Assessment:  BP at goal    Potential Reasons for BP too high: NA - Not applicable    BP Follow-Up Plan: Recheck BP in 6 months at pharmacy    Recommendation to Provider: Continue therapy as planned with recheck in 6 months at [facility]    Note completed by: Isrrael Yarbrough RPH

## 2023-12-21 DIAGNOSIS — I10 ESSENTIAL HYPERTENSION: ICD-10-CM

## 2023-12-21 RX ORDER — AMLODIPINE BESYLATE 2.5 MG/1
2.5 TABLET ORAL DAILY
Qty: 90 TABLET | Refills: 2 | Status: SHIPPED | OUTPATIENT
Start: 2023-12-21 | End: 2024-01-10

## 2023-12-26 ENCOUNTER — ANCILLARY PROCEDURE (OUTPATIENT)
Dept: MRI IMAGING | Facility: CLINIC | Age: 73
End: 2023-12-26
Payer: COMMERCIAL

## 2023-12-26 DIAGNOSIS — R41.3 MEMORY LOSS: ICD-10-CM

## 2023-12-26 PROCEDURE — 70551 MRI BRAIN STEM W/O DYE: CPT

## 2023-12-29 NOTE — PROGRESS NOTES
[facility]    Hematology/Oncology Established Patient Note      Today's Date: 1/8/2023    Reason for follow-up: Atypical ductal hyperplasia.    HISTORY OF PRESENT ILLNESS: Emma Rudolph is a 73 year old female who presents with the following issues:  1. 9/18/2020: Mammogram showed architectural distortion in left breast at 11:00-1:00 retroareolar mid-depth. No concerning findings in right breast.  2. 9/24/2020: Left breast U/S showed oval hypoechoic mass measuring 1.1 cm at 11:00, 3 cm from nipple; oval hypoechoic mass measuring 1 cm at 12:00, 3 cm from nipple; normal-appearing parenchyma between 2 lesions. No enlarged lymph nodes in left axilla.  3. 10/2/2020: U/S-guided left breast needles biopsy at 12:00 showed a complex sclerosing lesion, negative for atypica and malignancy. Biopsy at 11:00 showed benign breast tissue with adenosis, negative for atypica and malignancy.  4. 10/28/2020: Underwent left breast excision under care of Dr. Eileen Montes. Pathology was negative for malignancy but showed atypical ductal hyperplasia, apocrine metaplasia, papillomatosis, microcalcifications.  5. 11/18/2020: Started raloxifene.    INTERIM HISTORY:  Celi reports feeling overall well with no new breast issues.    REVIEW OF SYSTEMS:   14 point ROS was reviewed and is negative other than as noted above in HPI.       HOME MEDICATIONS:  Current Outpatient Medications   Medication Sig Dispense Refill    amLODIPine (NORVASC) 2.5 MG tablet Take 1 tablet (2.5 mg) by mouth daily 90 tablet 2    atorvastatin (LIPITOR) 20 MG tablet Take 1 tablet (20 mg) by mouth daily 90 tablet 4    Cholecalciferol (VITAMIN D) 1000 UNITS capsule Take 1 capsule by mouth daily.      desvenlafaxine (PRISTIQ) 100 MG 24 hr tablet Take 1 tablet (100 mg) by mouth daily 90 tablet 4    lisinopril (ZESTRIL) 40 MG tablet TAKE 1 TABLET DAILY 90 tablet 2    Multiple Vitamins-Minerals (MULTIVITAMIN OR) Take by mouth daily      raloxifene  (EVISTA) 60 MG tablet TAKE 1 TABLET DAILY 90 tablet 3         ALLERGIES:  Allergies   Allergen Reactions    Penicillins Swelling, Rash and Hives     Amoxicillin    Sulfa Antibiotics Hives         PAST MEDICAL HISTORY:  Past Medical History:   Diagnosis Date    Breast lesion     LEFT    Generalized anxiety disorder     Hyperlipidemia LDL goal <130     Hypertension     Mild major depression (H24)     Osteoarthritis of multiple joints, unspecified osteoarthritis type of #2&3 fingers bilat R>L     Vitamin D deficiency disease      Gynecologic history:  Age of menarche at 13-14; G0, no HRT or OCP use.      PAST SURGICAL HISTORY:  Past Surgical History:   Procedure Laterality Date    BIOPSY BREAST SEED LOCALIZATION Left 10/28/2020    Procedure: 2 SEED LOCALIZED LEFT BREAST BIOPSY;  Surgeon: Eileen Montes MD;  Location:  OR    BREAST SURGERY  2020    COLONOSCOPY      EYE SURGERY Left 11/2020    vitrictomy     GYN SURGERY      H ARGON LASER FOR RETINAL TEAR Left 3/2014    HEAD & NECK SURGERY      wisdom teeth    LAPAROSCOPIC SINGLE SITE SALPINGO-OOPHORECTOMY Bilateral 4/15/2015    Procedure: LAPAROSCOPIC SINGLE SITE SALPINGO-OOPHORECTOMY;  Surgeon: Leonora Zavaleta MD;  [facility]:  [facility]    wisdom teeth removal  age 19         SOCIAL HISTORY:  Social History     Socioeconomic History    Marital status: Single     Spouse name: Not on file    Number of children: Not on file    Years of education: Not on file    Highest education level: Not on file   Occupational History     Employer: RETIRED     Comment: [employer]   Tobacco Use    Smoking status: Never    Smokeless tobacco: Never   Vaping Use    Vaping Use: Never used   Substance and Sexual Activity    Alcohol use: Yes     Comment: infrequent    Drug use: No    Sexual activity: Not Currently     Partners: Female   Other Topics Concern    Parent/sibling w/ CABG, MI or angioplasty before 65F 55M? Yes     Comment: father, infarction at age 44     Service Not  Asked    Blood Transfusions Not Asked    Caffeine Concern Not Asked    Occupational Exposure Not Asked    Hobby Hazards Not Asked    Sleep Concern Not Asked    Stress Concern Not Asked    Weight Concern Not Asked    Special Diet Not Asked    Back Care Not Asked    Exercise Not Asked    Bike Helmet Not Asked    Seat Belt Yes    Self-Exams Not Asked   Social History Narrative    Not on file     Social Determinants of Health     Financial Resource Strain: Low Risk  (9/20/2023)    Financial Resource Strain     Within the past 12 months, have you or your family members you live with been unable to get utilities (heat, electricity) when it was really needed?: No   Food Insecurity: Low Risk  (9/20/2023)    Food Insecurity     Within the past 12 months, did you worry that your food would run out before you got money to buy more?: No     Within the past 12 months, did the food you bought just not last and you didn t have money to get more?: No   Transportation Needs: Low Risk  (9/20/2023)    Transportation Needs     Within the past 12 months, has lack of transportation kept you from medical appointments, getting your medicines, non-medical meetings or appointments, work, or from getting things that you need?: No   Physical Activity: Not on file   Stress: Not on file   Social Connections: Not on file   Interpersonal Safety: Not on file   Housing Stability: Low Risk  (9/20/2023)    Housing Stability     Do you have housing? : Yes     Are you worried about losing your housing?: No         FAMILY HISTORY:  Family History   Problem Relation Age of Onset    Breast Cancer Mother 80    Neurologic Disorder Father         Parkinson's    Coronary Artery Disease Father 44        MI    Hyperlipidemia Father     Anxiety Disorder Father     Lipids Brother     Hypertension Brother     Hyperlipidemia Brother     Lipids Brother     Family History Negative Brother     Lipids Sister     Hypertension Sister     Hyperlipidemia Sister     Family  "History Negative Sister     Family History Negative Sister     Family History Negative Sister     Cerebrovascular Disease Maternal Grandmother 86    Cancer Maternal Grandfather 69        leukemia    Other Cancer Maternal Grandfather         Leukemia    Cancer Paternal Grandfather 69        stomach    Other Cancer Paternal Grandfather         stomach cancer    Diabetes No family hx of     Cancer - colorectal No family hx of     Hypertension No family hx of     Hyperlipidemia No family hx of     Colon Cancer No family hx of     Prostate Cancer No family hx of     Other Cancer No family hx of     Depression No family hx of     Anxiety Disorder No family hx of     Mental Illness No family hx of     Substance Abuse No family hx of     Anesthesia Reaction No family hx of     Asthma No family hx of     Osteoporosis No family hx of     Genetic Disorder No family hx of     Thyroid Disease No family hx of     Obesity No family hx of     Unknown/Adopted No family hx of          PHYSICAL EXAM:  Vital signs:  /79   Pulse 109   Resp 16   Ht 1.626 m (5' 4\")   Wt 73.8 kg (162 lb 12.8 oz)   LMP  (LMP Unknown)   SpO2 98%   BMI 27.94 kg/m    GENERAL/CONSTITUTIONAL: No acute distress.  EYES: No erythema or scleral icterus.  LYMPH: No cervical, supraclavicular, axillary, epitroclear adenopathy.   BREAST: No palpable masses in either breast; nipples everted with no discharge; no rash or ulceration. Slight concavity at the left breast prior excision site.  RESPIRATORY: No audible cough or wheezing.   MUSCULOSKELETAL: Warm and well-perfused, no cyanosis, clubbing, or edema.  NEUROLOGIC: Alert, oriented, answers questions appropriately.  INTEGUMENTARY: No rashes or jaundice.  GAIT: Steady, does not use assistive device    LABS:  CBC RESULTS: Recent Labs   Lab Test 05/24/23  2154   WBC 7.5   RBC 4.47   HGB 14.3   HCT 42.4   MCV 95   MCH 32.0   MCHC 33.7   RDW 12.2          Last Comprehensive Metabolic Panel:  Sodium "   Date Value Ref Range Status   09/20/2023 140 136 - 145 mmol/L Final   08/27/2020 138 133 - 144 mmol/L Final     Potassium   Date Value Ref Range Status   09/20/2023 5.3 3.4 - 5.3 mmol/L Final   10/08/2021 3.7 3.4 - 5.3 mmol/L Final   08/27/2020 3.5 3.4 - 5.3 mmol/L Final     Chloride   Date Value Ref Range Status   09/20/2023 104 98 - 107 mmol/L Final   10/08/2021 107 94 - 109 mmol/L Final   08/27/2020 106 94 - 109 mmol/L Final     Carbon Dioxide   Date Value Ref Range Status   08/27/2020 26 20 - 32 mmol/L Final     Carbon Dioxide (CO2)   Date Value Ref Range Status   09/20/2023 27 22 - 29 mmol/L Final   10/08/2021 25 20 - 32 mmol/L Final     Anion Gap   Date Value Ref Range Status   09/20/2023 9 7 - 15 mmol/L Final   10/08/2021 8 3 - 14 mmol/L Final   08/27/2020 6 3 - 14 mmol/L Final     Glucose   Date Value Ref Range Status   09/20/2023 98 70 - 99 mg/dL Final   10/08/2021 104 (H) 70 - 99 mg/dL Final   08/27/2020 110 (H) 70 - 99 mg/dL Final     Comment:     Fasting specimen     Urea Nitrogen   Date Value Ref Range Status   09/20/2023 14.8 8.0 - 23.0 mg/dL Final   10/08/2021 12 7 - 30 mg/dL Final   08/27/2020 15 7 - 30 mg/dL Final     Creatinine   Date Value Ref Range Status   09/20/2023 0.81 0.51 - 0.95 mg/dL Final   08/27/2020 0.81 0.52 - 1.04 mg/dL Final     GFR Estimate   Date Value Ref Range Status   09/20/2023 77 >60 mL/min/1.73m2 Final   03/11/2021 71 >60 mL/min/[1.73_m2] Final     Calcium   Date Value Ref Range Status   09/20/2023 9.6 8.8 - 10.2 mg/dL Final   08/27/2020 9.4 8.5 - 10.1 mg/dL Final     Bilirubin Total   Date Value Ref Range Status   05/24/2023 0.8 <=1.2 mg/dL Final   08/27/2020 1.3 0.2 - 1.3 mg/dL Final     Alkaline Phosphatase   Date Value Ref Range Status   05/24/2023 105 (H) 35 - 104 U/L Final   08/27/2020 122 40 - 150 U/L Final     ALT   Date Value Ref Range Status   05/24/2023 25 10 - 35 U/L Final   08/27/2020 65 (H) 0 - 50 U/L Final     AST   Date Value Ref Range Status   05/24/2023 33  10 - 35 U/L Final   2020 35 0 - 45 U/L Final         PATHOLOGY:  None new.    IMAGIN: Mammogram showed no malignant findings.    2023: Breast MRI showed no concerning findings.    ASSESSMENT/PLAN:  Emma Rudolph is a 73 year old female with the following issues:  1. Left breast atypical ductal hyperplasia  -Pat is aware that atypical ductal hyperplasia is considered a marker for increased risk of developing invasive breast cancer, approximately ranging 11-40%, 5-15 years from time of diagnosis of ADH.  She is aware this is not a cancer.  -She is tolerating raloxifene well for risk reduction. Advised total 5 years of raloxifene, through 2025.  -She has no breast abnormalities on physical exam today or mammogram reviewed from 2023.  -Continue high risk surveillance with clinical breast exam and breast imaging every 6 months with yearly mammograms alternating with yearly breast MRI.  -Due for breast MRI 2024. Will arrange.    2. Anxiety  -Mood stable.  -Continue desvenlafaxine.    Return in 6 months.    Priya Ontiveros MD  Hematology/Oncology  [facility]    Total time spent today: 20 minutes in chart review, patient evaluation, counseling, documentation, test and/or medication/prescription orders, and coordination of care.

## 2024-01-02 ENCOUNTER — THERAPY VISIT (OUTPATIENT)
Dept: PHYSICAL THERAPY | Facility: CLINIC | Age: 74
End: 2024-01-02
Payer: COMMERCIAL

## 2024-01-02 DIAGNOSIS — M25.552 HIP PAIN, LEFT: Primary | ICD-10-CM

## 2024-01-02 PROCEDURE — 97110 THERAPEUTIC EXERCISES: CPT | Mod: GP | Performed by: PHYSICAL THERAPIST

## 2024-01-08 ENCOUNTER — MYC MEDICAL ADVICE (OUTPATIENT)
Dept: INTERNAL MEDICINE | Facility: CLINIC | Age: 74
End: 2024-01-08

## 2024-01-08 ENCOUNTER — ONCOLOGY VISIT (OUTPATIENT)
Dept: ONCOLOGY | Facility: CLINIC | Age: 74
End: 2024-01-08
Attending: INTERNAL MEDICINE
Payer: COMMERCIAL

## 2024-01-08 VITALS
HEART RATE: 109 BPM | BODY MASS INDEX: 27.79 KG/M2 | WEIGHT: 162.8 LBS | OXYGEN SATURATION: 98 % | SYSTOLIC BLOOD PRESSURE: 138 MMHG | RESPIRATION RATE: 16 BRPM | DIASTOLIC BLOOD PRESSURE: 79 MMHG | HEIGHT: 64 IN

## 2024-01-08 DIAGNOSIS — N60.92 ATYPICAL DUCTAL HYPERPLASIA OF LEFT BREAST: Primary | ICD-10-CM

## 2024-01-08 DIAGNOSIS — N64.89 OTHER SPECIFIED DISORDERS OF BREAST: ICD-10-CM

## 2024-01-08 DIAGNOSIS — I10 ESSENTIAL HYPERTENSION: ICD-10-CM

## 2024-01-08 PROCEDURE — 99213 OFFICE O/P EST LOW 20 MIN: CPT | Performed by: INTERNAL MEDICINE

## 2024-01-08 PROCEDURE — G0463 HOSPITAL OUTPT CLINIC VISIT: HCPCS | Performed by: INTERNAL MEDICINE

## 2024-01-08 ASSESSMENT — PAIN SCALES - GENERAL: PAINLEVEL: NO PAIN (0)

## 2024-01-08 NOTE — LETTER
1/8/2024         RE: Emma Rudolph  [address] [facility] [address]        Dear Colleague,    Thank you for referring your patient, Emma Rudolph, to the [facility]. Please see a copy of my visit note below.    [facility]    Hematology/Oncology Established Patient Note      Today's Date: 1/8/2023    Reason for follow-up: Atypical ductal hyperplasia.    HISTORY OF PRESENT ILLNESS: Emma Rudolph is a 73 year old female who presents with the following issues:  1. 9/18/2020: Mammogram showed architectural distortion in left breast at 11:00-1:00 retroareolar mid-depth. No concerning findings in right breast.  2. 9/24/2020: Left breast U/S showed oval hypoechoic mass measuring 1.1 cm at 11:00, 3 cm from nipple; oval hypoechoic mass measuring 1 cm at 12:00, 3 cm from nipple; normal-appearing parenchyma between 2 lesions. No enlarged lymph nodes in left axilla.  3. 10/2/2020: U/S-guided left breast needles biopsy at 12:00 showed a complex sclerosing lesion, negative for atypica and malignancy. Biopsy at 11:00 showed benign breast tissue with adenosis, negative for atypica and malignancy.  4. 10/28/2020: Underwent left breast excision under care of Dr. Eileen Montes. Pathology was negative for malignancy but showed atypical ductal hyperplasia, apocrine metaplasia, papillomatosis, microcalcifications.  5. 11/18/2020: Started raloxifene.    INTERIM HISTORY:  Pat reports feeling overall well with no new breast issues.    REVIEW OF SYSTEMS:   14 point ROS was reviewed and is negative other than as noted above in HPI.       HOME MEDICATIONS:  Current Outpatient Medications   Medication Sig Dispense Refill     amLODIPine (NORVASC) 2.5 MG tablet Take 1 tablet (2.5 mg) by mouth daily 90 tablet 2     atorvastatin (LIPITOR) 20 MG tablet Take 1 tablet (20 mg) by mouth daily 90 tablet 4     Cholecalciferol (VITAMIN D) 1000 UNITS capsule Take 1 capsule by mouth  daily.       desvenlafaxine (PRISTIQ) 100 MG 24 hr tablet Take 1 tablet (100 mg) by mouth daily 90 tablet 4     lisinopril (ZESTRIL) 40 MG tablet TAKE 1 TABLET DAILY 90 tablet 2     Multiple Vitamins-Minerals (MULTIVITAMIN OR) Take by mouth daily       raloxifene (EVISTA) 60 MG tablet TAKE 1 TABLET DAILY 90 tablet 3         ALLERGIES:  Allergies   Allergen Reactions     Penicillins Swelling, Rash and Hives     Amoxicillin     Sulfa Antibiotics Hives         PAST MEDICAL HISTORY:  Past Medical History:   Diagnosis Date     Breast lesion     LEFT     Generalized anxiety disorder      Hyperlipidemia LDL goal <130      Hypertension      Mild major depression (H24)      Osteoarthritis of multiple joints, unspecified osteoarthritis type of #2&3 fingers bilat R>L      Vitamin D deficiency disease      Gynecologic history:  Age of menarche at 13-14; G0, no HRT or OCP use.      PAST SURGICAL HISTORY:  Past Surgical History:   Procedure Laterality Date     BIOPSY BREAST SEED LOCALIZATION Left 10/28/2020    Procedure: 2 SEED LOCALIZED LEFT BREAST BIOPSY;  Surgeon: Eileen Montes MD;  Location:  OR     BREAST SURGERY  2020     COLONOSCOPY       EYE SURGERY Left 11/2020    vitrictomy      GYN SURGERY       H ARGON LASER FOR RETINAL TEAR Left 3/2014     HEAD & NECK SURGERY      wisdom teeth     LAPAROSCOPIC SINGLE SITE SALPINGO-OOPHORECTOMY Bilateral 4/15/2015    Procedure: LAPAROSCOPIC SINGLE SITE SALPINGO-OOPHORECTOMY;  Surgeon: Leonora Zavaleta MD;  [facility]:  [facility]     wisdom teeth removal  age 19         SOCIAL HISTORY:  Social History     Socioeconomic History     Marital status: Single     Spouse name: Not on file     Number of children: Not on file     Years of education: Not on file     Highest education level: Not on file   Occupational History     Employer: RETIRED     Comment: [employer]   Tobacco Use     Smoking status: Never     Smokeless tobacco: Never   Vaping Use     Vaping Use: Never used   Substance  and Sexual Activity     Alcohol use: Yes     Comment: infrequent     Drug use: No     Sexual activity: Not Currently     Partners: Female   Other Topics Concern     Parent/sibling w/ CABG, MI or angioplasty before 65F 55M? Yes     Comment: father, infarction at age 44      Service Not Asked     Blood Transfusions Not Asked     Caffeine Concern Not Asked     Occupational Exposure Not Asked     Hobby Hazards Not Asked     Sleep Concern Not Asked     Stress Concern Not Asked     Weight Concern Not Asked     Special Diet Not Asked     Back Care Not Asked     Exercise Not Asked     Bike Helmet Not Asked     Seat Belt Yes     Self-Exams Not Asked   Social History Narrative     Not on file     Social Determinants of Health     Financial Resource Strain: Low Risk  (9/20/2023)    Financial Resource Strain      Within the past 12 months, have you or your family members you live with been unable to get utilities (heat, electricity) when it was really needed?: No   Food Insecurity: Low Risk  (9/20/2023)    Food Insecurity      Within the past 12 months, did you worry that your food would run out before you got money to buy more?: No      Within the past 12 months, did the food you bought just not last and you didn t have money to get more?: No   Transportation Needs: Low Risk  (9/20/2023)    Transportation Needs      Within the past 12 months, has lack of transportation kept you from medical appointments, getting your medicines, non-medical meetings or appointments, work, or from getting things that you need?: No   Physical Activity: Not on file   Stress: Not on file   Social Connections: Not on file   Interpersonal Safety: Not on file   Housing Stability: Low Risk  (9/20/2023)    Housing Stability      Do you have housing? : Yes      Are you worried about losing your housing?: No         FAMILY HISTORY:  Family History   Problem Relation Age of Onset     Breast Cancer Mother 80     Neurologic Disorder Father          "Parkinson's     Coronary Artery Disease Father 44        MI     Hyperlipidemia Father      Anxiety Disorder Father      Lipids Brother      Hypertension Brother      Hyperlipidemia Brother      Lipids Brother      Family History Negative Brother      Lipids Sister      Hypertension Sister      Hyperlipidemia Sister      Family History Negative Sister      Family History Negative Sister      Family History Negative Sister      Cerebrovascular Disease Maternal Grandmother 86     Cancer Maternal Grandfather 69        leukemia     Other Cancer Maternal Grandfather         Leukemia     Cancer Paternal Grandfather 69        stomach     Other Cancer Paternal Grandfather         stomach cancer     Diabetes No family hx of      Cancer - colorectal No family hx of      Hypertension No family hx of      Hyperlipidemia No family hx of      Colon Cancer No family hx of      Prostate Cancer No family hx of      Other Cancer No family hx of      Depression No family hx of      Anxiety Disorder No family hx of      Mental Illness No family hx of      Substance Abuse No family hx of      Anesthesia Reaction No family hx of      Asthma No family hx of      Osteoporosis No family hx of      Genetic Disorder No family hx of      Thyroid Disease No family hx of      Obesity No family hx of      Unknown/Adopted No family hx of          PHYSICAL EXAM:  Vital signs:  /79   Pulse 109   Resp 16   Ht 1.626 m (5' 4\")   Wt 73.8 kg (162 lb 12.8 oz)   LMP  (LMP Unknown)   SpO2 98%   BMI 27.94 kg/m    GENERAL/CONSTITUTIONAL: No acute distress.  EYES: No erythema or scleral icterus.  LYMPH: No cervical, supraclavicular, axillary, epitroclear adenopathy.   BREAST: No palpable masses in either breast; nipples everted with no discharge; no rash or ulceration. Slight concavity at the left breast prior excision site.  RESPIRATORY: No audible cough or wheezing.   MUSCULOSKELETAL: Warm and well-perfused, no cyanosis, clubbing, or " edema.  NEUROLOGIC: Alert, oriented, answers questions appropriately.  INTEGUMENTARY: No rashes or jaundice.  GAIT: Steady, does not use assistive device    LABS:  CBC RESULTS: Recent Labs   Lab Test 05/24/23  2154   WBC 7.5   RBC 4.47   HGB 14.3   HCT 42.4   MCV 95   MCH 32.0   MCHC 33.7   RDW 12.2          Last Comprehensive Metabolic Panel:  Sodium   Date Value Ref Range Status   09/20/2023 140 136 - 145 mmol/L Final   08/27/2020 138 133 - 144 mmol/L Final     Potassium   Date Value Ref Range Status   09/20/2023 5.3 3.4 - 5.3 mmol/L Final   10/08/2021 3.7 3.4 - 5.3 mmol/L Final   08/27/2020 3.5 3.4 - 5.3 mmol/L Final     Chloride   Date Value Ref Range Status   09/20/2023 104 98 - 107 mmol/L Final   10/08/2021 107 94 - 109 mmol/L Final   08/27/2020 106 94 - 109 mmol/L Final     Carbon Dioxide   Date Value Ref Range Status   08/27/2020 26 20 - 32 mmol/L Final     Carbon Dioxide (CO2)   Date Value Ref Range Status   09/20/2023 27 22 - 29 mmol/L Final   10/08/2021 25 20 - 32 mmol/L Final     Anion Gap   Date Value Ref Range Status   09/20/2023 9 7 - 15 mmol/L Final   10/08/2021 8 3 - 14 mmol/L Final   08/27/2020 6 3 - 14 mmol/L Final     Glucose   Date Value Ref Range Status   09/20/2023 98 70 - 99 mg/dL Final   10/08/2021 104 (H) 70 - 99 mg/dL Final   08/27/2020 110 (H) 70 - 99 mg/dL Final     Comment:     Fasting specimen     Urea Nitrogen   Date Value Ref Range Status   09/20/2023 14.8 8.0 - 23.0 mg/dL Final   10/08/2021 12 7 - 30 mg/dL Final   08/27/2020 15 7 - 30 mg/dL Final     Creatinine   Date Value Ref Range Status   09/20/2023 0.81 0.51 - 0.95 mg/dL Final   08/27/2020 0.81 0.52 - 1.04 mg/dL Final     GFR Estimate   Date Value Ref Range Status   09/20/2023 77 >60 mL/min/1.73m2 Final   03/11/2021 71 >60 mL/min/[1.73_m2] Final     Calcium   Date Value Ref Range Status   09/20/2023 9.6 8.8 - 10.2 mg/dL Final   08/27/2020 9.4 8.5 - 10.1 mg/dL Final     Bilirubin Total   Date Value Ref Range Status  "  2023 0.8 <=1.2 mg/dL Final   2020 1.3 0.2 - 1.3 mg/dL Final     Alkaline Phosphatase   Date Value Ref Range Status   2023 105 (H) 35 - 104 U/L Final   2020 122 40 - 150 U/L Final     ALT   Date Value Ref Range Status   2023 25 10 - 35 U/L Final   2020 65 (H) 0 - 50 U/L Final     AST   Date Value Ref Range Status   2023 33 10 - 35 U/L Final   2020 35 0 - 45 U/L Final         PATHOLOGY:  None new.    IMAGIN: Mammogram showed no malignant findings.    2023: Breast MRI showed no concerning findings.    ASSESSMENT/PLAN:  Emma Rudolph is a 73 year old female with the following issues:  1. Left breast atypical ductal hyperplasia  -Pat is aware that atypical ductal hyperplasia is considered a marker for increased risk of developing invasive breast cancer, approximately ranging 11-40%, 5-15 years from time of diagnosis of ADH.  She is aware this is not a cancer.  -She is tolerating raloxifene well for risk reduction. Advised total 5 years of raloxifene, through 2025.  -She has no breast abnormalities on physical exam today or mammogram reviewed from 2023.  -Continue high risk surveillance with clinical breast exam and breast imaging every 6 months with yearly mammograms alternating with yearly breast MRI.  -Due for breast MRI 2024. Will arrange.    2. Anxiety  -Mood stable.  -Continue desvenlafaxine.    Return in 6 months.    Priya Ontiveros MD  Hematology/Oncology  [facility]    Total time spent today: 20 minutes in chart review, patient evaluation, counseling, documentation, test and/or medication/prescription orders, and coordination of care.    Oncology Rooming Note    2024 10:30 AM   Emma Rudolph is a 73 year old female who presents for:    Chief Complaint   Patient presents with     Oncology Clinic Visit     Initial Vitals: /79   Pulse 109   Resp 16   Ht 1.626 m (5' 4\")   Wt 73.8 kg " "(162 lb 12.8 oz)   LMP  (LMP Unknown)   BMI 27.94 kg/m   Estimated body mass index is 27.94 kg/m  as calculated from the following:    Height as of this encounter: 1.626 m (5' 4\").    Weight as of this encounter: 73.8 kg (162 lb 12.8 oz). Body surface area is 1.83 meters squared.  No Pain (0) Comment: Data Unavailable   No LMP recorded (lmp unknown). Patient is postmenopausal.  Allergies reviewed: Yes  Medications reviewed: Yes    Medications: Medication refills not needed today.  Pharmacy name entered into EPIC:    [facility] - [facility], [place] - [address] [place] [address] [id number] - [place], [place] - [address] AT [facility] [address]    Frailty Screening:   Is the patient here for a new oncology consult visit in cancer care? 2. No        Lise Barnes MA              Again, thank you for allowing me to participate in the care of your patient.        Sincerely,        Priya Ontiveros MD  "

## 2024-01-08 NOTE — PROGRESS NOTES
"Oncology Rooming Note    January 8, 2024 10:30 AM   Emma Rudolph is a 73 year old female who presents for:    Chief Complaint   Patient presents with    Oncology Clinic Visit     Initial Vitals: /79   Pulse 109   Resp 16   Ht 1.626 m (5' 4\")   Wt 73.8 kg (162 lb 12.8 oz)   LMP  (LMP Unknown)   BMI 27.94 kg/m   Estimated body mass index is 27.94 kg/m  as calculated from the following:    Height as of this encounter: 1.626 m (5' 4\").    Weight as of this encounter: 73.8 kg (162 lb 12.8 oz). Body surface area is 1.83 meters squared.  No Pain (0) Comment: Data Unavailable   No LMP recorded (lmp unknown). Patient is postmenopausal.  Allergies reviewed: Yes  Medications reviewed: Yes    Medications: Medication refills not needed today.  Pharmacy name entered into EPIC:    [email or web address] - [facility], [place] - [address] [place] [address] [facility] ST[facility] #[id number] - [facility] [address] AT [facility] [address]    Frailty Screening:   Is the patient here for a new oncology consult visit in cancer care? 2. No        Lise Barnes MA            "

## 2024-01-09 ENCOUNTER — THERAPY VISIT (OUTPATIENT)
Dept: PHYSICAL THERAPY | Facility: CLINIC | Age: 74
End: 2024-01-09
Payer: COMMERCIAL

## 2024-01-09 DIAGNOSIS — M25.552 HIP PAIN, LEFT: Primary | ICD-10-CM

## 2024-01-09 PROCEDURE — 97110 THERAPEUTIC EXERCISES: CPT | Mod: GP | Performed by: PHYSICAL THERAPIST

## 2024-01-09 NOTE — TELEPHONE ENCOUNTER
"Please see  message and advise.     Per chart review- amLODIPine (NORVASC) 2.5 MG tablet Route: Take 1 tablet (2.5 mg) by mouth daily - Oral was sent to pharmacy on 12/21/23.     Pt reports \"Amlodipine Besylate 2.25mg which I have been taking two a day.\"    "

## 2024-01-10 ENCOUNTER — TELEPHONE (OUTPATIENT)
Dept: INTERNAL MEDICINE | Facility: CLINIC | Age: 74
End: 2024-01-10
Payer: COMMERCIAL

## 2024-01-10 RX ORDER — AMLODIPINE BESYLATE 2.5 MG/1
2.5 TABLET ORAL 2 TIMES DAILY
Qty: 180 TABLET | Refills: 2 | Status: SHIPPED | OUTPATIENT
Start: 2024-01-10 | End: 2024-01-17

## 2024-01-10 NOTE — TELEPHONE ENCOUNTER
Patient calling regarding this request she states she though she was told about 3 months ago to be taking 2.5mg amlodipine twice a day     She would like whatever dose she is [facility]    Ronnie Villagomez RN

## 2024-01-10 NOTE — TELEPHONE ENCOUNTER
I don't see any notes indicating she ought to be on BID dosing. What are her BP doing on the BID dosing? If they are adequately controlled, fine to continue.

## 2024-01-12 ENCOUNTER — OFFICE VISIT (OUTPATIENT)
Dept: ORTHOPEDICS | Facility: CLINIC | Age: 74
End: 2024-01-12
Payer: COMMERCIAL

## 2024-01-12 DIAGNOSIS — M16.12 OSTEOARTHRITIS OF ONE HIP, LEFT: Primary | ICD-10-CM

## 2024-01-12 PROCEDURE — 20611 DRAIN/INJ JOINT/BURSA W/US: CPT | Mod: LT | Performed by: PHYSICIAN ASSISTANT

## 2024-01-12 PROCEDURE — 99207 PR DROP WITH A PROCEDURE: CPT | Performed by: PHYSICIAN ASSISTANT

## 2024-01-12 RX ORDER — TRIAMCINOLONE ACETONIDE 40 MG/ML
40 INJECTION, SUSPENSION INTRA-ARTICULAR; INTRAMUSCULAR
Status: SHIPPED | OUTPATIENT
Start: 2024-01-12

## 2024-01-12 RX ORDER — LIDOCAINE HYDROCHLORIDE 10 MG/ML
4 INJECTION, SOLUTION EPIDURAL; INFILTRATION; INTRACAUDAL; PERINEURAL
Status: SHIPPED | OUTPATIENT
Start: 2024-01-12

## 2024-01-12 RX ADMIN — LIDOCAINE HYDROCHLORIDE 4 ML: 10 INJECTION, SOLUTION EPIDURAL; INFILTRATION; INTRACAUDAL; PERINEURAL at 11:15

## 2024-01-12 RX ADMIN — TRIAMCINOLONE ACETONIDE 40 MG: 40 INJECTION, SUSPENSION INTRA-ARTICULAR; INTRAMUSCULAR at 11:15

## 2024-01-12 NOTE — PROGRESS NOTES
[facility]  Orthopaedic Surgery  by Brian Yepez PA-C    Emma Rudolph MRN# [id number]    YOB: 1950               Clinical History:   73 year old female with history of left hip degenerative joint disease who presents my clinic today for a left hip intra-articular steroid injection per referral from Dr. Banegas.          Procedure:   The injection was performed using sterile technique.  Under ultrasound guidance a 3.5 inch 22-gauge needle was used to enter the femoracetabular joint.  Anterior approach was used, needle placement was visualized and documented with ultrasound.  Ultrasound was necessary due to decreased joint space in the setting of osteoarthritis and to avoid vital adjacent structures.  Injection performed long axis to the probe  Injection solution visualized within the joint space.  Images were permanently stored for the patient's record.    There were complications.  The patient tolerated the procedure well.  There was negligible bleeding     Large Joint Injection/Arthocentesis: L hip joint    Date/Time: 1/12/2024 11:15 AM    Performed by: Brian Yepez PA-C  Authorized by: Brian Yepez PA-C    Indications:  Pain  Needle Size:  22 G  Guidance: ultrasound    Approach:  Anterior  Location:  Hip      Site:  L hip joint  Medications:  4 mL lidocaine (PF) 1 %; 40 mg triamcinolone 40 MG/ML  Aspirate analysis: sent for lab analysis    Outcome:  Tolerated well, no immediate complications  Procedure discussed: discussed risks, benefits, and alternatives    Consent Given by:  Patient  Timeout: timeout called immediately prior to procedure    Prep: patient was prepped and draped in usual sterile fashion              Assessment and Plan:   Assessment:  73-year-old female with history of left hip degenerative joint disease who presents to clinic today for left hip intra-articular steroid injection.  She tolerated procedure well.     Plan:  After consent was given  the procedure was performed as above.  The patient tolerated it well.  A Band-Aid was replaced which the patient can remove this evening and start showering tomorrrow.  I instructed them to watch for signs of infection including erythema, discharge and increased pain.  If they have any concerns I instructed them to call.  All questions were answered and the patient was in agreement the plan.     Brian Yepez PA-C  Physician Assistant   Oncology and Adult Reconstructive Surgery  Dept Orthopaedic Surgery, [facility]

## 2024-01-12 NOTE — LETTER
1/12/2024         RE: Emma Rudolph  [address]  [facility] [address]        Dear Colleague,    Thank you for referring your patient, Emma Rudolph, to the [facility] [place]. Please see a copy of my visit note below.        [facility]  Orthopaedic Surgery  by Brian Yepez PA-C    Emma Rudolph MRN# [id number]    YOB: 1950               Clinical History:   73 year old female with history of left hip degenerative joint disease who presents my clinic today for a left hip intra-articular steroid injection per referral from Dr. Banegas.          Procedure:   The injection was performed using sterile technique.  Under ultrasound guidance a 3.5 inch 22-gauge needle was used to enter the femoracetabular joint.  Anterior approach was used, needle placement was visualized and documented with ultrasound.  Ultrasound was necessary due to decreased joint space in the setting of osteoarthritis and to avoid vital adjacent structures.  Injection performed long axis to the probe  Injection solution visualized within the joint space.  Images were permanently stored for the patient's record.    There were complications.  The patient tolerated the procedure well.  There was negligible bleeding     Large Joint Injection/Arthocentesis: L hip joint    Date/Time: 1/12/2024 11:15 AM    Performed by: Brian Yepez PA-C  Authorized by: Brian Yepez PA-C    Indications:  Pain  Needle Size:  22 G  Guidance: ultrasound    Approach:  Anterior  Location:  Hip      Site:  L hip joint  Medications:  4 mL lidocaine (PF) 1 %; 40 mg triamcinolone 40 MG/ML  Aspirate analysis: sent for lab analysis    Outcome:  Tolerated well, no immediate complications  Procedure discussed: discussed risks, benefits, and alternatives    Consent Given by:  Patient  Timeout: timeout called immediately prior to procedure    Prep: patient was prepped and draped in usual sterile  fashion              Assessment and Plan:   Assessment:  73-year-old female with history of left hip degenerative joint disease who presents to clinic today for left hip intra-articular steroid injection.  She tolerated procedure well.     Plan:  After consent was given the procedure was performed as above.  The patient tolerated it well.  A Band-Aid was replaced which the patient can remove this evening and start showering tomorrrow.  I instructed them to watch for signs of infection including erythema, discharge and increased pain.  If they have any concerns I instructed them to call.  All questions were answered and the patient was in agreement the plan.     Brian Yepez PA-C  Physician Assistant   Oncology and Adult Reconstructive Surgery  Dept Orthopaedic Surgery, [facility]          Again, thank you for allowing me to participate in the care of your patient.        Sincerely,        Brian Yepez PA-C

## 2024-01-17 ENCOUNTER — VIRTUAL VISIT (OUTPATIENT)
Dept: INTERNAL MEDICINE | Facility: CLINIC | Age: 74
End: 2024-01-17
Payer: COMMERCIAL

## 2024-01-17 DIAGNOSIS — E78.5 HYPERLIPIDEMIA LDL GOAL <100: ICD-10-CM

## 2024-01-17 DIAGNOSIS — I10 ESSENTIAL HYPERTENSION: Primary | ICD-10-CM

## 2024-01-17 DIAGNOSIS — F33.1 MAJOR DEPRESSIVE DISORDER, RECURRENT EPISODE, MODERATE WITH ANXIOUS DISTRESS (H): ICD-10-CM

## 2024-01-17 PROCEDURE — 99214 OFFICE O/P EST MOD 30 MIN: CPT | Mod: 95 | Performed by: INTERNAL MEDICINE

## 2024-01-17 RX ORDER — ATORVASTATIN CALCIUM 20 MG/1
20 TABLET, FILM COATED ORAL DAILY
Qty: 90 TABLET | Refills: 4 | Status: SHIPPED | OUTPATIENT
Start: 2024-01-17

## 2024-01-17 RX ORDER — BUSPIRONE HYDROCHLORIDE 10 MG/1
10 TABLET ORAL 3 TIMES DAILY PRN
Qty: 180 TABLET | Refills: 1 | Status: SHIPPED | OUTPATIENT
Start: 2024-01-17 | End: 2024-07-08

## 2024-01-17 RX ORDER — LISINOPRIL 40 MG/1
40 TABLET ORAL DAILY
Qty: 90 TABLET | Refills: 4 | Status: SHIPPED | OUTPATIENT
Start: 2024-01-17

## 2024-01-17 RX ORDER — AMLODIPINE BESYLATE 5 MG/1
5 TABLET ORAL DAILY
Qty: 90 TABLET | Refills: 4 | Status: SHIPPED | OUTPATIENT
Start: 2024-01-17

## 2024-01-17 RX ORDER — DESVENLAFAXINE 100 MG/1
100 TABLET, EXTENDED RELEASE ORAL DAILY
Qty: 90 TABLET | Refills: 4 | Status: SHIPPED | OUTPATIENT
Start: 2024-01-17

## 2024-01-17 NOTE — PROGRESS NOTES
Celi is a 73 year old who is being evaluated via a billable video visit.      How would you like to obtain your AVS? MyChart  If the video visit is dropped, the invitation should be resent by: Text to cell phone: [phone]  Will anyone else be joining your video visit? No    Assessment & Plan   Essential hypertension  Home BP currently 120s/70s. Refilled chronic medications at current doses.  - amLODIPine (NORVASC) 5 MG tablet; Take 1 tablet (5 mg) by mouth daily  - lisinopril (ZESTRIL) 40 MG tablet; Take 1 tablet (40 mg) by mouth daily    Hyperlipidemia LDL goal <100  Refilled chronic medication at current dose.  - atorvastatin (LIPITOR) 20 MG tablet; Take 1 tablet (20 mg) by mouth daily    Major depressive disorder, recurrent episode, moderate with anxious distress (H)  Reports ongoing anxiety. Has been on Pristiq for years. We will start Buspar (discussed this can either be a scheduled daily medication or she can try it PRN up to TID). Continue Pristiq. If no improvement in her mood on Buspar + Pristiq, then will consider weaning her off Pristiq and onto a different daily medication.  - desvenlafaxine (PRISTIQ) 100 MG 24 hr tablet; Take 1 tablet (100 mg) by mouth daily  - busPIRone (BUSPAR) 10 MG tablet; Take 1 tablet (10 mg) by mouth 3 times daily as needed (anxiety)    Flynn Gibbons MD, MPH  [facility]  Internal Medicine    Subjective   Celi is a 73 year old who presents for a next day acute care virtual video visit with chief concern of: med check. Reports her pharmacy is changing, needs new scripts sent. Home BP in 120s/70s. Tolerating meds well. Reports anxiety is still ongoing.      Objective       Vitals: No vitals were obtained today due to virtual visit.    Physical Exam   GENERAL: alert and no distress  EYES: Eyes grossly normal to inspection.  No discharge or erythema, or obvious scleral/conjunctival abnormalities.  RESP: No audible wheeze, cough, or visible  cyanosis.    SKIN: Visible skin clear. No significant rash, abnormal pigmentation or lesions.  NEURO: Cranial nerves grossly intact.  Mentation and speech appropriate for age.  PSYCH: Appropriate affect, tone, and pace of words    Video-Visit Details  Type of service: Video Visit   Video Start Time: 12:40 PM  Video End Time: 12:47 PM  Originating Location (pt. Location): Home  Distant Location (provider location):  On-site  Platform used for Video Visit: Johnny

## 2024-01-23 ENCOUNTER — THERAPY VISIT (OUTPATIENT)
Dept: PHYSICAL THERAPY | Facility: CLINIC | Age: 74
End: 2024-01-23
Payer: COMMERCIAL

## 2024-01-23 DIAGNOSIS — M25.552 HIP PAIN, LEFT: Primary | ICD-10-CM

## 2024-01-23 PROCEDURE — 97110 THERAPEUTIC EXERCISES: CPT | Mod: GP | Performed by: PHYSICAL THERAPIST

## 2024-01-30 ENCOUNTER — THERAPY VISIT (OUTPATIENT)
Dept: PHYSICAL THERAPY | Facility: CLINIC | Age: 74
End: 2024-01-30
Payer: COMMERCIAL

## 2024-01-30 DIAGNOSIS — M25.552 HIP PAIN, LEFT: Primary | ICD-10-CM

## 2024-01-30 PROCEDURE — 97110 THERAPEUTIC EXERCISES: CPT | Mod: GP | Performed by: PHYSICAL THERAPIST

## 2024-01-30 NOTE — PROGRESS NOTES
[facility]                                                                                   OUTPATIENT PHYSICAL THERAPY    PLAN OF TREATMENT FOR OUTPATIENT REHABILITATION   Patient's Last Name, First Name, Emma Andersen YOB: 1950   Provider's Name   [facility]   Medical Record No.  [id number]     Onset Date: 02/01/23  Start of Care Date: 12/04/23     Medical Diagnosis:  Osteoarthritis of one hip, left  Subacromial bursitis of left shoulder joint      PT Treatment Diagnosis:  L hip pain and L shoulder pain Plan of Treatment  Frequency/Duration: every other week for 4 weeks/ 4 weeks    Certification date from 02/27/24 to           See note for plan of treatment details and functional goals     Madeleine Suggs PT                         I CERTIFY THE NEED FOR THESE SERVICES FURNISHED UNDER        THIS PLAN OF TREATMENT AND WHILE UNDER MY CARE     (Physician attestation of this document indicates review and certification of the therapy plan).              Referring Provider:  Favio Banegas    Initial Assessment  See Epic Evaluation- Start of Care Date: 12/04/23            PLAN  Pt has completed 6 sessions of PT. Pt underwent cortisone injection to the L hip on 1-12-24. Pt reported overall improvement in hip sx's following hip injection. Pt is progressing towards goals established at IE. To continue with 2 additional visits to review pt's ability with follow through.   01/30/24 0500   Appointment Info   Signing clinician's name / credentials Madeleine Suggs PT   Total/Authorized Visits 8   Visits Used 6   Medical Diagnosis Osteoarthritis of one hip, left  Subacromial bursitis of left shoulder joint   PT Tx Diagnosis L hip pain and L shoulder pain   Quick Adds Certification   Progress Note/Certification   Start of Care Date 12/04/23   Onset of illness/injury or Date of Surgery 02/01/23   Therapy Frequency every other week  "for 4 weeks   Predicted Duration 4 weeks   Certification date from 02/27/24   GOALS   PT Goals 2;3   PT Goal 1   Goal Identifier HEP   Goal Description Patient will be consistent and independent with HEP in order to achieve functional goals.   Rationale to maximize safety and independence with performance of ADLs and functional tasks;to maximize safety and independence within the home;to maximize safety and independence within the community;to maximize safety and independence with self cares   Goal Progress progressing   Target Date 01/01/24   PT Goal 2   Goal Identifier ROM   Goal Description Patient will demonstrate ability to reach with L UE to countertop and behind her back without symptoms and symmetrical movement to R shoulder.   Rationale to maximize safety and independence with performance of ADLs and functional tasks;to maximize safety and independence with self cares   Goal Progress progressing   Target Date 01/29/24   PT Goal 3   Goal Identifier Walking   Goal Description Patient will tolerate walking at least 30 minutes on paved surface without increased L hip symptoms.   Rationale to maximize safety and independence within the home;to maximize safety and independence within the community;to maximize safety and independence with transportation   Goal Progress has not attempted; ongoing   Target Date 01/29/24   Subjective Report   Subjective Report Reports less stiffness of the L hip since last week.   Objective Measures   Objective Measures Objective Measure 1;Objective Measure 2;Objective Measure 3;Objective Measure 4   Objective Measure 1   Objective Measure PROM   Objective Measure 2   Objective Measure Special tests   Objective Measure 3   Objective Measure MMT   Objective Measure 4   Objective Measure Functional strength/Step-ups   Details Able to ascend/descend stairs with a reciprocal gait. Able to perform step-up to 7\" height on the L with functional control and without pain provocation. " "  Therapeutic Procedure/Exercise   Therapeutic Procedures: strength, endurance, ROM, flexibillity minutes (55890) 40   Therapeutic Procedures Ther Proc 3   Ther Proc 1 Leg press   Ther Proc 1 - Details Reviewed; seat D; 75# x10 B; discussed reps/sets for strength building. Verbal review of hip abduction machine.   Ther Proc 2 Discussed cardio options.   Ther Proc 2 - Details Reported she has not yet returned to her HC. Advised to return prior to next visit to discuss tolerance to activity.   PTRx Ther Proc 1 - Details HEP: 2x10 L side  (mild provocation of L groin sx's during-advised to monitor-stop if sx provocation increases with reps)   PTRx Ther Proc 2 Standing Hip Flexor Stretch   PTRx Ther Proc 2 - Details Reviewed in standing; no discomfort during-reports of discomfort following stretch. Trial in supine-improved tolerance. Will trial at home.   PTRx Ther Proc 3 Clamshell with Theraband   PTRx Ther Proc 3 - Details Verbal review. Encouraged 20-30 reps. To continue.   Skilled Intervention reviewed all HEP movements during session, progressed stretching and strengthening L hip program   Patient Response/Progress pt tolerated well overall continues to have discomfort with walking   Ther Proc 3 Step-up   Ther Proc 3 - Details Reviewed to 7\" height. Recommended trial at home, goal 2 sets of 10 reps.   Education   Learner/Method Patient;No Barriers to Learning;Pictures/Video   Education Comments update/modification to HEP, education of self cervical STM   Plan   Home program see PTRx for details   Plan for next session gait related tasks, stairs, step overs   Total Session Time   Timed Code Treatment Minutes 40   Total Treatment Time (sum of timed and untimed services) 40         Beginning/End Dates of Progress Note Reporting Period:  12-4-23  to 01/30/2024    Referring Provider:  Favio Banegas    "

## 2024-03-11 PROBLEM — M25.552 HIP PAIN, LEFT: Status: RESOLVED | Noted: 2021-04-09 | Resolved: 2024-03-11

## 2024-05-06 ENCOUNTER — ANCILLARY PROCEDURE (OUTPATIENT)
Dept: MRI IMAGING | Facility: CLINIC | Age: 74
End: 2024-05-06
Attending: INTERNAL MEDICINE
Payer: COMMERCIAL

## 2024-05-06 DIAGNOSIS — N64.89 OTHER SPECIFIED DISORDERS OF BREAST: ICD-10-CM

## 2024-05-06 DIAGNOSIS — N60.92 ATYPICAL DUCTAL HYPERPLASIA OF LEFT BREAST: ICD-10-CM

## 2024-05-06 PROCEDURE — 77049 MRI BREAST C-+ W/CAD BI: CPT

## 2024-05-06 PROCEDURE — 255N000002 HC RX 255 OP 636: Performed by: INTERNAL MEDICINE

## 2024-05-06 PROCEDURE — A9585 GADOBUTROL INJECTION: HCPCS | Performed by: INTERNAL MEDICINE

## 2024-05-06 RX ORDER — GADOBUTROL 604.72 MG/ML
8 INJECTION INTRAVENOUS ONCE
Status: COMPLETED | OUTPATIENT
Start: 2024-05-06 | End: 2024-05-06

## 2024-05-06 RX ADMIN — GADOBUTROL 8 ML: 604.72 INJECTION INTRAVENOUS at 13:34

## 2024-05-17 ENCOUNTER — TELEPHONE (OUTPATIENT)
Dept: NEUROLOGY | Facility: CLINIC | Age: 74
End: 2024-05-17
Payer: COMMERCIAL

## 2024-05-17 NOTE — TELEPHONE ENCOUNTER
Patient agreed to date change for follow up appointment with Dr. Lagos. Patient moved to July 11th at 3:30p

## 2024-06-18 ENCOUNTER — OFFICE VISIT (OUTPATIENT)
Dept: NEUROPSYCHOLOGY | Facility: CLINIC | Age: 74
End: 2024-06-18
Payer: COMMERCIAL

## 2024-06-18 DIAGNOSIS — G31.84 MILD COGNITIVE IMPAIRMENT: Primary | ICD-10-CM

## 2024-06-18 PROCEDURE — 96132 NRPSYC TST EVAL PHYS/QHP 1ST: CPT

## 2024-06-18 PROCEDURE — 96139 PSYCL/NRPSYC TST TECH EA: CPT

## 2024-06-18 PROCEDURE — 96138 PSYCL/NRPSYC TECH 1ST: CPT

## 2024-06-18 PROCEDURE — 96133 NRPSYC TST EVAL PHYS/QHP EA: CPT

## 2024-06-18 PROCEDURE — 90791 PSYCH DIAGNOSTIC EVALUATION: CPT

## 2024-06-18 NOTE — NURSING NOTE
Pt was seen for neuropsychological evaluation at the request of Dr. Johnathan Lagos for the purposes of diagnostic clarification and treatment planning. 203 minutes of test administration and scoring were provided by this writer. Please see Dr. Bridget Norman's report for a full interpretation of the findings.    Jey Bustamante MA, CSP

## 2024-06-18 NOTE — LETTER
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 Information was obtained via interview with the patient and review of her medical records. Records indicate a history of hyperlipidemia, hypertension, generalized anxiety disorder, and depression.  She was seen in Neurology on 12/5/2023 with concerns regarding short-term memory loss.  A MoCA on that date was 24/30, with 5 points being lost on the delayed recall section.  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  A subsequent MRI of the brain on 12/26/2023 was notable for moderate generalized cerebral atrophy with perhaps a slight predilection for the parietal lobes. She was referred for neuropsychological evaluation by Stefan Gilbert M.D., for further characterization of any cognitive difficulties, and to evaluate mood.     During the interview, the nature of the neuropsychological evaluation was also discussed, including limits of confidentiality (for suspected child or elder abuse, potential homicide or suicide, and court orders). Additionally, the patient was informed that the report would be placed in the electronic medical records system. The patient was given an opportunity to ask questions. The patient indicated an understanding of the information and consented to participate in the evaluation.     CLINICAL INTERVIEW FINDINGS    Upon interview, Ms. Rudolph stated that she has noticed at most very subtle difficulty with memory.  She stated that she had been working with a physical therapist after injuring her foot and shoulder in a fall down some stairs.  During the physical therapy appointment, they asked questions, and when she responded something about an injury to her right arm, they reportedly looked at her \"in horror,\" and asked her to go out into " the hallway where someone gave her a dementia screening measure.  She described the MoCA, and stated that she felt she did poorly because there were people all around.  She recalls not being able to remember all 5 words.  She has otherwise, however, not noticed significant cognitive difficulties.  She gets together with a group of people weekly and they discuss [place] history, and she finds that she has to write information down in order to remember it.  She has not become lost in familiar places, nor is she misplacing items anymore than normal.  She is not noticing difficulty with word finding, comprehension, attention or concentration, or decision-making.    Ms. Gerard stated that she lives by herself.  She manages her own finances, medications, driving, and cooking, primarily using the microwave.  She handles her personal cares independently.    Ms. Rudolph stated that she has been prescribed medications for generalized anxiety disorder since her 30s.  She does not think she has experienced depression.  She has not worked with a psychologist, nor has she been hospitalized for psychiatric care.  When asked to describe her mood, she stated that it is good.  She stated that she is not depressed or anxious, and that she is not a worrier.  She has been no more irritable than normal and has not been crying any more than normal.  She has been sleeping well, 8 to 10 hours a night.  She feels rested in the morning and occasionally naps for an hour during the day, which is refreshing.  Her appetite has been strong and she likes eating candy and sweets.  She stated that she has gained weight through the pandemic.  Her energy level is good.  She exercises on the treadmill for an hour 3 times a week, and does yoga once a week.  Her interest level is good.  She enjoys doing [employer] research.  She works at two different [employer], has coffee with her friends once a week, and sees her brother frequently.  She denies  suicidal ideation or any history of suicide attempts.  She has not had visual or auditory hallucinations.  She has one alcoholic beverage every couple of months or so.  There was never a period of time where she drank more than that.  She denied illicit drug use or tobacco use.    Ms. Rudolph completed a Bachelor of Science degree in Education, and then took more classes at [employer].  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  She has never been  and has no children.    Ms. Rudolph denied any history of seizure, stroke, or head injury resulting loss of consciousness.  Her balance has been good.  She had 1 fall where she slid down the stairs when her right foot went out.  She noted that she was carrying something, and that the stairs were carpeted and she may have just stepped wrong.  She denied unilateral weakness or numbness.  She has not had tremor.  She is not bothered by headaches or other aches or pains.    Past Medical History  Past Medical History:   Diagnosis Date     Breast lesion     LEFT     Generalized anxiety disorder      Hyperlipidemia LDL goal <130      Hypertension      Mild major depression (H24)      Osteoarthritis of multiple joints, unspecified osteoarthritis type of #2&3 fingers bilat R>L      Vitamin D deficiency disease      Current Medications    She has a current medication list which includes the following prescription(s): amlodipine, atorvastatin, buspirone, vitamin d, desvenlafaxine, lisinopril, multiple vitamin, and raloxifene, and the following Facility-Administered Medications: bupivacaine hcl (pf), bupivacaine hcl (pf), lidocaine (pf), lidocaine, lidocaine, triamcinolone, triamcinolone, and triamcinolone.      Family History  Family History   Problem Relation Age of Onset     Breast Cancer Mother 80     Neurologic Disorder Father         Parkinson's     Coronary Artery Disease Father 44        MI     Hyperlipidemia  Father      Anxiety Disorder Father      Lipids Brother      Hypertension Brother      Hyperlipidemia Brother      Lipids Brother      Family History Negative Brother      Lipids Sister      Hypertension Sister      Hyperlipidemia Sister      Family History Negative Sister      Family History Negative Sister      Family History Negative Sister      Cerebrovascular Disease Maternal Grandmother 86     Cancer Maternal Grandfather 69        leukemia     Other Cancer Maternal Grandfather         Leukemia     Cancer Paternal Grandfather 69        stomach     Other Cancer Paternal Grandfather         stomach cancer       BEHAVIORAL OBSERVATIONS    During the evaluation, she was pleasant, cooperative, and seemed to understand the instructions. She was alert and oriented to person and place, but not to time, stating that it was June 18, 2015. She could not recall the current president of [place], although she accurately named the president before him. Mood was neutral. No abnormal movements were observed clinically. Speech was fluent, with normal articulation, volume, and rate. Internal performance validity measures fell within normal limits. The results are believed to accurately reflect her current level of functioning.     MEASURES ADMINISTERED    The following measures were administered by a trained psychometrist, under the direct supervision of a licensed psychologist.    Subtests of the Wechsler Adult Intelligence Scale - 4; Reading subtest of the Wide Range Achievement Test - 4; subtests of the Wechsler Memory Scale - 4; Daniel Auditory Verbal Learning Test; Daniel-Osterrieth Complex Figure; [place] Naming Test; Controlled Oral Word Association Test; Semantic Fluency; Clock Drawing; Trail Making Test; Stroop; Elkins Judgment of Line Orientation; [place] Card Sorting Test; Dementia Rating Scale - 2 (DRS-2); Geriatric Depression Scale (GDS).    RESULTS AND INTERPRETATION    Overall intellectual functioning was  estimated to fall in the average range, generally consistent with premorbid estimates of high average based on single word reading abilities.  Performance on a screening measure of dementia was low average (DRS-2 Total Score = 127/144).  On this measure, she had particular difficulty on tasks of memory.    Confrontation naming was average for her age and level of education.  Expressive vocabulary was high average for her age.  Letter fluency was below average.  Generative naming to category was low average for her age and level of education.    Attention span was average for her age.  Divided attention was average.  Performance on a measure of distractibility was average.  Psychomotor processing speed was average.    Basic visual perception, including matching lines and angles, was average for her age and level of education.  Construction of a clock fell within normal limits.  Construction of a complex design was below average, and was notable for slightly disjointed placement of details, which were drawn quickly.  Assembly of visual material was average.    Novel problem-solving, including the ability to generate strategies and solutions, fell within normal limits for her age and level of education.    Immediate recall of verbal narrative material was average, with exceptionally low recall following a 30-minute delay.  Recognition memory on this task was low average.  On a multiple trial list learning task, immediate recall was exceptionally low, with exceptionally low recall following a 30-minute delay.  Recognition memory on this task was exceptionally low.  Immediate recall of visual material was below average, with exceptionally low recall following a 30-minute delay.  Recognition memory on this task was below average.    On the GDS, a self-report questionnaire, she endorsed minimal depressive symptomatology.    IMPRESSIONS     Emma Rudolph is a 73 year old woman with a history of memory loss. At a  Neurology clinic visit in December 2023, there was concern for amnestic MCI.     Results of today's evaluation indicate impairments in learning and memory, with marginal benefit from cueing.  Information processing speed is slowed.  Performance otherwise falls within normal limits across cognitive domains, including language, visual processing, complex attention, and executive functioning.  She does not endorse significant depressive symptomatology.    This pattern of performance is suggestive of medial temporal lobe involvement.  No collateral informants were available for interview, but based on the available information, she lives by herself and is managing her instrumental activities of daily living independently.  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  The etiology of her cognitive difficulties is not clear based on neuropsychological evaluation alone.  Taken together with her history, however, a neurodegenerative etiology such as Alzheimer's disease should be considered. She has a history of depression and anxiety, but does not endorse current symptoms of either, and these are unlikely to significantly contribute to her cognitive profile.    RECOMMENDATIONS    1) In terms of daily functioning, Ms. Rudolph may require increasing assistance in the management of her instrumental activities of daily living. If possible, it may be helpful to monitor her medications to ensure that she is remembering to take them correctly. She may benefit from the use of a pillbox if not already considered.     2) She lives by herself, and it may be reasonable to consider an occupational therapy evaluation to assist with assessing for safety and ability to manage her iADLs.     3) She may benefit from the use of written reminders or checklists. She may also find it helpful to use a smart phone or similar device for alarms and reminders.     4) Results may serve as a baseline of her  neurocognitive functioning. It may be helpful to follow her over time. Repeated neuropsychological evaluation in one year may help to determine whether her cognitive difficulties are progressive.           Bridget Norman, Ph.D., [facility]  Licensed Psychologist, [employer] [id number]  Board Certified in Clinical Neuropsychology      Time spent: One unit of professional time, including interview (CPT 46686). 60 minutes (1 unit) neuropsychological testing evaluation by licensed and board-certified neuropsychologist, including integration of patient data, interpretation of standardized test results and clinical data, clinical decision-making, treatment planning, report, and interactive feedback to the patient, first hour (CPT 18214). 116 minutes (2 units) of neuropsychological testing evaluation by licensed and board-certified neuropsychologist, including integration of patient data, interpretation of standardized test results and clinical data, clinical decision-making, treatment planning, report, and interactive feedback to the patient, subsequent hours (CPT 77920). 30 minutes of neuropsychological test administration and scoring by technician, first 30 minutes (CPT 48298). 173 additional minutes (6 units) neuropsychological test administration and scoring by technician, subsequent 30 minutes (CPT 87778). ICD-10 Diagnoses: G31.84.      Bridget Norman, PhD LP

## 2024-06-26 NOTE — CONFIDENTIAL NOTE
"NAME  Kate Rudolph    MRN  [id number]      50     AGE  73     SEX  Female     HANDEDNESS Right     EDUCATION 16     BARRERA  24     PROVIDER  [facility]     STATION  OP            WMS-R       Orientation 10            CLOCK DRAWING      Command  2.5     Copy  2.5            MARIYA-O COMPLEX FIGURE        Raw T %ile   Copy  27  2-5   Time to Copy 173\"  >16           WMS-IV LOGICAL MEMORY OA    Raw ScS/%ile     LM I 25 8     LM II 0 1     LM Rec. 16 17-25     VR I  20 5     VR II 0 1     VR Rec. 1 3-9            RAVLT       I II III IV V   3 4 4 5 4   B VI      1 0        Raw T    Trial B (short delay) 0 16    30 Min Recall 0 20    30 Min Recognition 3 23    Intrusions  0     Memory Eff. Score 0.2 19            [place] NAMING TEST     Raw 55 /60     MAS 10      %ile       # Stimulus Cues 0     # Phonemic Cues 3             COWAT   CFL   Raw 21      MAS 4      %ile 0             SEMANTIC FLUENCY      Raw 30      MAS 6      %ile 0             TRAIL MAKING TEST       Time Errors MAS %ile   A 35 0 11    B 87 0 9           STROOP        Raw MAS T    Word 101 12     Color 55 8     C/W 35 11     Interference 0  57            WCST   128     Raw %ile T   # Categories 6 >16    % Perseveative Err. 9 60 84   % Concept Resp. 82 65 93   FTMS:  0            WRAT    5     SS %ile GE   Word Reading 113 81 >12.9          WAIS-IV         Raw ScS    Block Design 33 11    Vocabulary  46 12    Digit Span  56 11    Coding  26 11            DEMENTIA RATING SCALE - 2 STANDARD     Raw MAS    Attention  35 10    Initiation/Persev. 37 12    Construction 6 10    Concept  38 11    Memory  11 2    Total /144  127 6            MADHAV   H   Raw 23      MAS 10             GDS       Raw 2      Interp. MINIMAL        "

## 2024-07-02 NOTE — PROGRESS NOTES
[facility] Cancer Care    Hematology/Oncology Established Patient Note      Today's Date: 7/8/2024    Reason for follow-up: Atypical ductal hyperplasia.    HISTORY OF PRESENT ILLNESS: Emma Rudolph is a 73 year old female who presents with the following issues:  1. 9/18/2020: Mammogram showed architectural distortion in left breast at 11:00-1:00 retroareolar mid-depth. No concerning findings in right breast.  2. 9/24/2020: Left breast U/S showed oval hypoechoic mass measuring 1.1 cm at 11:00, 3 cm from nipple; oval hypoechoic mass measuring 1 cm at 12:00, 3 cm from nipple; normal-appearing parenchyma between 2 lesions. No enlarged lymph nodes in left axilla.  3. 10/2/2020: U/S-guided left breast needles biopsy at 12:00 showed a complex sclerosing lesion, negative for atypica and malignancy. Biopsy at 11:00 showed benign breast tissue with adenosis, negative for atypica and malignancy.  4. 10/28/2020: Underwent left breast excision under care of Dr. Eileen Montes. Pathology was negative for malignancy but showed atypical ductal hyperplasia, apocrine metaplasia, papillomatosis, microcalcifications.  5. 11/18/2020: Started raloxifene.    INTERVAL HISTORY:  Celi reports feeling well with no new breast issues.    REVIEW OF SYSTEMS:   14 point ROS was reviewed and is negative other than as noted above in HPI.       HOME MEDICATIONS:  Current Outpatient Medications   Medication Sig Dispense Refill    amLODIPine (NORVASC) 5 MG tablet Take 1 tablet (5 mg) by mouth daily 90 tablet 4    atorvastatin (LIPITOR) 20 MG tablet Take 1 tablet (20 mg) by mouth daily 90 tablet 4    busPIRone (BUSPAR) 10 MG tablet Take 1 tablet (10 mg) by mouth 3 times daily as needed (anxiety) 180 tablet 1    Cholecalciferol (VITAMIN D) 1000 UNITS capsule Take 1 capsule by mouth daily.      desvenlafaxine (PRISTIQ) 100 MG 24 hr tablet Take 1 tablet (100 mg) by mouth daily 90 tablet 4    lisinopril (ZESTRIL) 40 MG tablet Take 1 tablet (40 mg)  by mouth daily 90 tablet 4    Multiple Vitamins-Minerals (MULTIVITAMIN OR) Take by mouth daily      raloxifene (EVISTA) 60 MG tablet TAKE 1 TABLET DAILY 90 tablet 3         ALLERGIES:  Allergies   Allergen Reactions    Penicillins Swelling, Rash and Hives     Amoxicillin    Sulfa Antibiotics Hives         PAST MEDICAL HISTORY:  Past Medical History:   Diagnosis Date    Breast lesion     LEFT    Generalized anxiety disorder     Hyperlipidemia LDL goal <130     Hypertension     Mild major depression (H24)     Osteoarthritis of multiple joints, unspecified osteoarthritis type of #2&3 fingers bilat R>L     Vitamin D deficiency disease      Gynecologic history:  Age of menarche at 13-14; G0, no HRT or OCP use.      PAST SURGICAL HISTORY:  Past Surgical History:   Procedure Laterality Date    BIOPSY BREAST SEED LOCALIZATION Left 10/28/2020    Procedure: 2 SEED LOCALIZED LEFT BREAST BIOPSY;  Surgeon: Eileen Motnes MD;  Location:  OR    BREAST SURGERY  2020    COLONOSCOPY      EYE SURGERY Left 11/2020    vitrictomy     GYN SURGERY      H ARGON LASER FOR RETINAL TEAR Left 3/2014    HEAD & NECK SURGERY      wisdom teeth    LAPAROSCOPIC SINGLE SITE SALPINGO-OOPHORECTOMY Bilateral 4/15/2015    Procedure: LAPAROSCOPIC SINGLE SITE SALPINGO-OOPHORECTOMY;  Surgeon: Leonora Zavaleta MD;  Location:  [facility]    wisdom teeth removal  age 19         SOCIAL HISTORY:  Social History     Socioeconomic History    Marital status: Single     Spouse name: Not on file    Number of children: Not on file    Years of education: Not on file    Highest education level: Not on file   Occupational History     Employer: RETIRED     Comment: [employer]   Tobacco Use    Smoking status: Never    Smokeless tobacco: Never   Vaping Use    Vaping status: Never Used   Substance and Sexual Activity    Alcohol use: Yes     Comment: infrequent    Drug use: No    Sexual activity: Not Currently     Partners: Female   Other Topics Concern    Parent/sibling  w/ CABG, MI or angioplasty before 65F 55M? Yes     Comment: father, infarction at age 44     Service Not Asked    Blood Transfusions Not Asked    Caffeine Concern Not Asked    Occupational Exposure Not Asked    Hobby Hazards Not Asked    Sleep Concern Not Asked    Stress Concern Not Asked    Weight Concern Not Asked    Special Diet Not Asked    Back Care Not Asked    Exercise Not Asked    Bike Helmet Not Asked    Seat Belt Yes    Self-Exams Not Asked   Social History Narrative    Not on file     Social Determinants of Health     Financial Resource Strain: Low Risk  (9/20/2023)    Financial Resource Strain     Within the past 12 months, have you or your family members you live with been unable to get utilities (heat, electricity) when it was really needed?: No   Food Insecurity: Low Risk  (9/20/2023)    Food Insecurity     Within the past 12 months, did you worry that your food would run out before you got money to buy more?: No     Within the past 12 months, did the food you bought just not last and you didn t have money to get more?: No   Transportation Needs: Low Risk  (9/20/2023)    Transportation Needs     Within the past 12 months, has lack of transportation kept you from medical appointments, getting your medicines, non-medical meetings or appointments, work, or from getting things that you need?: No   Physical Activity: Not on file   Stress: Not on file   Social Connections: Not on file   Interpersonal Safety: Not on file   Housing Stability: Low Risk  (9/20/2023)    Housing Stability     Do you have housing? : Yes     Are you worried about losing your housing?: No         FAMILY HISTORY:  Family History   Problem Relation Age of Onset    Breast Cancer Mother 80    Neurologic Disorder Father         Parkinson's    Coronary Artery Disease Father 44        MI    Hyperlipidemia Father     Anxiety Disorder Father     Lipids Brother     Hypertension Brother     Hyperlipidemia Brother     Lipids Brother      "Family History Negative Brother     Lipids Sister     Hypertension Sister     Hyperlipidemia Sister     Family History Negative Sister     Family History Negative Sister     Family History Negative Sister     Cerebrovascular Disease Maternal Grandmother 86    Cancer Maternal Grandfather 69        leukemia    Other Cancer Maternal Grandfather         Leukemia    Cancer Paternal Grandfather 69        stomach    Other Cancer Paternal Grandfather         stomach cancer    Diabetes No family hx of     Cancer - colorectal No family hx of     Hypertension No family hx of     Hyperlipidemia No family hx of     Colon Cancer No family hx of     Prostate Cancer No family hx of     Other Cancer No family hx of     Depression No family hx of     Anxiety Disorder No family hx of     Mental Illness No family hx of     Substance Abuse No family hx of     Anesthesia Reaction No family hx of     Asthma No family hx of     Osteoporosis No family hx of     Genetic Disorder No family hx of     Thyroid Disease No family hx of     Obesity No family hx of     Unknown/Adopted No family hx of          PHYSICAL EXAM:  Vital signs:  /76   Pulse 101   Resp 16   Ht 1.626 m (5' 4\")   Wt 74.9 kg (165 lb 3.2 oz)   LMP  (LMP Unknown)   SpO2 97%   BMI 28.36 kg/m    GENERAL/CONSTITUTIONAL: No acute distress.  EYES: No erythema or scleral icterus.  LYMPH: No cervical, supraclavicular, axillary, epitroclear adenopathy.   BREAST: No palpable masses in either breast; nipples everted with no discharge; no rash or ulceration. Slight concavity at the left breast prior excision site.  RESPIRATORY: No audible cough or wheezing.   MUSCULOSKELETAL: Warm and well-perfused, no cyanosis, clubbing, or edema.  NEUROLOGIC: Alert, oriented, answers questions appropriately.  INTEGUMENTARY: No rashes or jaundice.  GAIT: Steady, does not use assistive device    LABS:  CBC RESULTS: Recent Labs   Lab Test 05/24/23  2154   WBC 7.5   RBC 4.47   HGB 14.3   HCT " 42.4   MCV 95   MCH 32.0   MCHC 33.7   RDW 12.2          Last Comprehensive Metabolic Panel:  Sodium   Date Value Ref Range Status   09/20/2023 140 136 - 145 mmol/L Final   08/27/2020 138 133 - 144 mmol/L Final     Potassium   Date Value Ref Range Status   09/20/2023 5.3 3.4 - 5.3 mmol/L Final   10/08/2021 3.7 3.4 - 5.3 mmol/L Final   08/27/2020 3.5 3.4 - 5.3 mmol/L Final     Chloride   Date Value Ref Range Status   09/20/2023 104 98 - 107 mmol/L Final   10/08/2021 107 94 - 109 mmol/L Final   08/27/2020 106 94 - 109 mmol/L Final     Carbon Dioxide   Date Value Ref Range Status   08/27/2020 26 20 - 32 mmol/L Final     Carbon Dioxide (CO2)   Date Value Ref Range Status   09/20/2023 27 22 - 29 mmol/L Final   10/08/2021 25 20 - 32 mmol/L Final     Anion Gap   Date Value Ref Range Status   09/20/2023 9 7 - 15 mmol/L Final   10/08/2021 8 3 - 14 mmol/L Final   08/27/2020 6 3 - 14 mmol/L Final     Glucose   Date Value Ref Range Status   09/20/2023 98 70 - 99 mg/dL Final   10/08/2021 104 (H) 70 - 99 mg/dL Final   08/27/2020 110 (H) 70 - 99 mg/dL Final     Comment:     Fasting specimen     Urea Nitrogen   Date Value Ref Range Status   09/20/2023 14.8 8.0 - 23.0 mg/dL Final   10/08/2021 12 7 - 30 mg/dL Final   08/27/2020 15 7 - 30 mg/dL Final     Creatinine   Date Value Ref Range Status   09/20/2023 0.81 0.51 - 0.95 mg/dL Final   08/27/2020 0.81 0.52 - 1.04 mg/dL Final     GFR Estimate   Date Value Ref Range Status   09/20/2023 77 >60 mL/min/1.73m2 Final   03/11/2021 71 >60 mL/min/[1.73_m2] Final     Calcium   Date Value Ref Range Status   09/20/2023 9.6 8.8 - 10.2 mg/dL Final   08/27/2020 9.4 8.5 - 10.1 mg/dL Final     Bilirubin Total   Date Value Ref Range Status   05/24/2023 0.8 <=1.2 mg/dL Final   08/27/2020 1.3 0.2 - 1.3 mg/dL Final     Alkaline Phosphatase   Date Value Ref Range Status   05/24/2023 105 (H) 35 - 104 U/L Final   08/27/2020 122 40 - 150 U/L Final     ALT   Date Value Ref Range Status   05/24/2023 25  10 - 35 U/L Final   2020 65 (H) 0 - 50 U/L Final     AST   Date Value Ref Range Status   2023 33 10 - 35 U/L Final   2020 35 0 - 45 U/L Final         PATHOLOGY:  None new.    IMAGIN: Mammogram showed no malignant findings.    2024: Breast MRI showed no concerning findings.    ASSESSMENT/PLAN:  Emma Rudolph is a 73 year old female with the following issues:  1. Left breast atypical ductal hyperplasia  -Pat is aware that atypical ductal hyperplasia is considered a marker for increased risk of developing invasive breast cancer, approximately ranging 11-40%, 5-15 years from time of diagnosis of ADH.  She is aware this is not a cancer.  -She is tolerating raloxifene well for risk reduction. Advised total 5 years of raloxifene, through 2025.  -She has no breast abnormalities on physical exam today or breast MRI reviewed from 2024.  -Continue high risk surveillance with clinical breast exam and breast imaging every 6 months with yearly mammograms alternating with yearly breast MRI for now; can potentially discontinue MRI in the next 1-2 years as her breasts are not extremely dense.    2. Anxiety  -Mood stable.  -Continue desvenlafaxine.    Return in 1 year.    Priya Ontiveros MD  [facility] Hematology/Oncology     Total time spent today: 30 minutes in chart review, patient evaluation, counseling, documentation, test and/or medication/prescription orders, and coordination of care.    The longitudinal plan of care for the diagnosis(es)/condition(s) as documented were addressed during this visit. Due to the added complexity in care, I will continue to support Pat in the subsequent management and with ongoing continuity of care.

## 2024-07-08 ENCOUNTER — ONCOLOGY VISIT (OUTPATIENT)
Dept: ONCOLOGY | Facility: CLINIC | Age: 74
End: 2024-07-08
Attending: INTERNAL MEDICINE
Payer: COMMERCIAL

## 2024-07-08 VITALS
HEIGHT: 64 IN | BODY MASS INDEX: 28.2 KG/M2 | OXYGEN SATURATION: 97 % | DIASTOLIC BLOOD PRESSURE: 76 MMHG | SYSTOLIC BLOOD PRESSURE: 138 MMHG | WEIGHT: 165.2 LBS | HEART RATE: 101 BPM | RESPIRATION RATE: 16 BRPM

## 2024-07-08 DIAGNOSIS — Z12.31 ENCOUNTER FOR SCREENING MAMMOGRAM FOR BREAST CANCER: ICD-10-CM

## 2024-07-08 DIAGNOSIS — N64.89 OTHER SPECIFIED DISORDERS OF BREAST: ICD-10-CM

## 2024-07-08 DIAGNOSIS — N60.92 ATYPICAL DUCTAL HYPERPLASIA OF LEFT BREAST: Primary | ICD-10-CM

## 2024-07-08 PROCEDURE — G0463 HOSPITAL OUTPT CLINIC VISIT: HCPCS | Performed by: INTERNAL MEDICINE

## 2024-07-08 PROCEDURE — G2211 COMPLEX E/M VISIT ADD ON: HCPCS | Performed by: INTERNAL MEDICINE

## 2024-07-08 PROCEDURE — 99214 OFFICE O/P EST MOD 30 MIN: CPT | Performed by: INTERNAL MEDICINE

## 2024-07-08 ASSESSMENT — PAIN SCALES - GENERAL: PAINLEVEL: NO PAIN (0)

## 2024-07-08 NOTE — PROGRESS NOTES
"Oncology Rooming Note    July 8, 2024 1:31 PM   Emma Rudolph is a 73 year old female who presents for:    Chief Complaint   Patient presents with    Oncology Clinic Visit     Initial Vitals: LMP  (LMP Unknown)  Estimated body mass index is 27.94 kg/m  as calculated from the following:    Height as of 1/8/24: 1.626 m (5' 4\").    Weight as of 1/8/24: 73.8 kg (162 lb 12.8 oz). There is no height or weight on file to calculate BSA.  Data Unavailable Comment: Data Unavailable   No LMP recorded (lmp unknown). Patient is postmenopausal.  Allergies reviewed: Yes  Medications reviewed: Yes    Medications: Medication refills not needed today.  Pharmacy name entered into [employer]:    [id number] - [place], [place] - [address] AT [facility] [address] [place] [address] [facility]ACY # [id number] - [place], [place] - [address] [address]    Frailty Screening:   Is the patient here for a new oncology consult visit in cancer care? 2. No  Lise Barnes MA            "

## 2024-07-08 NOTE — LETTER
7/8/2024      Emma Rudolph  [address]  [facility] [address]      Dear Colleague,    Thank you for referring your patient, Emma Rudolph, to the [facility]. Please see a copy of my visit note below.    [facility]    Hematology/Oncology Established Patient Note      Today's Date: 7/8/2024    Reason for follow-up: Atypical ductal hyperplasia.    HISTORY OF PRESENT ILLNESS: Emma Rudolph is a 73 year old female who presents with the following issues:  1. 9/18/2020: Mammogram showed architectural distortion in left breast at 11:00-1:00 retroareolar mid-depth. No concerning findings in right breast.  2. 9/24/2020: Left breast U/S showed oval hypoechoic mass measuring 1.1 cm at 11:00, 3 cm from nipple; oval hypoechoic mass measuring 1 cm at 12:00, 3 cm from nipple; normal-appearing parenchyma between 2 lesions. No enlarged lymph nodes in left axilla.  3. 10/2/2020: U/S-guided left breast needles biopsy at 12:00 showed a complex sclerosing lesion, negative for atypica and malignancy. Biopsy at 11:00 showed benign breast tissue with adenosis, negative for atypica and malignancy.  4. 10/28/2020: Underwent left breast excision under care of Dr. Eileen Montes. Pathology was negative for malignancy but showed atypical ductal hyperplasia, apocrine metaplasia, papillomatosis, microcalcifications.  5. 11/18/2020: Started raloxifene.    INTERVAL HISTORY:  Pat reports feeling well with no new breast issues.    REVIEW OF SYSTEMS:   14 point ROS was reviewed and is negative other than as noted above in HPI.       HOME MEDICATIONS:  Current Outpatient Medications   Medication Sig Dispense Refill     amLODIPine (NORVASC) 5 MG tablet Take 1 tablet (5 mg) by mouth daily 90 tablet 4     atorvastatin (LIPITOR) 20 MG tablet Take 1 tablet (20 mg) by mouth daily 90 tablet 4     busPIRone (BUSPAR) 10 MG tablet Take 1 tablet (10 mg) by mouth 3 times daily as needed  (anxiety) 180 tablet 1     Cholecalciferol (VITAMIN D) 1000 UNITS capsule Take 1 capsule by mouth daily.       desvenlafaxine (PRISTIQ) 100 MG 24 hr tablet Take 1 tablet (100 mg) by mouth daily 90 tablet 4     lisinopril (ZESTRIL) 40 MG tablet Take 1 tablet (40 mg) by mouth daily 90 tablet 4     Multiple Vitamins-Minerals (MULTIVITAMIN OR) Take by mouth daily       raloxifene (EVISTA) 60 MG tablet TAKE 1 TABLET DAILY 90 tablet 3         ALLERGIES:  Allergies   Allergen Reactions     Penicillins Swelling, Rash and Hives     Amoxicillin     Sulfa Antibiotics Hives         PAST MEDICAL HISTORY:  Past Medical History:   Diagnosis Date     Breast lesion     LEFT     Generalized anxiety disorder      Hyperlipidemia LDL goal <130      Hypertension      Mild major depression (H24)      Osteoarthritis of multiple joints, unspecified osteoarthritis type of #2&3 fingers bilat R>L      Vitamin D deficiency disease      Gynecologic history:  Age of menarche at 13-14; G0, no HRT or OCP use.      PAST SURGICAL HISTORY:  Past Surgical History:   Procedure Laterality Date     BIOPSY BREAST SEED LOCALIZATION Left 10/28/2020    Procedure: 2 SEED LOCALIZED LEFT BREAST BIOPSY;  Surgeon: Eileen Montes MD;  Location:  OR     BREAST SURGERY  2020     COLONOSCOPY       EYE SURGERY Left 11/2020    vitrictomy      GYN SURGERY       H ARGON LASER FOR RETINAL TEAR Left 3/2014     HEAD & NECK SURGERY      wisdom teeth     LAPAROSCOPIC SINGLE SITE SALPINGO-OOPHORECTOMY Bilateral 4/15/2015    Procedure: LAPAROSCOPIC SINGLE SITE SALPINGO-OOPHORECTOMY;  Surgeon: Leonora Zavaleta MD;  Location:  SD     wisdom teeth removal  age 19         SOCIAL HISTORY:  Social History     Socioeconomic History     Marital status: Single     Spouse name: Not on file     Number of children: Not on file     Years of education: Not on file     Highest education level: Not on file   Occupational History     Employer: RETIRED     Comment: [employer]    Tobacco Use     Smoking status: Never     Smokeless tobacco: Never   Vaping Use     Vaping status: Never Used   Substance and Sexual Activity     Alcohol use: Yes     Comment: infrequent     Drug use: No     Sexual activity: Not Currently     Partners: Female   Other Topics Concern     Parent/sibling w/ CABG, MI or angioplasty before 65F 55M? Yes     Comment: father, infarction at age 44      Service Not Asked     Blood Transfusions Not Asked     Caffeine Concern Not Asked     Occupational Exposure Not Asked     Hobby Hazards Not Asked     Sleep Concern Not Asked     Stress Concern Not Asked     Weight Concern Not Asked     Special Diet Not Asked     Back Care Not Asked     Exercise Not Asked     Bike Helmet Not Asked     Seat Belt Yes     Self-Exams Not Asked   Social History Narrative     Not on file     Social Determinants of Health     Financial Resource Strain: Low Risk  (9/20/2023)    Financial Resource Strain      Within the past 12 months, have you or your family members you live with been unable to get utilities (heat, electricity) when it was really needed?: No   Food Insecurity: Low Risk  (9/20/2023)    Food Insecurity      Within the past 12 months, did you worry that your food would run out before you got money to buy more?: No      Within the past 12 months, did the food you bought just not last and you didn t have money to get more?: No   Transportation Needs: Low Risk  (9/20/2023)    Transportation Needs      Within the past 12 months, has lack of transportation kept you from medical appointments, getting your medicines, non-medical meetings or appointments, work, or from getting things that you need?: No   Physical Activity: Not on file   Stress: Not on file   Social Connections: Not on file   Interpersonal Safety: Not on file   Housing Stability: Low Risk  (9/20/2023)    Housing Stability      Do you have housing? : Yes      Are you worried about losing your housing?: No         FAMILY  "HISTORY:  Family History   Problem Relation Age of Onset     Breast Cancer Mother 80     Neurologic Disorder Father         Parkinson's     Coronary Artery Disease Father 44        MI     Hyperlipidemia Father      Anxiety Disorder Father      Lipids Brother      Hypertension Brother      Hyperlipidemia Brother      Lipids Brother      Family History Negative Brother      Lipids Sister      Hypertension Sister      Hyperlipidemia Sister      Family History Negative Sister      Family History Negative Sister      Family History Negative Sister      Cerebrovascular Disease Maternal Grandmother 86     Cancer Maternal Grandfather 69        leukemia     Other Cancer Maternal Grandfather         Leukemia     Cancer Paternal Grandfather 69        stomach     Other Cancer Paternal Grandfather         stomach cancer     Diabetes No family hx of      Cancer - colorectal No family hx of      Hypertension No family hx of      Hyperlipidemia No family hx of      Colon Cancer No family hx of      Prostate Cancer No family hx of      Other Cancer No family hx of      Depression No family hx of      Anxiety Disorder No family hx of      Mental Illness No family hx of      Substance Abuse No family hx of      Anesthesia Reaction No family hx of      Asthma No family hx of      Osteoporosis No family hx of      Genetic Disorder No family hx of      Thyroid Disease No family hx of      Obesity No family hx of      Unknown/Adopted No family hx of          PHYSICAL EXAM:  Vital signs:  /76   Pulse 101   Resp 16   Ht 1.626 m (5' 4\")   Wt 74.9 kg (165 lb 3.2 oz)   LMP  (LMP Unknown)   SpO2 97%   BMI 28.36 kg/m    GENERAL/CONSTITUTIONAL: No acute distress.  EYES: No erythema or scleral icterus.  LYMPH: No cervical, supraclavicular, axillary, epitroclear adenopathy.   BREAST: No palpable masses in either breast; nipples everted with no discharge; no rash or ulceration. Slight concavity at the left breast prior excision " site.  RESPIRATORY: No audible cough or wheezing.   MUSCULOSKELETAL: Warm and well-perfused, no cyanosis, clubbing, or edema.  NEUROLOGIC: Alert, oriented, answers questions appropriately.  INTEGUMENTARY: No rashes or jaundice.  GAIT: Steady, does not use assistive device    LABS:  CBC RESULTS: Recent Labs   Lab Test 05/24/23  2154   WBC 7.5   RBC 4.47   HGB 14.3   HCT 42.4   MCV 95   MCH 32.0   MCHC 33.7   RDW 12.2          Last Comprehensive Metabolic Panel:  Sodium   Date Value Ref Range Status   09/20/2023 140 136 - 145 mmol/L Final   08/27/2020 138 133 - 144 mmol/L Final     Potassium   Date Value Ref Range Status   09/20/2023 5.3 3.4 - 5.3 mmol/L Final   10/08/2021 3.7 3.4 - 5.3 mmol/L Final   08/27/2020 3.5 3.4 - 5.3 mmol/L Final     Chloride   Date Value Ref Range Status   09/20/2023 104 98 - 107 mmol/L Final   10/08/2021 107 94 - 109 mmol/L Final   08/27/2020 106 94 - 109 mmol/L Final     Carbon Dioxide   Date Value Ref Range Status   08/27/2020 26 20 - 32 mmol/L Final     Carbon Dioxide (CO2)   Date Value Ref Range Status   09/20/2023 27 22 - 29 mmol/L Final   10/08/2021 25 20 - 32 mmol/L Final     Anion Gap   Date Value Ref Range Status   09/20/2023 9 7 - 15 mmol/L Final   10/08/2021 8 3 - 14 mmol/L Final   08/27/2020 6 3 - 14 mmol/L Final     Glucose   Date Value Ref Range Status   09/20/2023 98 70 - 99 mg/dL Final   10/08/2021 104 (H) 70 - 99 mg/dL Final   08/27/2020 110 (H) 70 - 99 mg/dL Final     Comment:     Fasting specimen     Urea Nitrogen   Date Value Ref Range Status   09/20/2023 14.8 8.0 - 23.0 mg/dL Final   10/08/2021 12 7 - 30 mg/dL Final   08/27/2020 15 7 - 30 mg/dL Final     Creatinine   Date Value Ref Range Status   09/20/2023 0.81 0.51 - 0.95 mg/dL Final   08/27/2020 0.81 0.52 - 1.04 mg/dL Final     GFR Estimate   Date Value Ref Range Status   09/20/2023 77 >60 mL/min/1.73m2 Final   03/11/2021 71 >60 mL/min/[1.73_m2] Final     Calcium   Date Value Ref Range Status   09/20/2023 9.6  8.8 - 10.2 mg/dL Final   2020 9.4 8.5 - 10.1 mg/dL Final     Bilirubin Total   Date Value Ref Range Status   2023 0.8 <=1.2 mg/dL Final   2020 1.3 0.2 - 1.3 mg/dL Final     Alkaline Phosphatase   Date Value Ref Range Status   2023 105 (H) 35 - 104 U/L Final   2020 122 40 - 150 U/L Final     ALT   Date Value Ref Range Status   2023 25 10 - 35 U/L Final   2020 65 (H) 0 - 50 U/L Final     AST   Date Value Ref Range Status   2023 33 10 - 35 U/L Final   2020 35 0 - 45 U/L Final         PATHOLOGY:  None new.    IMAGIN: Mammogram showed no malignant findings.    2024: Breast MRI showed no concerning findings.    ASSESSMENT/PLAN:  Emma Rudolph is a 73 year old female with the following issues:  1. Left breast atypical ductal hyperplasia  -Pat is aware that atypical ductal hyperplasia is considered a marker for increased risk of developing invasive breast cancer, approximately ranging 11-40%, 5-15 years from time of diagnosis of ADH.  She is aware this is not a cancer.  -She is tolerating raloxifene well for risk reduction. Advised total 5 years of raloxifene, through 2025.  -She has no breast abnormalities on physical exam today or breast MRI reviewed from 2024.  -Continue high risk surveillance with clinical breast exam and breast imaging every 6 months with yearly mammograms alternating with yearly breast MRI for now; can potentially discontinue MRI in the next 1-2 years as her breasts are not extremely dense.    2. Anxiety  -Mood stable.  -Continue desvenlafaxine.    Return in 1 year.    Priya Ontiveros MD  [facility] Hematology/Oncology     Total time spent today: 30 minutes in chart review, patient evaluation, counseling, documentation, test and/or medication/prescription orders, and coordination of care.    The longitudinal plan of care for the diagnosis(es)/condition(s) as documented were addressed during this visit. Due to  "the added complexity in care, I will continue to support Pat in the subsequent management and with ongoing continuity of care.      Oncology Rooming Note    July 8, 2024 1:31 PM   Emma Rudolph is a 73 year old female who presents for:    Chief Complaint   Patient presents with     Oncology Clinic Visit     Initial Vitals: LMP  (LMP Unknown)  Estimated body mass index is 27.94 kg/m  as calculated from the following:    Height as of 1/8/24: 1.626 m (5' 4\").    Weight as of 1/8/24: 73.8 kg (162 lb 12.8 oz). There is no height or weight on file to calculate BSA.  Data Unavailable Comment: Data Unavailable   No LMP recorded (lmp unknown). Patient is postmenopausal.  Allergies reviewed: Yes  Medications reviewed: Yes    Medications: Medication refills not needed today.  Pharmacy name entered into [employer]:    [facility] (Inspect Manager[id number] - [place], [place] - [address] AT [facility] [address] [place] [address] [facility]ARMACY # [id number] - [place], [place] - [address] [address]    Frailty Screening:   Is the patient here for a new oncology consult visit in cancer care? 2. No  Lise Barnes MA              Again, thank you for allowing me to participate in the care of your patient.        Sincerely,        Priya Ontiveros MD  "

## 2024-07-11 ENCOUNTER — OFFICE VISIT (OUTPATIENT)
Dept: NEUROLOGY | Facility: CLINIC | Age: 74
End: 2024-07-11
Payer: COMMERCIAL

## 2024-07-11 VITALS
HEART RATE: 107 BPM | BODY MASS INDEX: 28.2 KG/M2 | SYSTOLIC BLOOD PRESSURE: 141 MMHG | OXYGEN SATURATION: 96 % | WEIGHT: 165.2 LBS | DIASTOLIC BLOOD PRESSURE: 90 MMHG | HEIGHT: 64 IN

## 2024-07-11 DIAGNOSIS — G31.9 CEREBRAL ATROPHY (H): ICD-10-CM

## 2024-07-11 DIAGNOSIS — G31.84 AMNESTIC MCI (MILD COGNITIVE IMPAIRMENT WITH MEMORY LOSS): Primary | ICD-10-CM

## 2024-07-11 PROCEDURE — 99214 OFFICE O/P EST MOD 30 MIN: CPT | Mod: GC

## 2024-07-11 PROCEDURE — G2211 COMPLEX E/M VISIT ADD ON: HCPCS

## 2024-07-11 RX ORDER — DONEPEZIL HYDROCHLORIDE 5 MG/1
TABLET, FILM COATED ORAL
Qty: 30 TABLET | Refills: 0 | Status: SHIPPED | OUTPATIENT
Start: 2024-07-11 | End: 2024-08-01

## 2024-07-11 NOTE — LETTER
7/11/2024      Emma Rudolph  [address]  [facility] [address]      Dear Colleague,    Thank you for referring your patient, Emma Rudolph, to the [facility]UROLOGY [facility]IN[facility]. Please see a copy of my visit note below.      Section of General Neurology  Return Patient Visit    Emma Rudolph MRN# [id number]   Age: 73 year old YOB: 1950     Brief history of symptoms: The patient was initially seen in neurologic consultation on 12/05/23 for evaluation of memory loss. Please see the comprehensive neurologic consultation note from that date in the Epic records for details.          Assessment and Plan:   Assessment:    # Amnestic MCI    Is a 73-year-old female with past medical history of hyperlipidemia, hypertension, generalized anxiety disorder, depression and left breast mass status postresection presenting as a follow-up for diagnosed amnestic mild cognitive impairment.    MoCA on in 12/2024 was 24/30.  Neuropsychological testing revealed pattern consistent with amnestic mild cognitive impairment with significant deficiencies in the learning and forming new memories.  Otherwise, her cognitive performance is fairly average for her age.  Her MRI brain revealed diffuse atrophy most notably in the parietal lobes as well as bilateral hippocampal atrophy.  Overall her picture thus far represents early signs of Alzheimer's disease.  Recommend obtaining previously ordered labs including beta amyloid 42/40 ratio since with diagnosis as well as TSH, vitamin B1, and vitamin B12 to rule out other possible reversible causes of dementia.    I believe that a memory pill such as donepezil or memantine is warranted at this time.  Recommend starting 5 mg daily for 4 weeks and if tolerated increasing to 10 mg daily indefinitely.  Will have patient follow-up with occupational therapy cognitive performance testing and follow-up with me in 1 year to repeat MoCA and monitor for  "possible disease progression.     Plan:  -Start donepezil 5 mg daily for 4 weeks, if tolerated increase to 10 mg daily indefinitely  -Occupational Therapy cognitive performance testing to assess for safe living at home alone as she currently is  -Obtain labs that were previously ordered including beta amyloid 42/40 ratio, TSH, vitamin B1 and vitamin B12  -Follow-up in 6 months to 1 year to re-evaluate and monitor for disease progression      GIL Lagos D.O.  Resident Physician of Neurology  [facility]    Patient discussed with my supervising physician Dr. Ball, who agrees with the critical findings, assessment, and plan as documented in the note above or as otherwise in their attestation.       Interval history:   Patient states that things are going well since her last visit back in December.  She states that the neuropsychological testing that she had done recently was fine \"was only 20 minutes long.\"  No issues with the MRI.  When I brought up the labs that were not performed, she expressed that she did not remember that these were ordered.  She is agreeable to get them now.  She feels that her cognitive functioning is normal and has not noticed any decline over the last 7 months, however she does admit that friends and family have made comments regarding her repeating herself frequently.        Past Medical History:     Patient Active Problem List   Diagnosis     Generalized anxiety disorder     Mixed hyperlipidemia     CKD (chronic kidney disease) stage 2, GFR 60-89 ml/min     Major depression in complete remission (H) on meds since 6-12-13     Osteoarthritis of multiple joints, unspecified osteoarthritis type of #2&3 fingers bilat R>L     Family history of ischemic heart disease     Memory loss     Primary insomnia     Benign essential hypertension     Breast mass, left     Past Medical History:   Diagnosis Date     Breast lesion     LEFT     Generalized anxiety disorder      " Hyperlipidemia LDL goal <130      Hypertension      Mild major depression (H24)      Osteoarthritis of multiple joints, unspecified osteoarthritis type of #2&3 fingers bilat R>L      Vitamin D deficiency disease         Past Surgical History:     Past Surgical History:   Procedure Laterality Date     BIOPSY BREAST SEED LOCALIZATION Left 10/28/2020    Procedure: 2 SEED LOCALIZED LEFT BREAST BIOPSY;  Surgeon: Eileen Montes MD;  Location:  OR     BREAST SURGERY  2020     COLONOSCOPY       EYE SURGERY Left 11/2020    vitrictomy      GYN SURGERY       H ARGON LASER FOR RETINAL TEAR Left 3/2014     HEAD & NECK SURGERY      wisdom teeth     LAPAROSCOPIC SINGLE SITE SALPINGO-OOPHORECTOMY Bilateral 4/15/2015    Procedure: LAPAROSCOPIC SINGLE SITE SALPINGO-OOPHORECTOMY;  Surgeon: Leonora Zavaleta MD;  Location:  [facility]     wisdom teeth removal  age 19        Social History:     Social History     Tobacco Use     Smoking status: Never     Smokeless tobacco: Never   Vaping Use     Vaping status: Never Used   Substance Use Topics     Alcohol use: Yes     Comment: infrequent     Drug use: No        Family History:     Family History   Problem Relation Age of Onset     Breast Cancer Mother 80     Neurologic Disorder Father         Parkinson's     Coronary Artery Disease Father 44        MI     Hyperlipidemia Father      Anxiety Disorder Father      Lipids Brother      Hypertension Brother      Hyperlipidemia Brother      Lipids Brother      Family History Negative Brother      Lipids Sister      Hypertension Sister      Hyperlipidemia Sister      Family History Negative Sister      Family History Negative Sister      Family History Negative Sister      Cerebrovascular Disease Maternal Grandmother 86     Cancer Maternal Grandfather 69        leukemia     Other Cancer Maternal Grandfather         Leukemia     Cancer Paternal Grandfather 69        stomach     Other Cancer Paternal Grandfather         stomach cancer     Diabetes No  family hx of      Cancer - colorectal No family hx of      Hypertension No family hx of      Hyperlipidemia No family hx of      Colon Cancer No family hx of      Prostate Cancer No family hx of      Other Cancer No family hx of      Depression No family hx of      Anxiety Disorder No family hx of      Mental Illness No family hx of      Substance Abuse No family hx of      Anesthesia Reaction No family hx of      Asthma No family hx of      Osteoporosis No family hx of      Genetic Disorder No family hx of      Thyroid Disease No family hx of      Obesity No family hx of      Unknown/Adopted No family hx of         Medications:     Current Outpatient Medications   Medication Sig Dispense Refill     amLODIPine (NORVASC) 5 MG tablet Take 1 tablet (5 mg) by mouth daily 90 tablet 4     atorvastatin (LIPITOR) 20 MG tablet Take 1 tablet (20 mg) by mouth daily 90 tablet 4     Cholecalciferol (VITAMIN D) 1000 UNITS capsule Take 1 capsule by mouth daily.       desvenlafaxine (PRISTIQ) 100 MG 24 hr tablet Take 1 tablet (100 mg) by mouth daily 90 tablet 4     donepezil (ARICEPT) 5 MG tablet Take 5 mg (one tablet) at bedtime for 30 days, if no side effects, increase to 10 mg (2 tablets) at bedtime. 30 tablet 0     lisinopril (ZESTRIL) 40 MG tablet Take 1 tablet (40 mg) by mouth daily 90 tablet 4     Multiple Vitamins-Minerals (MULTIVITAMIN OR) Take by mouth daily       raloxifene (EVISTA) 60 MG tablet TAKE 1 TABLET DAILY 90 tablet 3     Current Facility-Administered Medications   Medication Dose Route Frequency Provider Last Rate Last Admin     2 mL bupivacaine (MARCAINE) preservative free injection 0.25% (10 mL vial)  2 mL   Ced Contreras MD   2 mL at 08/28/23 1443     2 mL bupivacaine (MARCAINE) preservative free injection 0.25% (10 mL vial)  2 mL   Ced Contreras MD   2 mL at 03/27/23 1543     lidocaine (PF) (XYLOCAINE) 1 % injection 4 mL  4 mL   Brian Yepez, PA-C   4 mL at 01/12/24 1115     lidocaine 1 % injection 2  "mL  2 mL   Ced Contreras MD   2 mL at 08/28/23 1443     lidocaine 1 % injection 2 mL  2 mL   Ced Contreras MD   2 mL at 03/27/23 1543     triamcinolone (KENALOG-40) injection 40 mg  40 mg   Brian Yepez PA-C   40 mg at 01/12/24 1115     triamcinolone (KENALOG-40) injection 40 mg  40 mg   Ced Contreras MD   40 mg at 08/28/23 1443     triamcinolone (KENALOG-40) injection 40 mg  40 mg   Ced Contreras MD   40 mg at 03/27/23 1543        Allergies:     Allergies   Allergen Reactions     Penicillins Swelling, Rash and Hives     Amoxicillin     Sulfa Antibiotics Hives          Physical Exam:   Vitals: BP (!) 141/90   Pulse 107   Ht 1.626 m (5' 4\")   Wt 74.9 kg (165 lb 3.2 oz)   LMP  (LMP Unknown)   SpO2 96%   BMI 28.36 kg/m     CV: peripheral pulse appreciated  Lungs: breathing comfortably  Extremities: no edema  Skin: No rashes    Neuro:   General Appearance: No apparent distress, well-nourished, well-groomed, pleasant     Mental Status: Speech fluent and comprehension intact. No dysarthria. Normal fund of knowledge, attention/concentration, and language    Cranial Nerves: Grossly symmetric, extraocular movement intact, no dysarthria    Motor Exam: Moves all extremities spontaneously. No abnormal movements.    Gait: normal casual gait, normal stride length         Data: Pertinent prior to visit   Imaging:  Brain MRI on 12/26/2023  IMPRESSION:  1.  No acute intracranial abnormality.  2.  Moderate generalized cerebral atrophy with perhaps a slight predilection for the parietal lobes. Correlate with neurocognitive testing for any evidence of neurodegenerative disorder such as Alzheimer's disease. If clinically indicated, brain FDG PET   may be useful for further evaluation.  3.  Changes suggestive of mild chronic microvascular ischemic disease.    Procedures:  Reviewed    Laboratory:  Labs were ordered at last visit were not performed    Attestation signed by Jennifer Ball MD at 7/22/2024 11:26 " AM:  Physician Attestation  I, Jennifer Ball MD, saw this patient and agree with the findings and plan of care as documented in the note.       73F with amnestic MCI. Recommend completing additional testing with beta amyloid 42/40 ratio, TSH, vitamin B1, and vitamin B12. Offered donepezil start, OT assessment today. Recommend return to clinic with family or close friend at future visit in 6 months.    Jennifer Ball MD      Again, thank you for allowing me to participate in the care of your patient.        Sincerely,        Johnathan Lagos MD

## 2024-07-11 NOTE — NURSING NOTE
"Emma Rudolph is a 73 year old female who presents for:  Chief Complaint   Patient presents with    Memory Loss     Follow up on testing for memory loss        Initial Vitals:  BP (!) 158/72   Pulse 119   Ht 1.626 m (5' 4\")   Wt 74.9 kg (165 lb 3.2 oz)   LMP  (LMP Unknown)   SpO2 96%   BMI 28.36 kg/m   Estimated body mass index is 28.36 kg/m  as calculated from the following:    Height as of this encounter: 1.626 m (5' 4\").    Weight as of this encounter: 74.9 kg (165 lb 3.2 oz).. Body surface area is 1.84 meters squared. BP completed using cuff size: regular    Sruthi Briscoehai   "

## 2024-07-11 NOTE — PROGRESS NOTES
Section of General Neurology  Return Patient Visit    Emma Rudolph MRN# [id number]   Age: 73 year old YOB: 1950     Brief history of symptoms: The patient was initially seen in neurologic consultation on 12/05/23 for evaluation of memory loss. Please see the comprehensive neurologic consultation note from that date in the Epic records for details.          Assessment and Plan:   Assessment:    # Amnestic MCI    Is a 73-year-old female with past medical history of hyperlipidemia, hypertension, generalized anxiety disorder, depression and left breast mass status postresection presenting as a follow-up for diagnosed amnestic mild cognitive impairment.    MoCA on in 12/2024 was 24/30.  Neuropsychological testing revealed pattern consistent with amnestic mild cognitive impairment with significant deficiencies in the learning and forming new memories.  Otherwise, her cognitive performance is fairly average for her age.  Her MRI brain revealed diffuse atrophy most notably in the parietal lobes as well as bilateral hippocampal atrophy.  Overall her picture thus far represents early signs of Alzheimer's disease.  Recommend obtaining previously ordered labs including beta amyloid 42/40 ratio since with diagnosis as well as TSH, vitamin B1, and vitamin B12 to rule out other possible reversible causes of dementia.    I believe that a memory pill such as donepezil or memantine is warranted at this time.  Recommend starting 5 mg daily for 4 weeks and if tolerated increasing to 10 mg daily indefinitely.  Will have patient follow-up with occupational therapy cognitive performance testing and follow-up with me in 1 year to repeat MoCA and monitor for possible disease progression.     Plan:  -Start donepezil 5 mg daily for 4 weeks, if tolerated increase to 10 mg daily indefinitely  -Occupational Therapy cognitive performance testing to assess for safe living at home alone as she currently is  -Obtain labs  "that were previously ordered including beta amyloid 42/40 ratio, TSH, vitamin B1 and vitamin B12  -Follow-up in 6 months to 1 year to re-evaluate and monitor for disease progression      GIL Lagos D.O.  Resident Physician of Neurology  [facility]    Patient discussed with my supervising physician Dr. Ball, who agrees with the critical findings, assessment, and plan as documented in the note above or as otherwise in their attestation.       Interval history:   Patient states that things are going well since her last visit back in December.  She states that the neuropsychological testing that she had done recently was fine \"was only 20 minutes long.\"  No issues with the MRI.  When I brought up the labs that were not performed, she expressed that she did not remember that these were ordered.  She is agreeable to get them now.  She feels that her cognitive functioning is normal and has not noticed any decline over the last 7 months, however she does admit that friends and family have made comments regarding her repeating herself frequently.        Past Medical History:     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    Osteoarthritis of multiple joints, unspecified osteoarthritis type of #2&3 fingers bilat R>L    Family history of ischemic heart disease    Memory loss    Primary insomnia    Benign essential hypertension    Breast mass, left     Past Medical History:   Diagnosis Date    Breast lesion     LEFT    Generalized anxiety disorder     Hyperlipidemia LDL goal <130     Hypertension     Mild major depression (H24)     Osteoarthritis of multiple joints, unspecified osteoarthritis type of #2&3 fingers bilat R>L     Vitamin D deficiency disease         Past Surgical History:     Past Surgical History: "   Procedure Laterality Date    BIOPSY BREAST SEED LOCALIZATION Left 10/28/2020    Procedure: 2 SEED LOCALIZED LEFT BREAST BIOPSY;  Surgeon: Eileen Montes MD;  Location:  OR    BREAST SURGERY  2020    COLONOSCOPY      EYE SURGERY Left 11/2020    vitrictomy     GYN SURGERY      H ARGON LASER FOR RETINAL TEAR Left 3/2014    HEAD & NECK SURGERY      wisdom teeth    LAPAROSCOPIC SINGLE SITE SALPINGO-OOPHORECTOMY Bilateral 4/15/2015    Procedure: LAPAROSCOPIC SINGLE SITE SALPINGO-OOPHORECTOMY;  Surgeon: Leonora Zavaleta MD;  Location:  SD    wisdom teeth removal  age 19        Social History:     Social History     Tobacco Use    Smoking status: Never    Smokeless tobacco: Never   Vaping Use    Vaping status: Never Used   Substance Use Topics    Alcohol use: Yes     Comment: infrequent    Drug use: No        Family History:     Family History   Problem Relation Age of Onset    Breast Cancer Mother 80    Neurologic Disorder Father         Parkinson's    Coronary Artery Disease Father 44        MI    Hyperlipidemia Father     Anxiety Disorder Father     Lipids Brother     Hypertension Brother     Hyperlipidemia Brother     Lipids Brother     Family History Negative Brother     Lipids Sister     Hypertension Sister     Hyperlipidemia Sister     Family History Negative Sister     Family History Negative Sister     Family History Negative Sister     Cerebrovascular Disease Maternal Grandmother 86    Cancer Maternal Grandfather 69        leukemia    Other Cancer Maternal Grandfather         Leukemia    Cancer Paternal Grandfather 69        stomach    Other Cancer Paternal Grandfather         stomach cancer    Diabetes No family hx of     Cancer - colorectal No family hx of     Hypertension No family hx of     Hyperlipidemia No family hx of     Colon Cancer No family hx of     Prostate Cancer No family hx of     Other Cancer No family hx of     Depression No family hx of     Anxiety Disorder No family hx of     Mental  Illness No family hx of     Substance Abuse No family hx of     Anesthesia Reaction No family hx of     Asthma No family hx of     Osteoporosis No family hx of     Genetic Disorder No family hx of     Thyroid Disease No family hx of     Obesity No family hx of     Unknown/Adopted No family hx of         Medications:     Current Outpatient Medications   Medication Sig Dispense Refill    amLODIPine (NORVASC) 5 MG tablet Take 1 tablet (5 mg) by mouth daily 90 tablet 4    atorvastatin (LIPITOR) 20 MG tablet Take 1 tablet (20 mg) by mouth daily 90 tablet 4    Cholecalciferol (VITAMIN D) 1000 UNITS capsule Take 1 capsule by mouth daily.      desvenlafaxine (PRISTIQ) 100 MG 24 hr tablet Take 1 tablet (100 mg) by mouth daily 90 tablet 4    donepezil (ARICEPT) 5 MG tablet Take 5 mg (one tablet) at bedtime for 30 days, if no side effects, increase to 10 mg (2 tablets) at bedtime. 30 tablet 0    lisinopril (ZESTRIL) 40 MG tablet Take 1 tablet (40 mg) by mouth daily 90 tablet 4    Multiple Vitamins-Minerals (MULTIVITAMIN OR) Take by mouth daily      raloxifene (EVISTA) 60 MG tablet TAKE 1 TABLET DAILY 90 tablet 3     Current Facility-Administered Medications   Medication Dose Route Frequency Provider Last Rate Last Admin    2 mL bupivacaine (MARCAINE) preservative free injection 0.25% (10 mL vial)  2 mL   Ced Contreras MD   2 mL at 08/28/23 1443    2 mL bupivacaine (MARCAINE) preservative free injection 0.25% (10 mL vial)  2 mL   Ced Contreras MD   2 mL at 03/27/23 1543    lidocaine (PF) (XYLOCAINE) 1 % injection 4 mL  4 mL   Brian Yepez, PA-C   4 mL at 01/12/24 1115    lidocaine 1 % injection 2 mL  2 mL   Ced Contreras MD   2 mL at 08/28/23 1443    lidocaine 1 % injection 2 mL  2 mL   Ced Contreras MD   2 mL at 03/27/23 1543    triamcinolone (KENALOG-40) injection 40 mg  40 mg   Brian Yepez PA-C   40 mg at 01/12/24 1115    triamcinolone (KENALOG-40) injection 40 mg  40 mg   Ced Contreras MD   40 mg at  "08/28/23 1443    triamcinolone (KENALOG-40) injection 40 mg  40 mg   Ced Contreras MD   40 mg at 03/27/23 1543        Allergies:     Allergies   Allergen Reactions    Penicillins Swelling, Rash and Hives     Amoxicillin    Sulfa Antibiotics Hives          Physical Exam:   Vitals: BP (!) 141/90   Pulse 107   Ht 1.626 m (5' 4\")   Wt 74.9 kg (165 lb 3.2 oz)   LMP  (LMP Unknown)   SpO2 96%   BMI 28.36 kg/m     CV: peripheral pulse appreciated  Lungs: breathing comfortably  Extremities: no edema  Skin: No rashes    Neuro:   General Appearance: No apparent distress, well-nourished, well-groomed, pleasant     Mental Status: Speech fluent and comprehension intact. No dysarthria. Normal fund of knowledge, attention/concentration, and language    Cranial Nerves: Grossly symmetric, extraocular movement intact, no dysarthria    Motor Exam: Moves all extremities spontaneously. No abnormal movements.    Gait: normal casual gait, normal stride length         Data: Pertinent prior to visit   Imaging:  Brain MRI on 12/26/2023  IMPRESSION:  1.  No acute intracranial abnormality.  2.  Moderate generalized cerebral atrophy with perhaps a slight predilection for the parietal lobes. Correlate with neurocognitive testing for any evidence of neurodegenerative disorder such as Alzheimer's disease. If clinically indicated, brain FDG PET   may be useful for further evaluation.  3.  Changes suggestive of mild chronic microvascular ischemic disease.    Procedures:  Reviewed    Laboratory:  Labs were ordered at last visit were not performed  "

## 2024-07-23 ENCOUNTER — THERAPY VISIT (OUTPATIENT)
Dept: OCCUPATIONAL THERAPY | Facility: CLINIC | Age: 74
End: 2024-07-23
Payer: COMMERCIAL

## 2024-07-23 DIAGNOSIS — R41.89 COGNITIVE CHANGES: ICD-10-CM

## 2024-07-23 DIAGNOSIS — G31.9 CEREBRAL ATROPHY (H): ICD-10-CM

## 2024-07-23 DIAGNOSIS — G31.84 AMNESTIC MCI (MILD COGNITIVE IMPAIRMENT WITH MEMORY LOSS): Primary | ICD-10-CM

## 2024-07-23 PROCEDURE — 97165 OT EVAL LOW COMPLEX 30 MIN: CPT | Mod: GO | Performed by: OCCUPATIONAL THERAPIST

## 2024-07-23 PROCEDURE — 96125 COGNITIVE TEST BY HC PRO: CPT | Mod: GO | Performed by: OCCUPATIONAL THERAPIST

## 2024-07-23 PROCEDURE — 97535 SELF CARE MNGMENT TRAINING: CPT | Mod: GO | Performed by: OCCUPATIONAL THERAPIST

## 2024-07-23 NOTE — PROGRESS NOTES
"Occupational Therapy  Cognitive Performance Test  Evaluation      Occupational Profile (including diagnosis and medical history):  Patient referred to outpatient Occupational Therapy for cognitive performance testing to assess for safe living at home alone as she currently is.  Per chart:  Pt is a 73-year-old female with past medical history of hyperlipidemia, hypertension, generalized anxiety disorder, depression and left breast mass status postresection presenting as a follow-up for diagnosed amnestic mild cognitive impairment.     Neuropsychological testing revealed pattern consistent with amnestic mild cognitive impairment with significant deficiencies in the learning and forming new memories.  Otherwise, her cognitive performance is fairly average for her age.  Her MRI brain revealed diffuse atrophy most notably in the parietal lobes as well as bilateral hippocampal atrophy.  Overall her picture thus far represents early signs of Alzheimer's disease.  Recommend obtaining previously ordered labs including beta amyloid 42/40 ratio since with diagnosis as well as TSH, vitamin B1, and vitamin B12 to rule out other possible reversible causes of dementia.    She has started Donepezil since last MD visit and reports improved concentration/attention.     Previous cognitive screens completed in OT or by Physician and score: MoCA 23/30 with neurology on 12/10/2023.    Functional History Interview:  Patient is retired-worked for [employer], [employer], retired in 2009.    She has a sister that lives in [place], [place] and one in [place].  Brother 3 miles away (see's him 1x/week).  She has lived in her current house for 36 years.    Client s perception of memory/ thinking or functional difficulties: \"I don't fully recognize that I have problems.  I have always written notes, often writes sticky notes when I has an appointments.\"       Living situation: Alone, house     Home maintenance activities:  Reports " "being independent with cleaning, laundry, yardwork.     Meal preparation and grocery shopping: Patient cooks, rarely uses the stove/oven but microwaves often.  Patient does not follow recipes.       Finances and paying bills: Patient is independent with basic finances/bill paying, has assistance with taxes.     Medication management: Independent, sets up pills using a pillbox (morning and evening).  Patient reports that her new medication has helped with her ability to focus and clarity.  \"I have been very impressed as on the 2nd day I have noticed improved concentration.\"       Driving and transportation: Independent, does not use GPS but will use it if she needs to for going to unfamiliar places, will look up a place if she is unfamiliar.  \"My dad taught me if I make a wrong turn, how to fix it.\"     Leisure and routine activities: Patient enjoys geniology research for the [facility].  Patient goes to [employer] group each week, working at the [employer], and Saturday volunteers at a [employer].     ADL/Mobility/Physical Functioning: Independent without an assistive device.  She practices yoga and walks on treadmill 3x/week.  Patient goes to a club but unable to recall the name of the place.    Cognitive observations/assessments: Problem solving/safety questions:  2/3 questions correct (unable to problem solve what to do if her purse/wallet was stolen).  She was very quick to answer questions despite therapist finishing giving directions.  Reports she has a lot of sticky notes placed in her home, has piles of papers but \"can find anything she needs in a pile.\"    Fall Risk Screen:   Has the patient fallen 2 or more times in the last year? Yes, slipped on the stairs but reports it wasn't because of balance.      Has the patient fallen and had an injury in the past year? Yes  (shoulder injury and fractured tibia-wore a boot).         Timed Up and Go Score: Not able to perform due to time " "constraints (pt late to appointment)    Is the patient a fall risk? No       Cognitive Performance Test    SUMMARY OF TEST:    The Cognitive Performance Test (CPT) is a standardized performance-based assessment to measure working memory/executive function processing capacities that underlie functional performance. Subtasks include common basic and instrumental activities of daily living (ADL/IADL) which are rated based on the manner in which patients respond to task demands of varying complexity. The total CPT score describes a level of functioning that indicates how information is processed, implications for functional activities, potential safety risks and a recommended level of supervision or assist based on cognitive function. The highest total score on this test is in the range of 5.6 to 5.8.    DATE OF TESTIN2024    RESULTS OF TESTING:                                                                                         CPT Subtest Results    MEDBOX: 5. SHOP/GLOVES:  PHONE: 4.   WASH:   TRAVEL:  TOAST:    DRESS:    TOTAL CPT SCORE:  34/39     Average CPT Score  4.85/5.6    INTERPRETATION OF TEST RESULTS:    Based on the Cognitive Performance Test, this patient scored at CPT Level 4.85 (4.5 profile level).  See CPT Levels reference below.    Summary of functional cognitive status:   Patient slightly impulsive during assessment today, often starting a task prior to therapist finishing directions. Missing details of information, needing directions repeated as allowed for each subtest.  She struggled mostly with the Travel subtest, difficulty following a map as well as written directions to specific location in the building, \"I wasn't paying attention.\".  She demonstrated ability to shift attention to locate specific number in the phonebook but asked the incorrect question demonstrating decreased working memory.    Factors affecting performance:  Impulsive actions    Recommendations: "    Assist for ADL/IADL:  Finances  Supervision for ADL/IADL:  Shopping, Driving, and Medication management  Supervision in living setting:  Daily checks                                                       TIME ADMINISTERING TEST: 40    TIME FOR INTERPRETATION AND PREPARATION OF REPORT: 10    TOTAL TIME: 50      CPT Levels Reference:    Patient's Average CPT Score:  4.5                                                                                                                                                  Individual scores range along a continuum as outlined below.  In addition to cognitive status, other factors may affect safety in a home environment.  Please refer to specific recommendations for this patient.    ___5.6-5.8  Normal functioning (absence of cognitive-functional disability).  Independent in managing personal affairs, monitors and directs own behavior.  Uses complex information to carry out daily activities with safety and accuracy.    Proficient with instrumental activities of daily living (IADL) and learning new activity.  Problems are anticipated, errors are avoided, and consequences of actions are considered.      ___5.0   Mild cognitive-functional disability; deficits in working memory and executive thought processes. Difficulty using complex information. Problems may be observed with recent memory, judgment, reasoning and planning ahead. May be impulsive or have difficulty anticipating consequences.  Safety:  May require assistance to plan ahead; or to manage complex medication schedules, appointments or finances.  Hazardous activities may need to be monitored or limited.  ADL:  Mild functional decline.  Able to complete basic self-care and routine household tasks.  May have difficulty with complex daily tasks such as reading, writing, meal preparation, shopping or driving.   Learns through hands on teaching. Self-centered behavior or difficulty considering the needs of others may be seen  "related to trouble seeing the  whole picture\". Can appear disorganized or uninhibited.    _X__4.5  Mild to moderate cognitive-functional disability. Significant deficits in working memory and executive thought processes. Judgment, reasoning and planning show obvious impairment.  Distractible with inability to shift attention/actions given competing stimuli.  Difficulty with problem solving and managing details. Complex daily tasks performed with inconsistency, difficulty, or error.     Safety:  Medications should be monitored, stove use may require supervision, and driving ability may be affected.  Impaired safety awareness with inability to anticipate potential problems.  May not recognize or respond to emergent situations. Requires frequent check-in support.   ADL:  Mild difficulty with simple everyday self-care tasks. Benefits from structured, routine activity.  Will likely need reminders to complete tasks outside of the routine. Requires assistance with planning and IADL tasks like shopping and finances. Learns concrete tasks through repetition, but performance may not generalize. Tends to be impulsive with poor insight. Self centered behavior or inability to consider the needs of others is common.    ___4.0  Moderate cognitive-functional disability; abstract to concrete thought processes. Working memory and executive function impairments are obvious. Difficulty with planning and problem solving.  Behavior is goal-directed, but unable to follow multi-step directions, is easily distracted, and may not recognize mistakes.  Inability to anticipate hazards or understand precautions.  Safety:  Recommend 24-hour supervision for safety. Supervision needed for medication management and for hazardous activities. May not be able to follow a restricted diet. Can get lost in unfamiliar surroundings. Generally, persons functioning at level 4 should not be driving.   ADL:  Some decline in quality or frequency of ADL.  " Brandon enhanced by use of a routine, simple concrete directions, and caregiver set-up of needed items. Complex tasks such as money or home management typically requires assistance.  Relies heavily on vision to guide behavior; will ignore objects/hazards not in plain sight and can be distracted by irrelevant objects. Often has poor insight.  Able to carry out social conversation and may verbally  cover  for deficits leading caregivers to believe they are capable of functioning independently.       ___3.5  Moderate cognitive-functional disability; increased cues needed for task completion. Aware of concrete task steps but needs prompting or cues to initiate and complete simple tasks. Attention span is limited, simple directions may need to be repeated, and re-focus to a topic or task may be required.  Safety:  24-hour supervision required for safety and for assistance with daily tasks. Assistance required with medications, and access to medication should be limited. Meals, nutrition and dietary restrictions need to be monitored.  All hazardous activities should be restricted or supervised. Should not drive. Prone to wandering and can become lost.  ADL:  Moderate functional decline. Familiar tasks usually requires set-up of supplies and directions to complete steps. May need objects handed to them for task initiation. Function best with a set schedule in familiar surroundings with familiar people. All complex tasks must be done by others. Vocabulary is diminished and speech often unfocused.             [facility]                                                                                   OUTPATIENT OCCUPATIONAL THERAPY    PLAN OF TREATMENT FOR OUTPATIENT REHABILITATION   Patient's Last Name, First Name, Emma Andersen YOB: 1950   Provider's Name   [facility]   Medical Record No.  [id number]     Onset Date: 07/11/24  (order date) Start of Care Date: 07/23/24     Medical Diagnosis:  Amnestic MCI (mild cognitive impairment with memory loss)      OT Treatment Diagnosis:  Cognitive Changes Plan of Treatment  Frequency/Duration:Eval and 1 treatment/1 day    Certification date from 07/23/24   To 07/23/24        See note for plan of treatment details and functional goals     Shahana Sam OTR/L                        I CERTIFY THE NEED FOR THESE SERVICES FURNISHED UNDER        THIS PLAN OF TREATMENT AND WHILE UNDER MY CARE     (Physician attestation of this document indicates review and certification of the therapy plan).              Referring Provider:  Johnathan Lagos    Initial Assessment  See Epic Evaluation- 07/23/24

## 2024-08-01 ENCOUNTER — TELEPHONE (OUTPATIENT)
Dept: NEUROLOGY | Facility: CLINIC | Age: 74
End: 2024-08-01
Payer: COMMERCIAL

## 2024-08-01 DIAGNOSIS — G31.84 AMNESTIC MCI (MILD COGNITIVE IMPAIRMENT WITH MEMORY LOSS): ICD-10-CM

## 2024-08-01 DIAGNOSIS — G31.9 CEREBRAL ATROPHY (H): ICD-10-CM

## 2024-08-01 NOTE — TELEPHONE ENCOUNTER
Pending Prescriptions:                       Disp   Refills    donepezil (ARICEPT) 5 MG tablet           30 tab*0            Sig: Take 5 mg (one tablet) at bedtime for 30 days, if           no side effects, increase to 10 mg (2 tablets) at           bedtime.   Last O/V 7/11/24  Next O/V 1/6/24

## 2024-08-02 RX ORDER — DONEPEZIL HYDROCHLORIDE 10 MG/1
10 TABLET, FILM COATED ORAL AT BEDTIME
Qty: 90 TABLET | Refills: 3 | Status: SHIPPED | OUTPATIENT
Start: 2024-08-02 | End: 2024-08-06

## 2024-08-02 NOTE — TELEPHONE ENCOUNTER
Per 7/11/24 OV note:  Plan:  -Start donepezil 5 mg daily for 4 weeks, if tolerated increase to 10 mg daily indefinitely    Called pt and she stated that she is tolerating fine, with no side effects.  She is okay with increasing to 10 mg daily.     Pended donepezil 10 mg tablet at bedtime for provider review/approval.     Shantelle PRESCOTT RN, BSN  [facility]

## 2024-08-05 NOTE — TELEPHONE ENCOUNTER
[facility]    Phone Message    May a detailed message be left on voicemail: yes     Reason for Call: Other:       Patient requesting call back to discuss ringing in her ears that she has had since starting the donepezil (ARICEPT) 5 MG tablet. She is thinking the does needs to be lowered. She is also requesting to change pharmacies to: [facility]  [address], [place] [address]      Action Taken: Message routed to:  Other: [facility]    Travel Screening: Not Applicable

## 2024-08-06 RX ORDER — DONEPEZIL HYDROCHLORIDE 5 MG/1
5 TABLET, FILM COATED ORAL AT BEDTIME
Qty: 90 TABLET | Refills: 3 | Status: SHIPPED | OUTPATIENT
Start: 2024-08-06 | End: 2024-10-01

## 2024-08-06 NOTE — TELEPHONE ENCOUNTER
Called pt back.     She has not increased to the 10 mg of donepezil yet.     She reports ringing in her ears that started when she started the 5 mg of donepezil.     Pt did stop the med for one day and continued having ringing in the ears. She is unsure if it is related to the medication.     She reports that historically she usually best tolerates lower doses of medications.     Pt will remain on 5 mg donepezil. Will see if provider can send in an updated script of 5 mg donepezil to the [facility]'s instead.     Pended donepezil 5 mg daily.     Shantelle PRESCOTT RN, BSN  [facility]

## 2024-08-12 ENCOUNTER — ALLIED HEALTH/NURSE VISIT (OUTPATIENT)
Dept: INTERNAL MEDICINE | Facility: CLINIC | Age: 74
End: 2024-08-12
Payer: COMMERCIAL

## 2024-08-12 DIAGNOSIS — Z01.30 BP CHECK: Primary | ICD-10-CM

## 2024-08-12 PROCEDURE — 99207 PR NO CHARGE NURSE ONLY: CPT | Performed by: INTERNAL MEDICINE

## 2024-09-16 ENCOUNTER — TELEPHONE (OUTPATIENT)
Dept: NEUROLOGY | Facility: CLINIC | Age: 74
End: 2024-09-16
Payer: COMMERCIAL

## 2024-09-16 NOTE — TELEPHONE ENCOUNTER
Per provider:  Ok to trial off and see if symptoms improve. Would also be ok to switch to memantine if desired.  Jennifer Ball MD     Called pt and relayed provider message.     She will trial off of the medication, and update clinic if interested in trying memantine instead.     Shantelle PRESCOTT RN, BSN  [facility]

## 2024-09-16 NOTE — TELEPHONE ENCOUNTER
[facility] Call Center    Phone Message    May a detailed message be left on voicemail: yes     Reason for Call: Symptoms or Concerns     If patient has red-flag symptoms, warm transfer to triage line    Current symptom or concern: Zoning out, Foggy    Symptoms have been present for:  1 month(s)    Has patient previously been seen for this? No    Are there any new or worsening symptoms? Yes: Pt calling in regards to donepezil (ARICEPT) 5 MG tablet medication, believing that it is a dosage that is too high and causing some symptoms.    Please contact pt at [phone]    Action Taken: Message routed to:  Other: [facility]    Travel Screening: Not Applicable     Date of Service:

## 2024-09-27 SDOH — HEALTH STABILITY: PHYSICAL HEALTH: ON AVERAGE, HOW MANY MINUTES DO YOU ENGAGE IN EXERCISE AT THIS LEVEL?: 60 MIN

## 2024-09-27 SDOH — HEALTH STABILITY: PHYSICAL HEALTH: ON AVERAGE, HOW MANY DAYS PER WEEK DO YOU ENGAGE IN MODERATE TO STRENUOUS EXERCISE (LIKE A BRISK WALK)?: 3 DAYS

## 2024-09-27 ASSESSMENT — SOCIAL DETERMINANTS OF HEALTH (SDOH): HOW OFTEN DO YOU GET TOGETHER WITH FRIENDS OR RELATIVES?: TWICE A WEEK

## 2024-10-01 ENCOUNTER — OFFICE VISIT (OUTPATIENT)
Dept: INTERNAL MEDICINE | Facility: CLINIC | Age: 74
End: 2024-10-01
Attending: INTERNAL MEDICINE
Payer: COMMERCIAL

## 2024-10-01 VITALS
SYSTOLIC BLOOD PRESSURE: 138 MMHG | BODY MASS INDEX: 27.33 KG/M2 | DIASTOLIC BLOOD PRESSURE: 84 MMHG | RESPIRATION RATE: 16 BRPM | WEIGHT: 160.1 LBS | OXYGEN SATURATION: 97 % | HEART RATE: 103 BPM | HEIGHT: 64 IN

## 2024-10-01 DIAGNOSIS — I10 ESSENTIAL HYPERTENSION: ICD-10-CM

## 2024-10-01 DIAGNOSIS — R41.3 MEMORY LOSS: ICD-10-CM

## 2024-10-01 DIAGNOSIS — N18.2 CKD (CHRONIC KIDNEY DISEASE) STAGE 2, GFR 60-89 ML/MIN: ICD-10-CM

## 2024-10-01 DIAGNOSIS — Z23 ENCOUNTER FOR IMMUNIZATION: ICD-10-CM

## 2024-10-01 DIAGNOSIS — E78.5 HYPERLIPIDEMIA LDL GOAL <100: ICD-10-CM

## 2024-10-01 DIAGNOSIS — Z00.00 MEDICARE ANNUAL WELLNESS VISIT, SUBSEQUENT: Primary | ICD-10-CM

## 2024-10-01 PROCEDURE — G0008 ADMIN INFLUENZA VIRUS VAC: HCPCS | Performed by: INTERNAL MEDICINE

## 2024-10-01 PROCEDURE — 99214 OFFICE O/P EST MOD 30 MIN: CPT | Mod: 25 | Performed by: INTERNAL MEDICINE

## 2024-10-01 PROCEDURE — 91320 SARSCV2 VAC 30MCG TRS-SUC IM: CPT | Performed by: INTERNAL MEDICINE

## 2024-10-01 PROCEDURE — 84443 ASSAY THYROID STIM HORMONE: CPT | Performed by: INTERNAL MEDICINE

## 2024-10-01 PROCEDURE — 82570 ASSAY OF URINE CREATININE: CPT | Performed by: INTERNAL MEDICINE

## 2024-10-01 PROCEDURE — 36415 COLL VENOUS BLD VENIPUNCTURE: CPT | Performed by: INTERNAL MEDICINE

## 2024-10-01 PROCEDURE — 90480 ADMN SARSCOV2 VAC 1/ONLY CMP: CPT | Performed by: INTERNAL MEDICINE

## 2024-10-01 PROCEDURE — 90662 IIV NO PRSV INCREASED AG IM: CPT | Performed by: INTERNAL MEDICINE

## 2024-10-01 PROCEDURE — 84460 ALANINE AMINO (ALT) (SGPT): CPT | Performed by: INTERNAL MEDICINE

## 2024-10-01 PROCEDURE — G0439 PPPS, SUBSEQ VISIT: HCPCS | Performed by: INTERNAL MEDICINE

## 2024-10-01 PROCEDURE — 80048 BASIC METABOLIC PNL TOTAL CA: CPT | Performed by: INTERNAL MEDICINE

## 2024-10-01 PROCEDURE — 84425 ASSAY OF VITAMIN B-1: CPT | Mod: 90 | Performed by: INTERNAL MEDICINE

## 2024-10-01 PROCEDURE — 82607 VITAMIN B-12: CPT | Performed by: INTERNAL MEDICINE

## 2024-10-01 PROCEDURE — 83520 IMMUNOASSAY QUANT NOS NONAB: CPT | Mod: 90 | Performed by: INTERNAL MEDICINE

## 2024-10-01 PROCEDURE — 82043 UR ALBUMIN QUANTITATIVE: CPT | Performed by: INTERNAL MEDICINE

## 2024-10-01 PROCEDURE — 99000 SPECIMEN HANDLING OFFICE-LAB: CPT | Performed by: INTERNAL MEDICINE

## 2024-10-01 PROCEDURE — 80061 LIPID PANEL: CPT | Performed by: INTERNAL MEDICINE

## 2024-10-01 NOTE — PATIENT INSTRUCTIONS
- I will send you a message on [facility] when I am able to look at the results of your tests from today    - Call [phone] to schedule a hip injection with orthopedics

## 2024-10-01 NOTE — PROGRESS NOTES
"Preventive Care Visit  [facility]  Flynn Gibbons MD, Internal Medicine  Oct 1, 2024    Assessment & Plan   Medicare annual wellness visit, subsequent  Reviewed PMH. Discussed healthcare maintenance issues, including cancer screenings, relevant immunizations, and cardiac risk factor screenings such as for cholesterol, HTN, and DM.    Essential hypertension  BP at goal. Continue current medications.  - Basic metabolic panel; Future    CKD (chronic kidney disease) stage 2, GFR 60-89 ml/min  Lab check today.  - Albumin Random Urine Quantitative with Creat Ratio; Future    Hyperlipidemia LDL goal <100  Lab check today. Continue atorvastatin.  - ALT; Future  - Lipid panel reflex to direct LDL Non-fasting; Future    Encounter for immunization  - INFLUENZA HIGH DOSE, TRIVALENT, PF (FLUZONE)  - COVID-19 12+ (PFIZER)    BMI  Estimated body mass index is 27.48 kg/m  as calculated from the following:    Height as of this encounter: 1.626 m (5' 4\").    Weight as of this encounter: 72.6 kg (160 lb 1.6 oz).     Counseling  Appropriate preventive services were addressed with this patient via screening, questionnaire, or discussion as appropriate for fall prevention, nutrition, physical activity, Tobacco-use cessation, social engagement, weight loss and cognition.  Checklist reviewing preventive services available has been given to the patient.  Reviewed patient's diet, addressing concerns and/or questions.   She is at risk for lack of exercise and has been provided with information to increase physical activity for the benefit of her well-being. She is at risk for psychosocial distress and has been provided with information to reduce risk. Information on urinary incontinence and treatment options given to patient.     Signed Electronically by:  Flynn Gibbons MD, MPH  [facility]  Internal Medicine    Subjective   Pat is a 73 year old presenting for the following: " Physical    Health Care Directive Patient does not have a Health Care Directive or Living Will    HPI  Pat presents today for an AWV. We also discussed her chronic health conditions. She recently stopped donepezil due to side effects.        9/27/2024   General Health   How would you rate your overall physical health? Good   Feel stress (tense, anxious, or unable to sleep) Only a little      (!) STRESS CONCERN      9/27/2024   Nutrition   Diet: Regular (no restrictions)          9/27/2024   Exercise   Days per week of moderate/strenous exercise 3 days   Average minutes spent exercising at this level 60 min          9/27/2024   Social Factors   Frequency of gathering with friends or relatives Twice a week   Worry food won't last until get money to buy more No   Food not last or not have enough money for food? No   Do you have housing? (Housing is defined as stable permanent housing and does not include staying ouside in a car, in a tent, in an abandoned building, in an overnight shelter, or couch-surfing.) Yes   Are you worried about losing your housing? No   Lack of transportation? No   Unable to get utilities (heat,electricity)? No          9/27/2024   Fall Risk   Fallen 2 or more times in the past year? No    No   Trouble with walking or balance? No    No       Multiple values from one day are sorted in reverse-chronological order          9/27/2024   Activities of Daily Living- Home Safety   Needs help with the following daily activites None of the above   Safety concerns in the home None of the above          9/27/2024   Dental   Dentist two times every year? Yes          9/27/2024   Hearing Screening   Hearing concerns? None of the above          9/27/2024   Driving Risk Screening   Patient/family members have concerns about driving No          9/27/2024   General Alertness/Fatigue Screening   Have you been more tired than usual lately? No          9/27/2024   Urinary Incontinence Screening   Bothered by  leaking urine in past 6 months Yes          9/27/2024   TB Screening   Were you born outside of the [place]? No      Today's PHQ-9 Score:       10/1/2024     2:13 PM   PHQ-9 SCORE   PHQ-9 Total Score MyChart Incomplete         9/27/2024   Substance Use   Alcohol more than 3/day or more than 7/wk No   Do you have a current opioid prescription? No   How severe/bad is pain from 1 to 10? 0/10 (No Pain)   Do you use any other substances recreationally? No      Social History     Tobacco Use    Smoking status: Never    Smokeless tobacco: Never   Vaping Use    Vaping status: Never Used   Substance Use Topics    Alcohol use: Yes     Comment: infrequent    Drug use: No         11/28/2022   LAST FHS-7 RESULTS   1st degree relative breast or ovarian cancer Yes   Any relative bilateral breast cancer No   Any male have breast cancer No   Any ONE woman have BOTH breast AND ovarian cancer No   Any woman with breast cancer before 50yrs No   2 or more relatives with breast AND/OR ovarian cancer No   2 or more relatives with breast AND/OR bowel cancer No      ASCVD Risk   The 10-year ASCVD risk score (Vivienne ROSARIO, et al., 2019) is: 19.5%    Values used to calculate the score:      Age: 73 years      Sex: Female      Is Non- : No      Diabetic: No      Tobacco smoker: No      Systolic Blood Pressure: 138 mmHg      Is BP treated: Yes      HDL Cholesterol: 53 mg/dL      Total Cholesterol: 185 mg/dL    Reviewed and updated as needed this visit by Provider   Tobacco  Allergies  Meds  Problems  Med Hx  Surg Hx  Fam Hx          Current providers sharing in care for this patient include:  Patient Care Team:  Flynn Hamilton MD as PCP - General (Internal Medicine)  Priya Ontiveros MD as Assigned Cancer Care Provider  Flynn Hmailton MD as Assigned PCP  Ced Contreras MD as Assigned Musculoskeletal Provider  Johnathan Lagos MD as Resident  Bridget Norman, PhD LP as Assigned Behavioral  "Health Provider    The following health maintenance items are reviewed in Epic and correct as of today:  Health Maintenance   Topic Date Due    LIPID  03/01/2024    PHQ-9  03/20/2024    ALT  05/24/2024    BMP  09/20/2024    MICROALBUMIN  09/20/2024    ANNUAL REVIEW OF HM ORDERS  09/20/2024    COLORECTAL CANCER SCREENING  04/08/2025    MEDICARE ANNUAL WELLNESS VISIT  10/01/2025    FALL RISK ASSESSMENT  10/01/2025    RSV VACCINE (1 - 1-dose 75+ series) 11/13/2025    MAMMO SCREENING  11/30/2025    GLUCOSE  09/20/2026    DTAP/TDAP/TD IMMUNIZATION (2 - Td or Tdap) 06/01/2027    ADVANCE CARE PLANNING  09/21/2028    DEXA  08/04/2032    HEPATITIS C SCREENING  Completed    DEPRESSION ACTION PLAN  Completed    INFLUENZA VACCINE  Completed    Pneumococcal Vaccine: 65+ Years  Completed    ZOSTER IMMUNIZATION  Completed    COVID-19 Vaccine  Completed    HPV IMMUNIZATION  Aged Out    MENINGITIS IMMUNIZATION  Aged Out    RSV MONOCLONAL ANTIBODY  Aged Out    URINALYSIS  Discontinued        Objective    Exam  /84   Pulse 103   Resp 16   Ht 1.626 m (5' 4\")   Wt 72.6 kg (160 lb 1.6 oz)   LMP  (LMP Unknown)   SpO2 97%   BMI 27.48 kg/m     Estimated body mass index is 27.48 kg/m  as calculated from the following:    Height as of this encounter: 1.626 m (5' 4\").    Weight as of this encounter: 72.6 kg (160 lb 1.6 oz).    Physical Exam  GENERAL: alert and in no distress.  EYES: conjunctivae/corneas clear. EOMs grossly intact  HENT: Facies symmetric.  RESP: CTAB, no w/r/r.  CV: RRR, no m/r/g.  GI: NT, ND, without rebound tenderness or guarding  MSK: No edema. Moves all four extremities freely.  SKIN: No significant ulcers, lesions, or rashes on the visualized portions of the skin  NEURO: CN II-XII grossly intact.  No minicog done due to known cognitive impairment.    Answers submitted by the patient for this visit:  Patient Health Questionnaire (Submitted on 10/1/2024)  PHQ9 TOTAL SCORE: Incomplete  "

## 2024-10-02 LAB
ALT SERPL W P-5'-P-CCNC: 22 U/L (ref 0–50)
ANION GAP SERPL CALCULATED.3IONS-SCNC: 12 MMOL/L (ref 7–15)
BUN SERPL-MCNC: 11.2 MG/DL (ref 8–23)
CALCIUM SERPL-MCNC: 9.5 MG/DL (ref 8.8–10.4)
CHLORIDE SERPL-SCNC: 103 MMOL/L (ref 98–107)
CHOLEST SERPL-MCNC: 163 MG/DL
CREAT SERPL-MCNC: 0.93 MG/DL (ref 0.51–0.95)
CREAT UR-MCNC: 71.7 MG/DL
EGFRCR SERPLBLD CKD-EPI 2021: 65 ML/MIN/1.73M2
FASTING STATUS PATIENT QL REPORTED: NO
FASTING STATUS PATIENT QL REPORTED: NO
GLUCOSE SERPL-MCNC: 112 MG/DL (ref 70–99)
HCO3 SERPL-SCNC: 24 MMOL/L (ref 22–29)
HDLC SERPL-MCNC: 52 MG/DL
LDLC SERPL CALC-MCNC: 64 MG/DL
Lab: NORMAL
MICROALBUMIN UR-MCNC: <12 MG/L
MICROALBUMIN/CREAT UR: NORMAL MG/G{CREAT}
NONHDLC SERPL-MCNC: 111 MG/DL
PERFORMING LABORATORY: NORMAL
POTASSIUM SERPL-SCNC: 4.4 MMOL/L (ref 3.4–5.3)
SODIUM SERPL-SCNC: 139 MMOL/L (ref 135–145)
SPECIMEN STATUS: NORMAL
TEST NAME: NORMAL
TRIGL SERPL-MCNC: 235 MG/DL
TSH SERPL DL<=0.005 MIU/L-ACNC: 1.63 UIU/ML (ref 0.3–4.2)
VIT B12 SERPL-MCNC: 548 PG/ML (ref 232–1245)

## 2024-10-03 ENCOUNTER — MYC MEDICAL ADVICE (OUTPATIENT)
Dept: NEUROLOGY | Facility: CLINIC | Age: 74
End: 2024-10-03
Payer: COMMERCIAL

## 2024-10-06 LAB — VIT B1 PYROPHOSHATE BLD-SCNC: 153 NMOL/L

## 2024-10-09 ENCOUNTER — OFFICE VISIT (OUTPATIENT)
Dept: ORTHOPEDICS | Facility: CLINIC | Age: 74
End: 2024-10-09
Payer: COMMERCIAL

## 2024-10-09 VITALS — BODY MASS INDEX: 27.31 KG/M2 | WEIGHT: 160 LBS | HEIGHT: 64 IN

## 2024-10-09 DIAGNOSIS — M16.12 OSTEOARTHRITIS OF ONE HIP, LEFT: Primary | ICD-10-CM

## 2024-10-09 DIAGNOSIS — M75.52 SUBACROMIAL BURSITIS OF LEFT SHOULDER JOINT: ICD-10-CM

## 2024-10-09 LAB
SCANNED LAB RESULT: ABNORMAL
TEST NAME: ABNORMAL

## 2024-10-09 PROCEDURE — 99204 OFFICE O/P NEW MOD 45 MIN: CPT | Mod: 25 | Performed by: FAMILY MEDICINE

## 2024-10-09 PROCEDURE — 20611 DRAIN/INJ JOINT/BURSA W/US: CPT | Mod: LT | Performed by: FAMILY MEDICINE

## 2024-10-09 RX ORDER — ROPIVACAINE HYDROCHLORIDE 5 MG/ML
4 INJECTION, SOLUTION EPIDURAL; INFILTRATION; PERINEURAL
Status: SHIPPED | OUTPATIENT
Start: 2024-10-09

## 2024-10-09 RX ORDER — METHYLPREDNISOLONE ACETATE 40 MG/ML
40 INJECTION, SUSPENSION INTRA-ARTICULAR; INTRALESIONAL; INTRAMUSCULAR; SOFT TISSUE
Status: SHIPPED | OUTPATIENT
Start: 2024-10-09

## 2024-10-09 RX ORDER — LIDOCAINE HYDROCHLORIDE 10 MG/ML
8 INJECTION, SOLUTION INFILTRATION; PERINEURAL
Status: SHIPPED | OUTPATIENT
Start: 2024-10-09

## 2024-10-09 RX ADMIN — LIDOCAINE HYDROCHLORIDE 8 ML: 10 INJECTION, SOLUTION INFILTRATION; PERINEURAL at 11:23

## 2024-10-09 RX ADMIN — METHYLPREDNISOLONE ACETATE 40 MG: 40 INJECTION, SUSPENSION INTRA-ARTICULAR; INTRALESIONAL; INTRAMUSCULAR; SOFT TISSUE at 11:23

## 2024-10-09 RX ADMIN — ROPIVACAINE HYDROCHLORIDE 4 ML: 5 INJECTION, SOLUTION EPIDURAL; INFILTRATION; PERINEURAL at 11:23

## 2024-10-09 NOTE — LETTER
10/9/2024      Emma Rudolph  [address] [facility] [address]      Dear Colleague,    Thank you for referring your patient, Emma Rudolph, to the [facility] [place]. Please see a copy of my visit note below.    ASSESSMENT & PLAN  Patient Instructions     1. Osteoarthritis of one hip, left    2. Subacromial bursitis of left shoulder joint      -Patient has chronic left hip pain due to arthritis and left shoulder pain due to bursitis  -Personal review of left hip and pelvis x-ray shows minimal degenerative changes  -Patient requested left hip intra-articular cortisone injection today.  Patient tolerated left hip intra-articular cortisone injection today without complications.  Patient was given postprocedure instructions  -Has chronic left shoulder pain due to bursitis.  Patient has not had any treatment for the shoulder.  -We discussed treatment options including formal physical therapy, home exercise program, a cortisone injection.  -Patient start home exercise program.  Handouts were given today.  -Patient will follow-up if pain returns  -Call direct clinic number [[phone]] at any time with questions or concerns.    Albert Yeo MD [facility]  [facility]        -----    SUBJECTIVE  Emma Rudolph is a/an 73 year old female who is seen as a self referral for evaluation of left hip pain. The patient is seen by themselves.    Onset: 1 years(s) ago. Patient describes injury as she fell down steps and slipped down stairs   Location of Pain: left hip anterior hip pain with rotation   Rating of Pain at worst: 3/10  Rating of Pain Currently: 0/10  Worsened by: twisting   Better with: rest avoidance, cortisone injection   Treatments tried: rest/activity avoidance and corticosteroid injection (most recent date: 1/12/24) that provided  8 month(s) of relief  Quality: sharp pain   Associated symptoms: no distal  numbness or tingling; denies swelling or warmth  Orthopedic history: NO  Relevant surgical history: NO  Social history: social history: retired    Past Medical History:   Diagnosis Date     Breast lesion     LEFT     Depressive disorder 1980s? taken meds since it started     Generalized anxiety disorder      Hyperlipidemia LDL goal <130      Hypertension      Mild major depression (H)      Osteoarthritis of multiple joints, unspecified osteoarthritis type of #2&3 fingers bilat R>L      Vitamin D deficiency disease      Social History     Socioeconomic History     Marital status: Single   Occupational History     Employer: RETIRED     Comment: [employer]   Tobacco Use     Smoking status: Never     Smokeless tobacco: Never   Vaping Use     Vaping status: Never Used   Substance and Sexual Activity     Alcohol use: Yes     Comment: infrequent     Drug use: No     Sexual activity: Not Currently     Partners: Female   Other Topics Concern     Parent/sibling w/ CABG, MI or angioplasty before 65F 55M? Yes     Comment: father, infarction at age 44     Seat Belt Yes     Social Determinants of Health     Financial Resource Strain: Low Risk  (9/27/2024)    Financial Resource Strain      Within the past 12 months, have you or your family members you live with been unable to get utilities (heat, electricity) when it was really needed?: No   Food Insecurity: Low Risk  (9/27/2024)    Food Insecurity      Within the past 12 months, did you worry that your food would run out before you got money to buy more?: No      Within the past 12 months, did the food you bought just not last and you didn t have money to get more?: No   Transportation Needs: Low Risk  (9/27/2024)    Transportation Needs      Within the past 12 months, has lack of transportation kept you from medical appointments, getting your medicines, non-medical meetings or appointments, work, or from getting things that you need?: No   Physical Activity: Sufficiently  "Active (9/27/2024)    Exercise Vital Sign      Days of Exercise per Week: 3 days      Minutes of Exercise per Session: 60 min   Stress: No Stress Concern Present (9/27/2024)    [employer] [place] [employer] - Occupational Stress Questionnaire      Feeling of Stress : Only a little   Social Connections: Unknown (9/27/2024)    Social Connection and Isolation Panel [NHANES]      Frequency of Social Gatherings with Friends and Family: Twice a week   Housing Stability: Low Risk  (9/27/2024)    Housing Stability      Do you have housing? : Yes      Are you worried about losing your housing?: No         Patient's past medical, surgical, social, and family histories were reviewed today and no changes are noted.    REVIEW OF SYSTEMS:  10 point ROS is negative other than symptoms noted above in HPI, Past Medical History or as stated below  Constitutional: NEGATIVE for fever, chills, change in weight  Skin: NEGATIVE for worrisome rashes, moles or lesions  GI/: NEGATIVE for bowel or bladder changes  Neuro: NEGATIVE for weakness, dizziness or paresthesias    OBJECTIVE:  Ht 1.626 m (5' 4\")   Wt 72.6 kg (160 lb)   LMP  (LMP Unknown)   BMI 27.46 kg/m     General: healthy, alert and in no distress  HEENT: no scleral icterus or conjunctival erythema  Skin: no suspicious lesions or rash. No jaundice.  CV: no pedal edema  Resp: normal respiratory effort without conversational dyspnea   Psych: normal mood and affect  Gait: normal steady gait with appropriate coordination and balance  Neuro: Normal light sensory exam of lower extremity  MSK:  LEFT HIP  Inspection:    No obvious deformity or asymmetry, level pelvis  Palpation:    Tender about the anterior groin/joint line. Otherwise all other landmarks are nontender.  Active Range of Motion:     Flexion within normal limits, IR within normal limits, ER  within normal limits  Strength:    Flexion grossly intact, adduction grossly intact, abduction grossly intact  Special " Tests:    Positive: anterior impingement (FADIR)    Negative: Logroll, resisted gluteus medius provocation, ASHLEY, posterior impingement (EX/AB/ER)    Independent visualization of the below image:  No results found for this or any previous visit (from the past 24 hour(s)).    Personal review of left hip and pelvis x-rays performed on 11/20/2023 shows mild degenerative changes of the hip joint.  No acute fracture or dislocation.    Personal review of left shoulder x-ray performed on 3/14/2023 shows no acute fracture, dislocation or osseous abnormalities.  Large Joint Injection/Arthocentesis: L hip joint    Date/Time: 10/9/2024 11:23 AM    Performed by: Yeo, Albert, MD  Authorized by: Yeo, Albert, MD    Indications:  Pain  Needle Size:  22 G  Guidance: ultrasound    Approach:  Anterior  Location:  Hip      Site:  L hip joint  Medications:  40 mg methylPREDNISolone 40 MG/ML; 8 mL lidocaine 1 %; 4 mL ROPivacaine 5 MG/ML  Outcome:  Tolerated well, no immediate complications  Procedure discussed: discussed risks, benefits, and alternatives    Consent Given by:  Patient  Timeout: timeout called immediately prior to procedure    Prep: patient was prepped and draped in usual sterile fashion        Albert Yeo MD [facility]       Again, thank you for allowing me to participate in the care of your patient.        Sincerely,        Albert Yeo, MD

## 2024-10-09 NOTE — PROGRESS NOTES
ASSESSMENT & PLAN  Patient Instructions     1. Osteoarthritis of one hip, left    2. Subacromial bursitis of left shoulder joint      -Patient has chronic left hip pain due to arthritis and left shoulder pain due to bursitis  -Personal review of left hip and pelvis x-ray shows minimal degenerative changes  -Patient requested left hip intra-articular cortisone injection today.  Patient tolerated left hip intra-articular cortisone injection today without complications.  Patient was given postprocedure instructions  -Has chronic left shoulder pain due to bursitis.  Patient has not had any treatment for the shoulder.  -We discussed treatment options including formal physical therapy, home exercise program, a cortisone injection.  -Patient start home exercise program.  Handouts were given today.  -Patient will follow-up if pain returns  -Call direct clinic number [[phone]] at any time with questions or concerns.    Albert Yeo MD [facility]  [facility]        -----    SUBJECTIVE  Emma Rudolph is a/an 73 year old female who is seen as a self referral for evaluation of left hip pain. The patient is seen by themselves.    Onset: 1 years(s) ago. Patient describes injury as she fell down steps and slipped down stairs   Location of Pain: left hip anterior hip pain with rotation   Rating of Pain at worst: 3/10  Rating of Pain Currently: 0/10  Worsened by: twisting   Better with: rest avoidance, cortisone injection   Treatments tried: rest/activity avoidance and corticosteroid injection (most recent date: 1/12/24) that provided  8 month(s) of relief  Quality: sharp pain   Associated symptoms: no distal numbness or tingling; denies swelling or warmth  Orthopedic history: NO  Relevant surgical history: NO  Social history: social history: retired    Past Medical History:   Diagnosis Date    Breast lesion     LEFT    Depressive disorder 1980s? taken meds since it  started    Generalized anxiety disorder     Hyperlipidemia LDL goal <130     Hypertension     Mild major depression (H)     Osteoarthritis of multiple joints, unspecified osteoarthritis type of #2&3 fingers bilat R>L     Vitamin D deficiency disease      Social History     Socioeconomic History    Marital status: Single   Occupational History     Employer: RETIRED     Comment: [employer]   Tobacco Use    Smoking status: Never    Smokeless tobacco: Never   Vaping Use    Vaping status: Never Used   Substance and Sexual Activity    Alcohol use: Yes     Comment: infrequent    Drug use: No    Sexual activity: Not Currently     Partners: Female   Other Topics Concern    Parent/sibling w/ CABG, MI or angioplasty before 65F 55M? Yes     Comment: father, infarction at age 44    Seat Belt Yes     Social Determinants of Health     Financial Resource Strain: Low Risk  (9/27/2024)    Financial Resource Strain     Within the past 12 months, have you or your family members you live with been unable to get utilities (heat, electricity) when it was really needed?: No   Food Insecurity: Low Risk  (9/27/2024)    Food Insecurity     Within the past 12 months, did you worry that your food would run out before you got money to buy more?: No     Within the past 12 months, did the food you bought just not last and you didn t have money to get more?: No   Transportation Needs: Low Risk  (9/27/2024)    Transportation Needs     Within the past 12 months, has lack of transportation kept you from medical appointments, getting your medicines, non-medical meetings or appointments, work, or from getting things that you need?: No   Physical Activity: Sufficiently Active (9/27/2024)    Exercise Vital Sign     Days of Exercise per Week: 3 days     Minutes of Exercise per Session: 60 min   Stress: No Stress Concern Present (9/27/2024)    [employer] [place] [employer] - Occupational Stress Questionnaire     Feeling of Stress : Only a  "little   Social Connections: Unknown (9/27/2024)    Social Connection and Isolation Panel [NHANES]     Frequency of Social Gatherings with Friends and Family: Twice a week   Housing Stability: Low Risk  (9/27/2024)    Housing Stability     Do you have housing? : Yes     Are you worried about losing your housing?: No         Patient's past medical, surgical, social, and family histories were reviewed today and no changes are noted.    REVIEW OF SYSTEMS:  10 point ROS is negative other than symptoms noted above in HPI, Past Medical History or as stated below  Constitutional: NEGATIVE for fever, chills, change in weight  Skin: NEGATIVE for worrisome rashes, moles or lesions  GI/: NEGATIVE for bowel or bladder changes  Neuro: NEGATIVE for weakness, dizziness or paresthesias    OBJECTIVE:  Ht 1.626 m (5' 4\")   Wt 72.6 kg (160 lb)   LMP  (LMP Unknown)   BMI 27.46 kg/m     General: healthy, alert and in no distress  HEENT: no scleral icterus or conjunctival erythema  Skin: no suspicious lesions or rash. No jaundice.  CV: no pedal edema  Resp: normal respiratory effort without conversational dyspnea   Psych: normal mood and affect  Gait: normal steady gait with appropriate coordination and balance  Neuro: Normal light sensory exam of lower extremity  MSK:  LEFT HIP  Inspection:    No obvious deformity or asymmetry, level pelvis  Palpation:    Tender about the anterior groin/joint line. Otherwise all other landmarks are nontender.  Active Range of Motion:     Flexion within normal limits, IR within normal limits, ER  within normal limits  Strength:    Flexion grossly intact, adduction grossly intact, abduction grossly intact  Special Tests:    Positive: anterior impingement (FADIR)    Negative: Logroll, resisted gluteus medius provocation, ASHLEY, posterior impingement (EX/AB/ER)    Independent visualization of the below image:  No results found for this or any previous visit (from the past 24 hour(s)).    Personal " review of left hip and pelvis x-rays performed on 11/20/2023 shows mild degenerative changes of the hip joint.  No acute fracture or dislocation.    Personal review of left shoulder x-ray performed on 3/14/2023 shows no acute fracture, dislocation or osseous abnormalities.  Large Joint Injection/Arthocentesis: L hip joint    Date/Time: 10/9/2024 11:23 AM    Performed by: Yeo, Albert, MD  Authorized by: Yeo, Albert, MD    Indications:  Pain  Needle Size:  22 G  Guidance: ultrasound    Approach:  Anterior  Location:  Hip      Site:  L hip joint  Medications:  40 mg methylPREDNISolone 40 MG/ML; 8 mL lidocaine 1 %; 4 mL ROPivacaine 5 MG/ML  Outcome:  Tolerated well, no immediate complications  Procedure discussed: discussed risks, benefits, and alternatives    Consent Given by:  Patient  Timeout: timeout called immediately prior to procedure    Prep: patient was prepped and draped in usual sterile fashion        Albert Yeo MD [facility]

## 2024-10-09 NOTE — PATIENT INSTRUCTIONS
1. Osteoarthritis of one hip, left    2. Subacromial bursitis of left shoulder joint      -Patient has chronic left hip pain due to arthritis and left shoulder pain due to bursitis  -Personal review of left hip and pelvis x-ray shows minimal degenerative changes  -Patient requested left hip intra-articular cortisone injection today.  Patient tolerated left hip intra-articular cortisone injection today without complications.  Patient was given postprocedure instructions  -Has chronic left shoulder pain due to bursitis.  Patient has not had any treatment for the shoulder.  -We discussed treatment options including formal physical therapy, home exercise program, a cortisone injection.  -Patient start home exercise program.  Handouts were given today.  -Patient will follow-up if pain returns  -Call direct clinic number [[phone]] at any time with questions or concerns.    Albert Yeo MD [facility]  [facility]

## 2024-10-29 ASSESSMENT — ANXIETY QUESTIONNAIRES: GAD7 TOTAL SCORE: 8

## 2024-11-04 ASSESSMENT — PATIENT HEALTH QUESTIONNAIRE - PHQ9: SUM OF ALL RESPONSES TO PHQ QUESTIONS 1-9: 0

## 2024-11-26 DIAGNOSIS — N60.92 ATYPICAL DUCTAL HYPERPLASIA OF LEFT BREAST: ICD-10-CM

## 2024-11-27 RX ORDER — RALOXIFENE HYDROCHLORIDE 60 MG/1
1 TABLET, FILM COATED ORAL DAILY
Qty: 90 TABLET | Refills: 3 | OUTPATIENT
Start: 2024-11-27

## 2024-11-27 NOTE — TELEPHONE ENCOUNTER
Refill team-can you refuse the refill with a note to contact oncology team? See PCP note.     Thank you-    Nena Osorio RN

## 2024-11-29 DIAGNOSIS — N60.92 ATYPICAL DUCTAL HYPERPLASIA OF LEFT BREAST: ICD-10-CM

## 2024-11-29 NOTE — TELEPHONE ENCOUNTER
Patient is requesting a refill on Raloxifene. Would like this sent to [facility].    Last Written Prescription Date:  12/19/23  Last Fill Quantity: 90,  # refills: 3   Last office visit: 7/8/24  Future Office Visit:  7/7/25    Routing to care team for review and approval.     Leslie Benites RN, BSN, PHN, OCN  .[facility]

## 2024-12-02 ENCOUNTER — HOSPITAL ENCOUNTER (OUTPATIENT)
Dept: MAMMOGRAPHY | Facility: CLINIC | Age: 74
Discharge: HOME | End: 2024-12-02
Attending: INTERNAL MEDICINE | Admitting: INTERNAL MEDICINE
Payer: COMMERCIAL

## 2024-12-02 DIAGNOSIS — Z12.31 ENCOUNTER FOR SCREENING MAMMOGRAM FOR BREAST CANCER: ICD-10-CM

## 2024-12-02 DIAGNOSIS — N60.92 ATYPICAL DUCTAL HYPERPLASIA OF LEFT BREAST: ICD-10-CM

## 2024-12-02 PROCEDURE — 77063 BREAST TOMOSYNTHESIS BI: CPT

## 2024-12-02 RX ORDER — RALOXIFENE HYDROCHLORIDE 60 MG/1
1 TABLET, FILM COATED ORAL DAILY
Qty: 90 TABLET | Refills: 3 | Status: SHIPPED | OUTPATIENT
Start: 2024-12-02

## 2025-01-16 ENCOUNTER — OFFICE VISIT (OUTPATIENT)
Dept: NEUROLOGY | Facility: CLINIC | Age: 75
End: 2025-01-16
Payer: COMMERCIAL

## 2025-01-16 VITALS
OXYGEN SATURATION: 98 % | WEIGHT: 166 LBS | HEIGHT: 64 IN | BODY MASS INDEX: 28.34 KG/M2 | SYSTOLIC BLOOD PRESSURE: 143 MMHG | HEART RATE: 125 BPM | DIASTOLIC BLOOD PRESSURE: 85 MMHG

## 2025-01-16 DIAGNOSIS — G30.1 MILD LATE ONSET ALZHEIMER'S DEMENTIA WITHOUT BEHAVIORAL DISTURBANCE, PSYCHOTIC DISTURBANCE, MOOD DISTURBANCE, OR ANXIETY (H): Primary | ICD-10-CM

## 2025-01-16 DIAGNOSIS — F02.A0 MILD LATE ONSET ALZHEIMER'S DEMENTIA WITHOUT BEHAVIORAL DISTURBANCE, PSYCHOTIC DISTURBANCE, MOOD DISTURBANCE, OR ANXIETY (H): Primary | ICD-10-CM

## 2025-01-16 PROCEDURE — 99214 OFFICE O/P EST MOD 30 MIN: CPT | Mod: GC | Performed by: PSYCHIATRY & NEUROLOGY

## 2025-01-16 ASSESSMENT — MONTREAL COGNITIVE ASSESSMENT (MOCA)
9. REPEAT EACH SENTENCE: 2
11. FOR EACH PAIR OF WORDS, WHAT CATEGORY DO THEY BELONG TO (OUT OF 2): 2
13. ORIENTATION SUBSCORE: 4
7. [VIGILENCE] TAP WHEN HEARING DESIGNATED LETTER: 1
WHAT LEVEL OF EDUCATION WAS ATTAINED: 0
4. NAME EACH OF THE THREE ANIMALS SHOWN: 3
6. READ LIST OF DIGITS [FORWARD/BACKWARD]: 2
8. SERIAL SUBTRACTION OF 7S: 3
10. [FLUENCY] NAME WORDS STARTING WITH DESIGNATED LETTER: 1
WHAT IS THE TOTAL SCORE (OUT OF 30): 22
VISUOSPATIAL/EXECUTIVE SUBSCORE: 4
12. MEMORY INDEX SCORE: 0

## 2025-01-16 NOTE — NURSING NOTE
"Emma Rudolph is a 74 year old female who presents for:  Chief Complaint   Patient presents with    Memory Loss     Memory loss follow up        Initial Vitals:  BP (!) 143/85   Pulse (!) 125   Ht 1.626 m (5' 4\")   Wt 75.3 kg (166 lb)   LMP  (LMP Unknown)   SpO2 98%   BMI 28.49 kg/m   Estimated body mass index is 28.49 kg/m  as calculated from the following:    Height as of this encounter: 1.626 m (5' 4\").    Weight as of this encounter: 75.3 kg (166 lb).. Body surface area is 1.84 meters squared. BP completed using cuff size: regular    Sruthi Luis Felipehai   "

## 2025-01-16 NOTE — PATIENT INSTRUCTIONS
You have a lot of factors concerning for a neurodegenerative disease.  We are concerned for Alzheimer's disease.  We need you to have more visits with us with family members present to further discuss further plans.      Plan:  - Follow up in 3 months with family to discuss your neurological status.

## 2025-01-16 NOTE — PROGRESS NOTES
Section of General Neurology  Return Patient Visit    Emma Rudolph MRN# [id number]   Age: 74 year old YOB: 1950     Brief history of symptoms: The patient was initially seen in neurologic consultation on 12/5/2023 for evaluation of memory loss. Please see the comprehensive neurologic consultation note from that date in the Epic records for details.          Assessment and Plan:   Assessment:    # Mild dementia, concern for Alzheimer's disease    Pat is a 73-year-old female with past medical history significant for hyperlipidemia, hypertension, generalized anxiety disorder, depression and a left breast mass status postresection presenting to the neurology clinic for a follow-up for memory loss.    Overall, the data has been collected so far suggest dementia, concerning for Alzheimer's disease.  40:40 ratio showed high risk for Alzheimer's.  MRI brain shows parietal and hippocampal atrophy.  Her MoCA and neuropsychological testing are concerning for short-term memory loss.  Her cognitive performance testing with occupational therapy revealed difficulties with activities of daily living, suggesting a need for daily visitation, concern for inability to manage finances, medications, shopping and traveling.    Patient lives alone and is independent.  She has never been  and has no children.  Concern at this point is patient's safety.  Will order a social work consultation.  And would like to close follow-up (in another 3 months) preferably with siblings/family or friends that can be available for support as her disease progresses.     Plan:  -Social work consultation for vulnerable adult  -Follow-up in 3 months with family to discuss prognosis and future plan  -Patient did not like donepezil, no need to continue at this time-can discuss at next visit         GIL Lagos D.O.  Resident Physician of Neurology  [facility]    Patient discussed with my supervising  "physician Dr. Connor, who agrees with the critical findings, assessment, and plan as documented in the note above or as otherwise in their attestation.       Interval history:   Patient has been doing well.  She uses vague language to describe how things have been going.  She is still meeting with her coffee group on Thursdays.  She told me on several occasions that she meets with them on Thursdays, however thought she had met with them yesterday and that today was Friday (today is Thursday and she would have met with them this morning).  Since last visit, she had messaged the clinic saying that the medication was making her feel bad (donepezil).  She stopped it at that time and has not been taking it.  During the visit today she states that \"when I stop taking that condition, my mind opened up and I feel so much better now.\"    Since last visit she did undergo cognitive performance testing with occupational therapy.  She scored a 4.86 which is concerning for mild to moderate cognitive functional disability suggesting significant deficits in working memory and executive thought processes.  Patient comes to the visit alone today.  She continues to live alone.  She does have siblings but they are not very involved in her life at this time.  1 sister lives in [place], another lives in [place].  One of her sisters lives in the [facility], but does leave to [place] for the winter.  Her brother also lives in the [facility] and does the same.  Patient did agree to have her family contacted so that we could set up a time to meet and discuss formally over the phone and plan for the future        Past Medical History:     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    Osteoarthritis of multiple joints, unspecified osteoarthritis type of #2&3 " fingers bilat R>L    Family history of ischemic heart disease    Memory loss    Primary insomnia    Benign essential hypertension    Breast mass, left     Past Medical History:   Diagnosis Date    Breast lesion     LEFT    Depressive disorder 1980s? taken meds since it started    Generalized anxiety disorder     Hyperlipidemia LDL goal <130     Hypertension     Mild major depression     Osteoarthritis of multiple joints, unspecified osteoarthritis type of #2&3 fingers bilat R>L     Vitamin D deficiency disease         Past Surgical History:     Past Surgical History:   Procedure Laterality Date    BIOPSY BREAST SEED LOCALIZATION Left 10/28/2020    Procedure: 2 SEED LOCALIZED LEFT BREAST BIOPSY;  Surgeon: Eileen Montes MD;  Location:  OR    BREAST SURGERY  2020    COLONOSCOPY      EYE SURGERY Left 11/2020    vitrictomy     GYN SURGERY      H ARGON LASER FOR RETINAL TEAR Left 3/2014    HEAD & NECK SURGERY      wisdom teeth    LAPAROSCOPIC SINGLE SITE SALPINGO-OOPHORECTOMY Bilateral 4/15/2015    Procedure: LAPAROSCOPIC SINGLE SITE SALPINGO-OOPHORECTOMY;  Surgeon: Leonora Zavaleta MD;  Location:  [facility]    wisdom teeth removal  age 19        Social History:     Social History     Tobacco Use    Smoking status: Never    Smokeless tobacco: Never   Vaping Use    Vaping status: Never Used   Substance Use Topics    Alcohol use: Yes     Comment: infrequent    Drug use: No        Family History:     Family History   Problem Relation Age of Onset    Breast Cancer Mother 80    Neurologic Disorder Father         Parkinson's    Coronary Artery Disease Father 44        MI    Hyperlipidemia Father     Anxiety Disorder Father     Hypertension Father     Lipids Brother     Hypertension Brother     Hyperlipidemia Brother     Lipids Brother     Family History Negative Brother     Lipids Sister     Hypertension Sister     Hyperlipidemia Sister     Family History Negative Sister     Family History Negative Sister     Family History  Negative Sister     Cerebrovascular Disease Maternal Grandmother 86    Cancer Maternal Grandfather 69        leukemia    Other Cancer Maternal Grandfather         leukemia    Cancer Paternal Grandfather 69        stomach    Other Cancer Paternal Grandfather         cancer    Diabetes No family hx of     Cancer - colorectal No family hx of     Hyperlipidemia No family hx of     Colon Cancer No family hx of     Prostate Cancer No family hx of     Other Cancer No family hx of     Depression No family hx of     Anxiety Disorder No family hx of     Mental Illness No family hx of     Substance Abuse No family hx of     Anesthesia Reaction No family hx of     Asthma No family hx of     Osteoporosis No family hx of     Genetic Disorder No family hx of     Thyroid Disease No family hx of     Obesity No family hx of     Unknown/Adopted No family hx of         Medications:     Current Outpatient Medications   Medication Sig Dispense Refill    amLODIPine (NORVASC) 5 MG tablet Take 1 tablet (5 mg) by mouth daily 90 tablet 4    atorvastatin (LIPITOR) 20 MG tablet Take 1 tablet (20 mg) by mouth daily 90 tablet 4    Cholecalciferol (VITAMIN D) 1000 UNITS capsule Take 1 capsule by mouth daily.      desvenlafaxine (PRISTIQ) 100 MG 24 hr tablet Take 1 tablet (100 mg) by mouth daily 90 tablet 4    lisinopril (ZESTRIL) 40 MG tablet Take 1 tablet (40 mg) by mouth daily 90 tablet 4    Multiple Vitamins-Minerals (MULTIVITAMIN OR) Take by mouth daily      raloxifene (EVISTA) 60 MG tablet Take 1 tablet (60 mg) by mouth daily. 90 tablet 3     Current Facility-Administered Medications   Medication Dose Route Frequency Provider Last Rate Last Admin    2 mL bupivacaine (MARCAINE) preservative free injection 0.25% (10 mL vial)  2 mL   Ced Contreras MD   2 mL at 08/28/23 1443    2 mL bupivacaine (MARCAINE) preservative free injection 0.25% (10 mL vial)  2 mL   Ced Contreras MD   2 mL at 03/27/23 1543    4 mL ropivacaine (NAROPIN) injection 5  "mg/mL  4 mL      4 mL at 10/09/24 1123    lidocaine (PF) (XYLOCAINE) 1 % injection 4 mL  4 mL   Brian Yepez PA-C   4 mL at 01/12/24 1115    lidocaine 1 % injection 2 mL  2 mL   Ced Contreras MD   2 mL at 08/28/23 1443    lidocaine 1 % injection 2 mL  2 mL   Ced Contreras MD   2 mL at 03/27/23 1543    lidocaine 1 % injection 8 mL  8 mL      8 mL at 10/09/24 1123    methylPREDNISolone (DEPO-Medrol) injection 40 mg  40 mg      40 mg at 10/09/24 1123    triamcinolone (KENALOG-40) injection 40 mg  40 mg   Brian Yepez PA-C   40 mg at 01/12/24 1115    triamcinolone (KENALOG-40) injection 40 mg  40 mg   Ced Contreras MD   40 mg at 08/28/23 1443    triamcinolone (KENALOG-40) injection 40 mg  40 mg   Ced Contreras MD   40 mg at 03/27/23 1543        Allergies:     Allergies   Allergen Reactions    Penicillins Swelling, Rash and Hives     Amoxicillin    Sulfa Antibiotics Hives          Physical Exam:   Vitals: BP (!) 143/85   Pulse (!) 125   Ht 1.626 m (5' 4\")   Wt 75.3 kg (166 lb)   LMP  (LMP Unknown)   SpO2 98%   BMI 28.49 kg/m       MoCA  Visuospatial/Executive  4/5  Naming 3/3  Memory/Delayed Recall  0/5  Attention  6/6  Language Repetition 2/2  Language Fluency  1/1  Abstraction 2/2  Orientation  4/6  Total 22/30           Data: Pertinent prior to visit   Imaging:  Brain MRI on 12/26/2023  IMPRESSION:  1.  No acute intracranial abnormality.  2.  Moderate generalized cerebral atrophy with perhaps a slight predilection for the parietal lobes. Correlate with neurocognitive testing for any evidence of neurodegenerative disorder such as Alzheimer's disease. If clinically indicated, brain FDG PET   may be useful for further evaluation.  3.  Changes suggestive of mild chronic microvascular ischemic disease.     Procedures:  Reviewed    Laboratory:  Beta 4240 ratio: High risk  Vitamin B12: 548  Vitamin B1: 153  TSH: 1.63    "

## 2025-01-16 NOTE — LETTER
1/16/2025      Emma Rudolph  [address]  [facility] [address]      Dear Colleague,    Thank you for referring your patient, Emma Rudolph, to the [facility]. Please see a copy of my visit note below.      Section of General Neurology  Return Patient Visit    Emma Rudolph MRN# [id number]   Age: 74 year old YOB: 1950     Brief history of symptoms: The patient was initially seen in neurologic consultation on 12/5/2023 for evaluation of memory loss. Please see the comprehensive neurologic consultation note from that date in the Epic records for details.          Assessment and Plan:   Assessment:    # Mild dementia, concern for Alzheimer's disease    Pat is a 73-year-old female with past medical history significant for hyperlipidemia, hypertension, generalized anxiety disorder, depression and a left breast mass status postresection presenting to the neurology clinic for a follow-up for memory loss.    Overall, the data has been collected so far suggest dementia, concerning for Alzheimer's disease.  40:40 ratio showed high risk for Alzheimer's.  MRI brain shows parietal and hippocampal atrophy.  Her MoCA and neuropsychological testing are concerning for short-term memory loss.  Her cognitive performance testing with occupational therapy revealed difficulties with activities of daily living, suggesting a need for daily visitation, concern for inability to manage finances, medications, shopping and traveling.    Patient lives alone and is independent.  She has never been  and has no children.  Concern at this point is patient's safety.  Will order a social work consultation.  And would like to close follow-up (in another 3 months) preferably with siblings/family or friends that can be available for support as her disease progresses.     Plan:  -Social work consultation for vulnerable adult  -Follow-up in 3 months with family to discuss  "prognosis and future plan  -Patient did not like donepezil, no need to continue at this time-can discuss at next visit         GIL Lagos D.O.  Resident Physician of Neurology  [facility]    Patient discussed with my supervising physician Dr. Connor, who agrees with the critical findings, assessment, and plan as documented in the note above or as otherwise in their attestation.       Interval history:   Patient has been doing well.  She uses vague language to describe how things have been going.  She is still meeting with her coffee group on Thursdays.  She told me on several occasions that she meets with them on Thursdays, however thought she had met with them yesterday and that today was Friday (today is Thursday and she would have met with them this morning).  Since last visit, she had messaged the clinic saying that the medication was making her feel bad (donepezil).  She stopped it at that time and has not been taking it.  During the visit today she states that \"when I stop taking that condition, my mind opened up and I feel so much better now.\"    Since last visit she did undergo cognitive performance testing with occupational therapy.  She scored a 4.86 which is concerning for mild to moderate cognitive functional disability suggesting significant deficits in working memory and executive thought processes.  Patient comes to the visit alone today.  She continues to live alone.  She does have siblings but they are not very involved in her life at this time.  1 sister lives in [place], another lives in [place].  One of her sisters lives in the [facility], but does leave to [place] for the winter.  Her brother also lives in the [facility] and does the same.  Patient did agree to have her family contacted so that we could set up a time to meet and discuss formally over the phone and plan for the future        Past Medical History:     Patient Active Problem List   Diagnosis "     Generalized anxiety disorder     Mixed hyperlipidemia     CKD (chronic kidney disease) stage 2, GFR 60-89 ml/min     Major depression in complete remission (H) on meds since 6-12-13     Osteoarthritis of multiple joints, unspecified osteoarthritis type of #2&3 fingers bilat R>L     Family history of ischemic heart disease     Memory loss     Primary insomnia     Benign essential hypertension     Breast mass, left     Past Medical History:   Diagnosis Date     Breast lesion     LEFT     Depressive disorder 1980s? taken meds since it started     Generalized anxiety disorder      Hyperlipidemia LDL goal <130      Hypertension      Mild major depression      Osteoarthritis of multiple joints, unspecified osteoarthritis type of #2&3 fingers bilat R>L      Vitamin D deficiency disease         Past Surgical History:     Past Surgical History:   Procedure Laterality Date     BIOPSY BREAST SEED LOCALIZATION Left 10/28/2020    Procedure: 2 SEED LOCALIZED LEFT BREAST BIOPSY;  Surgeon: Eileen Montes MD;  Location:  OR     BREAST SURGERY  2020     COLONOSCOPY       EYE SURGERY Left 11/2020    vitrictomy      GYN SURGERY       H ARGON LASER FOR RETINAL TEAR Left 3/2014     HEAD & NECK SURGERY      wisdom teeth     LAPAROSCOPIC SINGLE SITE SALPINGO-OOPHORECTOMY Bilateral 4/15/2015    Procedure: LAPAROSCOPIC SINGLE SITE SALPINGO-OOPHORECTOMY;  Surgeon: Leonora Zavaleta MD;  Location:  [facility]     wisdom teeth removal  age 19        Social History:     Social History     Tobacco Use     Smoking status: Never     Smokeless tobacco: Never   Vaping Use     Vaping status: Never Used   Substance Use Topics     Alcohol use: Yes     Comment: infrequent     Drug use: No        Family History:     Family History   Problem Relation Age of Onset     Breast Cancer Mother 80     Neurologic Disorder Father         Parkinson's     Coronary Artery Disease Father 44        MI     Hyperlipidemia Father      Anxiety Disorder Father       Hypertension Father      Lipids Brother      Hypertension Brother      Hyperlipidemia Brother      Lipids Brother      Family History Negative Brother      Lipids Sister      Hypertension Sister      Hyperlipidemia Sister      Family History Negative Sister      Family History Negative Sister      Family History Negative Sister      Cerebrovascular Disease Maternal Grandmother 86     Cancer Maternal Grandfather 69        leukemia     Other Cancer Maternal Grandfather         leukemia     Cancer Paternal Grandfather 69        stomach     Other Cancer Paternal Grandfather         cancer     Diabetes No family hx of      Cancer - colorectal No family hx of      Hyperlipidemia No family hx of      Colon Cancer No family hx of      Prostate Cancer No family hx of      Other Cancer No family hx of      Depression No family hx of      Anxiety Disorder No family hx of      Mental Illness No family hx of      Substance Abuse No family hx of      Anesthesia Reaction No family hx of      Asthma No family hx of      Osteoporosis No family hx of      Genetic Disorder No family hx of      Thyroid Disease No family hx of      Obesity No family hx of      Unknown/Adopted No family hx of         Medications:     Current Outpatient Medications   Medication Sig Dispense Refill     amLODIPine (NORVASC) 5 MG tablet Take 1 tablet (5 mg) by mouth daily 90 tablet 4     atorvastatin (LIPITOR) 20 MG tablet Take 1 tablet (20 mg) by mouth daily 90 tablet 4     Cholecalciferol (VITAMIN D) 1000 UNITS capsule Take 1 capsule by mouth daily.       desvenlafaxine (PRISTIQ) 100 MG 24 hr tablet Take 1 tablet (100 mg) by mouth daily 90 tablet 4     lisinopril (ZESTRIL) 40 MG tablet Take 1 tablet (40 mg) by mouth daily 90 tablet 4     Multiple Vitamins-Minerals (MULTIVITAMIN OR) Take by mouth daily       raloxifene (EVISTA) 60 MG tablet Take 1 tablet (60 mg) by mouth daily. 90 tablet 3     Current Facility-Administered Medications   Medication Dose  "Route Frequency Provider Last Rate Last Admin     2 mL bupivacaine (MARCAINE) preservative free injection 0.25% (10 mL vial)  2 mL   Ced Contreras MD   2 mL at 08/28/23 1443     2 mL bupivacaine (MARCAINE) preservative free injection 0.25% (10 mL vial)  2 mL   Ced Contreras MD   2 mL at 03/27/23 1543     4 mL ropivacaine (NAROPIN) injection 5 mg/mL  4 mL      4 mL at 10/09/24 1123     lidocaine (PF) (XYLOCAINE) 1 % injection 4 mL  4 mL   Brian Yepez PA-C   4 mL at 01/12/24 1115     lidocaine 1 % injection 2 mL  2 mL   Ced Contreras MD   2 mL at 08/28/23 1443     lidocaine 1 % injection 2 mL  2 mL   Ced Contreras MD   2 mL at 03/27/23 1543     lidocaine 1 % injection 8 mL  8 mL      8 mL at 10/09/24 1123     methylPREDNISolone (DEPO-Medrol) injection 40 mg  40 mg      40 mg at 10/09/24 1123     triamcinolone (KENALOG-40) injection 40 mg  40 mg   Brian Yepez PA-C   40 mg at 01/12/24 1115     triamcinolone (KENALOG-40) injection 40 mg  40 mg   Ced Contreras MD   40 mg at 08/28/23 1443     triamcinolone (KENALOG-40) injection 40 mg  40 mg   Ced Contreras MD   40 mg at 03/27/23 1543        Allergies:     Allergies   Allergen Reactions     Penicillins Swelling, Rash and Hives     Amoxicillin     Sulfa Antibiotics Hives          Physical Exam:   Vitals: BP (!) 143/85   Pulse (!) 125   Ht 1.626 m (5' 4\")   Wt 75.3 kg (166 lb)   LMP  (LMP Unknown)   SpO2 98%   BMI 28.49 kg/m       MoCA  Visuospatial/Executive  4/5  Naming 3/3  Memory/Delayed Recall  0/5  Attention  6/6  Language Repetition 2/2  Language Fluency  1/1  Abstraction 2/2  Orientation  4/6  Total 22/30           Data: Pertinent prior to visit   Imaging:  Brain MRI on 12/26/2023  IMPRESSION:  1.  No acute intracranial abnormality.  2.  Moderate generalized cerebral atrophy with perhaps a slight predilection for the parietal lobes. Correlate with neurocognitive testing for any evidence of neurodegenerative disorder such as Alzheimer's " disease. If clinically indicated, brain FDG PET   may be useful for further evaluation.  3.  Changes suggestive of mild chronic microvascular ischemic disease.     Procedures:  Reviewed    Laboratory:  Beta 4240 ratio: High risk  Vitamin B12: 548  Vitamin B1: 153  TSH: 1.63      Again, thank you for allowing me to participate in the care of your patient.        Sincerely,        Johnathan Lagos MD    Electronically signed

## 2025-01-20 ENCOUNTER — PATIENT OUTREACH (OUTPATIENT)
Dept: CARE COORDINATION | Facility: CLINIC | Age: 75
End: 2025-01-20
Payer: COMMERCIAL

## 2025-01-20 NOTE — LETTER
[facility] CARE COORDINATION  [address] [facility] [address]    January 20, 2025    Emma Rudolph  [address] [place] [id number]      Dear Celi,    I am a clinic care coordinator who works with Flynn Hamilton MD with the [facility]. I wanted to thank you for spending the time to talk with me.  Below is a description of clinic care coordination and how I can further assist you.       The clinic care coordination team is made up of a registered nurse, , financial resource worker and community health worker who understand the health care system. The goal of clinic care coordination is to help you manage your health and improve access to the health care system. Our team works alongside your provider to assist you in determining your health and social needs. We can help you obtain health care and community resources, providing you with necessary information and education. We can work with you through any barriers and develop a care plan that helps coordinate and strengthen the communication between you and your care team.  Our services are voluntary and are offered without charge to you personally.    Please feel free to contact me with any questions or concerns regarding care coordination and what we can offer.      We are focused on providing you with the highest-quality healthcare experience possible.    Sincerely,     Whit Jon,  [facility]  Clinic Care Coordinator  [facility]  [phone]  [email or web address]

## 2025-01-20 NOTE — PROGRESS NOTES
Clinic Care Coordination Contact  Clinic Care Coordination Contact  OUTREACH    Referral Information:  Referral Source: PCP  Primary Diagnosis: Psychosocial    SW called and spoke to pt briefly.  SW shares that it is recommended that pt bring a trusted family member to her next neurology meeting, and she asks why and SW explains that it is common that doctors would ask that family be present to help keep track of information.  Pt asks if this is about memory loss, and SW mentions that that is a component.  Pt declined to answer questions, but shares that she works at [employer] and she does NOT have an issue with her memory.  She shares that her sisters are currently in [place], and she will ask them to come to appt but doesn't feel it is necessary.  SW offered to contact sisters for her, and she declines.  Pt declines to enroll in [facility], and SW shares that she can reach out at any time and SW will assist.      Chief Complaint   Patient presents with    Clinic Care Coordination - Initial        Universal Utilization:      Utilization      No Show Count (past year)  0             ED Visits  0             Hospital Admissions  0                    Current as of: 1/20/2025 12:45 PM                Clinical Concerns:  Current Medical Concerns:  Psychosocial     Current Behavioral Concerns: Memory loss    Education Provided to patient: [facility]      Health Maintenance Reviewed: Due/Overdue   Health Maintenance Due   Topic Date Due    PHQ-9  03/20/2024       Clinical Pathway: None    Medication Management:  Medication review status:        Functional Status:       Living Situation:  Current living arrangement:: I live alone  Type of residence:: Private home - stairs    Lifestyle & Psychosocial Needs:    Social Drivers of Health     Food Insecurity: Low Risk  (9/27/2024)    Food Insecurity     Within the past 12 months, did you worry that your food would run out before you got money to buy more?: No     Within the past 12 months,  did the food you bought just not last and you didn t have money to get more?: No   Depression: Not at risk (1/16/2025)    PHQ-2     PHQ-2 Score: 0   Housing Stability: Low Risk  (9/27/2024)    Housing Stability     Do you have housing? : Yes     Are you worried about losing your housing?: No   Tobacco Use: Low Risk  (1/16/2025)    Patient History     Smoking Tobacco Use: Never     Smokeless Tobacco Use: Never     Passive Exposure: Not on file   Financial Resource Strain: Low Risk  (9/27/2024)    Financial Resource Strain     Within the past 12 months, have you or your family members you live with been unable to get utilities (heat, electricity) when it was really needed?: No   Alcohol Use: Not on file   Transportation Needs: Low Risk  (9/27/2024)    Transportation Needs     Within the past 12 months, has lack of transportation kept you from medical appointments, getting your medicines, non-medical meetings or appointments, work, or from getting things that you need?: No   Physical Activity: Sufficiently Active (9/27/2024)    Exercise Vital Sign     Days of Exercise per Week: 3 days     Minutes of Exercise per Session: 60 min   Interpersonal Safety: Not on file   Stress: No Stress Concern Present (9/27/2024)    [employer] [place] [employer] - Occupational Stress Questionnaire     Feeling of Stress : Only a little   Social Connections: Unknown (9/27/2024)    Social Connection and Isolation Panel [NHANES]     Frequency of Communication with Friends and Family: Not on file     Frequency of Social Gatherings with Friends and Family: Twice a week     Attends Gnosticist Services: Not on file     Active Member of Clubs or Organizations: Not on file     Attends Club or Organization Meetings: Not on file     Marital Status: Not on file   Health Literacy: Not on file        Transportation means:: Family     Resources and Interventions:  Current Resources:      Community Resources: None       Referrals Placed: Non      Care Plan:  N/A    Patient/Caregiver understanding: yes       Future Appointments                In 2 months Johnathan Lagos MD [facility] [place],     In 3 months [id number]    In 5 months Priya Ontiveros MD [facility]            Plan: No further outreaches, pt declined.       Whit Jon,  [facility]  Clinic Care Coordinator  [facility]  [phone]  [email or web address]

## 2025-01-27 DIAGNOSIS — I10 ESSENTIAL HYPERTENSION: ICD-10-CM

## 2025-01-27 RX ORDER — AMLODIPINE BESYLATE 5 MG/1
5 TABLET ORAL DAILY
Qty: 90 TABLET | Refills: 2 | Status: SHIPPED | OUTPATIENT
Start: 2025-01-27

## 2025-02-06 DIAGNOSIS — I10 ESSENTIAL HYPERTENSION: ICD-10-CM

## 2025-02-06 RX ORDER — LISINOPRIL 40 MG/1
40 TABLET ORAL DAILY
Qty: 90 TABLET | Refills: 2 | Status: SHIPPED | OUTPATIENT
Start: 2025-02-06

## 2025-03-09 ENCOUNTER — PATIENT OUTREACH (OUTPATIENT)
Dept: CARE COORDINATION | Facility: CLINIC | Age: 75
End: 2025-03-09
Payer: COMMERCIAL

## 2025-03-29 ENCOUNTER — HEALTH MAINTENANCE LETTER (OUTPATIENT)
Age: 75
End: 2025-03-29

## 2025-04-17 ENCOUNTER — OFFICE VISIT (OUTPATIENT)
Dept: NEUROLOGY | Facility: CLINIC | Age: 75
End: 2025-04-17
Payer: COMMERCIAL

## 2025-04-17 VITALS
SYSTOLIC BLOOD PRESSURE: 150 MMHG | BODY MASS INDEX: 28.38 KG/M2 | HEIGHT: 64 IN | OXYGEN SATURATION: 99 % | WEIGHT: 166.25 LBS | DIASTOLIC BLOOD PRESSURE: 87 MMHG | HEART RATE: 125 BPM

## 2025-04-17 DIAGNOSIS — F02.A0 MILD LATE ONSET ALZHEIMER'S DEMENTIA WITHOUT BEHAVIORAL DISTURBANCE, PSYCHOTIC DISTURBANCE, MOOD DISTURBANCE, OR ANXIETY (H): Primary | ICD-10-CM

## 2025-04-17 DIAGNOSIS — G30.1 MILD LATE ONSET ALZHEIMER'S DEMENTIA WITHOUT BEHAVIORAL DISTURBANCE, PSYCHOTIC DISTURBANCE, MOOD DISTURBANCE, OR ANXIETY (H): Primary | ICD-10-CM

## 2025-04-17 NOTE — NURSING NOTE
"Emma Rudolph is a 74 year old female who presents for:  Chief Complaint   Patient presents with    Memory Loss     memory        Initial Vitals:  BP (!) 150/87   Pulse (!) 125   Ht 1.626 m (5' 4\")   Wt 75.4 kg (166 lb 4 oz)   LMP  (LMP Unknown)   SpO2 99%   BMI 28.54 kg/m   Estimated body mass index is 28.54 kg/m  as calculated from the following:    Height as of this encounter: 1.626 m (5' 4\").    Weight as of this encounter: 75.4 kg (166 lb 4 oz).. Body surface area is 1.85 meters squared. BP completed using cuff size: elizabeth Kee   "

## 2025-04-17 NOTE — LETTER
4/17/2025      Emma Rudolph  [address]  [facility] [address]      Dear Colleague,    Thank you for referring your patient, Emma Rudolph, to the [facility]. Please see a copy of my visit note below.      Section of General Neurology  Return Patient Visit    Emma Rudolph MRN# [id number]   Age: 74 year old YOB: 1950     Brief history of symptoms: The patient was initially seen in neurologic consultation on 12/5/2023 for evaluation of cognitive changes. Please see the comprehensive neurologic consultation note from that date in the Epic records for details.          Assessment and Plan:   Assessment:    # Mild dementia, likely Alzheimer's disease    Pat is a 73-year-old female with past medical history significant for hyperlipidemia, hypertension, generalized anxiety disorder, depression and a left breast mass status postresection presenting to the neurology clinic for a follow-up for memory loss.     Overall, the data has been collected so far suggest dementia, concerning for Alzheimer's disease. Amyloid 42:40 ratio showed high risk for Alzheimer's. MRI brain shows parietal and hippocampal atrophy. Her MoCA and neuropsychological testing are concerning for short-term memory loss. Her cognitive performance testing with occupational therapy revealed difficulties with activities of daily living, suggesting a need for daily visitation, concern for inability to manage finances, medications, shopping and traveling.     Patient lives alone.  Never been .  Has no children.  Has several siblings, but she is not very close with them.  Does not have any close friends that are heavily involved in her life at this time.  This is of course a worrisome situation in the setting of neuro degeneration and gradual cognitive decline.  Patient may be at risk of self-harm in the near future due to the inevitable worsening of her disease.  Social work was  consulted last visit.  They called the patient and the patient did not share a significant amount of information and denied permission to reach out to family.  At this point it does not sound like any family or friends that are a part of her history group know about her diagnosis.  She continues to drive and participate in activities in her community, but during visits continues to ask the same questions over and over and has a hard time understanding her diagnosis.  She did not remember (even after significant amount of emphasis last visit) to bring a family member or friend to the visit today and does not seem to have interest in discussing her diagnosis with anyone.  Unfortunately we will have to submit for vulnerable adults again with the assistance of social work and hopefully have somebody visit her at her house.  As her disease progresses, she is at increased risk of putting herself or others in danger.      Plan:  -Discuss with SW filing for vulnerable adult   -Discussed with the patient having her share her diagnosis with family/friends  -Patient did not like donepezil, will continue off of the medication-patient not interested in medications at this time         GIL Lagos D.O.  Resident Physician of Neurology  [facility]    Patient discussed with my supervising physician Dr. Connor, who agrees with the critical findings, assessment, and plan as documented in the note above or as otherwise in their attestation.       Interval history:   Social work called patient.  Discussed talking with her family about her diagnosis and potential issues.  Patient declined.  Patient stated that she was fine and did not have any problems to discuss.  Social work offered to have patient call back if needed.  Patient continuing to go to group activities and participate in genealogy and history with her community.  She continues to drive.    She arrives to the appointment again today by  herself.  She is not interested in having her family friends know about her diagnosis.  She does not feel that she has any issues or deficits at this time.  She lacks insight into her short-term memory loss and repeatedly states that she has had short-term memory issues her whole life and this is nothing new.  She asks several times during the visit what symptoms we are worried about and why were worried she has Alzheimer's.  We reiterated with her the results of her imaging and blood work (beta amyloid 42:40 ratio).  Unfortunately patient continued to ask repeated questions throughout the visit.  Patient stated that we did not make it clear to her that she needs to have family or friends in her life to help in case things get worse.  She states that this is never been told that her.  We reminded her that we discussed this last visit, but she denies that we brought it up.        Past Medical History:     Patient Active Problem List   Diagnosis     Generalized anxiety disorder     Mixed hyperlipidemia     CKD (chronic kidney disease) stage 2, GFR 60-89 ml/min     Major depression in complete remission (H) on meds since 6-12-13     Osteoarthritis of multiple joints, unspecified osteoarthritis type of #2&3 fingers bilat R>L     Family history of ischemic heart disease     Memory loss     Primary insomnia     Benign essential hypertension     Breast mass, left     Past Medical History:   Diagnosis Date     Breast lesion     LEFT     Depressive disorder 1980s? taken meds since it started     Generalized anxiety disorder      Hyperlipidemia LDL goal <130      Hypertension      Mild major depression      Osteoarthritis of multiple joints, unspecified osteoarthritis type of #2&3 fingers bilat R>L      Vitamin D deficiency disease         Past Surgical History:     Past Surgical History:   Procedure Laterality Date     BIOPSY BREAST SEED LOCALIZATION Left 10/28/2020    Procedure: 2 SEED LOCALIZED LEFT BREAST BIOPSY;   Surgeon: Eileen Montes MD;  Location:  OR     BREAST SURGERY  2020     COLONOSCOPY       EYE SURGERY Left 11/2020    vitrictomy      GYN SURGERY       H ARGON LASER FOR RETINAL TEAR Left 3/2014     HEAD & NECK SURGERY      wisdom teeth     LAPAROSCOPIC SINGLE SITE SALPINGO-OOPHORECTOMY Bilateral 4/15/2015    Procedure: LAPAROSCOPIC SINGLE SITE SALPINGO-OOPHORECTOMY;  Surgeon: Leonora Zavaleta MD;  Location:  [facility]     wisdom teeth removal  age 19        Social History:     Social History     Tobacco Use     Smoking status: Never     Smokeless tobacco: Never   Vaping Use     Vaping status: Never Used   Substance Use Topics     Alcohol use: Yes     Comment: infrequent     Drug use: No        Family History:     Family History   Problem Relation Age of Onset     Breast Cancer Mother 80     Neurologic Disorder Father         Parkinson's     Coronary Artery Disease Father 44        MI     Hyperlipidemia Father      Anxiety Disorder Father      Hypertension Father      Lipids Brother      Hypertension Brother      Hyperlipidemia Brother      Lipids Brother      Family History Negative Brother      Lipids Sister      Hypertension Sister      Hyperlipidemia Sister      Family History Negative Sister      Family History Negative Sister      Family History Negative Sister      Cerebrovascular Disease Maternal Grandmother 86     Cancer Maternal Grandfather 69        leukemia     Other Cancer Maternal Grandfather         leukemia     Cancer Paternal Grandfather 69        stomach     Other Cancer Paternal Grandfather         cancer     Diabetes No family hx of      Cancer - colorectal No family hx of      Hyperlipidemia No family hx of      Colon Cancer No family hx of      Prostate Cancer No family hx of      Other Cancer No family hx of      Depression No family hx of      Anxiety Disorder No family hx of      Mental Illness No family hx of      Substance Abuse No family hx of      Anesthesia Reaction No family hx of       "Asthma No family hx of      Osteoporosis No family hx of      Genetic Disorder No family hx of      Thyroid Disease No family hx of      Obesity No family hx of      Unknown/Adopted No family hx of         Medications:     Current Outpatient Medications   Medication Sig Dispense Refill     amLODIPine (NORVASC) 5 MG tablet TAKE ONE TABLET BY MOUTH ONE TIME DAILY 90 tablet 2     atorvastatin (LIPITOR) 20 MG tablet Take 1 tablet (20 mg) by mouth daily. 90 tablet 2     Cholecalciferol (VITAMIN D) 1000 UNITS capsule Take 1 capsule by mouth daily.       desvenlafaxine (PRISTIQ) 100 MG 24 hr tablet Take 1 tablet (100 mg) by mouth daily. 90 tablet 2     lisinopril (ZESTRIL) 40 MG tablet TAKE ONE TABLET BY MOUTH ONE TIME DAILY 90 tablet 2     Multiple Vitamins-Minerals (MULTIVITAMIN OR) Take by mouth daily       raloxifene (EVISTA) 60 MG tablet Take 1 tablet (60 mg) by mouth daily. 90 tablet 3     No current facility-administered medications for this visit.        Allergies:     Allergies   Allergen Reactions     Penicillins Swelling, Rash and Hives     Amoxicillin     Sulfa Antibiotics Hives          Physical Exam:   Vitals: BP (!) 150/87   Pulse (!) 125   Ht 1.626 m (5' 4\")   Wt 75.4 kg (166 lb 4 oz)   LMP  (LMP Unknown)   SpO2 99%   BMI 28.54 kg/m     CV: peripheral pulse appreciated  Lungs: breathing comfortably  Extremities: no edema  Skin: No rashes    Neuro:   General Appearance: No apparent distress, well-nourished, well-groomed, pleasant   Mental Status: Alert and oriented to person, place, and time. Speech fluent and comprehension intact. No dysarthria.   Cranial Nerves: PERRL, extraocular movements grossly intact, face symmetric  Motor Exam: Moves all limbs spontaneously antigravity. No abnormal movements.  Sensory: intact to light touch throughout  Coordination: no dysmetria with finger-to-nose bilaterally  Reflexes: Did not assess  Gait: normal casual gait, normal stride length         Data: Pertinent " prior to visit   Imaging:  Brain MRI on 12/26/2023  IMPRESSION:  1.  No acute intracranial abnormality.  2.  Moderate generalized cerebral atrophy with perhaps a slight predilection for the parietal lobes. Correlate with neurocognitive testing for any evidence of neurodegenerative disorder such as Alzheimer's disease. If clinically indicated, brain FDG PET   may be useful for further evaluation.  3.  Changes suggestive of mild chronic microvascular ischemic disease.     Procedures:  Reviewed    Laboratory:  Beta 42:40 ratio: High risk  Vitamin B12: 548  Vitamin B1: 153  TSH: 1.63    Attestation signed by Steve Connor DO at 4/24/2025  9:35 AM:  I saw and evaluated the patient on 4/17/2025 and agree with the findings and the plan of care as documented in the resident's note.      The patient has progression of functional deficits, cognitive deficits progression on neuropsychology, and repeat screens (MoCA), imaging findings showing b/l parietal and hippocampal atrophy, and a high amyloid 42:40 ratio with high risk for Alzheimer's disease.  Overall, her etiology of cognitive and functional deficits is probable Alzheimer's disease.     The issue at hand has not necessarily been diagnostic in the last few visits, but more so about social and family support. The patient is without others at her visits, and has yet to bring others (family or friends) to her visits, even after specific recommendations in the last two visits to do so.  A concern for her insight into her condition, and ability to plan ahead for her progression persists (please see patient's interpretation of her functional abilities and diagnosis as described in conversation with social work (1/20/2025), and without clear evidence of social support, she may be set up for a major incident that leads to hospitalization or adverse outcome for herself/others (scam, accidents).  We will place a vulnerable adult report at this time, and discussed  with the patient the reasoning for this.  This may allow a interval of help/assistance to be given while she tries to obtain further family and friend support.  The patient did not express a clear understanding of her diagnosis today, and repeated similar questions after a few minutes, indicating a difficulty of failure to retain long term information provided to her from a health care perspective.    Total time of 48 minutes were spent today reviewing records, discussing patient care/case with resident team, examination, and with the patient and family for counseling or coordinating care    AMELIA Connor D.O.  [facility]      Again, thank you for allowing me to participate in the care of your patient.        Sincerely,        Johnathan Lagos MD    Electronically signed

## 2025-04-17 NOTE — PATIENT INSTRUCTIONS
You likely have Alzheimer's disease.  Its going to get worse.  We will place a vulnerable adult consultation fro you due to family not being around to help out.  We will see you again soon.      Plan:  - Vulnerable adult consultation - someone is coming by your house to check on you  - Tell someone that you have alzheimer's disease and that it will get worse over time.

## 2025-04-17 NOTE — PROGRESS NOTES
Section of General Neurology  Return Patient Visit    Emma uRdolph MRN# [id number]   Age: 74 year old YOB: 1950     Brief history of symptoms: The patient was initially seen in neurologic consultation on 12/5/2023 for evaluation of cognitive changes. Please see the comprehensive neurologic consultation note from that date in the Epic records for details.          Assessment and Plan:   Assessment:    # Mild dementia, likely Alzheimer's disease    Pat is a 73-year-old female with past medical history significant for hyperlipidemia, hypertension, generalized anxiety disorder, depression and a left breast mass status postresection presenting to the neurology clinic for a follow-up for memory loss.     Overall, the data has been collected so far suggest dementia, concerning for Alzheimer's disease. Amyloid 42:40 ratio showed high risk for Alzheimer's. MRI brain shows parietal and hippocampal atrophy. Her MoCA and neuropsychological testing are concerning for short-term memory loss. Her cognitive performance testing with occupational therapy revealed difficulties with activities of daily living, suggesting a need for daily visitation, concern for inability to manage finances, medications, shopping and traveling.     Patient lives alone.  Never been .  Has no children.  Has several siblings, but she is not very close with them.  Does not have any close friends that are heavily involved in her life at this time.  This is of course a worrisome situation in the setting of neuro degeneration and gradual cognitive decline.  Patient may be at risk of self-harm in the near future due to the inevitable worsening of her disease.  Social work was consulted last visit.  They called the patient and the patient did not share a significant amount of information and denied permission to reach out to family.  At this point it does not sound like any family or friends that are a part of her history group  know about her diagnosis.  She continues to drive and participate in activities in her community, but during visits continues to ask the same questions over and over and has a hard time understanding her diagnosis.  She did not remember (even after significant amount of emphasis last visit) to bring a family member or friend to the visit today and does not seem to have interest in discussing her diagnosis with anyone.  Unfortunately we will have to submit for vulnerable adults again with the assistance of social work and hopefully have somebody visit her at her house.  As her disease progresses, she is at increased risk of putting herself or others in danger.      Plan:  -Discuss with SW filing for vulnerable adult   -Discussed with the patient having her share her diagnosis with family/friends  -Patient did not like donepezil, will continue off of the medication-patient not interested in medications at this time         GIL Lagos D.O.  Resident Physician of Neurology  [facility]    Patient discussed with my supervising physician Dr. Connor, who agrees with the critical findings, assessment, and plan as documented in the note above or as otherwise in their attestation.       Interval history:   Social work called patient.  Discussed talking with her family about her diagnosis and potential issues.  Patient declined.  Patient stated that she was fine and did not have any problems to discuss.  Social work offered to have patient call back if needed.  Patient continuing to go to group activities and participate in genealogy and history with her community.  She continues to drive.    She arrives to the appointment again today by herself.  She is not interested in having her family friends know about her diagnosis.  She does not feel that she has any issues or deficits at this time.  She lacks insight into her short-term memory loss and repeatedly states that she has had short-term  memory issues her whole life and this is nothing new.  She asks several times during the visit what symptoms we are worried about and why were worried she has Alzheimer's.  We reiterated with her the results of her imaging and blood work (beta amyloid 42:40 ratio).  Unfortunately patient continued to ask repeated questions throughout the visit.  Patient stated that we did not make it clear to her that she needs to have family or friends in her life to help in case things get worse.  She states that this is never been told that her.  We reminded her that we discussed this last visit, but she denies that we brought it up.        Past Medical History:     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    Osteoarthritis of multiple joints, unspecified osteoarthritis type of #2&3 fingers bilat R>L    Family history of ischemic heart disease    Memory loss    Primary insomnia    Benign essential hypertension    Breast mass, left     Past Medical History:   Diagnosis Date    Breast lesion     LEFT    Depressive disorder 1980s? taken meds since it started    Generalized anxiety disorder     Hyperlipidemia LDL goal <130     Hypertension     Mild major depression     Osteoarthritis of multiple joints, unspecified osteoarthritis type of #2&3 fingers bilat R>L     Vitamin D deficiency disease         Past Surgical History:     Past Surgical History:   Procedure Laterality Date    BIOPSY BREAST SEED LOCALIZATION Left 10/28/2020    Procedure: 2 SEED LOCALIZED LEFT BREAST BIOPSY;  Surgeon: Eileen Montes MD;  Location:  OR    BREAST SURGERY  2020    COLONOSCOPY      EYE SURGERY Left 11/2020    vitrictomy     GYN SURGERY      H ARGON LASER FOR RETINAL TEAR Left 3/2014    HEAD & NECK SURGERY      wisdom teeth    LAPAROSCOPIC SINGLE SITE  SALPINGO-OOPHORECTOMY Bilateral 4/15/2015    Procedure: LAPAROSCOPIC SINGLE SITE SALPINGO-OOPHORECTOMY;  Surgeon: Leonora Zavaleta MD;  Location: [facility]    wisdom teeth removal  age 19        Social History:     Social History     Tobacco Use    Smoking status: Never    Smokeless tobacco: Never   Vaping Use    Vaping status: Never Used   Substance Use Topics    Alcohol use: Yes     Comment: infrequent    Drug use: No        Family History:     Family History   Problem Relation Age of Onset    Breast Cancer Mother 80    Neurologic Disorder Father         Parkinson's    Coronary Artery Disease Father 44        MI    Hyperlipidemia Father     Anxiety Disorder Father     Hypertension Father     Lipids Brother     Hypertension Brother     Hyperlipidemia Brother     Lipids Brother     Family History Negative Brother     Lipids Sister     Hypertension Sister     Hyperlipidemia Sister     Family History Negative Sister     Family History Negative Sister     Family History Negative Sister     Cerebrovascular Disease Maternal Grandmother 86    Cancer Maternal Grandfather 69        leukemia    Other Cancer Maternal Grandfather         leukemia    Cancer Paternal Grandfather 69        stomach    Other Cancer Paternal Grandfather         cancer    Diabetes No family hx of     Cancer - colorectal No family hx of     Hyperlipidemia No family hx of     Colon Cancer No family hx of     Prostate Cancer No family hx of     Other Cancer No family hx of     Depression No family hx of     Anxiety Disorder No family hx of     Mental Illness No family hx of     Substance Abuse No family hx of     Anesthesia Reaction No family hx of     Asthma No family hx of     Osteoporosis No family hx of     Genetic Disorder No family hx of     Thyroid Disease No family hx of     Obesity No family hx of     Unknown/Adopted No family hx of         Medications:     Current Outpatient Medications   Medication Sig Dispense Refill    amLODIPine (NORVASC) 5 MG  "tablet TAKE ONE TABLET BY MOUTH ONE TIME DAILY 90 tablet 2    atorvastatin (LIPITOR) 20 MG tablet Take 1 tablet (20 mg) by mouth daily. 90 tablet 2    Cholecalciferol (VITAMIN D) 1000 UNITS capsule Take 1 capsule by mouth daily.      desvenlafaxine (PRISTIQ) 100 MG 24 hr tablet Take 1 tablet (100 mg) by mouth daily. 90 tablet 2    lisinopril (ZESTRIL) 40 MG tablet TAKE ONE TABLET BY MOUTH ONE TIME DAILY 90 tablet 2    Multiple Vitamins-Minerals (MULTIVITAMIN OR) Take by mouth daily      raloxifene (EVISTA) 60 MG tablet Take 1 tablet (60 mg) by mouth daily. 90 tablet 3     No current facility-administered medications for this visit.        Allergies:     Allergies   Allergen Reactions    Penicillins Swelling, Rash and Hives     Amoxicillin    Sulfa Antibiotics Hives          Physical Exam:   Vitals: BP (!) 150/87   Pulse (!) 125   Ht 1.626 m (5' 4\")   Wt 75.4 kg (166 lb 4 oz)   LMP  (LMP Unknown)   SpO2 99%   BMI 28.54 kg/m     CV: peripheral pulse appreciated  Lungs: breathing comfortably  Extremities: no edema  Skin: No rashes    Neuro:   General Appearance: No apparent distress, well-nourished, well-groomed, pleasant   Mental Status: Alert and oriented to person, place, and time. Speech fluent and comprehension intact. No dysarthria.   Cranial Nerves: PERRL, extraocular movements grossly intact, face symmetric  Motor Exam: Moves all limbs spontaneously antigravity. No abnormal movements.  Sensory: intact to light touch throughout  Coordination: no dysmetria with finger-to-nose bilaterally  Reflexes: Did not assess  Gait: normal casual gait, normal stride length         Data: Pertinent prior to visit   Imaging:  Brain MRI on 12/26/2023  IMPRESSION:  1.  No acute intracranial abnormality.  2.  Moderate generalized cerebral atrophy with perhaps a slight predilection for the parietal lobes. Correlate with neurocognitive testing for any evidence of neurodegenerative disorder such as Alzheimer's disease. If " clinically indicated, brain FDG PET   may be useful for further evaluation.  3.  Changes suggestive of mild chronic microvascular ischemic disease.     Procedures:  Reviewed    Laboratory:  Beta 42:40 ratio: High risk  Vitamin B12: 548  Vitamin B1: 153  TSH: 1.63

## 2025-04-21 ENCOUNTER — TRANSFERRED RECORDS (OUTPATIENT)
Dept: INTERNAL MEDICINE | Facility: CLINIC | Age: 75
End: 2025-04-21
Payer: COMMERCIAL

## 2025-04-22 NOTE — PROCEDURES
[facility]   [address] [place] [address], [address], [place] [address]     Patient Name: Emma Rudolph  Gender:  Female  Exam Date: 04/21/2025 Visit Number:  [id number]  Age: 74 Years YOB: 1950  Attending MD: Alexis Norris DO Medical Record#:  [id number]    Procedure: Colonoscopy   Indications: Colorectal cancer screening       Last colonoscopy 2015 (no polyps)      Referring MD: Referral Self  Primary MD:      MD No Primary  Medications: Admitting Medications:   0.9% Normal Saline at TKO   Intra Procedure Medications:   Patient received monitored anesthesia care.     Complications: No immediate complications  ______________________________________________________________________________  Procedure:   An examination of the heart and lungs was performed and found to be within acceptable limits.  .  The patient was therefore deemed a reasonable candidate for endoscopy and sedation.   The risks and benefits of the procedure were explained to the patient.After obtaining informed consent, the patient received monitored anesthesia care and I passed the scope   without difficulty via the rectum to the cecum.  The appendiceal orifice and ic valve were identified.  The scope was retroflexed during the examination  The quality of the prep was good  (Miralax/Gatorade/2 tablets Bisacodyl/Magnesium Citrate).    This was a complete examination throughout the entire colon.    Findings:    Polyp location: sigmoid.  Quantity: 2.  Size:  3 mm, 4 mm.  Polyp shape:  sessile.         Maneuver: polypectomy was performed with a cold snare.       Removal:  complete.  Retrieval: complete.  Bleeding: none.    Diverticulosis.  Location: - sigmoid.    Description:  mild.    Size:  small.    Quantity:  single.    Hemorrhoids.  Internal hemorrhoids without bleeding.  Remainder of the exam is normal.    Impression:  Colorectal polyp detected on colonoscopy  Diverticulosis of colon without  diverticulitis  Hemorrhoids, internal    Preliminary Plan:  The patient and their physician will receive a copy of the pathology report as well as pathology-based recommendations for future screening or surveillance.  Comments:  Anticipate no further surveillance colonoscopies given age however we will await pathology  Pathology Results:  A: COLON, SIGMOID, POLYPS:           1. Hyperplastic polyps (2)      MICROSCOPIC  A: Performed     Electronically signed by: Kelsea Gunn DO    Interpreted at [facility], [address] [place] [id number]    Orders    Instruction(s)/Education:  Instruction/Education Timeframe Assessment   Colon Cancer Prevention  K63.5   Colon Polyps  K63.5   Diverticulosis/Diverticulitis  K63.5   Hemorrhoids  K63.5       Final Plan:  Repeat colonoscopy not indicated at this time.    We will attempt to contact you at appropriate intervals via [place] mail.  We may not be able to find you or contact you at that time, therefore you should know that the responsibility for following our recommendation rests with you.  If you don't hear from us at the time your procedure is due, please contact our office to schedule an appointment.  If your contact information should change, please contact our office so that we can update your record.      _Electronically signed by:___________________  Alexis Norris DO                 04/21/2025    cc: MD No Primary

## 2025-04-24 PROBLEM — G30.1 MILD LATE ONSET ALZHEIMER'S DEMENTIA WITHOUT BEHAVIORAL DISTURBANCE, PSYCHOTIC DISTURBANCE, MOOD DISTURBANCE, OR ANXIETY (H): Status: ACTIVE | Noted: 2025-04-24

## 2025-04-24 PROBLEM — F02.A0 MILD LATE ONSET ALZHEIMER'S DEMENTIA WITHOUT BEHAVIORAL DISTURBANCE, PSYCHOTIC DISTURBANCE, MOOD DISTURBANCE, OR ANXIETY (H): Status: ACTIVE | Noted: 2025-04-24

## 2025-05-06 ENCOUNTER — HOSPITAL ENCOUNTER (OUTPATIENT)
Dept: MRI IMAGING | Facility: CLINIC | Age: 75
Discharge: HOME | End: 2025-05-06
Attending: INTERNAL MEDICINE | Admitting: INTERNAL MEDICINE
Payer: COMMERCIAL

## 2025-05-06 DIAGNOSIS — N64.89 OTHER SPECIFIED DISORDERS OF BREAST: ICD-10-CM

## 2025-05-06 DIAGNOSIS — N60.92 ATYPICAL DUCTAL HYPERPLASIA OF LEFT BREAST: ICD-10-CM

## 2025-05-06 PROCEDURE — 77049 MRI BREAST C-+ W/CAD BI: CPT

## 2025-05-06 PROCEDURE — 255N000002 HC RX 255 OP 636: Performed by: INTERNAL MEDICINE

## 2025-05-06 PROCEDURE — A9585 GADOBUTROL INJECTION: HCPCS | Performed by: INTERNAL MEDICINE

## 2025-05-06 RX ORDER — GADOBUTROL 604.72 MG/ML
10 INJECTION INTRAVENOUS ONCE
Status: COMPLETED | OUTPATIENT
Start: 2025-05-06 | End: 2025-05-06

## 2025-05-06 RX ADMIN — GADOBUTROL 8 ML: 604.72 INJECTION INTRAVENOUS at 08:44

## 2025-05-21 ENCOUNTER — ALLIED HEALTH/NURSE VISIT (OUTPATIENT)
Dept: INTERNAL MEDICINE | Facility: CLINIC | Age: 75
End: 2025-05-21
Payer: COMMERCIAL

## 2025-05-21 VITALS — HEART RATE: 89 BPM | SYSTOLIC BLOOD PRESSURE: 119 MMHG | DIASTOLIC BLOOD PRESSURE: 70 MMHG

## 2025-05-21 DIAGNOSIS — I10 BENIGN ESSENTIAL HYPERTENSION: Primary | ICD-10-CM

## 2025-05-21 PROCEDURE — 3078F DIAST BP <80 MM HG: CPT | Performed by: INTERNAL MEDICINE

## 2025-05-21 PROCEDURE — 99207 PR NO CHARGE NURSE ONLY: CPT | Performed by: INTERNAL MEDICINE

## 2025-05-21 PROCEDURE — 3074F SYST BP LT 130 MM HG: CPT | Performed by: INTERNAL MEDICINE

## 2025-05-21 NOTE — PROGRESS NOTES
Emma Rudolph was evaluated at [facility] on May 21, 2025 at which time her blood pressure was:    BP Readings from Last 1 Encounters:   05/21/25 119/70       Reviewed lifestyle modifications for blood pressure control and reduction: including making healthy food choices, managing weight, getting regular exercise, smoking cessation, reducing alcohol consumption, monitoring blood pressure regularly.     Symptoms: None    BP Goal:< 140/90 mmHg    BP Assessment:  BP at goal    Potential Reasons for BP too high: NA - Not applicable    BP Follow-Up Plan: Recheck BP in 6 months at pharmacy    Recommendation to Provider: Continue with current BP medication regimen.     Note completed by:    Danyel Srinivasan, Student Pharmacist   [facility]

## 2025-06-16 DIAGNOSIS — F02.A0 MILD LATE ONSET ALZHEIMER'S DEMENTIA WITHOUT BEHAVIORAL DISTURBANCE, PSYCHOTIC DISTURBANCE, MOOD DISTURBANCE, OR ANXIETY (H): Primary | ICD-10-CM

## 2025-06-16 DIAGNOSIS — G30.1 MILD LATE ONSET ALZHEIMER'S DEMENTIA WITHOUT BEHAVIORAL DISTURBANCE, PSYCHOTIC DISTURBANCE, MOOD DISTURBANCE, OR ANXIETY (H): Primary | ICD-10-CM

## 2025-07-04 NOTE — PROGRESS NOTES
[facility]    Hematology/Oncology Established Patient Note      Today's Date: 7/7/2025    Reason for follow-up: Atypical ductal hyperplasia.    HISTORY OF PRESENT ILLNESS: Emma Rudolph is a 74 year old female who presents with the following issues:  1. 9/18/2020: Mammogram showed architectural distortion in left breast at 11:00-1:00 retroareolar mid-depth. No concerning findings in right breast.  2. 9/24/2020: Left breast U/S showed oval hypoechoic mass measuring 1.1 cm at 11:00, 3 cm from nipple; oval hypoechoic mass measuring 1 cm at 12:00, 3 cm from nipple; normal-appearing parenchyma between 2 lesions. No enlarged lymph nodes in left axilla.  3. 10/2/2020: U/S-guided left breast needles biopsy at 12:00 showed a complex sclerosing lesion, negative for atypica and malignancy. Biopsy at 11:00 showed benign breast tissue with adenosis, negative for atypica and malignancy.  4. 10/28/2020: Underwent left breast excision under care of Dr. Eileen Montes. Pathology was negative for malignancy but showed atypical ductal hyperplasia, apocrine metaplasia, papillomatosis, microcalcifications.  5. 11/18/2020: Started raloxifene.    INTERVAL HISTORY:  Celi reports feeling well with no new breast issues.    REVIEW OF SYSTEMS:   14 point ROS was reviewed and is negative other than as noted above in HPI.       HOME MEDICATIONS:  Current Outpatient Medications   Medication Sig Dispense Refill    amLODIPine (NORVASC) 5 MG tablet TAKE ONE TABLET BY MOUTH ONE TIME DAILY 90 tablet 2    atorvastatin (LIPITOR) 20 MG tablet Take 1 tablet (20 mg) by mouth daily. 90 tablet 2    Cholecalciferol (VITAMIN D) 1000 UNITS capsule Take 1 capsule by mouth daily.      desvenlafaxine (PRISTIQ) 100 MG 24 hr tablet Take 1 tablet (100 mg) by mouth daily. 90 tablet 2    lisinopril (ZESTRIL) 40 MG tablet TAKE ONE TABLET BY MOUTH ONE TIME DAILY 90 tablet 2    Multiple Vitamins-Minerals (MULTIVITAMIN OR) Take by mouth daily       raloxifene (EVISTA) 60 MG tablet Take 1 tablet (60 mg) by mouth daily. 90 tablet 3         ALLERGIES:  Allergies   Allergen Reactions    Penicillins Swelling, Rash and Hives     Amoxicillin    Sulfa Antibiotics Hives         PAST MEDICAL HISTORY:  Past Medical History:   Diagnosis Date    Breast lesion     LEFT    Depressive disorder 1980s? taken meds since it started    Generalized anxiety disorder     Hyperlipidemia LDL goal <130     Hypertension     Mild major depression     Osteoarthritis of multiple joints, unspecified osteoarthritis type of #2&3 fingers bilat R>L     Vitamin D deficiency disease      Gynecologic history:  Age of menarche at 13-14; G0, no HRT or OCP use.      PAST SURGICAL HISTORY:  Past Surgical History:   Procedure Laterality Date    BIOPSY BREAST SEED LOCALIZATION Left 10/28/2020    Procedure: 2 SEED LOCALIZED LEFT BREAST BIOPSY;  Surgeon: Eileen Montes MD;  Location:  OR    BREAST SURGERY  2020    COLONOSCOPY      EYE SURGERY Left 11/2020    vitrictomy     GYN SURGERY      H ARGON LASER FOR RETINAL TEAR Left 3/2014    HEAD & NECK SURGERY      wisdom teeth    LAPAROSCOPIC SINGLE SITE SALPINGO-OOPHORECTOMY Bilateral 4/15/2015    Procedure: LAPAROSCOPIC SINGLE SITE SALPINGO-OOPHORECTOMY;  Surgeon: Leonora Zavaleta MD;  Location:  [facility]    wisdom teeth removal  age 19         SOCIAL HISTORY:  Social History     Socioeconomic History    Marital status: Single     Spouse name: Not on file    Number of children: Not on file    Years of education: Not on file    Highest education level: Not on file   Occupational History     Employer: RETIRED     Comment: [employer]   Tobacco Use    Smoking status: Never    Smokeless tobacco: Never   Vaping Use    Vaping status: Never Used   Substance and Sexual Activity    Alcohol use: Yes     Comment: infrequent    Drug use: No    Sexual activity: Not Currently     Partners: Female   Other Topics Concern    Parent/sibling w/ CABG, MI or angioplasty  before 65F 55M? Yes     Comment: father, infarction at age 44     Service Not Asked    Blood Transfusions Not Asked    Caffeine Concern Not Asked    Occupational Exposure Not Asked    Hobby Hazards Not Asked    Sleep Concern Not Asked    Stress Concern Not Asked    Weight Concern Not Asked    Special Diet Not Asked    Back Care Not Asked    Exercise Not Asked    Bike Helmet Not Asked    Seat Belt Yes    Self-Exams Not Asked   Social History Narrative    Not on file     Social Drivers of Health     Financial Resource Strain: Low Risk  (9/27/2024)    Financial Resource Strain     Within the past 12 months, have you or your family members you live with been unable to get utilities (heat, electricity) when it was really needed?: No   Food Insecurity: Low Risk  (9/27/2024)    Food Insecurity     Within the past 12 months, did you worry that your food would run out before you got money to buy more?: No     Within the past 12 months, did the food you bought just not last and you didn t have money to get more?: No   Transportation Needs: Low Risk  (9/27/2024)    Transportation Needs     Within the past 12 months, has lack of transportation kept you from medical appointments, getting your medicines, non-medical meetings or appointments, work, or from getting things that you need?: No   Physical Activity: Sufficiently Active (9/27/2024)    Exercise Vital Sign     Days of Exercise per Week: 3 days     Minutes of Exercise per Session: 60 min   Stress: No Stress Concern Present (9/27/2024)    [employer] - Occupational Stress Questionnaire     Feeling of Stress : Only a little   Social Connections: Unknown (9/27/2024)    Social Connection and Isolation Panel [NHANES]     Frequency of Communication with Friends and Family: Not on file     Frequency of Social Gatherings with Friends and Family: Twice a week     Attends Buddhist Services: Not on file     Active Member of Clubs or Organizations:  Not on file     Attends Club or Organization Meetings: Not on file     Marital Status: Not on file   Interpersonal Safety: Not on file   Housing Stability: Low Risk  (9/27/2024)    Housing Stability     Do you have housing? : Yes     Are you worried about losing your housing?: No         FAMILY HISTORY:  Family History   Problem Relation Age of Onset    Breast Cancer Mother 80    Neurologic Disorder Father         Parkinson's    Coronary Artery Disease Father 44        MI    Hyperlipidemia Father     Anxiety Disorder Father     Hypertension Father     Lipids Brother     Hypertension Brother     Hyperlipidemia Brother     Lipids Brother     Family History Negative Brother     Lipids Sister     Hypertension Sister     Hyperlipidemia Sister     Family History Negative Sister     Family History Negative Sister     Family History Negative Sister     Cerebrovascular Disease Maternal Grandmother 86    Cancer Maternal Grandfather 69        leukemia    Other Cancer Maternal Grandfather         leukemia    Cancer Paternal Grandfather 69        stomach    Other Cancer Paternal Grandfather         cancer    Diabetes No family hx of     Cancer - colorectal No family hx of     Hyperlipidemia No family hx of     Colon Cancer No family hx of     Prostate Cancer No family hx of     Other Cancer No family hx of     Depression No family hx of     Anxiety Disorder No family hx of     Mental Illness No family hx of     Substance Abuse No family hx of     Anesthesia Reaction No family hx of     Asthma No family hx of     Osteoporosis No family hx of     Genetic Disorder No family hx of     Thyroid Disease No family hx of     Obesity No family hx of     Unknown/Adopted No family hx of          PHYSICAL EXAM:  Vital signs:  LMP  (LMP Unknown)   GENERAL/CONSTITUTIONAL: No acute distress.  EYES: No erythema or scleral icterus.  LYMPH: No cervical, supraclavicular, axillary, epitroclear adenopathy.   BREAST: No palpable masses in either  breast; nipples everted with no discharge; no rash or ulceration. Slight concavity at the left breast prior excision site.  RESPIRATORY: No audible cough or wheezing.   MUSCULOSKELETAL: Warm and well-perfused, no cyanosis, clubbing, or edema.  NEUROLOGIC: Alert, oriented, answers questions appropriately.  INTEGUMENTARY: No rashes or jaundice.  GAIT: Steady, does not use assistive device    LABS:  CBC RESULTS: Recent Labs   Lab Test 05/24/23  2154   WBC 7.5   RBC 4.47   HGB 14.3   HCT 42.4   MCV 95   MCH 32.0   MCHC 33.7   RDW 12.2          Last Comprehensive Metabolic Panel:  Sodium   Date Value Ref Range Status   10/01/2024 139 135 - 145 mmol/L Final   08/27/2020 138 133 - 144 mmol/L Final     Potassium   Date Value Ref Range Status   10/01/2024 4.4 3.4 - 5.3 mmol/L Final   10/08/2021 3.7 3.4 - 5.3 mmol/L Final   08/27/2020 3.5 3.4 - 5.3 mmol/L Final     Chloride   Date Value Ref Range Status   10/01/2024 103 98 - 107 mmol/L Final   10/08/2021 107 94 - 109 mmol/L Final   08/27/2020 106 94 - 109 mmol/L Final     Carbon Dioxide   Date Value Ref Range Status   08/27/2020 26 20 - 32 mmol/L Final     Carbon Dioxide (CO2)   Date Value Ref Range Status   10/01/2024 24 22 - 29 mmol/L Final   10/08/2021 25 20 - 32 mmol/L Final     Anion Gap   Date Value Ref Range Status   10/01/2024 12 7 - 15 mmol/L Final   10/08/2021 8 3 - 14 mmol/L Final   08/27/2020 6 3 - 14 mmol/L Final     Glucose   Date Value Ref Range Status   10/01/2024 112 (H) 70 - 99 mg/dL Final   10/08/2021 104 (H) 70 - 99 mg/dL Final   08/27/2020 110 (H) 70 - 99 mg/dL Final     Comment:     Fasting specimen     Urea Nitrogen   Date Value Ref Range Status   10/01/2024 11.2 8.0 - 23.0 mg/dL Final   10/08/2021 12 7 - 30 mg/dL Final   08/27/2020 15 7 - 30 mg/dL Final     Creatinine   Date Value Ref Range Status   10/01/2024 0.93 0.51 - 0.95 mg/dL Final   08/27/2020 0.81 0.52 - 1.04 mg/dL Final     GFR Estimate   Date Value Ref Range Status   10/01/2024 65 >60  mL/min/1.73m2 Final     Comment:     eGFR calculated using  CKD-EPI equation.   2021 71 >60 mL/min/[1.73_m2] Final     Calcium   Date Value Ref Range Status   10/01/2024 9.5 8.8 - 10.4 mg/dL Final     Comment:     Reference intervals for this test were updated on 2024 to reflect our healthy population more accurately. There may be differences in the flagging of prior results with similar values performed with this method. Those prior results can be interpreted in the context of the updated reference intervals.   2020 9.4 8.5 - 10.1 mg/dL Final     Bilirubin Total   Date Value Ref Range Status   2023 0.8 <=1.2 mg/dL Final   2020 1.3 0.2 - 1.3 mg/dL Final     Alkaline Phosphatase   Date Value Ref Range Status   2023 105 (H) 35 - 104 U/L Final   2020 122 40 - 150 U/L Final     ALT   Date Value Ref Range Status   10/01/2024 22 0 - 50 U/L Final   2020 65 (H) 0 - 50 U/L Final     AST   Date Value Ref Range Status   2023 33 10 - 35 U/L Final   2020 35 0 - 45 U/L Final         PATHOLOGY:  None new.    IMAGIN2024: Mammogram showed no malignant findings.    2025: Breast MRI showed no concerning findings.    ASSESSMENT/PLAN:  Emma Rudolph is a 74 year old female with the following issues:  1. Left breast atypical ductal hyperplasia  --Celi is aware that atypical ductal hyperplasia is considered a marker for increased risk of developing invasive breast cancer, approximately ranging 11-40%, 5-15 years from time of diagnosis of ADH.  She is aware this is not a cancer.  --She is tolerating raloxifene well for risk reduction. Advised total 5 years of raloxifene, through 2025.  --She has no breast abnormalities on physical exam today or breast MRI reviewed from 2025.  --Can discontinue MRI as her breast tissue is not extremely dense. She is comfortable with this plan of care.    2. Anxiety  -Mood stable.  -Continue desvenlafaxine.    Return in 1  year.    Priya Ontiveros MD  [facility] Hematology/Oncology     Total time spent today: 30 minutes in chart review, patient evaluation, counseling, documentation, test and/or medication/prescription orders, and coordination of care.    The longitudinal plan of care for the diagnosis(es)/condition(s) as documented were addressed during this visit. Due to the added complexity in care, I will continue to support Pat in the subsequent management and with ongoing continuity of care.

## 2025-07-07 ENCOUNTER — ONCOLOGY VISIT (OUTPATIENT)
Dept: ONCOLOGY | Facility: CLINIC | Age: 75
End: 2025-07-07
Attending: INTERNAL MEDICINE
Payer: COMMERCIAL

## 2025-07-07 VITALS
HEIGHT: 64 IN | WEIGHT: 163 LBS | RESPIRATION RATE: 16 BRPM | HEART RATE: 103 BPM | OXYGEN SATURATION: 99 % | BODY MASS INDEX: 27.83 KG/M2 | SYSTOLIC BLOOD PRESSURE: 149 MMHG | DIASTOLIC BLOOD PRESSURE: 83 MMHG

## 2025-07-07 DIAGNOSIS — Z12.31 ENCOUNTER FOR SCREENING MAMMOGRAM FOR BREAST CANCER: ICD-10-CM

## 2025-07-07 DIAGNOSIS — N60.92 ATYPICAL DUCTAL HYPERPLASIA OF LEFT BREAST: Primary | ICD-10-CM

## 2025-07-07 DIAGNOSIS — F41.9 ANXIETY: ICD-10-CM

## 2025-07-07 PROCEDURE — G0463 HOSPITAL OUTPT CLINIC VISIT: HCPCS | Performed by: INTERNAL MEDICINE

## 2025-07-07 PROCEDURE — G2211 COMPLEX E/M VISIT ADD ON: HCPCS | Performed by: INTERNAL MEDICINE

## 2025-07-07 PROCEDURE — 99214 OFFICE O/P EST MOD 30 MIN: CPT | Performed by: INTERNAL MEDICINE

## 2025-07-07 ASSESSMENT — PAIN SCALES - GENERAL: PAINLEVEL_OUTOF10: NO PAIN (0)

## 2025-07-07 NOTE — PROGRESS NOTES
"Oncology Rooming Note    July 7, 2025 2:41 PM   Emma Rudolph is a 74 year old female who presents for:    Chief Complaint   Patient presents with    Oncology Clinic Visit     Initial Vitals: BP (!) 149/83   Pulse 103   Resp 16   Ht 1.626 m (5' 4\")   Wt 73.9 kg (163 lb)   LMP  (LMP Unknown)   SpO2 99%   BMI 27.98 kg/m   Estimated body mass index is 27.98 kg/m  as calculated from the following:    Height as of this encounter: 1.626 m (5' 4\").    Weight as of this encounter: 73.9 kg (163 lb). Body surface area is 1.83 meters squared.  No Pain (0) Comment: Data Unavailable   No LMP recorded (lmp unknown). Patient is postmenopausal.  Allergies reviewed: Yes  Medications reviewed: Yes    Medications: Medication refills not needed today.  Pharmacy name entered into [employer]:    [id number] - [place], [place] - [address] AT [facility] [address] [facility]  [facility] [id number] - [place], [place] - [address] [id number] - [place], [place] - [facility] [address]    Frailty Screening:   Is the patient here for a new oncology consult visit in cancer care? 2. No    PHQ9:  Did this patient require a PHQ9?: No          Lise Barnes MA            "

## 2025-07-07 NOTE — LETTER
7/7/2025      Emma Rudolph  [address]  [facility] [address]      Dear Colleague,    Thank you for referring your patient, Emma Rudolph, to the [facility]. Please see a copy of my visit note below.    [facility]    Hematology/Oncology Established Patient Note      Today's Date: 7/7/2025    Reason for follow-up: Atypical ductal hyperplasia.    HISTORY OF PRESENT ILLNESS: Emam Rudolph is a 74 year old female who presents with the following issues:  1. 9/18/2020: Mammogram showed architectural distortion in left breast at 11:00-1:00 retroareolar mid-depth. No concerning findings in right breast.  2. 9/24/2020: Left breast U/S showed oval hypoechoic mass measuring 1.1 cm at 11:00, 3 cm from nipple; oval hypoechoic mass measuring 1 cm at 12:00, 3 cm from nipple; normal-appearing parenchyma between 2 lesions. No enlarged lymph nodes in left axilla.  3. 10/2/2020: U/S-guided left breast needles biopsy at 12:00 showed a complex sclerosing lesion, negative for atypica and malignancy. Biopsy at 11:00 showed benign breast tissue with adenosis, negative for atypica and malignancy.  4. 10/28/2020: Underwent left breast excision under care of Dr. Eileen Montes. Pathology was negative for malignancy but showed atypical ductal hyperplasia, apocrine metaplasia, papillomatosis, microcalcifications.  5. 11/18/2020: Started raloxifene.    INTERVAL HISTORY:  Pat reports feeling well with no new breast issues.    REVIEW OF SYSTEMS:   14 point ROS was reviewed and is negative other than as noted above in HPI.       HOME MEDICATIONS:  Current Outpatient Medications   Medication Sig Dispense Refill     amLODIPine (NORVASC) 5 MG tablet TAKE ONE TABLET BY MOUTH ONE TIME DAILY 90 tablet 2     atorvastatin (LIPITOR) 20 MG tablet Take 1 tablet (20 mg) by mouth daily. 90 tablet 2     Cholecalciferol (VITAMIN D) 1000 UNITS capsule Take 1 capsule by mouth daily.        desvenlafaxine (PRISTIQ) 100 MG 24 hr tablet Take 1 tablet (100 mg) by mouth daily. 90 tablet 2     lisinopril (ZESTRIL) 40 MG tablet TAKE ONE TABLET BY MOUTH ONE TIME DAILY 90 tablet 2     Multiple Vitamins-Minerals (MULTIVITAMIN OR) Take by mouth daily       raloxifene (EVISTA) 60 MG tablet Take 1 tablet (60 mg) by mouth daily. 90 tablet 3         ALLERGIES:  Allergies   Allergen Reactions     Penicillins Swelling, Rash and Hives     Amoxicillin     Sulfa Antibiotics Hives         PAST MEDICAL HISTORY:  Past Medical History:   Diagnosis Date     Breast lesion     LEFT     Depressive disorder 1980s? taken meds since it started     Generalized anxiety disorder      Hyperlipidemia LDL goal <130      Hypertension      Mild major depression      Osteoarthritis of multiple joints, unspecified osteoarthritis type of #2&3 fingers bilat R>L      Vitamin D deficiency disease      Gynecologic history:  Age of menarche at 13-14; G0, no HRT or OCP use.      PAST SURGICAL HISTORY:  Past Surgical History:   Procedure Laterality Date     BIOPSY BREAST SEED LOCALIZATION Left 10/28/2020    Procedure: 2 SEED LOCALIZED LEFT BREAST BIOPSY;  Surgeon: Eileen Montes MD;  Location:  OR     BREAST SURGERY  2020     COLONOSCOPY       EYE SURGERY Left 11/2020    vitrictomy      GYN SURGERY       H ARGON LASER FOR RETINAL TEAR Left 3/2014     HEAD & NECK SURGERY      wisdom teeth     LAPAROSCOPIC SINGLE SITE SALPINGO-OOPHORECTOMY Bilateral 4/15/2015    Procedure: LAPAROSCOPIC SINGLE SITE SALPINGO-OOPHORECTOMY;  Surgeon: Leonora Zavaleta MD;  Location: [facility]     wisdom teeth removal  age 19         SOCIAL HISTORY:  Social History     Socioeconomic History     Marital status: Single     Spouse name: Not on file     Number of children: Not on file     Years of education: Not on file     Highest education level: Not on file   Occupational History     Employer: RETIRED     Comment: [employer]   Tobacco Use     Smoking status: Never      Smokeless tobacco: Never   Vaping Use     Vaping status: Never Used   Substance and Sexual Activity     Alcohol use: Yes     Comment: infrequent     Drug use: No     Sexual activity: Not Currently     Partners: Female   Other Topics Concern     Parent/sibling w/ CABG, MI or angioplasty before 65F 55M? Yes     Comment: father, infarction at age 44      Service Not Asked     Blood Transfusions Not Asked     Caffeine Concern Not Asked     Occupational Exposure Not Asked     Hobby Hazards Not Asked     Sleep Concern Not Asked     Stress Concern Not Asked     Weight Concern Not Asked     Special Diet Not Asked     Back Care Not Asked     Exercise Not Asked     Bike Helmet Not Asked     Seat Belt Yes     Self-Exams Not Asked   Social History Narrative     Not on file     Social Drivers of Health     Financial Resource Strain: Low Risk  (9/27/2024)    Financial Resource Strain      Within the past 12 months, have you or your family members you live with been unable to get utilities (heat, electricity) when it was really needed?: No   Food Insecurity: Low Risk  (9/27/2024)    Food Insecurity      Within the past 12 months, did you worry that your food would run out before you got money to buy more?: No      Within the past 12 months, did the food you bought just not last and you didn t have money to get more?: No   Transportation Needs: Low Risk  (9/27/2024)    Transportation Needs      Within the past 12 months, has lack of transportation kept you from medical appointments, getting your medicines, non-medical meetings or appointments, work, or from getting things that you need?: No   Physical Activity: Sufficiently Active (9/27/2024)    Exercise Vital Sign      Days of Exercise per Week: 3 days      Minutes of Exercise per Session: 60 min   Stress: No Stress Concern Present (9/27/2024)    [employer] [place] [employer] - Occupational Stress Questionnaire      Feeling of Stress : Only a little   Social  Connections: Unknown (9/27/2024)    Social Connection and Isolation Panel [NHANES]      Frequency of Communication with Friends and Family: Not on file      Frequency of Social Gatherings with Friends and Family: Twice a week      Attends Rastafarian Services: Not on file      Active Member of Clubs or Organizations: Not on file      Attends Club or Organization Meetings: Not on file      Marital Status: Not on file   Interpersonal Safety: Not on file   Housing Stability: Low Risk  (9/27/2024)    Housing Stability      Do you have housing? : Yes      Are you worried about losing your housing?: No         FAMILY HISTORY:  Family History   Problem Relation Age of Onset     Breast Cancer Mother 80     Neurologic Disorder Father         Parkinson's     Coronary Artery Disease Father 44        MI     Hyperlipidemia Father      Anxiety Disorder Father      Hypertension Father      Lipids Brother      Hypertension Brother      Hyperlipidemia Brother      Lipids Brother      Family History Negative Brother      Lipids Sister      Hypertension Sister      Hyperlipidemia Sister      Family History Negative Sister      Family History Negative Sister      Family History Negative Sister      Cerebrovascular Disease Maternal Grandmother 86     Cancer Maternal Grandfather 69        leukemia     Other Cancer Maternal Grandfather         leukemia     Cancer Paternal Grandfather 69        stomach     Other Cancer Paternal Grandfather         cancer     Diabetes No family hx of      Cancer - colorectal No family hx of      Hyperlipidemia No family hx of      Colon Cancer No family hx of      Prostate Cancer No family hx of      Other Cancer No family hx of      Depression No family hx of      Anxiety Disorder No family hx of      Mental Illness No family hx of      Substance Abuse No family hx of      Anesthesia Reaction No family hx of      Asthma No family hx of      Osteoporosis No family hx of      Genetic Disorder No family hx of       Thyroid Disease No family hx of      Obesity No family hx of      Unknown/Adopted No family hx of          PHYSICAL EXAM:  Vital signs:  LMP  (LMP Unknown)   GENERAL/CONSTITUTIONAL: No acute distress.  EYES: No erythema or scleral icterus.  LYMPH: No cervical, supraclavicular, axillary, epitroclear adenopathy.   BREAST: No palpable masses in either breast; nipples everted with no discharge; no rash or ulceration. Slight concavity at the left breast prior excision site.  RESPIRATORY: No audible cough or wheezing.   MUSCULOSKELETAL: Warm and well-perfused, no cyanosis, clubbing, or edema.  NEUROLOGIC: Alert, oriented, answers questions appropriately.  INTEGUMENTARY: No rashes or jaundice.  GAIT: Steady, does not use assistive device    LABS:  CBC RESULTS: Recent Labs   Lab Test 05/24/23  2154   WBC 7.5   RBC 4.47   HGB 14.3   HCT 42.4   MCV 95   MCH 32.0   MCHC 33.7   RDW 12.2          Last Comprehensive Metabolic Panel:  Sodium   Date Value Ref Range Status   10/01/2024 139 135 - 145 mmol/L Final   08/27/2020 138 133 - 144 mmol/L Final     Potassium   Date Value Ref Range Status   10/01/2024 4.4 3.4 - 5.3 mmol/L Final   10/08/2021 3.7 3.4 - 5.3 mmol/L Final   08/27/2020 3.5 3.4 - 5.3 mmol/L Final     Chloride   Date Value Ref Range Status   10/01/2024 103 98 - 107 mmol/L Final   10/08/2021 107 94 - 109 mmol/L Final   08/27/2020 106 94 - 109 mmol/L Final     Carbon Dioxide   Date Value Ref Range Status   08/27/2020 26 20 - 32 mmol/L Final     Carbon Dioxide (CO2)   Date Value Ref Range Status   10/01/2024 24 22 - 29 mmol/L Final   10/08/2021 25 20 - 32 mmol/L Final     Anion Gap   Date Value Ref Range Status   10/01/2024 12 7 - 15 mmol/L Final   10/08/2021 8 3 - 14 mmol/L Final   08/27/2020 6 3 - 14 mmol/L Final     Glucose   Date Value Ref Range Status   10/01/2024 112 (H) 70 - 99 mg/dL Final   10/08/2021 104 (H) 70 - 99 mg/dL Final   08/27/2020 110 (H) 70 - 99 mg/dL Final     Comment:     Fasting specimen      Urea Nitrogen   Date Value Ref Range Status   10/01/2024 11.2 8.0 - 23.0 mg/dL Final   10/08/2021 12 7 - 30 mg/dL Final   2020 15 7 - 30 mg/dL Final     Creatinine   Date Value Ref Range Status   10/01/2024 0.93 0.51 - 0.95 mg/dL Final   2020 0.81 0.52 - 1.04 mg/dL Final     GFR Estimate   Date Value Ref Range Status   10/01/2024 65 >60 mL/min/1.73m2 Final     Comment:     eGFR calculated using  CKD-EPI equation.   2021 71 >60 mL/min/[1.73_m2] Final     Calcium   Date Value Ref Range Status   10/01/2024 9.5 8.8 - 10.4 mg/dL Final     Comment:     Reference intervals for this test were updated on 2024 to reflect our healthy population more accurately. There may be differences in the flagging of prior results with similar values performed with this method. Those prior results can be interpreted in the context of the updated reference intervals.   2020 9.4 8.5 - 10.1 mg/dL Final     Bilirubin Total   Date Value Ref Range Status   2023 0.8 <=1.2 mg/dL Final   2020 1.3 0.2 - 1.3 mg/dL Final     Alkaline Phosphatase   Date Value Ref Range Status   2023 105 (H) 35 - 104 U/L Final   2020 122 40 - 150 U/L Final     ALT   Date Value Ref Range Status   10/01/2024 22 0 - 50 U/L Final   2020 65 (H) 0 - 50 U/L Final     AST   Date Value Ref Range Status   2023 33 10 - 35 U/L Final   2020 35 0 - 45 U/L Final         PATHOLOGY:  None new.    IMAGIN2024: Mammogram showed no malignant findings.    2025: Breast MRI showed no concerning findings.    ASSESSMENT/PLAN:  Emma Rudolph is a 74 year old female with the following issues:  1. Left breast atypical ductal hyperplasia  --Pat is aware that atypical ductal hyperplasia is considered a marker for increased risk of developing invasive breast cancer, approximately ranging 11-40%, 5-15 years from time of diagnosis of ADH.  She is aware this is not a cancer.  --She is tolerating raloxifene  "well for risk reduction. Advised total 5 years of raloxifene, through 11/2025.  --She has no breast abnormalities on physical exam today or breast MRI reviewed from 5/6/2025.  --Can discontinue MRI as her breast tissue is not extremely dense. She is comfortable with this plan of care.    2. Anxiety  -Mood stable.  -Continue desvenlafaxine.    Return in 1 year.    Priya Ontiveros MD  [facility] Hematology/Oncology     Total time spent today: 30 minutes in chart review, patient evaluation, counseling, documentation, test and/or medication/prescription orders, and coordination of care.    The longitudinal plan of care for the diagnosis(es)/condition(s) as documented were addressed during this visit. Due to the added complexity in care, I will continue to support Pat in the subsequent management and with ongoing continuity of care.      Oncology Rooming Note    July 7, 2025 2:41 PM   Emma Rudolph is a 74 year old female who presents for:    Chief Complaint   Patient presents with     Oncology Clinic Visit     Initial Vitals: BP (!) 149/83   Pulse 103   Resp 16   Ht 1.626 m (5' 4\")   Wt 73.9 kg (163 lb)   LMP  (LMP Unknown)   SpO2 99%   BMI 27.98 kg/m   Estimated body mass index is 27.98 kg/m  as calculated from the following:    Height as of this encounter: 1.626 m (5' 4\").    Weight as of this encounter: 73.9 kg (163 lb). Body surface area is 1.83 meters squared.  No Pain (0) Comment: Data Unavailable   No LMP recorded (lmp unknown). Patient is postmenopausal.  Allergies reviewed: Yes  Medications reviewed: Yes    Medications: Medication refills not needed today.  Pharmacy name entered into [facility]:    [id number] - [place], [place] - [address] AT [facility] & [address] [place] [address] PHARMACY # [id number] - [place], [place] - [address] [id number] - [place], [place] - [address]    Frailty Screening:   Is the patient here for a new oncology consult visit in " cancer care? 2. No    PHQ9:  Did this patient require a PHQ9?: No          Lise Barnes MA              Again, thank you for allowing me to participate in the care of your patient.        Sincerely,        Priya Ontiveros MD    Electronically signed

## 2025-07-07 NOTE — PATIENT INSTRUCTIONS
1. Completion of raloxifene in November 2025.  2. Arrange for mammogram for 12/2025.  3. RTC MD in 1 year.

## 2025-07-14 ENCOUNTER — THERAPY VISIT (OUTPATIENT)
Dept: OCCUPATIONAL THERAPY | Facility: CLINIC | Age: 75
End: 2025-07-14
Attending: PSYCHIATRY & NEUROLOGY
Payer: COMMERCIAL

## 2025-07-14 DIAGNOSIS — F02.A0 MILD LATE ONSET ALZHEIMER'S DEMENTIA WITHOUT BEHAVIORAL DISTURBANCE, PSYCHOTIC DISTURBANCE, MOOD DISTURBANCE, OR ANXIETY (H): Primary | ICD-10-CM

## 2025-07-14 DIAGNOSIS — R41.89 COGNITIVE CHANGE: ICD-10-CM

## 2025-07-14 DIAGNOSIS — G30.1 MILD LATE ONSET ALZHEIMER'S DEMENTIA WITHOUT BEHAVIORAL DISTURBANCE, PSYCHOTIC DISTURBANCE, MOOD DISTURBANCE, OR ANXIETY (H): Primary | ICD-10-CM

## 2025-07-14 PROCEDURE — 97165 OT EVAL LOW COMPLEX 30 MIN: CPT | Mod: GO

## 2025-07-14 PROCEDURE — 97535 SELF CARE MNGMENT TRAINING: CPT | Mod: GO

## 2025-07-14 PROCEDURE — 96125 COGNITIVE TEST BY HC PRO: CPT | Mod: GO

## 2025-07-14 NOTE — PROGRESS NOTES
Occupational Therapy   Cognitive Performance Test    SUMMARY OF TEST:    The Cognitive Performance Test (CPT) is a standardized performance-based assessment to measure working memory/executive function processing capacities that underlie functional performance. Subtasks include common basic and instrumental activities of daily living (ADL/IADL) which are rated based on the manner in which patients respond to task demands of varying complexity. The total CPT score describes a level of functioning that indicates how information is processed, implications for functional activities, potential safety risks and a recommended level of supervision or assist based on cognitive function. The highest total score on this test is in the range of 5.6 to 5.8.    DATE OF TESTIN25    Ordering Provider: Steve Connor DO    Order date: 25    Order Diagnosis:Mild late onset Alzheimer's dementia without behavioral disturbance, psychotic disturbance, mood disturbance, or anxiety (H) [G30.1, F02.A0]  - Primary    Occupational Profile (including diagnosis and medical history): Patient is a 74 year old female who was referred to outpatient occupational therapy at the request of Dr. Connor for re-administration of The Cognitive Performance Test (CPT) in setting of Alzheimer's dementia. Patient has a past medical history of hyperlipidemia, hypertension, generalized anxiety disorder, depression and left breast mass status postresection. She was seen by neurology for initial consultation 2023 and most recently 2024 and findings suggested a diagnosis of Alzheimer's disease. Previous CPT results on 24 revealed a score of 4.8/5.6.     Celi presented to session accompanied by her sister, Rebecca, who was present and contributed to history. Celi lives alone in her home of 37 years in [place]. She has a brother she sees weekly for coffee and her sister, Rebecca is available for support as needed and lives  "in [place]. She has a total of 7 siblings (no kids, never ). 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 She usually does yoga weekly but not going currently due to construction. Rebecca states Celi is receiving check-in support from family and neighbors. She is now bringing family members along to appointments. She does not think there is anything wrong with her memory and she functions like she always has.        Functional History Interview:    Previous cognitive screens completed in OT or by Physician and score:   MoCA 23/30 with neurology on 12/10/2023.  Previous CPT results on 7/23/24 revealed a score of 4.8/5.6     Functional History Interview:    Informants: Patient and sister, Rebecca who lives in [place]      Client s perception of memory/ thinking or functional difficulties: She states she is the same as how she always has been and does not recognize cognitive/memory changes that her doctor's have identified. She does not know why she needs to be here today. She endorses \"I was in a funk, that's why I had to come here before\" Her sister notes she is in denial of diagnosis. Her sister states she has been able to call them if she needs help with something or has called a  if needed.      Living situation: Patient lives alone in her house of 37 years     Home maintenance activities: Reports being independent with cleaning, laundry, yardwork.     Meal preparation and grocery shopping: She manages her own meals, primarily using toaster oven or microwave. She rarely uses stove/oven. She manages grocery shopping independently     Appointments: She manages her own appointments using a calendar     Finances and paying bills: She is managing her own finances " "independently. Patient and family deny missed payments     Medication management: Patient sets up her own medications using pill box and she recalls to take medications on time.      Driving and transportation: She drives independently without requiring GPS. When inquiring about how she locates unfamiliar areas, she states she can look it up but her dad taught her how to drive without a map. She is not getting lost     Leisure and routine activities: Patient enjoys geniology research for the [facility].  Patient goes to [employer] each week, working at the [employer] and Saturday volunteers at a [employer]. She usually does yoga weekly but not going currently due to construction     ADL/Mobility/Physical Functioning: Independent in ADL's and ambulates independently.     Fall Risk Screen:   Has the patient fallen 2 or more times in the last year? No      Has the patient fallen and had an injury in the past year? No       Timed Up and Go Score: NT    Is the patient a fall risk? No      RESULTS OF TESTING:                                                                                         CPT Subtest Results    MEDBOX: 6/6 SHOP/GLOVES: 6/6 PHONE: 6/6   WASH:  5/5 TRAVEL: 6/6 TOAST: 5/5   DRESS: NT/5   TOTAL CPT SCORE:  34/34     Average CPT Score  5.6/5.6    6/6  MEDBOX: Able to correctly follow directions on 4/4 medications. She was able to omit \"as needed medication\" and state these would not go in pill box. She was able to states Thinifa medication would have to go into the next week's pillbox on Sunday 6/6  SHOP: Attends to funds available prior to choosing gloves; chooses appropriate size, recognized differences in prices and currently pays exact amount.       5/5  WASH: Locates necessary items for task  and sequences without difficulty       5/5  TOAST: Independently locates outlet sequences task appropriately.  States \"I always get asked to make toast and I don't even eat " "toast\"      6/6  PHONE: Writes down information, locates number in paint section of phone book, dials without difficulty and asks correct question     NT/5 DRESS: NT      6/6  TRAVEL: Independently locates structure using topographical map.         Suprisingly improved performance compared to last CPT 7/2024. Patient did demonstrates impulsive behaviors during subtests and appeared anxious to work faster and requires cues to listen to instructions prior to initiating task    INTERPRETATION OF TEST RESULTS:    Based on the Cognitive Performance Test, this patient scored at CPT Level 5.6.  See CPT Levels reference below.    Summary of functional cognitive status:     Normal functioning (absence of cognitive-functional disability).  Independent in managing personal affairs, monitors and directs own behavior.  Uses complex information to carry out daily activities with safety and accuracy.    Proficient with instrumental activities of daily living (IADL) and learning new activity.  Problems are anticipated, errors are avoided, and consequences of actions are considered.      Factors affecting performance:  Impulsive behaviors    Recommendations:    Oversight/Supervision for ADL/IADL:  higher level finances, life planning, more complex medication regimens, appointments; consider tracker for safety; make tasks simpler or assist with new, complex tasks. Encourage structure and routine to optimize safety and independence   Supervision in living setting:  Weekly checks from family. Encouraged family to be available for support/assist as needed                        CPT goal statement:  Patient and family to verbalize understanding of Cognitive Performance Test results and recommendations and identify strategies to increase patient's safety in her home setting. - met                                 TIME ADMINISTERING TEST: 30    TIME FOR INTERPRETATION AND PREPARATION OF REPORT: 20    TOTAL TIME: 50      CPT Levels " "Reference:    Patient's Average CPT Score:  5.6                                                                                                                                                  Individual scores range along a continuum as outlined below.  In addition to cognitive status, other factors may affect safety in a home environment.  Please refer to specific recommendations for this patient.    __x_5.6-5.8  Normal functioning (absence of cognitive-functional disability).  Independent in managing personal affairs, monitors and directs own behavior.  Uses complex information to carry out daily activities with safety and accuracy.    Proficient with instrumental activities of daily living (IADL) and learning new activity.  Problems are anticipated, errors are avoided, and consequences of actions are considered.      ___5.0   Mild cognitive-functional disability; deficits in working memory and executive thought processes. Difficulty using complex information. Problems may be observed with recent memory, judgment, reasoning and planning ahead. May be impulsive or have difficulty anticipating consequences.  Safety:  May require assistance to plan ahead; or to manage complex medication schedules, appointments or finances.  Hazardous activities may need to be monitored or limited.  ADL:  Mild functional decline.  Able to complete basic self-care and routine household tasks.  May have difficulty with complex daily tasks such as reading, writing, meal preparation, shopping or driving.   Learns through hands on teaching. Self-centered behavior or difficulty considering the needs of others may be seen related to trouble seeing the  whole picture\". Can appear disorganized or uninhibited.    ___4.5  Mild to moderate cognitive-functional disability. Significant deficits in working memory and executive thought processes. Judgment, reasoning and planning show obvious impairment.  Distractible with inability to shift " attention/actions given competing stimuli.  Difficulty with problem solving and managing details. Complex daily tasks performed with inconsistency, difficulty, or error.     Safety:  Medications should be monitored, stove use may require supervision, and driving ability may be affected.  Impaired safety awareness with inability to anticipate potential problems.  May not recognize or respond to emergent situations. Requires frequent check-in support.   ADL:  Mild difficulty with simple everyday self-care tasks. Benefits from structured, routine activity.  Will likely need reminders to complete tasks outside of the routine. Requires assistance with planning and IADL tasks like shopping and finances. Learns concrete tasks through repetition, but performance may not generalize. Tends to be impulsive with poor insight. Self centered behavior or inability to consider the needs of others is common.    ___4.0  Moderate cognitive-functional disability; abstract to concrete thought processes. Working memory and executive function impairments are obvious. Difficulty with planning and problem solving.  Behavior is goal-directed, but unable to follow multi-step directions, is easily distracted, and may not recognize mistakes.  Inability to anticipate hazards or understand precautions.  Safety:  Recommend 24-hour supervision for safety. Supervision needed for medication management and for hazardous activities. May not be able to follow a restricted diet. Can get lost in unfamiliar surroundings. Generally, persons functioning at level 4 should not be driving.   ADL:  Some decline in quality or frequency of ADL.  [place] enhanced by use of a routine, simple concrete directions, and caregiver set-up of needed items. Complex tasks such as money or home management typically requires assistance.  Relies heavily on vision to guide behavior; will ignore objects/hazards not in plain sight and can be distracted by irrelevant objects.  Often has poor insight.  Able to carry out social conversation and may verbally  cover  for deficits leading caregivers to believe they are capable of functioning independently.       ___3.5  Moderate cognitive-functional disability; increased cues needed for task completion. Aware of concrete task steps but needs prompting or cues to initiate and complete simple tasks. Attention span is limited, simple directions may need to be repeated, and re-focus to a topic or task may be required.  Safety:  24-hour supervision required for safety and for assistance with daily tasks. Assistance required with medications, and access to medication should be limited. Meals, nutrition and dietary restrictions need to be monitored.  All hazardous activities should be restricted or supervised. Should not drive. Prone to wandering and can become lost.  ADL:  Moderate functional decline. Familiar tasks usually requires set-up of supplies and directions to complete steps. May need objects handed to them for task initiation. Function best with a set schedule in familiar surroundings with familiar people. All complex tasks must be done by others. Vocabulary is diminished and speech often unfocused.         [facility]                                                                                   OUTPATIENT OCCUPATIONAL THERAPY    PLAN OF TREATMENT FOR OUTPATIENT REHABILITATION   Patient's Last Name, First Name, EMI RudolphEmma  MASSIEL YOB: 1950   Provider's Name   [facility]   Medical Record No.  [id number]     Onset Date: 06/16/25 Start of Care Date: 07/14/25     Medical Diagnosis:  Mild late onset Alzheimer's dementia without behavioral disturbance, psychotic disturbance, mood disturbance, or anxiety (H) (G30.1, F02.A0)  - Primary      OT Treatment Diagnosis:  memory change Plan of Treatment  Frequency/Duration:1 visit/1 visit    Certification date from  07/14/25   To 07/14/25        See note for plan of treatment details and functional goals     Laly Govea, OTR/L, CNS, CSRS                         I CERTIFY THE NEED FOR THESE SERVICES FURNISHED UNDER        THIS PLAN OF TREATMENT AND WHILE UNDER MY CARE     (Physician attestation of this document indicates review and certification of the therapy plan).              Referring Provider:  Steve Connor    Initial Assessment  See Epic Evaluation- 07/14/25

## (undated) DEVICE — NDL 19GA 1.5"

## (undated) DEVICE — SU MONOCRYL 4-0 PS-2 18" UND Y496G

## (undated) DEVICE — SU VICRYL 3-0 SH 27" J316H

## (undated) DEVICE — LINEN TOWEL PACK X5 5464

## (undated) DEVICE — GLOVE PROTEXIS W/NEU-THERA 6.5  2D73TE65

## (undated) DEVICE — SLEEVE PROTECTIVE BREAST BIOPSY  GMSLV001-10

## (undated) DEVICE — PREP CHLORAPREP 26ML TINTED ORANGE  260815

## (undated) DEVICE — PAD CHUX UNDERPAD 23X24" 7136

## (undated) DEVICE — NDL 22GA 1.5"

## (undated) DEVICE — ESU PENCIL W/SMOKE EVAC CVPLP2000

## (undated) DEVICE — PACK MINOR SBA15MIFSE

## (undated) DEVICE — CLIP ETHICON LIGACLIP SM BLUE LT100

## (undated) DEVICE — SYR BULB IRRIG 50ML LATEX FREE 0035280

## (undated) DEVICE — BNDG ELASTIC 6" DBL LENGTH UNSTERILE 6611-16

## (undated) DEVICE — SYR 10ML FINGER CONTROL W/O NDL 309695

## (undated) DEVICE — ESU ELEC BLADE 2.75" COATED/INSULATED E1455

## (undated) DEVICE — DRAPE BREAST/CHEST 29420

## (undated) DEVICE — APPLICATOR COTTON TIP 6"X2 STERILE LF 6012

## (undated) RX ORDER — CLINDAMYCIN PHOSPHATE 900 MG/50ML
INJECTION, SOLUTION INTRAVENOUS
Status: DISPENSED
Start: 2020-10-28

## (undated) RX ORDER — FENTANYL CITRATE 50 UG/ML
INJECTION, SOLUTION INTRAMUSCULAR; INTRAVENOUS
Status: DISPENSED
Start: 2020-10-28

## (undated) RX ORDER — HYDROCODONE BITARTRATE AND ACETAMINOPHEN 5; 325 MG/1; MG/1
TABLET ORAL
Status: DISPENSED
Start: 2020-10-28

## (undated) RX ORDER — FENTANYL CITRATE 0.05 MG/ML
INJECTION, SOLUTION INTRAMUSCULAR; INTRAVENOUS
Status: DISPENSED
Start: 2020-10-28

## (undated) RX ORDER — PROPOFOL 10 MG/ML
INJECTION, EMULSION INTRAVENOUS
Status: DISPENSED
Start: 2020-10-28

## (undated) RX ORDER — ONDANSETRON 2 MG/ML
INJECTION INTRAMUSCULAR; INTRAVENOUS
Status: DISPENSED
Start: 2020-10-28